# Patient Record
Sex: FEMALE | Race: BLACK OR AFRICAN AMERICAN | Employment: FULL TIME | URBAN - NONMETROPOLITAN AREA
[De-identification: names, ages, dates, MRNs, and addresses within clinical notes are randomized per-mention and may not be internally consistent; named-entity substitution may affect disease eponyms.]

---

## 2017-07-07 PROBLEM — C20 MALIGNANT NEOPLASM OF RECTUM (HCC): Status: ACTIVE | Noted: 2017-07-07

## 2017-07-17 ENCOUNTER — HOSPITAL ENCOUNTER (EMERGENCY)
Age: 52
Discharge: HOME | End: 2017-07-17
Payer: COMMERCIAL

## 2017-07-17 VITALS
TEMPERATURE: 99.2 F | RESPIRATION RATE: 16 BRPM | BODY MASS INDEX: 27.21 KG/M2 | WEIGHT: 135 LBS | OXYGEN SATURATION: 97 % | HEIGHT: 59 IN | HEART RATE: 92 BPM | SYSTOLIC BLOOD PRESSURE: 153 MMHG | DIASTOLIC BLOOD PRESSURE: 102 MMHG

## 2017-07-17 DIAGNOSIS — C20 MALIGNANT NEOPLASM OF RECTUM (HCC): Primary | ICD-10-CM

## 2017-07-17 PROCEDURE — 99283 EMERGENCY DEPT VISIT LOW MDM: CPT

## 2017-07-17 PROCEDURE — 6370000000 HC RX 637 (ALT 250 FOR IP): Performed by: NURSE PRACTITIONER

## 2017-07-17 RX ORDER — OXYCODONE HYDROCHLORIDE AND ACETAMINOPHEN 5; 325 MG/1; MG/1
2 TABLET ORAL ONCE
Status: COMPLETED | OUTPATIENT
Start: 2017-07-17 | End: 2017-07-17

## 2017-07-17 RX ORDER — LIDOCAINE 50 MG/G
OINTMENT TOPICAL
Qty: 50 G | Refills: 1 | Status: SHIPPED | OUTPATIENT
Start: 2017-07-17 | End: 2018-01-25 | Stop reason: SDUPTHER

## 2017-07-17 RX ADMIN — OXYCODONE HYDROCHLORIDE AND ACETAMINOPHEN 2 TABLET: 5; 325 TABLET ORAL at 23:00

## 2017-07-17 ASSESSMENT — ENCOUNTER SYMPTOMS
RHINORRHEA: 0
RECTAL PAIN: 1
EYE PAIN: 0
SHORTNESS OF BREATH: 0
DIARRHEA: 0
VOMITING: 0
EYE DISCHARGE: 0
BACK PAIN: 0
WHEEZING: 0
SORE THROAT: 0
ANAL BLEEDING: 1
NAUSEA: 0
COUGH: 0
ABDOMINAL PAIN: 0

## 2017-07-17 ASSESSMENT — PAIN SCALES - GENERAL
PAINLEVEL_OUTOF10: 8
PAINLEVEL_OUTOF10: 8

## 2017-07-17 ASSESSMENT — PAIN DESCRIPTION - LOCATION: LOCATION: RECTUM

## 2017-07-17 ASSESSMENT — PAIN DESCRIPTION - DESCRIPTORS: DESCRIPTORS: BURNING

## 2017-07-19 ENCOUNTER — HOSPITAL ENCOUNTER (OUTPATIENT)
Dept: PET IMAGING | Age: 52
Discharge: HOME | End: 2017-07-19
Payer: COMMERCIAL

## 2017-07-19 DIAGNOSIS — C79.89 COLON CANCER METASTASIZED TO PELVIS (HCC): ICD-10-CM

## 2017-07-19 DIAGNOSIS — C18.9 COLON CANCER METASTASIZED TO PELVIS (HCC): ICD-10-CM

## 2017-07-19 PROCEDURE — 3430000000 HC RX DIAGNOSTIC RADIOPHARMACEUTICAL: Performed by: RADIOLOGY

## 2017-07-19 PROCEDURE — 78815 PET IMAGE W/CT SKULL-THIGH: CPT

## 2017-07-19 PROCEDURE — A9552 F18 FDG: HCPCS | Performed by: RADIOLOGY

## 2017-07-19 RX ORDER — FLUDEOXYGLUCOSE F 18 200 MCI/ML
10 INJECTION, SOLUTION INTRAVENOUS
Status: COMPLETED | OUTPATIENT
Start: 2017-07-19 | End: 2017-07-19

## 2017-07-19 RX ADMIN — FLUDEOXYGLUCOSE F 18 10.9 MILLICURIE: 200 INJECTION, SOLUTION INTRAVENOUS at 08:13

## 2017-09-29 ENCOUNTER — APPOINTMENT (OUTPATIENT)
Dept: CT IMAGING | Age: 52
End: 2017-09-29
Payer: COMMERCIAL

## 2017-09-29 ENCOUNTER — HOSPITAL ENCOUNTER (EMERGENCY)
Age: 52
Discharge: HOME | End: 2017-09-29
Payer: COMMERCIAL

## 2017-09-29 VITALS
OXYGEN SATURATION: 98 % | HEART RATE: 99 BPM | DIASTOLIC BLOOD PRESSURE: 103 MMHG | TEMPERATURE: 98.2 F | RESPIRATION RATE: 18 BRPM | SYSTOLIC BLOOD PRESSURE: 170 MMHG

## 2017-09-29 DIAGNOSIS — K62.89 RECTAL PAIN: ICD-10-CM

## 2017-09-29 DIAGNOSIS — K59.03 DRUG-INDUCED CONSTIPATION: ICD-10-CM

## 2017-09-29 DIAGNOSIS — Z12.12 SCREENING FOR MALIGNANT NEOPLASM OF THE RECTUM: Primary | ICD-10-CM

## 2017-09-29 LAB
ALBUMIN SERPL-MCNC: 4 G/DL (ref 3.5–5.1)
ALP BLD-CCNC: 69 U/L (ref 38–126)
ALT SERPL-CCNC: 22 U/L (ref 11–66)
ANION GAP SERPL CALCULATED.3IONS-SCNC: 15 MEQ/L (ref 8–16)
ANISOCYTOSIS: ABNORMAL
AST SERPL-CCNC: 14 U/L (ref 5–40)
BACTERIA: ABNORMAL /HPF
BASOPHILS # BLD: 0 %
BASOPHILS ABSOLUTE: 0 THOU/MM3 (ref 0–0.1)
BILIRUB SERPL-MCNC: 0.4 MG/DL (ref 0.3–1.2)
BILIRUBIN URINE: NEGATIVE
BLOOD, URINE: ABNORMAL
BUN BLDV-MCNC: 13 MG/DL (ref 7–22)
CALCIUM SERPL-MCNC: 9.5 MG/DL (ref 8.5–10.5)
CASTS 2: ABNORMAL /LPF
CASTS UA: ABNORMAL /LPF
CHARACTER, URINE: CLEAR
CHLORIDE BLD-SCNC: 97 MEQ/L (ref 98–111)
CO2: 27 MEQ/L (ref 23–33)
COLOR: YELLOW
CREAT SERPL-MCNC: 0.5 MG/DL (ref 0.4–1.2)
CRYSTALS, UA: ABNORMAL
EOSINOPHIL # BLD: 0.7 %
EOSINOPHILS ABSOLUTE: 0.1 THOU/MM3 (ref 0–0.4)
EPITHELIAL CELLS, UA: ABNORMAL /HPF
GFR SERPL CREATININE-BSD FRML MDRD: > 90 ML/MIN/1.73M2
GLUCOSE BLD-MCNC: 98 MG/DL (ref 70–108)
GLUCOSE URINE: NEGATIVE MG/DL
HCT VFR BLD CALC: 31.2 % (ref 37–47)
HEMOGLOBIN: 10.2 GM/DL (ref 12–16)
KETONES, URINE: NEGATIVE
LACTIC ACID: 0.7 MMOL/L (ref 0.5–2.2)
LEUKOCYTE ESTERASE, URINE: NEGATIVE
LYMPHOCYTES # BLD: 6.7 %
LYMPHOCYTES ABSOLUTE: 0.5 THOU/MM3 (ref 1–4.8)
MCH RBC QN AUTO: 28.5 PG (ref 27–31)
MCHC RBC AUTO-ENTMCNC: 32.6 GM/DL (ref 33–37)
MCV RBC AUTO: 87.6 FL (ref 81–99)
MISCELLANEOUS 2: ABNORMAL
MONOCYTES # BLD: 10.3 %
MONOCYTES ABSOLUTE: 0.8 THOU/MM3 (ref 0.4–1.3)
NITRITE, URINE: NEGATIVE
NUCLEATED RED BLOOD CELLS: 0 /100 WBC
OSMOLALITY CALCULATION: 277.6 MOSMOL/KG (ref 275–300)
PDW BLD-RTO: 17.8 % (ref 11.5–14.5)
PH UA: 6
PLATELET # BLD: 350 THOU/MM3 (ref 130–400)
PMV BLD AUTO: 6.6 MCM (ref 7.4–10.4)
POTASSIUM SERPL-SCNC: 3.4 MEQ/L (ref 3.5–5.2)
PROTEIN UA: NEGATIVE
RBC # BLD: 3.57 MILL/MM3 (ref 4.2–5.4)
RBC # BLD: NORMAL 10*6/UL
RBC URINE: ABNORMAL /HPF
RENAL EPITHELIAL, UA: ABNORMAL
SEG NEUTROPHILS: 82.3 %
SEGMENTED NEUTROPHILS ABSOLUTE COUNT: 6.6 THOU/MM3 (ref 1.8–7.7)
SODIUM BLD-SCNC: 139 MEQ/L (ref 135–145)
SPECIFIC GRAVITY, URINE: 1.01 (ref 1–1.03)
TOTAL PROTEIN: 7 G/DL (ref 6.1–8)
UROBILINOGEN, URINE: 0.2 EU/DL
WBC # BLD: 8 THOU/MM3 (ref 4.8–10.8)
WBC UA: ABNORMAL /HPF
YEAST: ABNORMAL

## 2017-09-29 PROCEDURE — 2580000003 HC RX 258: Performed by: PHYSICIAN ASSISTANT

## 2017-09-29 PROCEDURE — 83605 ASSAY OF LACTIC ACID: CPT

## 2017-09-29 PROCEDURE — 36415 COLL VENOUS BLD VENIPUNCTURE: CPT

## 2017-09-29 PROCEDURE — 80053 COMPREHEN METABOLIC PANEL: CPT

## 2017-09-29 PROCEDURE — 96361 HYDRATE IV INFUSION ADD-ON: CPT

## 2017-09-29 PROCEDURE — 96374 THER/PROPH/DIAG INJ IV PUSH: CPT

## 2017-09-29 PROCEDURE — 6360000002 HC RX W HCPCS: Performed by: PHYSICIAN ASSISTANT

## 2017-09-29 PROCEDURE — 6360000004 HC RX CONTRAST MEDICATION: Performed by: PHYSICIAN ASSISTANT

## 2017-09-29 PROCEDURE — 81001 URINALYSIS AUTO W/SCOPE: CPT

## 2017-09-29 PROCEDURE — 74177 CT ABD & PELVIS W/CONTRAST: CPT

## 2017-09-29 PROCEDURE — 96375 TX/PRO/DX INJ NEW DRUG ADDON: CPT

## 2017-09-29 PROCEDURE — 99284 EMERGENCY DEPT VISIT MOD MDM: CPT

## 2017-09-29 PROCEDURE — 85025 COMPLETE CBC W/AUTO DIFF WBC: CPT

## 2017-09-29 RX ORDER — OXYCODONE HYDROCHLORIDE AND ACETAMINOPHEN 5; 325 MG/1; MG/1
1 TABLET ORAL EVERY 4 HOURS PRN
Status: ON HOLD | COMMUNITY
End: 2017-10-27

## 2017-09-29 RX ORDER — HEPARIN SODIUM (PORCINE) LOCK FLUSH IV SOLN 100 UNIT/ML 100 UNIT/ML
100 SOLUTION INTRAVENOUS ONCE
Status: COMPLETED | OUTPATIENT
Start: 2017-09-29 | End: 2017-09-29

## 2017-09-29 RX ORDER — HYDROCORTISONE ACETATE 25 MG/1
25 SUPPOSITORY RECTAL 2 TIMES DAILY
Status: ON HOLD | COMMUNITY
End: 2018-02-08

## 2017-09-29 RX ORDER — KETOROLAC TROMETHAMINE 30 MG/ML
30 INJECTION, SOLUTION INTRAMUSCULAR; INTRAVENOUS ONCE
Status: COMPLETED | OUTPATIENT
Start: 2017-09-29 | End: 2017-09-29

## 2017-09-29 RX ORDER — 0.9 % SODIUM CHLORIDE 0.9 %
1000 INTRAVENOUS SOLUTION INTRAVENOUS ONCE
Status: COMPLETED | OUTPATIENT
Start: 2017-09-29 | End: 2017-09-29

## 2017-09-29 RX ORDER — FENTANYL CITRATE 50 UG/ML
50 INJECTION, SOLUTION INTRAMUSCULAR; INTRAVENOUS ONCE
Status: COMPLETED | OUTPATIENT
Start: 2017-09-29 | End: 2017-09-29

## 2017-09-29 RX ORDER — ONDANSETRON 2 MG/ML
4 INJECTION INTRAMUSCULAR; INTRAVENOUS ONCE
Status: COMPLETED | OUTPATIENT
Start: 2017-09-29 | End: 2017-09-29

## 2017-09-29 RX ADMIN — IOPAMIDOL 80 ML: 755 INJECTION, SOLUTION INTRAVENOUS at 13:07

## 2017-09-29 RX ADMIN — FENTANYL CITRATE 50 MCG: 50 INJECTION INTRAMUSCULAR; INTRAVENOUS at 13:13

## 2017-09-29 RX ADMIN — ONDANSETRON 4 MG: 2 INJECTION INTRAMUSCULAR; INTRAVENOUS at 12:15

## 2017-09-29 RX ADMIN — SODIUM CHLORIDE 1000 ML: 9 INJECTION, SOLUTION INTRAVENOUS at 12:15

## 2017-09-29 RX ADMIN — SODIUM CHLORIDE, PRESERVATIVE FREE 100 UNITS: 5 INJECTION INTRAVENOUS at 15:11

## 2017-09-29 RX ADMIN — KETOROLAC TROMETHAMINE 30 MG: 30 INJECTION, SOLUTION INTRAMUSCULAR at 12:15

## 2017-09-29 ASSESSMENT — ENCOUNTER SYMPTOMS
BLOOD IN STOOL: 1
SHORTNESS OF BREATH: 0
RHINORRHEA: 0
EYE PAIN: 0
SORE THROAT: 0
WHEEZING: 0
NAUSEA: 0
COUGH: 0
RECTAL PAIN: 1
ABDOMINAL PAIN: 0
VOMITING: 0
BACK PAIN: 0
DIARRHEA: 0
EYE DISCHARGE: 0
CONSTIPATION: 1

## 2017-09-29 ASSESSMENT — PAIN DESCRIPTION - PAIN TYPE: TYPE: ACUTE PAIN

## 2017-09-29 ASSESSMENT — PAIN SCALES - GENERAL
PAINLEVEL_OUTOF10: 4
PAINLEVEL_OUTOF10: 5

## 2017-09-29 ASSESSMENT — PAIN DESCRIPTION - LOCATION: LOCATION: RECTUM

## 2017-09-29 NOTE — ED PROVIDER NOTES
R-1, Print      metFORMIN (GLUCOPHAGE) 500 MG tablet Take 500 mg by mouth 2 times daily (with meals)      Probiotic Product (PROBIOTIC ADVANCED PO) Take by mouth      Alpha-D-Galactosidase (BEANO PO) Take by mouth      prochlorperazine (COMPAZINE) 10 MG tablet Take 10 mg by mouth every 6 hours as needed. loperamide (IMODIUM) 2 MG capsule Take 2 mg by mouth as needed. No more than 4 caplets in 24 hours      levothyroxine (LEVOTHROID) 75 MCG tablet Take 75 mcg by mouth Daily. benazepril (LOTENSIN) 10 MG tablet Take 10 mg by mouth nightly. amlodipine (NORVASC) 10 MG tablet Take 10 mg by mouth nightly. Indications: High Blood Pressure             ALLERGIES     is allergic to codeine; dilaudid [hydromorphone hcl]; and morphine. FAMILY HISTORY     indicated that her mother is alive. She indicated that her father is alive. She indicated that her brother is alive. She indicated that her maternal grandmother is . She indicated that her maternal grandfather is . She indicated that her paternal grandmother is . She indicated that her paternal grandfather is . family history includes Cancer in her father; Diabetes in her mother; High Blood Pressure in her father and mother. SOCIAL HISTORY      reports that she has never smoked. She has never used smokeless tobacco. She reports that she drinks alcohol. She reports that she does not use illicit drugs. PHYSICAL EXAM     INITIAL VITALS:  temperature is 98.2 °F (36.8 °C). Her blood pressure is 170/103 (abnormal) and her pulse is 99. Her respiration is 18 and oxygen saturation is 98%. Physical Exam   Constitutional: She is oriented to person, place, and time. Vital signs are normal. She appears well-developed and well-nourished. Non-toxic appearance. No distress. HENT:   Head: Normocephalic and atraumatic. Right Ear: Hearing normal.   Left Ear: Hearing normal.   Nose: Nose normal. No rhinorrhea.    Mouth/Throat: Uvula is midline, oropharynx is clear and moist and mucous membranes are normal.   Eyes: Conjunctivae and EOM are normal. Pupils are equal, round, and reactive to light. No scleral icterus. Neck: No JVD present. No rigidity. No tracheal deviation present. Cardiovascular: Normal rate, regular rhythm and normal heart sounds. Pulmonary/Chest: Effort normal and breath sounds normal. No respiratory distress. She has no decreased breath sounds. She has no wheezes. Abdominal: Soft. Normal appearance and bowel sounds are normal. She exhibits no distension and no pulsatile midline mass. There is no tenderness. There is no rigidity, no rebound, no guarding, no CVA tenderness, no tenderness at McBurney's point and negative Canchola's sign. No hernia. Genitourinary: Rectal exam shows external hemorrhoid (small; not inflamed). Rectal exam shows no fissure, no mass, no tenderness and anal tone normal. Pelvic exam was performed with patient in the knee-chest position. Genitourinary Comments: Small traces of blood present on rectal exam With light brown stool Noted and moderate stool present in the rectal vault. Musculoskeletal: Normal range of motion. Lymphadenopathy:     She has no cervical adenopathy. Neurological: She is alert and oriented to person, place, and time. She has normal strength. Gait normal.   Skin: Skin is warm, dry and intact. No rash noted. She is not diaphoretic. No pallor. Psychiatric: She has a normal mood and affect. Her speech is normal and behavior is normal. Thought content normal.   Nursing note and vitals reviewed.       DIFFERENTIAL DIAGNOSIS:   Including but not limited to: constipation, rectal pain, pain with cancer, GI bleeding, anemia, and progression of colorectal cancer     DIAGNOSTIC RESULTS     EKG: All EKG's are interpreted by the Emergency Department Physician who either signs or Co-signs this chart in the absence of a cardiologist.  None    RADIOLOGY: non-plain film images(s) such as CT, Ultrasound and MRI are read by the radiologist.  Plain radiographic images are visualized and preliminarily interpreted by the emergency physician unless otherwise stated below. CT ABDOMEN PELVIS W IV CONTRAST Additional Contrast? Oral   Final Result   Improved appearance of the pelvis since the prior exam. Persistent moderate constipation. No acute findings. **This report has been created using voice recognition software. It may contain minor errors which are inherent in voice recognition technology. **      Final report electronically signed by Dr. Africa June on 9/29/2017 1:20 PM          LABS:   Labs Reviewed   CBC WITH AUTO DIFFERENTIAL - Abnormal; Notable for the following:        Result Value    RBC 3.57 (*)     Hemoglobin 10.2 (*)     Hematocrit 31.2 (*)     MCHC 32.6 (*)     RDW 17.8 (*)     MPV 6.6 (*)     Lymphocytes # 0.5 (*)     All other components within normal limits   COMPREHENSIVE METABOLIC PANEL - Abnormal; Notable for the following:     Potassium 3.4 (*)     Chloride 97 (*)     All other components within normal limits   URINE WITH REFLEXED MICRO - Abnormal; Notable for the following:     Blood, Urine SMALL (*)     All other components within normal limits   LACTIC ACID, PLASMA   ANION GAP   GLOMERULAR FILTRATION RATE, ESTIMATED   OSMOLALITY       EMERGENCY DEPARTMENT COURSE:   Vitals:    Vitals:    09/29/17 1030 09/29/17 1221   BP: (!) 165/104 (!) 170/103   Pulse: 86 99   Resp: 18 18   Temp:  98.2 °F (36.8 °C)   SpO2: 98% 98%     11:02 AM: The patient received Zofran and Toradol for her symptoms. The patient was seen and evaluated within the ED today for Worsening of chronic rectal pain. On exam, I appreciated Mild fecal impaction with small amount of rectal bleeding, but otherwise soft and nontender abdomen in a nontoxic-appearing patient. Old records were reviewed.   Within the department, I observed the patient's vital signs to be within acceptable range, Although hypertensive. Radiological studies within the department revealed Improved appearance of area in the pelvis with moderate constipation noted. Laboratory work was reassuring. Within the department, the patient was treated with Zofran, Fentanyl and Toradol. I observed the patient's condition to modestly improve during the duration of their stay. The patient had more improvement of pain after Toradol and then with the fentanyl. On re-exam the patient's abdomen is soft and non-tender, with no peritoneal signs. Vital signs have remained stable. The patient remains non-toxic appearing with no distress evident in the ER. I have considered  Intra-abdominal infection, anemia, acute GI bleed and this patient's presentation is not consistent with such entities and therefore, no further testing was warranted. The bleeding is mild and chronic and patient maintains a stable hemoglobin and hematocrit. She is comfortable with the addition of an anti-inflammatory to her regimen. She is aware this may increase rectal bleeding. She has already stopped the iron because she believes it is the cause for the constipation and increase in the rectal pain. I have talked to her about treatments for constipation such as adding Colace or MiraLAX. The patient was comfortable with the plan of discharge home and to follow up with Dr. Azul Parker and Dr. Edson More. Anticipatory guidance was given. The patient is instructed to return to the emergency department for any worsening of their symptoms. Patient was discharged from the emergency department in good condition with all questions answered. See disposition below. I have given the patient strict written and verbal instructions about care at home, follow-up, and signs and symptoms of worsening of condition and they did verbalize understanding. CRITICAL CARE:   None    CONSULTS:  None    PROCEDURES:  None    FINAL IMPRESSION      1. Screening for malignant neoplasm of the rectum    2.  Drug-induced constipation    3. Rectal pain          DISPOSITION/PLAN     1. Screening for malignant neoplasm of the rectum    2. Drug-induced constipation    3. Rectal pain        PATIENT REFERRED TO:  Radha Butt 84  [phone]      as scheduled on Monday    Will Smiley Galvan [address] [address]  [phone]      as scheduled next week      DISCHARGE MEDICATIONS:  Discharge Medication List as of 9/29/2017  2:02 PM          (Please note that portions of this note were completed with a voice recognition program.  Efforts were made to edit the dictations but occasionally words are mis-transcribed.)    Scribe:  Aliza Talbot 9/29/17 11:02 AM Scribing for and in the presence of DELMI Torres. Signed by: Brian Dunbar, 09/30/17 1:05 PM    Provider:  I personally performed the services described in the documentation, reviewed and edited the documentation which was dictated to the scribe in my presence, and it accurately records my words and actions.     DELMI Torres 09/30/17 1:05 PM    DELMI Torres [place]  09/30/17 [id number]

## 2017-09-29 NOTE — ED AVS SNAPSHOT
After Visit Summary  (Discharge Instructions)    Medication List for Home    Based on the information you provided to us as well as any changes during this visit, the following is your updated medication list.  Compare this with your prescription bottles at home. If you have any questions or concerns, contact your primary care physician's office. Daily Medication List (This medication list can be shared with any Healthcare provider who is helping you manage your medications)      ASK your doctor about these medications if you have questions     amLODIPine 10 MG tablet   Commonly known as:  NORVASC   Take 10 mg by mouth nightly. Indications: High Blood Pressure       ANUSOL-HC 25 MG suppository   Generic drug:  hydrocortisone   Place 25 mg rectally 2 times daily       BEANO PO   Take by mouth       benazepril 10 MG tablet   Commonly known as:  LOTENSIN   Take 10 mg by mouth nightly. LEVOTHROID 75 MCG tablet   Generic drug:  levothyroxine   Take 75 mcg by mouth Daily.       lidocaine 5 % ointment   Commonly known as:  XYLOCAINE   Apply topically as needed. loperamide 2 MG capsule   Commonly known as:  IMODIUM   Take 2 mg by mouth as needed. No more than 4 caplets in 24 hours       metFORMIN 500 MG tablet   Commonly known as:  GLUCOPHAGE   Take 500 mg by mouth 2 times daily (with meals)       oxyCODONE-acetaminophen 5-325 MG per tablet   Commonly known as:  PERCOCET   Take 1 tablet by mouth every 4 hours as needed for Pain . PROBIOTIC ADVANCED PO   Take by mouth       prochlorperazine 10 MG tablet   Commonly known as:  COMPAZINE   Take 10 mg by mouth every 6 hours as needed. Allergies as of 9/29/2017        Reactions    Codeine Itching    Dilaudid [Hydromorphone Hcl] Itching    Morphine Itching      Immunizations as of 9/29/2017     No immunizations on file. After Visit Summary    This summary was created for you. Call the [address] [place] [address] at [facility] ([phone])    Smoking harms nonsmokers. When nonsmokers are around people who smoke, they absorb nicotine, carbon monoxide, and other ingredients of tobacco smoke. DO NOT SMOKE AROUND CHILDREN     Children exposed to secondhand smoke are at an increased risk of:  Sudden Infant Death Syndrome (SIDS), acute respiratory infections, inflammation of the middle ear, and severe asthma. Over a longer time, it causes heart disease and lung cancer. There is no safe level of exposure to secondhand smoke. [facility] Signup     Our records indicate that you have an active [facility] account. You can view your After Visit Summary by going to [email or web address] and logging in with your [facility] username and password. If you don't have a [facility] username and password but a parent or guardian has access to your record, the parent or guardian should login with their own [employer] username and password and access your record to view the After Visit Summary. Additional Information  If you have questions, please contact the physician practice where you receive care. Remember, [facility] is NOT to be used for urgent needs. For medical emergencies, dial 911. For questions regarding your [facility] account call [phone]. If you have a clinical question, please call your doctor's office. View your information online  ? Review your current list of  medications, immunization, and allergies. ? Review your future test results online . ? Review your discharge instructions provided by your caregivers at discharge    Certain functionality such as prescription refills, scheduling appointments or sending messages to your provider are not activated if your provider does not use [employer] in his/her office    For questions regarding your [employer] account call [phone].  If you Current as of: March 20, 2017  Content Version: 11.3  © 0314-8857 [email or web address], [employer]. Care instructions adapted under license by [facility] ([facility]). If you have questions about a medical condition or this instruction, always ask your healthcare professional. [address] any warranty or liability for your use of this information.

## 2017-10-04 ENCOUNTER — HOSPITAL ENCOUNTER (EMERGENCY)
Age: 52
Discharge: HOME | End: 2017-10-04
Payer: COMMERCIAL

## 2017-10-04 VITALS
HEART RATE: 105 BPM | TEMPERATURE: 99.5 F | SYSTOLIC BLOOD PRESSURE: 147 MMHG | RESPIRATION RATE: 17 BRPM | DIASTOLIC BLOOD PRESSURE: 92 MMHG | OXYGEN SATURATION: 98 %

## 2017-10-04 DIAGNOSIS — G89.3 CHRONIC PAIN DUE TO NEOPLASM: ICD-10-CM

## 2017-10-04 DIAGNOSIS — C20 MALIGNANT NEOPLASM OF RECTUM (HCC): Primary | ICD-10-CM

## 2017-10-04 PROCEDURE — 99282 EMERGENCY DEPT VISIT SF MDM: CPT

## 2017-10-04 PROCEDURE — 6370000000 HC RX 637 (ALT 250 FOR IP): Performed by: NURSE PRACTITIONER

## 2017-10-04 RX ORDER — FENTANYL 25 UG/H
1 PATCH TRANSDERMAL
Qty: 5 PATCH | Refills: 0 | Status: SHIPPED | OUTPATIENT
Start: 2017-10-04 | End: 2017-10-19

## 2017-10-04 RX ORDER — FENTANYL 12 UG/H
1 PATCH TRANSDERMAL
Status: DISCONTINUED | OUTPATIENT
Start: 2017-10-04 | End: 2017-10-04

## 2017-10-04 RX ORDER — IBUPROFEN 600 MG/1
600 TABLET ORAL EVERY 6 HOURS PRN
Qty: 30 TABLET | Refills: 0 | Status: ON HOLD | OUTPATIENT
Start: 2017-10-04 | End: 2018-11-05

## 2017-10-04 RX ORDER — FENTANYL 50 UG/H
1 PATCH TRANSDERMAL
Status: DISCONTINUED | OUTPATIENT
Start: 2017-10-04 | End: 2017-10-04

## 2017-10-04 RX ORDER — FENTANYL 25 UG/H
1 PATCH TRANSDERMAL
Status: DISCONTINUED | OUTPATIENT
Start: 2017-10-04 | End: 2017-10-04

## 2017-10-04 RX ORDER — FENTANYL 25 UG/H
1 PATCH TRANSDERMAL
Status: DISCONTINUED | OUTPATIENT
Start: 2017-10-04 | End: 2017-10-04 | Stop reason: HOSPADM

## 2017-10-04 ASSESSMENT — ENCOUNTER SYMPTOMS
WHEEZING: 0
NAUSEA: 0
COUGH: 0
BACK PAIN: 0
BLOOD IN STOOL: 0
ABDOMINAL PAIN: 0
DIARRHEA: 0
RECTAL PAIN: 1
SORE THROAT: 0
VOMITING: 0
SHORTNESS OF BREATH: 0

## 2017-10-04 ASSESSMENT — PAIN SCALES - GENERAL
PAINLEVEL_OUTOF10: 10
PAINLEVEL_OUTOF10: 10

## 2017-10-04 ASSESSMENT — PAIN DESCRIPTION - PAIN TYPE: TYPE: ACUTE PAIN

## 2017-10-04 NOTE — LETTER
[address] [facility] [address] [address] EMERGENCY DEPT  [facility] [address]  Phone: [phone]             October 4, 2017    Patient: Karishma Narvaez   YOB: 1965   Date of Visit: 10/4/2017       To Whom It May Concern:    Corrine Can was seen and treated in our emergency department on 10/4/2017. She may return to work on 10/9/2017.       Sincerely,       Margareth Mckeon RN      Signature:__________________________________

## 2017-10-04 NOTE — ED AVS SNAPSHOT
After Visit Summary  (Discharge Instructions)    Medication List for Home    Based on the information you provided to us as well as any changes during this visit, the following is your updated medication list.  Compare this with your prescription bottles at home. If you have any questions or concerns, contact your primary care physician's office. Daily Medication List (This medication list can be shared with any Healthcare provider who is helping you manage your medications)      There are NEW medications for you. START taking them after you leave the hospital     fentaNYL 25 MCG/HR   Commonly known as:  [address] 1 patch onto the skin every 72 hours for 15 days . ibuprofen 600 MG tablet   Commonly known as:  ADVIL;MOTRIN   Take 1 tablet by mouth every 6 hours as needed for Pain         ASK your doctor about these medications if you have questions     amLODIPine 10 MG tablet   Commonly known as:  NORVASC   Take 10 mg by mouth nightly. Indications: High Blood Pressure       ANUSOL-HC 25 MG suppository   Generic drug:  hydrocortisone   Place 25 mg rectally 2 times daily       BEANO PO   Take by mouth       benazepril 10 MG tablet   Commonly known as:  LOTENSIN   Take 10 mg by mouth nightly. LEVOTHROID 75 MCG tablet   Generic drug:  levothyroxine   Take 75 mcg by mouth Daily.       lidocaine 5 % ointment   Commonly known as:  XYLOCAINE   Apply topically as needed. loperamide 2 MG capsule   Commonly known as:  IMODIUM   Take 2 mg by mouth as needed. No more than 4 caplets in 24 hours       metFORMIN 500 MG tablet   Commonly known as:  GLUCOPHAGE   Take 500 mg by mouth 2 times daily (with meals)       oxyCODONE-acetaminophen 5-325 MG per tablet   Commonly known as:  PERCOCET   Take 1 tablet by mouth every 4 hours as needed for Pain .        PROBIOTIC ADVANCED PO   Take by mouth       prochlorperazine 10 MG tablet   Commonly known as:  COMPAZINE Your diagnoses were MALIGNANT NEOPLASM OF RECTUM ([facility] [address] and CHRONIC PAIN DUE TO NEOPLASM. Visit Information     Date of Visit Department Dept Phone    10/4/2017 [facility] [phone]      You were seen by     You were seen by Francesca Saxena NP. Follow-up Appointments    Below is a list of your follow-up and future appointments. This may not be a complete list as you may have made appointments directly with providers that we are not aware of or your providers may have made some for you. Please call your providers to confirm appointments. It is important to keep your appointments. Please bring your current insurance card, photo ID, co-pay, and all medication bottles to your appointment. If self-pay, payment is expected at the time of service. Follow-up Information     Go to Vladimir Martinez DO. Specialty:  Hematology and Oncology    Why:  Go on Tuesday as scheduled    Contact information:    [address]  [phone]        Preventive Care        Date Due    HIV screening is recommended for all people regardless of risk factors  aged 15-65 years at least once (lifetime) who have never been HIV tested. 1/29/1981    Tetanus Combination Vaccine (1 - Tdap) 1/29/1985    Pap Smear 1/29/1987    Colonoscopy 1/29/2016    Yearly Flu Vaccine (1) 9/1/2017    Mammograms are recommended every 2 years for low/average risk patients aged 48 - 69, and every year for high risk patients per updated national guidelines. However these guidelines can be individualized by your provider. 10/4/2018    Cholesterol Screening 1/23/2021                 Care Plan Once You Return Home    This section includes instructions you will need to follow once you leave the hospital.  Your care team will discuss these with you, so you and those caring for you know how to best care for your health needs at home.   This section may also include educational information about certain health topics that may be of help to you. Important Information if you smoke or are exposed to smoking       SMOKING: QUIT SMOKING. THIS IS THE MOST IMPORTANT ACTION YOU CAN TAKE TO IMPROVE YOUR CURRENT AND FUTURE HEALTH. Call the [address] [place] [address] at [facility] ([phone])    Smoking harms nonsmokers. When nonsmokers are around people who smoke, they absorb nicotine, carbon monoxide, and other ingredients of tobacco smoke. DO NOT SMOKE AROUND CHILDREN     Children exposed to secondhand smoke are at an increased risk of:  Sudden Infant Death Syndrome (SIDS), acute respiratory infections, inflammation of the middle ear, and severe asthma. Over a longer time, it causes heart disease and lung cancer. There is no safe level of exposure to secondhand smoke. [employer] Signup     Our records indicate that you have an active [employer] account. You can view your After Visit Summary by going to [email or web address] and logging in with your [employer] username and password. If you don't have a [employer] username and password but a parent or guardian has access to your record, the parent or guardian should login with their own [employer] username and password and access your record to view the After Visit Summary. Additional Information  If you have questions, please contact the physician practice where you receive care. Remember, [employer] is NOT to be used for urgent needs. For medical emergencies, dial 911. For questions regarding your [employer] account call [phone]. If you have a clinical question, please call your doctor's office. View your information online  ? Review your current list of  medications, immunization, and allergies. ? Review your future test results online . ?  Review your discharge instructions provided by your caregivers at discharge    Certain functionality such as prescription refills, scheduling Watch closely for changes in your health, and be sure to contact your doctor if:  · Your pain is not controlled by medicine. · Your symptoms get worse or are not improving as expected. Where can you learn more? Go to [email or web address] and sign in to your [employer] account. Enter S279 in the XIFIN box to learn more about \"Rectal Cancer: Care Instructions. \"     If you do not have an account, please click on the \"Sign Up Now\" link. Current as of: July 26, 2016  Content Version: 11.3  © 0826-4251 [employer], Incorporated. Care instructions adapted under license by [facility] ([facility]). If you have questions about a medical condition or this instruction, always ask your healthcare professional. [address] any warranty or liability for your use of this information.

## 2017-10-04 NOTE — ED PROVIDER NOTES
[facility]  eMERGENCY dEPARTMENT eNCOUnter          [address] [facility]       Chief Complaint   Patient presents with    Rectal Pain     rectal cancer, chemo tx. Nurses Notes reviewed and I agree except as noted in the HPI. HISTORY OF PRESENT ILLNESS    Jimmy Smith is a 47 y.o. female who presents to the Emergency Department for the evaluation of rectal pain. Patient has a history of Hyperlipidemia, Hyperthyroidism, Hypertension, and Colon cancer. The patient states that she was diagnosed with colon cancer in 2012. She states that since then she has gone through surgeries, radiation, and chemo therapy. She states that every time she goes into remission, they find another spot with cancer. She states that Dr. Onel Mcnally MD is who she follows up with for her cancer. She states she is currently on chemotherapy treatment. She states that she is on Xylocaine and Percocet for her rectal pain, but is no longer working. She states after taking Percocet, her pain comes back 2 hours later and she is prescribed to take it every 4-6 hours. She also states Percocet makes her constipated so she is not really taking it. She states tylenol is not helping symptoms. She states she had a bowel movement of diarrhea this morning and denies any blood in it. She states she had an appointment with Dr.Sheikh CLARKE Monday who stated they thought her pain were muscle spasms and gave a new medicine. Patient states this is also not helping relieve pain. She describes her pain as being constant and aching and rates it a 10/10 in severity. She denies any abdominal pain. She states she was here 5 days ago. She states Advil has helped with her pain in the past. Patient has no other complaints at this time. The HPI was provided by the patient. REVIEW OF SYSTEMS     Review of Systems   Constitutional: Negative for chills, fever and unexpected weight change. HENT: Negative for congestion, ear pain and sore throat. Eyes: Negative for visual disturbance. Respiratory: Negative for cough, shortness of breath and wheezing. Cardiovascular: Negative for chest pain, palpitations and leg swelling. Gastrointestinal: Positive for rectal pain. Negative for abdominal pain, blood in stool, diarrhea, nausea and vomiting. Genitourinary: Negative for dysuria and hematuria. Musculoskeletal: Negative for arthralgias and back pain. Skin: Negative for rash. Allergic/Immunologic: Negative for environmental allergies. Neurological: Negative for dizziness, syncope, weakness, numbness and headaches. Hematological: Does not bruise/bleed easily. Psychiatric/Behavioral: Negative for dysphoric mood. The patient is not nervous/anxious. All other systems reviewed and are negative. PAST MEDICAL HISTORY    has a past medical history of Blood transfusion; Cancer ([facility] [address] Hyperlipidemia; Hypertension; and Hyperthyroidism. SURGICAL HISTORY      has a past surgical history that includes Hysterectomy (5/2011); hysteroscopy (2004); Tubal ligation (1990's); Tunneled venous port placement (6/28/2012); Colon surgery (6-5-12); other surgical history (7/30/2013); and Hemorrhoid surgery (8/2013). CURRENT MEDICATIONS       Previous Medications    ALPHA-D-GALACTOSIDASE (BEANO PO)    Take by mouth    AMLODIPINE (NORVASC) 10 MG TABLET    Take 10 mg by mouth nightly. Indications: High Blood Pressure    BENAZEPRIL (LOTENSIN) 10 MG TABLET    Take 10 mg by mouth nightly. HYDROCORTISONE (ANUSOL-HC) 25 MG SUPPOSITORY    Place 25 mg rectally 2 times daily    LEVOTHYROXINE (LEVOTHROID) 75 MCG TABLET    Take 75 mcg by mouth Daily. LIDOCAINE (XYLOCAINE) 5 % OINTMENT    Apply topically as needed. LOPERAMIDE (IMODIUM) 2 MG CAPSULE    Take 2 mg by mouth as needed.  No more than 4 caplets in 24 hours    METFORMIN (GLUCOPHAGE) 500 MG TABLET    Take 500 mg by mouth 2 times daily (with meals)    OXYCODONE-ACETAMINOPHEN (PERCOCET) 5-325 MG PER Exam reveals no gallop and no friction rub. No murmur heard. Pulses:       Radial pulses are 2+ on the right side   Pulmonary/Chest: Effort normal and breath sounds normal. No stridor. No respiratory distress. She has no wheezes. She has no rales. She exhibits no tenderness. Abdominal: Soft. Bowel sounds are normal. She exhibits no distension and no pulsatile midline mass. There is no tenderness. There is no rebound and no guarding. Musculoskeletal: Normal range of motion. She exhibits no edema or tenderness (No calf pain or tenderness. No evidence of DVT). Neurological: She is alert and oriented to person, place, and time. She has normal strength. She displays no tremor. She displays no seizure activity. Coordination normal. GCS eye subscore is 4. GCS verbal subscore is 5. GCS motor subscore is 6. Skin: Skin is warm and dry. No rash (on exposed surfaced) noted. She is not diaphoretic. No cyanosis or erythema. No pallor. Psychiatric: She has a normal mood and affect. Her speech is normal and behavior is normal.   Nursing note and vitals reviewed. DIFFERENTIAL DIAGNOSIS:   Ineffective pain control, Rectal cancer, Chronic pain    DIAGNOSTIC RESULTS     RADIOLOGY: non-plain film images(s) such as CT, Ultrasound and MRI are read by the radiologist.    No orders to display       LABS:   Labs Reviewed - No data to display    EMERGENCY DEPARTMENT COURSE:   Vitals:    Vitals:    10/04/17 1852   BP: (!) 147/92   Pulse: 105   Resp: 17   Temp: 99.5 °F (37.5 °C)   TempSrc: Oral   SpO2: 98%     7:19 PM: The patient was seen and evaluated. The patient's concern all surrounds pain. She has seen oncology and GI this week as well as had a CT scan in the ER. Patient was seen history and physical exam was performed. Patient remained stable here in the emergency department. This patient was evaluated by myself. Patient was resting on cart during examination. Patient did not appear in any distress.   Extensive discussion was had with the patient about pain control and disease process. I consulted with attending physician, Dr. Jose Fernandez, who recommended trialing a fentanyl patch. I discussed the case with Dr. Gerhardt Duel, the patient's oncologist, who agrees with this POC and will follow up with her on Tuesday. The patient will be given a patch here and instructed to continue her percocet and motrin for at least the next 24 hours while the fentanyl begins to work. She is fully educated on side effects of the medication and s/s of overdose. Patient is agreeable to plan of care and will follow up as directed. CRITICAL CARE:   None     CONSULTS:  Dr. Headley :  None    FINAL IMPRESSION      1. Malignant neoplasm of rectum ([address]    2. Chronic pain due to neoplasm          DISPOSITION/PLAN   Discharge    PATIENT REFERRED TO:  Mayra Andersen, [address] [place] [address]  [phone]    Go to  Go on Tuesday as scheduled      DISCHARGE MEDICATIONS:  New Prescriptions    FENTANYL (DURAGESIC) 25 MCG/HR    Place 1 patch onto the skin every 72 hours for 15 days . IBUPROFEN (ADVIL;MOTRIN) 600 MG TABLET    Take 1 tablet by mouth every 6 hours as needed for Pain       (Please note that portions of this note were completed with a voice recognition program.  Efforts were made to edit the dictations but occasionally words are mis-transcribed.)    Scribe:  Bill Pittman 10/4/17 7:19 PM Scribing for and in the presence of Adam Andrews CNP. Signed: Brian Mckee. 10/4/17 7:19 PM    Provider: Adam Andrews CNP  I personally performed the services described in the documentation, reviewed and edited the documentation which was dictated to the scribe in my presence, and it accurately records my words and actions.     Adam Andrews CNP 10/4/17 8:00 PM                     Dima Weiner NP  10/04/17 2052

## 2017-10-25 ENCOUNTER — HOSPITAL ENCOUNTER (OUTPATIENT)
Dept: INFUSION THERAPY | Age: 52
Discharge: HOME | End: 2017-10-25
Payer: COMMERCIAL

## 2017-10-25 VITALS
HEART RATE: 94 BPM | RESPIRATION RATE: 16 BRPM | DIASTOLIC BLOOD PRESSURE: 90 MMHG | OXYGEN SATURATION: 97 % | TEMPERATURE: 98.7 F | SYSTOLIC BLOOD PRESSURE: 150 MMHG

## 2017-10-25 DIAGNOSIS — C20 MALIGNANT NEOPLASM OF RECTUM (HCC): ICD-10-CM

## 2017-10-25 DIAGNOSIS — K63.89 COLONIC MASS: ICD-10-CM

## 2017-10-25 DIAGNOSIS — R19.00 ABDOMINAL WALL MASS OF RIGHT FLANK: ICD-10-CM

## 2017-10-25 LAB
ABO: NORMAL
ANTIBODY SCREEN: NORMAL
RH FACTOR: NORMAL

## 2017-10-25 PROCEDURE — 86900 BLOOD TYPING SEROLOGIC ABO: CPT

## 2017-10-25 PROCEDURE — 36591 DRAW BLOOD OFF VENOUS DEVICE: CPT

## 2017-10-25 PROCEDURE — 86850 RBC ANTIBODY SCREEN: CPT

## 2017-10-25 PROCEDURE — P9016 RBC LEUKOCYTES REDUCED: HCPCS

## 2017-10-25 PROCEDURE — 86901 BLOOD TYPING SEROLOGIC RH(D): CPT

## 2017-10-25 PROCEDURE — 2580000003 HC RX 258: Performed by: RADIOLOGY

## 2017-10-25 PROCEDURE — 6360000002 HC RX W HCPCS: Performed by: RADIOLOGY

## 2017-10-25 PROCEDURE — 36430 TRANSFUSION BLD/BLD COMPNT: CPT

## 2017-10-25 PROCEDURE — 2580000003 HC RX 258: Performed by: INTERNAL MEDICINE

## 2017-10-25 PROCEDURE — 86923 COMPATIBILITY TEST ELECTRIC: CPT

## 2017-10-25 RX ORDER — HEPARIN SODIUM (PORCINE) LOCK FLUSH IV SOLN 100 UNIT/ML 100 UNIT/ML
500 SOLUTION INTRAVENOUS PRN
Status: CANCELLED | OUTPATIENT
Start: 2017-10-25

## 2017-10-25 RX ORDER — HEPARIN SODIUM (PORCINE) LOCK FLUSH IV SOLN 100 UNIT/ML 100 UNIT/ML
500 SOLUTION INTRAVENOUS PRN
Status: DISCONTINUED | OUTPATIENT
Start: 2017-10-25 | End: 2017-10-26 | Stop reason: HOSPADM

## 2017-10-25 RX ORDER — 0.9 % SODIUM CHLORIDE 0.9 %
250 INTRAVENOUS SOLUTION INTRAVENOUS ONCE
Status: COMPLETED | OUTPATIENT
Start: 2017-10-25 | End: 2017-10-25

## 2017-10-25 RX ORDER — SODIUM CHLORIDE 0.9 % (FLUSH) 0.9 %
10 SYRINGE (ML) INJECTION PRN
Status: DISCONTINUED | OUTPATIENT
Start: 2017-10-25 | End: 2017-10-26 | Stop reason: HOSPADM

## 2017-10-25 RX ORDER — SODIUM CHLORIDE 0.9 % (FLUSH) 0.9 %
20 SYRINGE (ML) INJECTION PRN
Status: CANCELLED | OUTPATIENT
Start: 2017-10-25

## 2017-10-25 RX ORDER — SODIUM CHLORIDE 0.9 % (FLUSH) 0.9 %
10 SYRINGE (ML) INJECTION PRN
Status: CANCELLED | OUTPATIENT
Start: 2017-10-25

## 2017-10-25 RX ADMIN — Medication 10 ML: at 14:50

## 2017-10-25 RX ADMIN — Medication 10 ML: at 10:30

## 2017-10-25 RX ADMIN — Medication 500 UNITS: at 14:50

## 2017-10-25 RX ADMIN — Medication 10 ML: at 12:02

## 2017-10-25 RX ADMIN — SODIUM CHLORIDE 250 ML: 9 INJECTION, SOLUTION INTRAVENOUS at 12:02

## 2017-10-25 NOTE — PROGRESS NOTES
Patient tolerated transfusion of 1 unit PRBC's without any complications. Patient kept for 20 minutes observation post transfusion. Denies dizziness, lightheadedness, acute nausea or vomiting, headache, chest pain/pressure, rash/itching, or an increase in SOB. Last vital signs:   BP (!) 150/90   Pulse 94   Temp 98.7 °F (37.1 °C) (Oral)   Resp 16 Comment: lungs clear  LMP 05/01/2011   SpO2 97%     Patient instructed if they experience any of the above symptoms following today's transfusion, she is to notify the physician immediately or go to the emergency department. Discharge instructions given to patient. Verbalizes understanding. Ambulated off unit in stable condition accompanied by family.

## 2017-10-27 ENCOUNTER — APPOINTMENT (OUTPATIENT)
Dept: CT IMAGING | Age: 52
DRG: 394 | End: 2017-10-27
Payer: COMMERCIAL

## 2017-10-27 ENCOUNTER — HOSPITAL ENCOUNTER (INPATIENT)
Age: 52
LOS: 2 days | Discharge: ANOTHER HOSPITAL | DRG: 394 | End: 2017-10-29
Attending: INTERNAL MEDICINE | Admitting: INTERNAL MEDICINE
Payer: COMMERCIAL

## 2017-10-27 DIAGNOSIS — K92.2 LOWER GI BLEED: Primary | ICD-10-CM

## 2017-10-27 DIAGNOSIS — I95.89 HYPOTENSION DUE TO BLOOD LOSS: ICD-10-CM

## 2017-10-27 DIAGNOSIS — R00.0 TACHYCARDIA: ICD-10-CM

## 2017-10-27 DIAGNOSIS — R58 HYPOTENSION DUE TO BLOOD LOSS: ICD-10-CM

## 2017-10-27 DIAGNOSIS — E87.1 HYPONATREMIA: ICD-10-CM

## 2017-10-27 PROBLEM — E03.9 HYPOTHYROIDISM: Status: ACTIVE | Noted: 2017-10-27

## 2017-10-27 PROBLEM — K60.4 PERIRECTAL FISTULA: Status: ACTIVE | Noted: 2017-10-27

## 2017-10-27 PROBLEM — K62.89 CHRONIC RECTAL PAIN: Status: ACTIVE | Noted: 2017-10-27

## 2017-10-27 PROBLEM — K62.5 RECTAL BLEED: Status: ACTIVE | Noted: 2017-10-27

## 2017-10-27 PROBLEM — K62.5 BRBPR (BRIGHT RED BLOOD PER RECTUM): Status: ACTIVE | Noted: 2017-10-27

## 2017-10-27 PROBLEM — E11.9 DIABETES MELLITUS TYPE 2, CONTROLLED (HCC): Status: ACTIVE | Noted: 2017-10-27

## 2017-10-27 PROBLEM — D68.9 COAGULOPATHY (HCC): Status: ACTIVE | Noted: 2017-10-27

## 2017-10-27 PROBLEM — G89.29 CHRONIC RECTAL PAIN: Status: ACTIVE | Noted: 2017-10-27

## 2017-10-27 PROBLEM — I10 BENIGN HYPERTENSION: Status: ACTIVE | Noted: 2017-10-27

## 2017-10-27 LAB
ABO: NORMAL
ALBUMIN SERPL-MCNC: 3 G/DL (ref 3.5–5.1)
ALP BLD-CCNC: 89 U/L (ref 38–126)
ALT SERPL-CCNC: 24 U/L (ref 11–66)
ANION GAP SERPL CALCULATED.3IONS-SCNC: 15 MEQ/L (ref 8–16)
ANISOCYTOSIS: ABNORMAL
ANISOCYTOSIS: ABNORMAL
ANTIBODY SCREEN: NORMAL
APTT: 88.5 SECONDS (ref 22–38)
AST SERPL-CCNC: 12 U/L (ref 5–40)
BASOPHILS # BLD: 0 %
BASOPHILS # BLD: 0 %
BASOPHILS ABSOLUTE: 0 THOU/MM3 (ref 0–0.1)
BASOPHILS ABSOLUTE: 0 THOU/MM3 (ref 0–0.1)
BILIRUB SERPL-MCNC: 0.7 MG/DL (ref 0.3–1.2)
BILIRUBIN DIRECT: 0.3 MG/DL (ref 0–0.3)
BUN BLDV-MCNC: 17 MG/DL (ref 7–22)
CALCIUM SERPL-MCNC: 8.4 MG/DL (ref 8.5–10.5)
CHLORIDE BLD-SCNC: 82 MEQ/L (ref 98–111)
CO2: 24 MEQ/L (ref 23–33)
CREAT SERPL-MCNC: 0.7 MG/DL (ref 0.4–1.2)
DIFFERENTIAL TYPE: ABNORMAL
EOSINOPHIL # BLD: 0 %
EOSINOPHIL # BLD: 0.1 %
EOSINOPHILS ABSOLUTE: 0 THOU/MM3 (ref 0–0.4)
EOSINOPHILS ABSOLUTE: 0 THOU/MM3 (ref 0–0.4)
GFR SERPL CREATININE-BSD FRML MDRD: > 90 ML/MIN/1.73M2
GLUCOSE BLD-MCNC: 102 MG/DL (ref 70–108)
GLUCOSE BLD-MCNC: 112 MG/DL (ref 70–108)
GLUCOSE BLD-MCNC: 131 MG/DL (ref 70–108)
GLUCOSE BLD-MCNC: 168 MG/DL (ref 70–108)
HCT VFR BLD CALC: 18.6 % (ref 37–47)
HCT VFR BLD CALC: 33.1 % (ref 37–47)
HEMOCCULT STL QL: POSITIVE
HEMOGLOBIN: 11.5 GM/DL (ref 12–16)
HEMOGLOBIN: 6.2 GM/DL (ref 12–16)
INR BLD: 1.11 (ref 0.85–1.13)
LACTIC ACID: 2.7 MMOL/L (ref 0.5–2.2)
LYMPHOCYTES # BLD: 2.1 %
LYMPHOCYTES # BLD: 3.1 %
LYMPHOCYTES ABSOLUTE: 0.3 THOU/MM3 (ref 1–4.8)
LYMPHOCYTES ABSOLUTE: 0.4 THOU/MM3 (ref 1–4.8)
MCH RBC QN AUTO: 29.7 PG (ref 27–31)
MCH RBC QN AUTO: 31 PG (ref 27–31)
MCHC RBC AUTO-ENTMCNC: 33.2 GM/DL (ref 33–37)
MCHC RBC AUTO-ENTMCNC: 34.7 GM/DL (ref 33–37)
MCV RBC AUTO: 89.2 FL (ref 81–99)
MCV RBC AUTO: 89.4 FL (ref 81–99)
MONOCYTES # BLD: 4.9 %
MONOCYTES # BLD: 6.1 %
MONOCYTES ABSOLUTE: 0.7 THOU/MM3 (ref 0.4–1.3)
MONOCYTES ABSOLUTE: 0.7 THOU/MM3 (ref 0.4–1.3)
MRSA SCREEN RT-PCR: NEGATIVE
NUCLEATED RED BLOOD CELLS: 0 /100 WBC
NUCLEATED RED BLOOD CELLS: 0 /100 WBC
OSMOLALITY CALCULATION: 249.5 MOSMOL/KG (ref 275–300)
PATHOLOGIST REVIEW: ABNORMAL
PDW BLD-RTO: 16.7 % (ref 11.5–14.5)
PDW BLD-RTO: 19.6 % (ref 11.5–14.5)
PLATELET # BLD: 271 THOU/MM3 (ref 130–400)
PLATELET # BLD: 413 THOU/MM3 (ref 130–400)
PLATELET ESTIMATE: ADEQUATE
PMV BLD AUTO: 6.1 MCM (ref 7.4–10.4)
PMV BLD AUTO: 6.4 MCM (ref 7.4–10.4)
POTASSIUM SERPL-SCNC: 3.6 MEQ/L (ref 3.5–5.2)
RBC # BLD: 2.09 MILL/MM3 (ref 4.2–5.4)
RBC # BLD: 3.71 MILL/MM3 (ref 4.2–5.4)
RH FACTOR: NORMAL
SEG NEUTROPHILS: 90.8 %
SEG NEUTROPHILS: 92.9 %
SEGMENTED NEUTROPHILS ABSOLUTE COUNT: 11 THOU/MM3 (ref 1.8–7.7)
SEGMENTED NEUTROPHILS ABSOLUTE COUNT: 12.5 THOU/MM3 (ref 1.8–7.7)
SODIUM BLD-SCNC: 121 MEQ/L (ref 135–145)
TOTAL PROTEIN: 5.6 G/DL (ref 6.1–8)
WBC # BLD: 12.1 THOU/MM3 (ref 4.8–10.8)
WBC # BLD: 13.5 THOU/MM3 (ref 4.8–10.8)

## 2017-10-27 PROCEDURE — 36415 COLL VENOUS BLD VENIPUNCTURE: CPT

## 2017-10-27 PROCEDURE — 3609027000 HC COLONOSCOPY: Performed by: INTERNAL MEDICINE

## 2017-10-27 PROCEDURE — 99152 MOD SED SAME PHYS/QHP 5/>YRS: CPT | Performed by: INTERNAL MEDICINE

## 2017-10-27 PROCEDURE — 6370000000 HC RX 637 (ALT 250 FOR IP): Performed by: INTERNAL MEDICINE

## 2017-10-27 PROCEDURE — 2500000003 HC RX 250 WO HCPCS

## 2017-10-27 PROCEDURE — 2580000003 HC RX 258: Performed by: NURSE PRACTITIONER

## 2017-10-27 PROCEDURE — 2580000003 HC RX 258: Performed by: INTERNAL MEDICINE

## 2017-10-27 PROCEDURE — 99223 1ST HOSP IP/OBS HIGH 75: CPT | Performed by: INTERNAL MEDICINE

## 2017-10-27 PROCEDURE — 0DJD8ZZ INSPECTION OF LOWER INTESTINAL TRACT, VIA NATURAL OR ARTIFICIAL OPENING ENDOSCOPIC: ICD-10-PCS | Performed by: INTERNAL MEDICINE

## 2017-10-27 PROCEDURE — 85730 THROMBOPLASTIN TIME PARTIAL: CPT

## 2017-10-27 PROCEDURE — 80053 COMPREHEN METABOLIC PANEL: CPT

## 2017-10-27 PROCEDURE — 86901 BLOOD TYPING SEROLOGIC RH(D): CPT

## 2017-10-27 PROCEDURE — 82948 REAGENT STRIP/BLOOD GLUCOSE: CPT

## 2017-10-27 PROCEDURE — 99284 EMERGENCY DEPT VISIT MOD MDM: CPT

## 2017-10-27 PROCEDURE — 86850 RBC ANTIBODY SCREEN: CPT

## 2017-10-27 PROCEDURE — 99253 IP/OBS CNSLTJ NEW/EST LOW 45: CPT | Performed by: SURGERY

## 2017-10-27 PROCEDURE — 87641 MR-STAPH DNA AMP PROBE: CPT

## 2017-10-27 PROCEDURE — A6212 FOAM DRG <=16 SQ IN W/BORDER: HCPCS

## 2017-10-27 PROCEDURE — 85025 COMPLETE CBC W/AUTO DIFF WBC: CPT

## 2017-10-27 PROCEDURE — P9016 RBC LEUKOCYTES REDUCED: HCPCS

## 2017-10-27 PROCEDURE — 6360000002 HC RX W HCPCS

## 2017-10-27 PROCEDURE — 87081 CULTURE SCREEN ONLY: CPT

## 2017-10-27 PROCEDURE — 6360000002 HC RX W HCPCS: Performed by: NURSE PRACTITIONER

## 2017-10-27 PROCEDURE — 93005 ELECTROCARDIOGRAM TRACING: CPT

## 2017-10-27 PROCEDURE — 96374 THER/PROPH/DIAG INJ IV PUSH: CPT

## 2017-10-27 PROCEDURE — 82248 BILIRUBIN DIRECT: CPT

## 2017-10-27 PROCEDURE — 2500000003 HC RX 250 WO HCPCS: Performed by: INTERNAL MEDICINE

## 2017-10-27 PROCEDURE — 83605 ASSAY OF LACTIC ACID: CPT

## 2017-10-27 PROCEDURE — 86900 BLOOD TYPING SEROLOGIC ABO: CPT

## 2017-10-27 PROCEDURE — 82272 OCCULT BLD FECES 1-3 TESTS: CPT

## 2017-10-27 PROCEDURE — 85610 PROTHROMBIN TIME: CPT

## 2017-10-27 PROCEDURE — 87040 BLOOD CULTURE FOR BACTERIA: CPT

## 2017-10-27 PROCEDURE — 72194 CT PELVIS W/O & W/DYE: CPT

## 2017-10-27 PROCEDURE — 86923 COMPATIBILITY TEST ELECTRIC: CPT

## 2017-10-27 PROCEDURE — 36430 TRANSFUSION BLD/BLD COMPNT: CPT

## 2017-10-27 PROCEDURE — 6360000004 HC RX CONTRAST MEDICATION: Performed by: INTERNAL MEDICINE

## 2017-10-27 PROCEDURE — 2100000000 HC CCU R&B

## 2017-10-27 RX ORDER — DEXTROSE, SODIUM CHLORIDE, AND POTASSIUM CHLORIDE 5; .9; .15 G/100ML; G/100ML; G/100ML
INJECTION INTRAVENOUS CONTINUOUS
Status: DISCONTINUED | OUTPATIENT
Start: 2017-10-27 | End: 2017-10-30 | Stop reason: HOSPADM

## 2017-10-27 RX ORDER — SODIUM CHLORIDE 1000 MG
1 TABLET, SOLUBLE MISCELLANEOUS DAILY
Status: ON HOLD | COMMUNITY
End: 2018-11-08 | Stop reason: HOSPADM

## 2017-10-27 RX ORDER — FENTANYL CITRATE 50 UG/ML
50 INJECTION, SOLUTION INTRAMUSCULAR; INTRAVENOUS
Status: DISCONTINUED | OUTPATIENT
Start: 2017-10-27 | End: 2017-10-30 | Stop reason: HOSPADM

## 2017-10-27 RX ORDER — HYDROCHLOROTHIAZIDE 25 MG/1
25 TABLET ORAL DAILY
Status: ON HOLD | COMMUNITY
End: 2018-03-23 | Stop reason: HOSPADM

## 2017-10-27 RX ORDER — MIDAZOLAM HYDROCHLORIDE 1 MG/ML
INJECTION INTRAMUSCULAR; INTRAVENOUS
Status: COMPLETED
Start: 2017-10-27 | End: 2017-10-27

## 2017-10-27 RX ORDER — ALBUTEROL SULFATE 90 UG/1
1 AEROSOL, METERED RESPIRATORY (INHALATION) EVERY 6 HOURS PRN
Status: DISCONTINUED | OUTPATIENT
Start: 2017-10-27 | End: 2017-10-30 | Stop reason: HOSPADM

## 2017-10-27 RX ORDER — SODIUM CHLORIDE 0.9 % (FLUSH) 0.9 %
10 SYRINGE (ML) INJECTION PRN
Status: DISCONTINUED | OUTPATIENT
Start: 2017-10-27 | End: 2017-10-28

## 2017-10-27 RX ORDER — ACETAMINOPHEN 325 MG/1
650 TABLET ORAL EVERY 4 HOURS PRN
Status: DISCONTINUED | OUTPATIENT
Start: 2017-10-27 | End: 2017-10-30 | Stop reason: HOSPADM

## 2017-10-27 RX ORDER — VALSARTAN 320 MG/1
320 TABLET ORAL DAILY
Status: DISCONTINUED | OUTPATIENT
Start: 2017-10-27 | End: 2017-10-30 | Stop reason: HOSPADM

## 2017-10-27 RX ORDER — 0.9 % SODIUM CHLORIDE 0.9 %
250 INTRAVENOUS SOLUTION INTRAVENOUS ONCE
Status: COMPLETED | OUTPATIENT
Start: 2017-10-27 | End: 2017-10-27

## 2017-10-27 RX ORDER — ALBUTEROL SULFATE 90 UG/1
1 AEROSOL, METERED RESPIRATORY (INHALATION) PRN
Status: ON HOLD | COMMUNITY
End: 2018-02-08 | Stop reason: ALTCHOICE

## 2017-10-27 RX ORDER — FENTANYL 25 UG/H
1 PATCH TRANSDERMAL
Status: DISCONTINUED | OUTPATIENT
Start: 2017-10-30 | End: 2017-10-30 | Stop reason: HOSPADM

## 2017-10-27 RX ORDER — VALSARTAN 320 MG/1
320 TABLET ORAL DAILY
Status: ON HOLD | COMMUNITY
End: 2018-09-28 | Stop reason: HOSPADM

## 2017-10-27 RX ORDER — METOPROLOL TARTRATE 5 MG/5ML
INJECTION INTRAVENOUS
Status: COMPLETED
Start: 2017-10-27 | End: 2017-10-27

## 2017-10-27 RX ORDER — HYDROCORTISONE ACETATE 25 MG/1
25 SUPPOSITORY RECTAL 2 TIMES DAILY
Status: DISCONTINUED | OUTPATIENT
Start: 2017-10-27 | End: 2017-10-30 | Stop reason: HOSPADM

## 2017-10-27 RX ORDER — 0.9 % SODIUM CHLORIDE 0.9 %
1000 INTRAVENOUS SOLUTION INTRAVENOUS ONCE
Status: DISCONTINUED | OUTPATIENT
Start: 2017-10-27 | End: 2017-10-27

## 2017-10-27 RX ORDER — AMLODIPINE BESYLATE 5 MG/1
5 TABLET ORAL PRN
Status: DISCONTINUED | OUTPATIENT
Start: 2017-10-27 | End: 2017-10-30 | Stop reason: HOSPADM

## 2017-10-27 RX ORDER — FENTANYL 25 UG/H
1 PATCH TRANSDERMAL
COMMUNITY
End: 2017-11-22 | Stop reason: ALTCHOICE

## 2017-10-27 RX ORDER — 0.9 % SODIUM CHLORIDE 0.9 %
30 INTRAVENOUS SOLUTION INTRAVENOUS ONCE
Status: DISCONTINUED | OUTPATIENT
Start: 2017-10-27 | End: 2017-10-27

## 2017-10-27 RX ORDER — SODIUM CHLORIDE 9 MG/ML
INJECTION, SOLUTION INTRAVENOUS CONTINUOUS
Status: DISCONTINUED | OUTPATIENT
Start: 2017-10-27 | End: 2017-10-28

## 2017-10-27 RX ORDER — LEVOTHYROXINE SODIUM 0.07 MG/1
75 TABLET ORAL DAILY
Status: DISCONTINUED | OUTPATIENT
Start: 2017-10-28 | End: 2017-10-30 | Stop reason: HOSPADM

## 2017-10-27 RX ORDER — HYDROCHLOROTHIAZIDE 25 MG/1
25 TABLET ORAL DAILY
Status: DISCONTINUED | OUTPATIENT
Start: 2017-10-27 | End: 2017-10-30 | Stop reason: HOSPADM

## 2017-10-27 RX ORDER — METOPROLOL TARTRATE 5 MG/5ML
5 INJECTION INTRAVENOUS ONCE
Status: COMPLETED | OUTPATIENT
Start: 2017-10-27 | End: 2017-10-27

## 2017-10-27 RX ORDER — SODIUM CHLORIDE 0.9 % (FLUSH) 0.9 %
10 SYRINGE (ML) INJECTION EVERY 12 HOURS SCHEDULED
Status: DISCONTINUED | OUTPATIENT
Start: 2017-10-27 | End: 2017-10-28

## 2017-10-27 RX ORDER — SODIUM CHLORIDE 450 MG/100ML
INJECTION, SOLUTION INTRAVENOUS CONTINUOUS
Status: DISCONTINUED | OUTPATIENT
Start: 2017-10-27 | End: 2017-10-28

## 2017-10-27 RX ADMIN — VALSARTAN 320 MG: 320 TABLET ORAL at 22:07

## 2017-10-27 RX ADMIN — SODIUM CHLORIDE: 4.5 INJECTION, SOLUTION INTRAVENOUS at 13:51

## 2017-10-27 RX ADMIN — TAZOBACTAM SODIUM AND PIPERACILLIN SODIUM 3.38 G: 375; 3 INJECTION, SOLUTION INTRAVENOUS at 14:35

## 2017-10-27 RX ADMIN — MIDAZOLAM 4 MG: 1 INJECTION INTRAMUSCULAR; INTRAVENOUS at 13:07

## 2017-10-27 RX ADMIN — POTASSIUM CHLORIDE, DEXTROSE MONOHYDRATE AND SODIUM CHLORIDE: 150; 5; 900 INJECTION, SOLUTION INTRAVENOUS at 20:28

## 2017-10-27 RX ADMIN — FENTANYL CITRATE 50 MCG: 50 INJECTION INTRAMUSCULAR; INTRAVENOUS at 08:39

## 2017-10-27 RX ADMIN — IOPAMIDOL 80 ML: 755 INJECTION, SOLUTION INTRAVENOUS at 13:36

## 2017-10-27 RX ADMIN — TAZOBACTAM SODIUM AND PIPERACILLIN SODIUM 3.38 G: 375; 3 INJECTION, SOLUTION INTRAVENOUS at 22:56

## 2017-10-27 RX ADMIN — METOPROLOL TARTRATE 5 MG: 5 INJECTION INTRAVENOUS at 11:55

## 2017-10-27 RX ADMIN — SODIUM CHLORIDE 1000 ML: 9 INJECTION, SOLUTION INTRAVENOUS at 07:10

## 2017-10-27 RX ADMIN — SODIUM CHLORIDE 1782 ML: 9 INJECTION, SOLUTION INTRAVENOUS at 07:30

## 2017-10-27 RX ADMIN — FENTANYL CITRATE 50 MCG: 50 INJECTION INTRAMUSCULAR; INTRAVENOUS at 18:17

## 2017-10-27 RX ADMIN — SODIUM CHLORIDE 250 ML: 9 INJECTION, SOLUTION INTRAVENOUS at 10:00

## 2017-10-27 RX ADMIN — FENTANYL CITRATE 100 MCG: 50 INJECTION INTRAMUSCULAR; INTRAVENOUS at 13:08

## 2017-10-27 RX ADMIN — SODIUM CHLORIDE: 9 INJECTION, SOLUTION INTRAVENOUS at 08:10

## 2017-10-27 RX ADMIN — Medication 10 ML: at 22:08

## 2017-10-27 RX ADMIN — METOPROLOL TARTRATE 5 MG: 5 INJECTION, SOLUTION INTRAVENOUS at 11:55

## 2017-10-27 RX ADMIN — FENTANYL CITRATE 50 MCG: 50 INJECTION INTRAMUSCULAR; INTRAVENOUS at 07:25

## 2017-10-27 RX ADMIN — Medication 10 ML: at 13:09

## 2017-10-27 ASSESSMENT — PAIN SCALES - GENERAL
PAINLEVEL_OUTOF10: 10
PAINLEVEL_OUTOF10: 6
PAINLEVEL_OUTOF10: 4
PAINLEVEL_OUTOF10: 3
PAINLEVEL_OUTOF10: 3
PAINLEVEL_OUTOF10: 4
PAINLEVEL_OUTOF10: 2
PAINLEVEL_OUTOF10: 8
PAINLEVEL_OUTOF10: 7
PAINLEVEL_OUTOF10: 10
PAINLEVEL_OUTOF10: 5
PAINLEVEL_OUTOF10: 8

## 2017-10-27 ASSESSMENT — ENCOUNTER SYMPTOMS
CHEST TIGHTNESS: 0
COUGH: 0
SINUS PAIN: 0
NAUSEA: 1
COLOR CHANGE: 0
VOMITING: 0
EYE REDNESS: 0
EYE ITCHING: 0
CONSTIPATION: 0
WHEEZING: 0
DIARRHEA: 0
STRIDOR: 0
SORE THROAT: 0
SHORTNESS OF BREATH: 1
ABDOMINAL PAIN: 0
EYE PAIN: 0
APNEA: 0
BLOOD IN STOOL: 1
EYE DISCHARGE: 0
ABDOMINAL DISTENTION: 0
TROUBLE SWALLOWING: 0
RECTAL PAIN: 1
FACIAL SWELLING: 0
ANAL BLEEDING: 1
BACK PAIN: 0

## 2017-10-27 ASSESSMENT — PAIN DESCRIPTION - PAIN TYPE
TYPE: ACUTE PAIN

## 2017-10-27 ASSESSMENT — PAIN DESCRIPTION - LOCATION
LOCATION: RECTUM

## 2017-10-27 ASSESSMENT — PAIN DESCRIPTION - FREQUENCY
FREQUENCY: CONTINUOUS
FREQUENCY: CONTINUOUS

## 2017-10-27 ASSESSMENT — PAIN DESCRIPTION - DESCRIPTORS: DESCRIPTORS: SHARP

## 2017-10-27 NOTE — ED PROVIDER NOTES
[facility] Emergency Department    CHIEF COMPLAINT       Chief Complaint   Patient presents with    Rectal Bleeding     rectal pain       Nurses Notes reviewed and I agree except as noted in the HPI. HISTORY OF PRESENT ILLNESS    María Mohan is a 47 y.o. female who presents to the ED for evaluation of Rectal pain and bleeding. Patient reports that she began having bright red blood per rectum early this morning. Patient has dried blood along gluteal folds and douglas blood oozing from rectum. Patient has saturated an adult pad. She reports douglas blood and large clots in the toilet this morning. She states that she had a blood transfusion 2 days ago. She reports pain is chronic. States that it has been 2 days since her last Percocet, pain is being treated with fentanyl patch. Patient reports that she has also been treating pain with topical [place] cream. Patient noticed      Pain description:  Onset: Yesterday  Location: Rectum  Duration: 5 years  Character: aching and burning  Aggravating factors: bowel movements and ambulation  Radiation: none  Timing: constant  Severity: 9/10    Experienced previously: New onset bleeding, chronic pain    HPI was provided by the patient. REVIEW OF SYSTEMS     Review of Systems   Constitutional: Positive for activity change and fatigue. Negative for appetite change, chills, diaphoresis, fever and unexpected weight change. HENT: Negative for congestion, dental problem, ear pain, facial swelling, hearing loss, nosebleeds, sinus pain, sneezing, sore throat and trouble swallowing. Eyes: Negative for pain, discharge, redness and itching. Respiratory: Positive for shortness of breath. Negative for apnea, cough, chest tightness, wheezing and stridor. Cardiovascular: Negative for chest pain, palpitations and leg swelling. Gastrointestinal: Positive for anal bleeding, blood in stool, nausea and rectal pain.  Negative for abdominal distention, abdominal pain, constipation, tracheal deviation present. No thyromegaly present. Cardiovascular: Intact distal pulses. Tachycardia present. PMI is not displaced. Exam reveals no gallop, no S3, no S4 and no friction rub. No murmur heard. Pulmonary/Chest: No stridor. Tachypnea noted. No apnea. She is not intubated. No respiratory distress. She has no wheezes. She has no rales. She exhibits no tenderness. Abdominal: Bowel sounds are normal. She exhibits no shifting dullness, no distension, no pulsatile liver, no fluid wave, no abdominal bruit and no ascites. There is no hepatosplenomegaly. There is no tenderness. There is no rebound, no guarding and no CVA tenderness. No hernia. Genitourinary: Rectal exam shows guaiac positive stool. Musculoskeletal: She exhibits no edema, tenderness or deformity. Neurological: She is alert and oriented to person, place, and time. Skin: Skin is warm and dry. DIFFERENTIAL DIAGNOSIS:   Rectal cancer, melena, internal hemorrhoid, rectal fissure. DIAGNOSTIC RESULTS     EKG: All EKG's are interpreted by the Emergency Department Physician who either signs or Co-signs this chart in the absence of a cardiologist.    EKG read and interpreted by myself with comparison to EKG from July 2013 gives impression of sinus tachycardia with heart rate of 131; interval 120ms; QRS 64ms;QTc 445ms; axis 66-45-59. RADIOLOGY: non-plain film images(s) such as CT, Ultrasound and MRI are read by the radiologist.  Plain radiographic images are visualized and preliminarily interpreted by the emergency physician unless otherwise stated below. CT PELVIS W WO IV CONTRAST Additional Contrast? None   Final Result    IMPRESSION:  Perirectal and presacral gas and fluid with contiguous gas predominantly and minimal associated edema highly suspicious for a perirectal fistula and possible developing necrotic infection, no drainable abscess is seen.             **This report has been created using voice recognition software. It may contain minor errors which are inherent in voice recognition technology. **      Final report electronically signed by Dr. Colonel Markham on 10/27/2017 1:50 PM            LABS:   Labs Reviewed   CBC WITH AUTO DIFFERENTIAL - Abnormal; Notable for the following:        Result Value    WBC 13.5 (*)     RBC 2.09 (*)     Hemoglobin 6.2 (*)     Hematocrit 18.6 (*)     RDW 19.6 (*)     Platelets 229 (*)     MPV 6.1 (*)     Segs Absolute 12.5 (*)     Lymphocytes # 0.3 (*)     All other components within normal limits   BASIC METABOLIC PANEL - Abnormal; Notable for the following:     Sodium 121 (*)     Chloride 82 (*)     Glucose 168 (*)     Calcium 8.4 (*)     All other components within normal limits   HEPATIC FUNCTION PANEL - Abnormal; Notable for the following:     Alb 3.0 (*)     Total Protein 5.6 (*)     All other components within normal limits   APTT - Abnormal; Notable for the following:     aPTT 88.5 (*)     All other components within normal limits   LACTIC ACID, PLASMA - Abnormal; Notable for the following:     Lactic Acid 2.7 (*)     All other components within normal limits   OSMOLALITY - Abnormal; Notable for the following:     Osmolality Calc 249.5 (*)     All other components within normal limits   CBC WITH AUTO DIFFERENTIAL - Abnormal; Notable for the following:     WBC 12.1 (*)     RBC 3.71 (*)     Hemoglobin 11.5 (*)     Hematocrit 33.1 (*)     RDW 16.7 (*)     MPV 6.4 (*)     Segs Absolute 11.0 (*)     Lymphocytes # 0.4 (*)     All other components within normal limits   POCT GLUCOSE - Abnormal; Notable for the following:     POC Glucose 112 (*)     All other components within normal limits   MRSA SCREENING CULTURE ONLY   VRE CULTURE   CULTURE BLOOD #1   CULTURE BLOOD #2   PROTIME-INR   BLOOD OCCULT STOOL SCREEN #1   ANION GAP   GLOMERULAR FILTRATION RATE, ESTIMATED   MRSA BY PCR   POCT GLUCOSE   TYPE AND SCREEN   PREPARE RBC (CROSSMATCH)    Narrative:     [id number] transfused  [id number]     issued       EMERGENCY DEPARTMENT COURSE:   Vitals:    Vitals:    10/27/17 1040 10/27/17 1125 10/27/17 1222 10/27/17 1500   BP:   (!) 167/103 (!) 151/96   Pulse: 98   125   Resp: 20 22 18   Temp: 99 °F (37.2 °C)  99.6 °F (37.6 °C) 99.5 °F (37.5 °C)   TempSrc:   Oral Oral   SpO2:    99%   Weight:       Height:  4' 11\" (1.499 m)           MDM    Patient was seen and evaluated in the emergency department, patient appeared to be in acute distress associated with rectal pain. She was given Fentanyl for pain relief, this appeared to significantly decrease her pain. Labs were obtained, significant anemia was noted hemoglobin of 6.2 and hematocrit of 18.6. Hyponatremia was also noted. Slight lactic acid was noted, tachycardia was also noted, I believe these are all likely related to the patient's anemia. I have a low suspicion for infection at this time as the patient's clinical scenario points to GI bleeding likely from rectal cancer. Due to the patient's tachycardia and slight decreased blood pressure I feel she will require intensive care. I discussed the case with Dr. Maricarmen Foreman of the intensivist service he graciously accepted the patient for admission. I discussed the case with Lyn montenegro CNP of the gastroenterology service who states they will see the patient on the floor. I also discussed the case with Dr. Major the patient's oncologist to also will see the patient on the floor. I discussed my findings my plan of care with the patient she was amenable with admission and blood transfusion. While here in the emergency department she maintained a stable course is appropriate for admission.     Medications   fentaNYL (SUBLIMAZE) injection 50 mcg (50 mcg Intravenous Given 10/27/17 1817)   0.9 % sodium chloride bolus (250 mLs Intravenous New Bag 10/27/17 1000)   0.9 % sodium chloride infusion ( Intravenous New Bag 10/27/17 0810)   sodium chloride flush 0.9 % injection 10 mL (10 mLs [address] [facility] [address]

## 2017-10-27 NOTE — ED NOTES
Pt alert and oriented x4. Breathing easy and unlabored. Rprts 10/10 rectum pressure. Bedside commode placed at bedside. Pt able to ambulate and pivot w/o assistance. Rprts increased pain when sitting on commode. Multiple blood clots noted in commode. Pt repositioned back on cot. Call light within reach. Will continue to monitor for safety and comfort.       Adam Ruby, RN  10/27/17 3034

## 2017-10-27 NOTE — CONSULTS
[address] CONSULT      Patient: Jayy Harrison  : 1965  Acct#: [de-identified]  Consult seen for:   Jayy Harrison is a 47 y.o. , female with rectal bleeding. ASSESSMENT:     Rectal bleeding- Hematochezia  Perirectal pain; multiple ER visits for this per 2017 office note. Was on NSAIDs per office notes ([facility] had recommended she take 600 mg every 6 hours)  Anemia; H/H 6.2/18.6  Hx rectal cancer  and recurrent  and again on Flex sigmoidoscopy in May 2017 ; had radiation and chemotherapy in the past. Notes from Radiation oncology state that the patient had no further imaging for 2 years. (since )  Hyponatremia; Na+ 121   + FOBT  Hx bowel resection in   HTN  Hyperthyroidism  Anxiety  Has Mediport         PLAN: I reviewed multiple provider notes since patient was not helpful with information!  --Note from Office; Alejo Closs CNP, from 10-02-17. She saw her for rectal pain which patient described as twisting, pinching sensation. 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 She was to take baths, continue Anusol suppositories, lidocaine ointment and avoid narcotics/Percoce that she was on already. --She was to see Alejo Closs CNP again in the office on 10-23-17 but did not go; said that she went to see DR Sidney Hamilton \"instead\". Patient is already due to have repeat colonoscopy per office note  -- plan for flex sigmoidoscopy/Colonoscopy to be done today with DR call at 1215. She may still need full colonoscopy done at a later time  --Pt said that she saw DR Sidney Hamilton just yesterday, and is getting Avastin. However, in the ER note from 10-04-17, she told the ER staff that she was getting Chemotherapy and she was seeing DR Sidney Hamilton then also   --Follow labs; H/H  --Saw surgeon at [facility]; Dragan Harp in the past   --Flex sigmoidoscopy May 2017: pathology was suspicious for invasive colonic adenocarcinoma.  She was referred to  [facility]  --Office visit 10/27/17   0704   LACTA  2.7*     Calcium:  Recent Labs      10/27/17   0704   CALCIUM  8.4*     Ionized Calcium:No results for input(s): IONCA in the last 72 hours. Magnesium:No results for input(s): MG in the last 72 hours. Phosphorus:No results for input(s): PHOS in the last 72 hours. Troponin: No results for input(s): CKTOTAL, CKMB, TROPONINI in the last 72 hours. PT/INR:     Recent Labs      10/27/17   0704   INR  1.11         REVIEW OF SYSTEMS:    GENERAL:  No fever, chills or weight loss. EYES:  No  blurred vision, double vision,  pain on exposure to light. CARDIOVASCULAR:  No chest pain or palpitations. RESPIRATORY:  No dyspnea or cough. GI: rectal bleeding; rectal pain   MUSCULOSKELETAL:  No new painful or swollen joints or myalgias. :   No dysuria or hematuria. SKIN:  No rashes or jaundice. NEUROLOGIC:   No headaches or  seizures,  numbness or tingling of arms, or legs. PSYCH:  No anxiety or depression. ENDOCRINE:   No polyuria or polydipsia. BLOOD: ++  anemia, no bleeding disorder, ++ blood or blood product transfusion. PHYSICAL EXAMINATION:      Vitals:    10/27/17 0730   BP: 99/76   Pulse: 126   Resp: 12   Temp:    SpO2: 98%     GEN: Well nourished, well developed. LUNGS:  Clear to auscultation bilaterally. Chest rises equally on inspiration. CARDIOVASCULAR:  Regular rate and rhythm without murmurs, rubs or gallops. ABDOMEN:  Soft, nontender and nondistended with normal bowel sounds. EYES: SANTIAGO. Extraoccular motions intact bilaterally. ENT:  Ears symmetric, Neck supple, trachea midline, moist mucous membranes     EXTREMITIES:  No cyanosis, clubbing, or edema. DERM:  No rash or jaundice. NEURO:  Alert and oriented x4. Patient moves all extremities and has gross sensation in all extremities.    PSYCHIATRIC:short with answers, said she has no idea what the plan of care is with DR Major or any other provider, rude  Male visitors were more friendly but still not a lot of info supplied. HOME MEDICATIONS      Prior to Admission medications    Medication Sig Start Date End Date Taking? Authorizing Provider   ibuprofen (ADVIL;MOTRIN) 600 MG tablet Take 1 tablet by mouth every 6 hours as needed for Pain 10/4/17  Yes Rema Clark NP   oxyCODONE-acetaminophen (PERCOCET) 5-325 MG per tablet Take 1 tablet by mouth every 4 hours as needed for Pain . Yes Historical Provider, MD   hydrocortisone (ANUSOL-HC) 25 MG suppository Place 25 mg rectally 2 times daily   Yes Historical Provider, MD   lidocaine (XYLOCAINE) 5 % ointment Apply topically as needed. 7/17/17  Yes Rema Clark NP   metFORMIN (GLUCOPHAGE) 500 MG tablet Take 500 mg by mouth 2 times daily (with meals)   Yes Historical Provider, MD   Probiotic Product (PROBIOTIC ADVANCED PO) Take by mouth   Yes Historical Provider, MD   Alpha-D-Galactosidase (BEANO PO) Take by mouth   Yes Historical Provider, MD   prochlorperazine (COMPAZINE) 10 MG tablet Take 10 mg by mouth every 6 hours as needed. Yes Historical Provider, MD   loperamide (IMODIUM) 2 MG capsule Take 2 mg by mouth as needed. No more than 4 caplets in 24 hours   Yes Historical Provider, MD   levothyroxine (LEVOTHROID) 75 MCG tablet Take 75 mcg by mouth Daily. Yes Historical Provider, MD   benazepril (LOTENSIN) 10 MG tablet Take 10 mg by mouth nightly. Yes Historical Provider, MD   amlodipine (NORVASC) 10 MG tablet Take 10 mg by mouth nightly. Indications: High Blood Pressure   Yes Historical Provider, MD       MEDICATIONS    Scheduled Meds:   sodium chloride  250 mL Intravenous Once     Continuous Infusions:   sodium chloride       PRN Meds:.fentanNYL    ALLERGIES   is allergic to codeine; dilaudid [hydromorphone hcl]; and morphine.     SOCIAL HISTORY    Social History  Social History     Social History    Marital status:      Spouse name: N/A    Number of children: N/A    Years of education: N/A     Occupational

## 2017-10-27 NOTE — PROGRESS NOTES
MRI ; care everywhere notes      Result Impression   IMPRESSION:  1.  Treatment changes from prior low anterior resection and chemoradiation. Wall thickening of the rectosigmoid colon demonstrates mild enhancement and  may correspond with the recurrent disease seen on recent sigmoidoscopy. 2.  Presacral and perirectal fat soft tissue thickening may reflect treatment  change. Peripheral enhancement is seen in some of the presacral soft tissues  and is indeterminate. 3.  Right pelvic sidewall lymph node is not enlarged by size criteria and  appears similar to prior PET/CT. 4.  No loculated fluid collections. Electronically Signed By: Radames Kang MD on 8/3/2017 11:27 AM    I personally viewed and interpreted these images and I have reviewed and  approved this report. Electronically Signed By: Jakob Castle MD on 8/3/2017 1:50 PM   Result Narrative   EXAM: MRI PELVIS WITH AND WITHOUT CONTRAST, 08/02/2017 17:49 PM    CLINICAL INDICATIONS: Rectal cancer, staging, locoregional; Recurrent rectal  cancer, please assess sacral involvement;       C20: Malignant neoplasm of rectum      Sex:  Female, Age:  52 years    COMPARISON: PET/CT dated August 12, 2015. TECHNIQUE: 1.5 linda  T2-weighted images were obtained in the axial, coronal and sagittal planes. Axial T1-weighted images with and without fat suppression were also acquired. Postcontrast T1 W fat saturated images were obtained in the axial and sagittal  planes. FINDINGS:     Genital:  Patient is status post hysterectomy minimal, nonspecific enhancement along the  vaginal cuff. Bladder:  Urinary bladder is distended and appears unremarkable. Adenopathy:  No enlarged pelvic lymph nodes by size criteria. A prominent pelvic lymph node  along the right external iliac chain measures 0.8 x 1.6 cm on axial  postgadolinium image 125; this appears not significantly changed from PET/CT  in 2015 where it showed no significant hypermetabolic activity.  No new  lymphadenopathy is noted within the visualized pelvis. Gastrointestinal:  Postoperative change is seen from low anterior resection and chemoradiation,  with susceptibility artifact noted on precontrast T1 sequences at the  anastomosis. There is wall thickening with mild postcontrast enhancement near  the site of prior anastomosis (series 13 image 104). There is soft tissue  thickening in the presacral and perirectal fat planes, which may reflect  treatment change from prior resection and chemoradiation. There is peripheral  enhancement of some of this thickened soft tissue in the presacral space,  eccentric to the left; this is indeterminate (series 13 image 98). No fluid  collections are seen. No free fluid or loculated fluid collections are noted within the visualized  pelvis.     Bony:  Visualized osseous structures are intact with a normal marrow signal.   Status Results Details

## 2017-10-27 NOTE — H&P
History & Physical        Patient:  Dannie Lagunas  YOB: 1965    MRN: [id number]     Acct: [de-identified]    PCP: Alysha Gaona MD    Date of Admission: 10/27/2017    Date of Service: Pt seen/examined on 10/27/17 and Admitted to Inpatient with expected LOS greater than two midnights due to medical therapy. Chief Complaint:  Acute onset of bright red blood per rectum. Chronic rectal pain. History Of Present Illness:     47 y.o. female who has a known history of colon cancer and is status post a left hemicolectomy back in 2012, possibly with adjunctive radio and chemotherapy. In 2013 patient underwent resection of a right abdominal wall mass, and shortly thereafter hemorrhoid surgery. Review of her chart seems to indicate that patient had further radiation therapy for malignant neoplasm of the rectosigmoid junction and the anus back in 2015. For several months now patient has had a persistent problem with worsening rectal pain. Recent studies have considered that a presacral mass was highly suspicious for malignancy. She has been receiving topical therapy and also a fentanyl skin patch for the rectal pain. Patient has a history of diabetes, hypertension, and hypothyroidism and reports that she diligently follows her medical regimen. Beginning last p.m. patient began to experience the passage of bright red blood per rectum, describing at times dark red blood and clots. She had no abdominal pain or cramping, except for the chronic rectal pain, but noted her heart was \"racing\". Patient was brought to the emergency department where an initial CBC revealed a white cell count of 13, a hematocrit of 18. Platelet count was 004,266. Patient's chemistries showed a sodium of 121. INR was 1.11, but a PTT was 85. Patient was admitted and had the emergent transfusion of 2 units of packed red cells. The follow-up hematocrit was 33 when checked this afternoon.   Patient also had CT scan of Regular rate and rhythm with normal S1/S2 without murmurs, rubs or gallops. Abdomen: Soft,  and rounded,non-tender, non-distended with normal bowel sounds. multiple well-healed scars from her prior surgeries. Musculoskeletal:  No clubbing, cyanosis or edema bilaterally. Full range of motion without deformity. Skin: Skin color, texture, turgor normal.  No rashes or lesions. Neurologic:  Neurovascularly intact without any focal sensory/motor deficits. Cranial nerves: II-XII intact, grossly non-focal.  Psychiatric:  Alert and oriented, thought content appropriate, normal insight  Capillary Refill: Brisk,< 3 seconds   Peripheral Pulses: +2 palpable, equal bilaterally       Labs:     Recent Labs      10/27/17   0704  10/27/17   1500   WBC  13.5*  12.1*   HGB  6.2*  11.5*   HCT  18.6*  33.1*   PLT  413*  271     Recent Labs      10/27/17   0704   NA  121*   K  3.6   CL  82*   CO2  24   BUN  17   CREATININE  0.7   CALCIUM  8.4*     Recent Labs      10/27/17   0704   AST  12   ALT  24   BILIDIR  0.3   BILITOT  0.7   ALKPHOS  89     Recent Labs      10/27/17   0704   INR  1.11     No results for input(s): Bernis Coy in the last 72 hours. Urinalysis:      Lab Results   Component Value Date    NITRU NEGATIVE 09/29/2017    WBCUA 0-2 09/29/2017    BACTERIA NONE 09/29/2017    RBCUA 3-5 09/29/2017    BLOODU SMALL 09/29/2017    SPECGRAV 1.020 08/06/2016    GLUCOSEU NEGATIVE 09/29/2017       Radiology:       CT PELVIS W WO IV CONTRAST Additional Contrast? None   Final Result    IMPRESSION:  Perirectal and presacral gas and fluid with contiguous gas predominantly and minimal associated edema highly suspicious for a perirectal fistula and possible developing necrotic infection, no drainable abscess is seen. **This report has been created using voice recognition software. It may contain minor errors which are inherent in voice recognition technology. **      Final report electronically signed by Dr. Ronen Ricci Bijal Rogerio on 10/27/2017 1:50 PM               DVT prophylaxis: [] Lovenox                                 [x] SCDs                                 [] SQ Heparin                                 [] Encourage ambulation           [] Already on Anticoagulation    Code Status: Full Code        Disposition:    [] Home       [] TCU       [] Rehab       [] Psych       [] SNF       [] [facility]       [] Other-    ASSESSMENT:    C/Sophia [facility] [address] Problems    Diagnosis Date Noted    BRBPR (bright red blood per rectum) [K62.5] 10/27/2017     Priority: High    Chronic rectal pain [K62.89, G89.29] 10/27/2017     Priority: High    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 Infectious diseases have been consulted and and will likely make recommendations for antibiotic therapy. Surgery has been subsequently involved. Patient had an elevated APTT on admission labs, suggesting a coagulopathy. This will be rechecked in the morning as well. I have asked that nursing keep a unit of blood available for emergent transfusion should it be needed. Thank you Alexa Mathis MD for the opportunity to be involved in this patient's care.     Electronically signed by Amelia Rosales MD on 10/27/2017 at 8:39 PM

## 2017-10-28 LAB
ANION GAP SERPL CALCULATED.3IONS-SCNC: 12 MEQ/L (ref 8–16)
APTT: 22.8 SECONDS (ref 22–38)
BUN BLDV-MCNC: 11 MG/DL (ref 7–22)
CALCIUM IONIZED: 1.1 MMOL/L (ref 1.12–1.32)
CALCIUM SERPL-MCNC: 7.8 MG/DL (ref 8.5–10.5)
CHLORIDE BLD-SCNC: 90 MEQ/L (ref 98–111)
CO2: 23 MEQ/L (ref 23–33)
CREAT SERPL-MCNC: 0.7 MG/DL (ref 0.4–1.2)
EKG ATRIAL RATE: 131 BPM
EKG P AXIS: 66 DEGREES
EKG P-R INTERVAL: 120 MS
EKG Q-T INTERVAL: 302 MS
EKG QRS DURATION: 64 MS
EKG QTC CALCULATION (BAZETT): 445 MS
EKG R AXIS: 45 DEGREES
EKG T AXIS: 59 DEGREES
EKG VENTRICULAR RATE: 131 BPM
GFR SERPL CREATININE-BSD FRML MDRD: > 90 ML/MIN/1.73M2
GLUCOSE BLD-MCNC: 137 MG/DL (ref 70–108)
GLUCOSE BLD-MCNC: 167 MG/DL (ref 70–108)
GLUCOSE BLD-MCNC: 168 MG/DL (ref 70–108)
GLUCOSE BLD-MCNC: 172 MG/DL (ref 70–108)
GLUCOSE BLD-MCNC: 172 MG/DL (ref 70–108)
HCT VFR BLD CALC: 29.5 % (ref 37–47)
HEMOGLOBIN: 10.4 GM/DL (ref 12–16)
INR BLD: 1.27 (ref 0.85–1.13)
MCH RBC QN AUTO: 31.4 PG (ref 27–31)
MCHC RBC AUTO-ENTMCNC: 35.2 GM/DL (ref 33–37)
MCV RBC AUTO: 89.4 FL (ref 81–99)
PDW BLD-RTO: 17.1 % (ref 11.5–14.5)
PLATELET # BLD: 254 THOU/MM3 (ref 130–400)
PMV BLD AUTO: 6.2 MCM (ref 7.4–10.4)
POTASSIUM SERPL-SCNC: 4 MEQ/L (ref 3.5–5.2)
RBC # BLD: 3.3 MILL/MM3 (ref 4.2–5.4)
SODIUM BLD-SCNC: 125 MEQ/L (ref 135–145)
WBC # BLD: 9.3 THOU/MM3 (ref 4.8–10.8)

## 2017-10-28 PROCEDURE — 6360000002 HC RX W HCPCS: Performed by: NURSE PRACTITIONER

## 2017-10-28 PROCEDURE — 82330 ASSAY OF CALCIUM: CPT

## 2017-10-28 PROCEDURE — 6370000000 HC RX 637 (ALT 250 FOR IP): Performed by: INTERNAL MEDICINE

## 2017-10-28 PROCEDURE — 2500000003 HC RX 250 WO HCPCS: Performed by: INTERNAL MEDICINE

## 2017-10-28 PROCEDURE — 87184 SC STD DISK METHOD PER PLATE: CPT

## 2017-10-28 PROCEDURE — 2100000000 HC CCU R&B

## 2017-10-28 PROCEDURE — 2580000003 HC RX 258: Performed by: INTERNAL MEDICINE

## 2017-10-28 PROCEDURE — 36415 COLL VENOUS BLD VENIPUNCTURE: CPT

## 2017-10-28 PROCEDURE — 80048 BASIC METABOLIC PNL TOTAL CA: CPT

## 2017-10-28 PROCEDURE — 87075 CULTR BACTERIA EXCEPT BLOOD: CPT

## 2017-10-28 PROCEDURE — 99232 SBSQ HOSP IP/OBS MODERATE 35: CPT | Performed by: SURGERY

## 2017-10-28 PROCEDURE — 87070 CULTURE OTHR SPECIMN AEROBIC: CPT

## 2017-10-28 PROCEDURE — 85027 COMPLETE CBC AUTOMATED: CPT

## 2017-10-28 PROCEDURE — 99233 SBSQ HOSP IP/OBS HIGH 50: CPT | Performed by: INTERNAL MEDICINE

## 2017-10-28 PROCEDURE — 82948 REAGENT STRIP/BLOOD GLUCOSE: CPT

## 2017-10-28 PROCEDURE — 85610 PROTHROMBIN TIME: CPT

## 2017-10-28 PROCEDURE — 87186 SC STD MICRODIL/AGAR DIL: CPT

## 2017-10-28 PROCEDURE — 85730 THROMBOPLASTIN TIME PARTIAL: CPT

## 2017-10-28 PROCEDURE — 87205 SMEAR GRAM STAIN: CPT

## 2017-10-28 PROCEDURE — 87147 CULTURE TYPE IMMUNOLOGIC: CPT

## 2017-10-28 RX ORDER — SODIUM CHLORIDE 1000 MG
1 TABLET, SOLUBLE MISCELLANEOUS
Status: DISCONTINUED | OUTPATIENT
Start: 2017-10-28 | End: 2017-10-30 | Stop reason: HOSPADM

## 2017-10-28 RX ADMIN — POTASSIUM CHLORIDE, DEXTROSE MONOHYDRATE AND SODIUM CHLORIDE: 150; 5; 900 INJECTION, SOLUTION INTRAVENOUS at 06:02

## 2017-10-28 RX ADMIN — SODIUM CHLORIDE TAB 1 GM 1 G: 1 TAB at 17:59

## 2017-10-28 RX ADMIN — LEVOTHYROXINE SODIUM 75 MCG: 75 TABLET ORAL at 07:00

## 2017-10-28 RX ADMIN — ACETAMINOPHEN 650 MG: 325 TABLET ORAL at 15:53

## 2017-10-28 RX ADMIN — POTASSIUM CHLORIDE, DEXTROSE MONOHYDRATE AND SODIUM CHLORIDE: 150; 5; 900 INJECTION, SOLUTION INTRAVENOUS at 16:26

## 2017-10-28 RX ADMIN — TAZOBACTAM SODIUM AND PIPERACILLIN SODIUM 3.38 G: 375; 3 INJECTION, SOLUTION INTRAVENOUS at 22:56

## 2017-10-28 RX ADMIN — TAZOBACTAM SODIUM AND PIPERACILLIN SODIUM 3.38 G: 375; 3 INJECTION, SOLUTION INTRAVENOUS at 15:49

## 2017-10-28 RX ADMIN — FENTANYL CITRATE 50 MCG: 50 INJECTION INTRAMUSCULAR; INTRAVENOUS at 21:12

## 2017-10-28 RX ADMIN — HYDROCHLOROTHIAZIDE 25 MG: 25 TABLET ORAL at 00:48

## 2017-10-28 RX ADMIN — FENTANYL CITRATE 50 MCG: 50 INJECTION INTRAMUSCULAR; INTRAVENOUS at 04:51

## 2017-10-28 RX ADMIN — FENTANYL CITRATE 50 MCG: 50 INJECTION INTRAMUSCULAR; INTRAVENOUS at 14:07

## 2017-10-28 RX ADMIN — HYDROCHLOROTHIAZIDE 25 MG: 25 TABLET ORAL at 09:58

## 2017-10-28 RX ADMIN — TAZOBACTAM SODIUM AND PIPERACILLIN SODIUM 3.38 G: 375; 3 INJECTION, SOLUTION INTRAVENOUS at 07:00

## 2017-10-28 RX ADMIN — FENTANYL CITRATE 50 MCG: 50 INJECTION INTRAMUSCULAR; INTRAVENOUS at 00:49

## 2017-10-28 RX ADMIN — SODIUM CHLORIDE TAB 1 GM 1 G: 1 TAB at 14:07

## 2017-10-28 RX ADMIN — VALSARTAN 320 MG: 320 TABLET ORAL at 09:58

## 2017-10-28 ASSESSMENT — PAIN DESCRIPTION - PAIN TYPE
TYPE: ACUTE PAIN

## 2017-10-28 ASSESSMENT — PAIN SCALES - GENERAL
PAINLEVEL_OUTOF10: 3
PAINLEVEL_OUTOF10: 2
PAINLEVEL_OUTOF10: 3
PAINLEVEL_OUTOF10: 5
PAINLEVEL_OUTOF10: 4
PAINLEVEL_OUTOF10: 5
PAINLEVEL_OUTOF10: 6
PAINLEVEL_OUTOF10: 4
PAINLEVEL_OUTOF10: 5
PAINLEVEL_OUTOF10: 4
PAINLEVEL_OUTOF10: 3
PAINLEVEL_OUTOF10: 2
PAINLEVEL_OUTOF10: 5
PAINLEVEL_OUTOF10: 4
PAINLEVEL_OUTOF10: 5
PAINLEVEL_OUTOF10: 4
PAINLEVEL_OUTOF10: 3
PAINLEVEL_OUTOF10: 2

## 2017-10-28 ASSESSMENT — PAIN DESCRIPTION - DESCRIPTORS
DESCRIPTORS: SHARP
DESCRIPTORS: SHARP

## 2017-10-28 ASSESSMENT — PAIN DESCRIPTION - LOCATION
LOCATION: RECTUM
LOCATION: BACK;RECTUM
LOCATION: BUTTOCKS
LOCATION: BUTTOCKS

## 2017-10-28 ASSESSMENT — PAIN DESCRIPTION - FREQUENCY
FREQUENCY: CONTINUOUS
FREQUENCY: CONTINUOUS

## 2017-10-28 ASSESSMENT — PAIN DESCRIPTION - ONSET: ONSET: ON-GOING

## 2017-10-28 ASSESSMENT — PAIN DESCRIPTION - PROGRESSION: CLINICAL_PROGRESSION: GRADUALLY WORSENING

## 2017-10-28 NOTE — PLAN OF CARE
Problem: Falls - Risk of  Goal: Absence of falls  Outcome: Ongoing  Pt free from any falls. Problem: Risk for Impaired Skin Integrity  Goal: Tissue integrity - skin and mucous membranes  Structural intactness and normal physiological function of skin and  mucous membranes. Outcome: Ongoing  Pt able to turn. Left butt cheek firm and tender. Problem: Discharge Planning:  Goal: Participates in care planning  Participates in care planning   Outcome: Ongoing  Pt and  participates in care of plan. Goal: Discharged to appropriate level of care  Discharged to appropriate level of care   Outcome: Ongoing  No plans of d/c as of now. Problem: Bowel Function - Altered:  Goal: Bowel elimination is within specified parameters  Bowel elimination is within specified parameters   Outcome: Ongoing  Pt has rectal bleeding. Sounds active. Problem: Pain:  Goal: Pain level will decrease  Pain level will decrease    Outcome: Ongoing  Acute pain from left butt cheek. Problem: Pain:  Goal: Pain level will decrease  Pain level will decrease    Outcome: Ongoing  Acute pain from left butt cheek. Comments: Care plan reviewed with patient and . Patient and  verbalize understanding of the plan of care and contribute to goal setting.

## 2017-10-28 NOTE — PROGRESS NOTES
[address] [place] [id number]   Dr. Nick Hudson  Daily Progress Note  Pt Name: Emmanuel Enriquez  Medical Record Number: [id number]  Date of Birth 1965   Today's Date: 10/28/2017    PAD#2    CHIEF COMPLAINT rectal bleeding recurrent carcinoma perirectal fistula    SUBJECTIVE  Patient feels well. OBJECTIVE  CURRENT VITALS BP (!) 144/97   Pulse 113   Temp 99.8 °F (37.7 °C) (Oral)   Resp 16   Ht 4' 11\" (1.499 m)   Wt 135 lb 5.8 oz (61.4 kg)   LMP 05/01/2011   SpO2 94%   BMI 27.34 kg/m²   LUNGS: Lungs clear   ABDOMEN: Soft bowel sounds positive  WOUNDS: Head drainage via rectum of pus and feels better. io  24 HR INTAKE/OUTPUT :   Intake/Output Summary (Last 24 hours) at 10/28/17 1151  Last data filed at 10/28/17 0520   Gross per 24 hour   Intake          2508.95 ml   Output              975 ml   Net          1533.95 ml   I/O last 3 completed shifts: In: 2860.3 [P.O.:300; I.V.:1899; Blood:661.3]  Out: 1375 [Urine:975; Stool:400]  DRAIN/TUBE OUTPUT :      LABS  CBC :   Lab Results   Component Value Date    WBC 9.3 10/28/2017    HGB 10.4 10/28/2017    HCT 29.5 10/28/2017     10/28/2017     BMP: Lab Results   Component Value Date     10/28/2017    K 4.0 10/28/2017    CL 90 10/28/2017    CO2 23 10/28/2017    BUN 11 10/28/2017    CREATININE 0.7 10/28/2017    MG 1.8 06/08/2012    PHOS 1.9 06/08/2012       ASSESSMENT  1. Initially planned on taking him to surgery for diverting colostomy possible I&D of right buttocks however discussed the case with Dr. Major and given the chemotherapy regimen patient needs to be off of it for a period of time prior to any surgical endeavor due to bleeding discussed with patient that this needs to be put on hold currently hemoglobin is stable but certainly if patient began bleeding rectally there would be almost nothing could be done to stop it. Patient does states she feels better with drainage of the abscess through the rectum      PLAN  1.   Colon surgery      Delilah Plants YAAKOV Chinchilla  Electronically signed 10/28/2017 at 11:51 AM

## 2017-10-28 NOTE — PROGRESS NOTES
or edema bilaterally. Full range of motion without deformity. Skin: Skin color, texture, turgor normal.  No rashes or lesions. Neurologic:  Neurovascularly intact without any focal sensory/motor deficits. Cranial nerves: II-XII intact, grossly non-focal.  Psychiatric: Alert and oriented, thought content appropriate, normal insight  Capillary Refill: Brisk,< 3 seconds   Peripheral Pulses: +2 palpable, equal bilaterally       Labs:   Recent Labs      10/27/17   0704  10/27/17   1500  10/28/17   0532   WBC  13.5*  12.1*  9.3   HGB  6.2*  11.5*  10.4*   HCT  18.6*  33.1*  29.5*   PLT  413*  271  254     Recent Labs      10/27/17   0704  10/28/17   0532   NA  121*  125*   K  3.6  4.0   CL  82*  90*   CO2  24  23   BUN  17  11   CREATININE  0.7  0.7   CALCIUM  8.4*  7.8*   siadh due to chemo drug, rxd by onc  Recent Labs      10/27/17   0704   AST  12   ALT  24   BILIDIR  0.3   BILITOT  0.7   ALKPHOS  89     Recent Labs      10/27/17   0704  10/28/17   0532   INR  1.11  1.27*     No results for input(s): Erna Rumps in the last 72 hours. Urinalysis:    Lab Results   Component Value Date    NITRU NEGATIVE 09/29/2017    WBCUA 0-2 09/29/2017    BACTERIA NONE 09/29/2017    RBCUA 3-5 09/29/2017    BLOODU SMALL 09/29/2017    SPECGRAV 1.020 08/06/2016    GLUCOSEU NEGATIVE 09/29/2017       Radiology:  CT PELVIS W WO IV CONTRAST Additional Contrast? None   Final Result    IMPRESSION:  Perirectal and presacral gas and fluid with contiguous gas predominantly and minimal associated edema highly suspicious for a perirectal fistula and possible developing necrotic infection, no drainable abscess is seen. **This report has been created using voice recognition software. It may contain minor errors which are inherent in voice recognition technology. **      Final report electronically signed by Dr. Wilson on 10/27/2017 1:50 PM          Diet: DIET CLEAR LIQUID;    DVT prophylaxis: [] Lovenox [x] SCDs                                 [] SQ Heparin                                 [] Encourage ambulation           [] Already on Anticoagulation     Disposition:    [x] Home       [] TCU       [] Rehab       [] Psych       [] SNF       [] [facility]       [] Other-    Code Status: Full Code    PT/OT Eval Status: not needed now    Assessment/Plan:    Anticipated Discharge in : ?    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 Discussed at length with Dr Miranda Ped   Transfer to [facility]?   As per surgery  Poor prognosis  I rec [facility]      Electronically signed by Ashley Marr MD on 10/28/2017 at 8:42 AM

## 2017-10-28 NOTE — CONSULTS
[address] [place] [address]                                 CONSULTATION    PATIENT NAME: Shivani Reynolds                      :             1965  MED REC NO:   [id number]                            ROOM:            0010  ACCOUNT NO:   [de-identified]                            ADMISSION DATE:  10/27/2017  PROVIDER:    Gladys Major D.O.    CONSULT DATE:  10/27/2017    REASON FOR CONSULTATION:  Metastatic rectal cancer with massive GI  bleeding. HISTORY OF PRESENT ILLNESS:  The patient is a 81[facility]  [place] [facility] with rectal cancer. She has a very long history of  this disease since . She initially was diagnosed then with an  adenocarcinoma of the rectum stage III, [id number] disease. My progress  notes from 10/10/2017 are on the chart. She is status post resection  surgically and adjuvant radiation chemotherapy eight cycles of FOLFOX. Back in , she had few pelvic recurrence in 2014 and 10/2015  with disease in the presacral soft tissue is managed with additional  resection chemotherapy and radiation. She had temporary colostomy in  . She had reversals done in . She had completed six cycles  of FOLFIRI in 2015. Her colonoscopy was negative technically  for cancer until 2017. She had colonoscopy 2017 that showed  a small lesion in the rectum and a biopsy showed intramucosal  adenocarcinoma suspicious for invasive disease. I ordered a PET scan  then on 2017 that showed avid presacral soft tissue mass highly  suspicious for malignancy with an FEV of 14.4, previously was seen  actually by Dr. Sukhjindre Armando.  She transferred care to me then on 2017  it is when I started seeing her for treatment. She is also seen by  Dr. Sia Kenyon on 2017 for surgical opinion and she was not  deemed a surgical candidate for her recurrent disease.   She was then  sent back to me for Avastin therapy or should make it safer for surgery. She  does have active bleeding still at this time with clots noted mostly  in the anal area. PAST MEDICAL HISTORY:  The patient's other medical history includes  hypertension, hyperlipidemia, hypothyroidism, diabetes, rectal cancer  and now SIADH. Her current therapy with CAPOX, Avastin and unfortunately she cannot  continue this. ALLERGIES:  DILAUDID and MORPHINE. MEDICATIONS:  Her medications to be seen in the [facility] PAST SURGICAL HISTORY:  Rectal surgery 2012 and what I mentioned up in  the history of present illness. SOCIAL HISTORY:  She is a nonsmoker, never smoker actually. No  alcohol. She has had colonoscopies obviously, never had an EGD. REVIEW OF SYSTEMS:  As I mentioned in the history of present illness. The patient had rectal bleeding. She does have pain in the perianal  area and this is chronic as a result of her recurrent cancer and is on  heavy doses of opiates to control her pain. The patient has a low  grade temp. She did not have nausea, but had cramping in the lower  abdominal area. She otherwise did not have any other complaints at  this present time. PHYSICAL EXAMINATION:  VITAL SIGNS:  As follows, temp is 99.8, pulse 107, blood pressure is  122/94, 27 respiration, sat is 97% on room air. HEENT:  Head is atraumatic and normocephalic. No pupillary defect,  scleral icterus or jaundice. Oral mucosa is pink without thrush. NECK:  Supple. No JVD. HEART:  Regular. LUNGS:  Clear. ABDOMEN:  Soft and nontender. The patient does have some protuberance  of the abdomen. There is no guarding or rebound. The pain mostly in  the lower part of the abdomen. I did a perianal exam, she has some  clots still noted in the anus. She had also problems with some  nodular areas underneath the skin, but not protruding out of the skin  of the right buttock area proximally. The patient has no edema.   EXTREMITIES:  No focal

## 2017-10-28 NOTE — PROGRESS NOTES
cephalic/atraumatic. NECK:  Neck supple. Trachea midline      UPPER EXTREMITIES:  No cyanosis, clubbing, or edema. DERM:  No rash or jaundice. LOWER EXTREMITIES:  No cyanosis, clubbing, or edema. NEURO:  Alert and oriented x4. Patient moves all extremities and has gross sensation in all extremities. Medications:  Scheduled Meds:   sodium chloride flush  10 mL Intravenous 2 times per day    piperacillin-tazobactam  3.375 g Intravenous Q8H    [START ON 10/30/2017] fentaNYL  1 patch Transdermal Q72H    hydrochlorothiazide  25 mg Oral Daily    hydrocortisone  25 mg Rectal BID    levothyroxine  75 mcg Oral Daily    valsartan  320 mg Oral Daily     Continuous Infusions:   sodium chloride Stopped (10/27/17 1821)    sodium chloride Stopped (10/27/17 2029)    dextrose 5% and 0.9% NaCl with KCl 20 mEq 100 mL/hr at 10/28/17 0602     PRN Meds:fentanNYL, sodium chloride flush, acetaminophen, albuterol sulfate HFA, amLODIPine        ASSESSMENT:     Rectal bleeding- Hematochezia  Perirectal fistula and possible abscess   Perirectal pain; multiple ER visits for this per October 2017 office note. Was on NSAIDs per office notes ([facility] had recommended she take 600 mg every 6 hours)  Anemia; H/H 10.4/29.5 after blood  Transfusion. Hx rectal cancer 2012, Dr. Clemetine Gottron did original colostomy,  and recurrent 2014 and again on Flex sigmoidoscopy in May 2017 ; had radiation and chemotherapy in the past. ; patient had no further imaging for 2 years. (since 2015)  Hx bowel resection in 2015  HTN  Hyperthyroidism  Anxiety          PLAN:   Colonoscopy done yesterday with DR Cloretta Scheuermann. He then talked with surgeon. Surgeon note reviewed (Dr Jeffy Mattson). He discussed diverting colostomy and possible I&D of buttocks. Pt not wanting to go to [facility] so surgery tomorrow. Pt follows with DR Jenifer Blackmon - is getting Avastin. Which will now be held.    ID consult placed yesterday, patient started on Zosyn for possibility of abscess. Unasyn not available. Labs noted  Saw surgeon at [facility]; DR Kierra Sood in the past   Flex sigmoidoscopy May 2017: pathology was suspicious for invasive colonic adenocarcinoma. [facility] Dr Kierra Sood 7-25-17 reviewed (Care everywhere tab)  She will need to f/u with Parris Davalos CNP. NED (she was scheduled on 10-23-17 and again on 10-25-17 but did not come for those)     GI signing off. Hospitalist/Attending provider notes, consulting physician notes, laboratory results and procedure notes reviewed prior to seeing the patient.    Note done in collaboration with DR Candida Ryan MD  Electronically signed by Erickson Hernandez CNP on 10/28/17 at 7:35 AM

## 2017-10-28 NOTE — FLOWSHEET NOTE
10/28/17 0817   Provider Notification   Reason for Communication Evaluate; Review case   Provider Name Dr. Gerhardt Duel   Provider Notification Physician   Method of Communication Secure Message   Response Waiting for response   Notification Time [phone]   Luis Alfredo Bettencourt 3A10: Consult from [address], sees you in office for rectal cancer. Patient arrived yesterday from ED with rectal bleeding, had a colonoscopy with Dr. Chelsy Reynolds, Dr. Gina Tariq consulted- planning a possible diverting colostomy and possible I&D of R raz Sunday. Thank you!

## 2017-10-28 NOTE — CONSULTS
improved to  9.3. I did not have any cultures on the abscess draining, but blood  cultures have been negative. Rest of the radiological studies done  including CAT scan of the abdomen and pelvis was done showing  perirectal gas and presacral gas with possible perirectal fistula and  necrotic infection. The patient's chest x-ray on 06/28 is noted to show no evidence of any  pneumothorax. No evidence of any pneumonia. The patient does have left subclavian  PowerPort present. DIAGNOSTIC IMPRESSION:  1. Perirectal abscess with fistula. 2.  Metastatic rectal cancer. 3.  Massive GI bleed. 4.  Status post past surgery. 5.  Hypertension. PLAN:  Continue Zosyn. Follow surgical plans on colostomy. The  patient is clinically stable on Zosyn. Follow the cultures and  overall, further intervention will depend upon as the clinical course  evolves. We will follow up this and discuss with GI and Surgical and  Oncology Services. Jessa Macdonald M.D.    D: 10/28/2017 10:46:48       T: 10/28/2017 11:43:17     [email or web address]  Job#: [id number]     Doc#: [id number]    CC:  Golden Thompson M.D. Mina Watkins. Fredna Soulier. Ta Olmedo.  Gal Jose MD

## 2017-10-29 VITALS
WEIGHT: 135.36 LBS | DIASTOLIC BLOOD PRESSURE: 83 MMHG | HEART RATE: 100 BPM | TEMPERATURE: 98.5 F | HEIGHT: 59 IN | SYSTOLIC BLOOD PRESSURE: 141 MMHG | RESPIRATION RATE: 23 BRPM | OXYGEN SATURATION: 100 % | BODY MASS INDEX: 27.29 KG/M2

## 2017-10-29 LAB
ANION GAP SERPL CALCULATED.3IONS-SCNC: 13 MEQ/L (ref 8–16)
BUN BLDV-MCNC: 15 MG/DL (ref 7–22)
CALCIUM IONIZED: 1.11 MMOL/L (ref 1.12–1.32)
CALCIUM SERPL-MCNC: 7.8 MG/DL (ref 8.5–10.5)
CHLORIDE BLD-SCNC: 95 MEQ/L (ref 98–111)
CO2: 20 MEQ/L (ref 23–33)
CREAT SERPL-MCNC: 1.3 MG/DL (ref 0.4–1.2)
GFR SERPL CREATININE-BSD FRML MDRD: 52 ML/MIN/1.73M2
GLUCOSE BLD-MCNC: 133 MG/DL (ref 70–108)
GLUCOSE BLD-MCNC: 136 MG/DL (ref 70–108)
GLUCOSE BLD-MCNC: 139 MG/DL (ref 70–108)
GLUCOSE BLD-MCNC: 144 MG/DL (ref 70–108)
HCT VFR BLD CALC: 27.2 % (ref 37–47)
HEMOGLOBIN: 9.5 GM/DL (ref 12–16)
MCH RBC QN AUTO: 31 PG (ref 27–31)
MCHC RBC AUTO-ENTMCNC: 34.8 GM/DL (ref 33–37)
MCV RBC AUTO: 89.2 FL (ref 81–99)
MRSA SCREEN: NORMAL
PDW BLD-RTO: 17.6 % (ref 11.5–14.5)
PLATELET # BLD: 232 THOU/MM3 (ref 130–400)
PMV BLD AUTO: 5.9 MCM (ref 7.4–10.4)
POTASSIUM SERPL-SCNC: 4.1 MEQ/L (ref 3.5–5.2)
RBC # BLD: 3.05 MILL/MM3 (ref 4.2–5.4)
SODIUM BLD-SCNC: 128 MEQ/L (ref 135–145)
VRE CULTURE: NORMAL
WBC # BLD: 8.1 THOU/MM3 (ref 4.8–10.8)

## 2017-10-29 PROCEDURE — 82330 ASSAY OF CALCIUM: CPT

## 2017-10-29 PROCEDURE — 80048 BASIC METABOLIC PNL TOTAL CA: CPT

## 2017-10-29 PROCEDURE — 6360000002 HC RX W HCPCS: Performed by: NURSE PRACTITIONER

## 2017-10-29 PROCEDURE — 99232 SBSQ HOSP IP/OBS MODERATE 35: CPT | Performed by: INTERNAL MEDICINE

## 2017-10-29 PROCEDURE — 2580000003 HC RX 258: Performed by: INTERNAL MEDICINE

## 2017-10-29 PROCEDURE — 82948 REAGENT STRIP/BLOOD GLUCOSE: CPT

## 2017-10-29 PROCEDURE — 6370000000 HC RX 637 (ALT 250 FOR IP): Performed by: INTERNAL MEDICINE

## 2017-10-29 PROCEDURE — 36415 COLL VENOUS BLD VENIPUNCTURE: CPT

## 2017-10-29 PROCEDURE — 2500000003 HC RX 250 WO HCPCS: Performed by: INTERNAL MEDICINE

## 2017-10-29 PROCEDURE — 2100000000 HC CCU R&B

## 2017-10-29 PROCEDURE — 85027 COMPLETE CBC AUTOMATED: CPT

## 2017-10-29 RX ORDER — CYCLOBENZAPRINE HCL 10 MG
5 TABLET ORAL 3 TIMES DAILY PRN
Status: DISCONTINUED | OUTPATIENT
Start: 2017-10-29 | End: 2017-10-30 | Stop reason: HOSPADM

## 2017-10-29 RX ADMIN — FENTANYL CITRATE 50 MCG: 50 INJECTION INTRAMUSCULAR; INTRAVENOUS at 21:43

## 2017-10-29 RX ADMIN — POTASSIUM CHLORIDE, DEXTROSE MONOHYDRATE AND SODIUM CHLORIDE: 150; 5; 900 INJECTION, SOLUTION INTRAVENOUS at 23:04

## 2017-10-29 RX ADMIN — POTASSIUM CHLORIDE, DEXTROSE MONOHYDRATE AND SODIUM CHLORIDE: 150; 5; 900 INJECTION, SOLUTION INTRAVENOUS at 02:04

## 2017-10-29 RX ADMIN — FENTANYL CITRATE 50 MCG: 50 INJECTION INTRAMUSCULAR; INTRAVENOUS at 23:34

## 2017-10-29 RX ADMIN — TAZOBACTAM SODIUM AND PIPERACILLIN SODIUM 3.38 G: 375; 3 INJECTION, SOLUTION INTRAVENOUS at 14:31

## 2017-10-29 RX ADMIN — VALSARTAN 320 MG: 320 TABLET ORAL at 09:05

## 2017-10-29 RX ADMIN — SODIUM CHLORIDE TAB 1 GM 1 G: 1 TAB at 09:05

## 2017-10-29 RX ADMIN — TAZOBACTAM SODIUM AND PIPERACILLIN SODIUM 3.38 G: 375; 3 INJECTION, SOLUTION INTRAVENOUS at 06:56

## 2017-10-29 RX ADMIN — SODIUM CHLORIDE TAB 1 GM 1 G: 1 TAB at 14:31

## 2017-10-29 RX ADMIN — FENTANYL CITRATE 50 MCG: 50 INJECTION INTRAMUSCULAR; INTRAVENOUS at 04:04

## 2017-10-29 RX ADMIN — FENTANYL CITRATE 50 MCG: 50 INJECTION INTRAMUSCULAR; INTRAVENOUS at 20:34

## 2017-10-29 RX ADMIN — CYCLOBENZAPRINE 5 MG: 10 TABLET, FILM COATED ORAL at 22:14

## 2017-10-29 RX ADMIN — LEVOTHYROXINE SODIUM 75 MCG: 75 TABLET ORAL at 06:56

## 2017-10-29 RX ADMIN — TAZOBACTAM SODIUM AND PIPERACILLIN SODIUM 3.38 G: 375; 3 INJECTION, SOLUTION INTRAVENOUS at 23:04

## 2017-10-29 RX ADMIN — HYDROCHLOROTHIAZIDE 25 MG: 25 TABLET ORAL at 09:05

## 2017-10-29 RX ADMIN — FENTANYL CITRATE 50 MCG: 50 INJECTION INTRAMUSCULAR; INTRAVENOUS at 17:51

## 2017-10-29 ASSESSMENT — PAIN SCALES - GENERAL
PAINLEVEL_OUTOF10: 6
PAINLEVEL_OUTOF10: 3
PAINLEVEL_OUTOF10: 4
PAINLEVEL_OUTOF10: 4
PAINLEVEL_OUTOF10: 3
PAINLEVEL_OUTOF10: 5
PAINLEVEL_OUTOF10: 2
PAINLEVEL_OUTOF10: 6
PAINLEVEL_OUTOF10: 6
PAINLEVEL_OUTOF10: 5
PAINLEVEL_OUTOF10: 6
PAINLEVEL_OUTOF10: 4
PAINLEVEL_OUTOF10: 3
PAINLEVEL_OUTOF10: 4

## 2017-10-29 ASSESSMENT — PAIN DESCRIPTION - PAIN TYPE: TYPE: ACUTE PAIN

## 2017-10-29 ASSESSMENT — PAIN DESCRIPTION - LOCATION: LOCATION: BUTTOCKS

## 2017-10-29 NOTE — PROGRESS NOTES
Dr. Waleska Brewer initiating transfer to [facility] with Dr. Eddie Morel via one call transfer. [phone]: bed assigned, 1142 SICU at the [facility], consents signed for transfer papers, obtaining paper chart, radiology reports/images send to Dr. Eddie Morel by the transfer center prior to patient being accepted. 1344: [facility] called stating they would call with a transport time when schedule was figured out. 1540: writer called [facility] inquiring a transport time, estimated  time is 1945. Family and patient aware. 1814: Report called to Carroll Roe at [facility]. Family updated on visiting hours and no call policy for updates, informed they will receive a daily update around 10pm once patient is there.

## 2017-10-29 NOTE — PROGRESS NOTES
Hospitalist Progress Note    Patient:  Kailey BookerPear Deck      Unit/Bed:3A-10/010-A    YOB: 1965    MRN: [id number]       Acct: [de-identified]     PCP: Umm Moore MD    Date of Admission: 10/27/2017    Chief Complaint: bleeding   BRBPR     Site of previous rectal cancer    Hospital Course: expectant       Talked w Dr Taylor Elam    Would rec tr to [facility]     Subjective:  No new pains       Feels weak     Hb=9.2      Medications:  Reviewed    Infusion Medications    dextrose 5% and 0.9% NaCl with KCl 20 mEq 100 mL/hr at 10/29/17 0204     Scheduled Medications    sodium chloride  1 g Oral TID WC    calcium replacement protocol   Other RX Placeholder    piperacillin-tazobactam  3.375 g Intravenous Q8H    [START ON 10/30/2017] fentaNYL  1 patch Transdermal Q72H    hydrochlorothiazide  25 mg Oral Daily    hydrocortisone  25 mg Rectal BID    levothyroxine  75 mcg Oral Daily    valsartan  320 mg Oral Daily     PRN Meds: fentanNYL, acetaminophen, albuterol sulfate HFA, amLODIPine      Intake/Output Summary (Last 24 hours) at 10/29/17 0807  Last data filed at 10/29/17 0644   Gross per 24 hour   Intake          4403.42 ml   Output                0 ml   Net          4403.42 ml       Diet:  DIET CLEAR LIQUID;    Exam:  /83   Pulse 105   Temp 98.8 °F (37.1 °C) (Oral)   Resp 27   Ht 4' 11\" (1.499 m)   Wt 135 lb 5.8 oz (61.4 kg)   LMP 05/01/2011   SpO2 100%   BMI 27.34 kg/m²     General appearance: No apparent distress, appears stated age and cooperative. pale  HEENT: Pupils equal, round, and reactive to light. Conjunctivae/corneas clear. Neck: Supple, with full range of motion. No jugular venous distention. Trachea midline. Respiratory:  Normal respiratory effort. Clear to auscultation, bilaterally without Rales/Wheezes/Rhonchi. Cardiovascular: Regular rate and rhythm with normal S1/S2 without murmurs, rubs or gallops.   Abdomen: Soft, non-tender, non-distended with normal bowel CLEAR LIQUID;    DVT prophylaxis: [] Lovenox                                 [x] SCDs                                 [] SQ Heparin                                 [] Encourage ambulation           [] Already on Anticoagulation     Disposition:    [x] Home       [] TCU       [] Rehab       [] Psych       [] SNF       [] [place]       [] Other-    Code Status: Full Code    PT/OT Eval Status: none needed    Assessment/Plan:    Anticipated Discharge in : 1 day    C/Sophia Schuster 1106 Problems    Diagnosis Date Noted    Malignant neoplasm of rectum ([facility] 07/07/2017     Priority: High     Class: Acute    BRBPR (bright red blood per rectum) [K62.5] 10/27/2017    Chronic rectal pain [K62.89, G89.29] 10/27/2017    Benign hypertension [I10] 10/27/2017    Diabetes mellitus type 2, controlled ([facility] [E11.9] 10/27/2017    Hypothyroidism [E03.9] 10/27/2017    Perirectal fistula [K60.4] 10/27/2017    Co[facility] [D68.9] 10/27/2017    Lower GI bleed [K92.2]    Anemia due to rectal blood loss from cancer    1.  As per surg and onc        Electronically signed by Savi Greene MD on 10/29/2017 at 8:07 AM

## 2017-10-29 NOTE — PROGRESS NOTES
Infectious Diseases Progress Note  Pt Name: Dannie Lagunas  MRN: [id number]  YOB: 1965  Admit Date: 10/27/2017  6:23 AM  Date of evaluation: 10/29/2017  Primary Care Physician: Alysha Gaona MD   3A-10/010-A     60-year-old female with  history of rectal cancer and history of radiation treatments and  surgeries in the past.  Problems started around 2012 and had  adenocarcinoma of the rectum. The patient did have surgical resection  and radiation treatment in 2012 and the patient had pelvic recurrence  in 12/2014 and also 10/2015. The patient has had disease with  presacral soft tissue that was treated with resection, chemotherapy,  and radiation and temporary colostomy that was done in 2014 and  reversal done in 2015. She has had evaluations at [facility] also in  07/2017. No further radiation treatments planned at this point and  the patient presented to the emergency room with GI bleeding on  10/27/2017 and had GI evaluation. The patient is noted to have right  perirectal/gluteal tender regions and essentially starting to bleed  from the perirectal area. She does have history of problems with  SIADH and she was treated for that. The patient presented with  massive GI bleeding with hemoglobin of 6.2, and she is being planned  for possible diverting colostomy and debridement of the buttock area  by Dr. Wilfredo Cancino. No fever, chills, or rigors. No abdominal pain. No dysuria. No cough or shortness of breath. Subjective: Interval History:  Patient being planned for transfer to [facility] . Still has rectal area bleeding     Pt/Chart review last 24 hours:  Fever No, Diarrhea rectal bleeding, Dyspnea No, Nausea No, Chills No    Objective:      Vitals: /78   Pulse 113   Temp 99.1 °F (37.3 °C) (Oral)   Resp (!) 33   Ht 4' 11\" (1.499 m)   Wt 135 lb 5.8 oz (61.4 kg)   LMP 05/01/2011   SpO2 100%   BMI 27.34 kg/m²    HEENT: Head: Normocephalic, no lesions, without obvious abnormality.   Heart: S1, S2 normal  Lungs: clear to auscultation bilaterally  Abdomen: soft, non-tender; bowel sounds normal; no masses,  no organomegaly  Extremities: extremities normal, atraumatic, no cyanosis or edema  Neurologic: Mental status: Alert, oriented, thought content appropriate   Wound/Ulcers: perirectal ulcer      Data:   Current ATBs: zosyn 10/27    Scheduled Meds:   sodium chloride  1 g Oral TID WC    calcium replacement protocol   Other RX Placeholder    piperacillin-tazobactam  3.375 g Intravenous Q8H    [START ON 10/30/2017] fentaNYL  1 patch Transdermal Q72H    hydrochlorothiazide  25 mg Oral Daily    hydrocortisone  25 mg Rectal BID    levothyroxine  75 mcg Oral Daily    valsartan  320 mg Oral Daily     Continuous Infusions:   dextrose 5% and 0.9% NaCl with KCl 20 mEq 100 mL/hr at 10/29/17 0204       I/O last 3 completed shifts: In: 4403.4 [P.O.:1870; I.V.:2533.4]  Out: -   I/O this shift:  In: 1900 [P.O.:1040; I.V.:860]  Out: -     Intake/Output Summary (Last 24 hours) at 10/29/17 1440  Last data filed at 10/29/17 1340   Gross per 24 hour   Intake          4731.42 ml   Output                0 ml   Net          4731.42 ml     CBC:   Recent Labs      10/27/17   1500  10/28/17   0532  10/29/17   0400   WBC  12.1*  9.3  8.1   HGB  11.5*  10.4*  9.5*   HCT  33.1*  29.5*  27.2*   PLT  271  254  232     BMP:  Recent Labs      10/27/17   0704  10/28/17   0532  10/29/17   0400   NA  121*  125*  128*   K  3.6  4.0  4.1   CL  82*  90*  95*   CO2  24  23  20*   BUN  17  11  15   CREATININE  0.7  0.7  1.3*   GLUCOSE  168*  172*  139*     Hepatic: Recent Labs      10/27/17   0704   AST  12   ALT  24   BILITOT  0.7   ALKPHOS  89     Urine:       MICRO:   Lab Results   Component Value Date    BC No growth-preliminary 10/27/2017       IMAGING per radiologist interpretation:      Assessment    1. Perirectal abscess with fistula. 2.  Metastatic rectal cancer. 3.  Massive GI bleed. 4.  Status post past surgery.   5. Hypertension. Patient Active Problem List:     Colonic mass     Abdominal wall mass of right flank     Malignant neoplasm of rectum (HCC)     Rectal bleed     BRBPR (bright red blood per rectum)     Chronic rectal pain     Benign hypertension     Diabetes mellitus type 2, controlled ([address]     Hypothyroidism     Perirectal fistula     Coagulopathy (HCC)     Lower GI bleed      Plan   Continue current antibiotics with zosyn  Follow cultures with non fermenting gram negative  [facility]   If surgery done and wound present may need jovanni post [facility] discharge   Continue wound care with  Gauze   Please see today's orders for updated patient plan of care.         Electronically signed by Noman Corey MD on 10/29/2017 at 2:40 PM

## 2017-10-29 NOTE — PLAN OF CARE
Problem: Falls - Risk of  Goal: Absence of falls  Outcome: Ongoing  From from any fall risk. Bed at lowest level and call light within reach. Problem: Risk for Impaired Skin Integrity  Goal: Tissue integrity - skin and mucous membranes  Structural intactness and normal physiological function of skin and  mucous membranes. Outcome: Ongoing  Pt able to turn self. Right butt cheek, firm, tenderness possible abscess. Problem: Discharge Planning:  Goal: Participates in care planning  Participates in care planning   Outcome: Ongoing  Pt and  participates in plan of care. Problem: Bowel Function - Altered:  Goal: Bowel elimination is within specified parameters  Bowel elimination is within specified parameters   Outcome: Completed Date Met: 10/29/17  Pt has rectal bleeding. Problem: Pain:  Goal: Pain level will decrease  Pain level will decrease    Outcome: Ongoing  States pain goal is 3/10. Acute pain in the right butt cheek and leg cramps. Pt has fentanyl patch and prn pain medication    Problem: Pain:  Goal: Pain level will decrease  Pain level will decrease    Outcome: Ongoing  States pain goal is 3/10. Acute pain in the right butt cheek and leg cramps. Pt has fentanyl patch and prn pain medication    Comments: Care plan reviewed with patient . Patient verbalize understanding of the plan of care and contribute to goal setting.

## 2017-10-29 NOTE — PROGRESS NOTES
[facility]  Hematology/Oncology - Dr. Zora Ash  Daily Progress Note  Pt Name: Shaun Renteria  Medical Record Number: [id number]  Date of Birth 1965   Today's Date: 10/29/2017  ASSESSMENT   1. Hospital day # 2   2. Continued rectal bleeding from rectal tumor. Possible abscess vs necrotic tumor of the right buttock. 3. 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 She may or may not need surgery. Spoke with Dr Beulah Teran at [facility] this AM and he feels that vascular intervention may stop bleeding and he will look at the PAX images today and get back with me. 5. Hb is still dropping daily as she is still bleeding and clots are seen per rectum. PLAN   1. Spoke with Dr Lucia Sadler this AM, also spoke to Dr Beulah Teran at [facility] and will transfer patient there, either to GI oncology service or Surgical depending on the PAX images that Dr Beulah Trean wants to look at himself. 2. Continue to monitor sodium and Hb.    3. 35 min visit today plus phone calls with one-call transfer  Jerry Bautista is hemodynamically stable. NAD and denies pain currently. No fevers, but still with clots per rectum. No CP/SOB. CURRENT MEDICATIONS   Scheduled Meds:   sodium chloride  1 g Oral TID WC    calcium replacement protocol   Other RX Placeholder    piperacillin-tazobactam  3.375 g Intravenous Q8H    [START ON 10/30/2017] fentaNYL  1 patch Transdermal Q72H    hydrochlorothiazide  25 mg Oral Daily    hydrocortisone  25 mg Rectal BID    levothyroxine  75 mcg Oral Daily    valsartan  320 mg Oral Daily     Continuous Infusions:   dextrose 5% and 0.9% NaCl with KCl 20 mEq 100 mL/hr at 10/29/17 0204     PRN Meds:.fentanNYL, acetaminophen, albuterol sulfate HFA, amLODIPine  OBJECTIVE   VITALS:  height is 4' 11\" (1.499 m) and weight is 135 lb 5.8 oz (61.4 kg). Her oral temperature is 98.8 °F (37.1 °C).  Her blood pressure is 124/83 and 10/29/2017    GLUCOSE 84 06/08/2012    PROT 5.6 10/27/2017    LABALBU 3.0 10/27/2017    LABALBU 4.3 04/29/2012    CALCIUM 7.8 10/29/2017    BILITOT 0.7 10/27/2017    ALKPHOS 89 10/27/2017    AST 12 10/27/2017    ALT 24 10/27/2017     BMP:    Lab Results   Component Value Date     10/29/2017    K 4.1 10/29/2017    CL 95 10/29/2017    CO2 20 10/29/2017    BUN 15 10/29/2017    LABALBU 3.0 10/27/2017    LABALBU 4.3 04/29/2012    CREATININE 1.3 10/29/2017    CALCIUM 7.8 10/29/2017    LABGLOM 52 10/29/2017    GLUCOSE 139 10/29/2017    GLUCOSE 84 06/08/2012     RADIOLOGY    CT 10/27/17: IMPRESSION:  Perirectal and presacral gas and fluid with contiguous gas predominantly and minimal associated edema highly suspicious for a perirectal fistula and possible developing necrotic infection, no drainable abscess is seen.     Yadira Chacko  Electronically signed 10/29/2017 at 9:09 AM

## 2017-10-29 NOTE — PLAN OF CARE
Problem: Falls - Risk of  Goal: Absence of falls  Outcome: Ongoing  Patient is a fall risk, continue fall precautions and assisting patient from bed. Problem: Risk for Impaired Skin Integrity  Goal: Tissue integrity - skin and mucous membranes  Structural intactness and normal physiological function of skin and  mucous membranes. Outcome: Ongoing  Patient informs staff of any incontinence or need to be cleaned, continue to shift weight often. Problem: Discharge Planning:  Goal: Participates in care planning  Participates in care planning   Outcome: Ongoing  Patient is aware of transfer to [address] [place] [address] this evening. Problem: Pain:  Goal: Pain level will decrease  Pain level will decrease    Outcome: Ongoing  Patients pain level has been controlled with prn medications. Problem: Pain:  Goal: Control of acute pain  Control of acute pain   Outcome: Ongoing  Using ice and repositioning to aid in pain control. Goal: Pain level will decrease  Pain level will decrease    Outcome: Ongoing  Patients pain level has been controlled with prn medications. Comments: Care plan reviewed with patient. Patient verbalizes understanding of the care plan and contributed to goal setting.

## 2017-10-30 NOTE — CARE COORDINATION
10/30/17, 12:09 PM    Discharge plan discussed by  and . Discharge plan reviewed with patient/ family. Patient/ family verbalize understanding of discharge plan and are in agreement with plan. Understanding was demonstrated using the teach back method. Pt transferred out to [facility] over the weekend.

## 2017-10-31 LAB
AEROBIC CULTURE: ABNORMAL
AEROBIC CULTURE: ABNORMAL
ANAEROBIC CULTURE: ABNORMAL
GRAM STAIN RESULT: ABNORMAL
ORGANISM: ABNORMAL

## 2017-11-01 ENCOUNTER — TELEPHONE (OUTPATIENT)
Dept: PHYSICAL MEDICINE AND REHAB | Age: 52
End: 2017-11-01

## 2017-11-02 LAB
BLOOD CULTURE, ROUTINE: NORMAL
BLOOD CULTURE, ROUTINE: NORMAL

## 2017-11-02 NOTE — DISCHARGE SUMMARY
[address] [place] [address]                              DISCHARGE SUMMARY    PATIENT NAME: Annabelle Magdaleno                      :             1965  MED REC NO:   [id number]                            ROOM:            0010  ACCOUNT NO:   [de-identified]                            ADMISSION DATE:  10/27/2017  PROVIDER:     Cece Marcano. Mark Freeman M.D.                 Juan Mcleanar:  10/29/2017    ADMISSION DIAGNOSIS:  Bright red blood per rectum. She had previous rectal cancer and had several surgeries,  chemotherapy, etc.  Locally, it was felt by Surgery,  _____ that  the patient really could not have safely any procedures here and he  wanted the patient transferred to [facility]. He spoke with his contact  physician there. The patient had been admitted to the hospitalist  service. Medical diagnoses of type 2 diabetes, controlled;  hypothyroidism; coagulopathy; hypertension. The patient had  management in the hospital by Surgery and Oncology. She had lower GI  bleeding that really did not stop. The patient was watched carefully  in an intensive care type setting. Concerns as to whether this was an  abscess or a necrotic tumor in the right buttock. The patient had  received oxaliplatin, which was discontinued because it caused SIADH. She was still having clots per rectum. She was transferred. Her last  hemoglobin was 9.5. Sodium went up to 128. Creatinine was 1.3, which  is high. Infectious disease issues as per Dr. Palmer Estrada. Transferred  for further treatment to tertiary center. Rectal cancer, recurrent;  bleeding per rectum, continuing; diabetes; hypertension. Further  medications and procedures as per next institution. TIME SPENT:  28 minutes.         Carlyn Smith M.D.    D: 2017 11:37:41       T: 2017 10:52:42     [id number]  Job#: [id number]     Doc#: [id number]

## 2017-11-10 ENCOUNTER — OFFICE VISIT (OUTPATIENT)
Dept: PHYSICAL MEDICINE AND REHAB | Age: 52
End: 2017-11-10
Payer: COMMERCIAL

## 2017-11-10 VITALS
SYSTOLIC BLOOD PRESSURE: 146 MMHG | HEIGHT: 59 IN | BODY MASS INDEX: 26.45 KG/M2 | DIASTOLIC BLOOD PRESSURE: 88 MMHG | HEART RATE: 75 BPM | WEIGHT: 131.2 LBS

## 2017-11-10 DIAGNOSIS — K62.89 CHRONIC RECTAL PAIN: ICD-10-CM

## 2017-11-10 DIAGNOSIS — C20 MALIGNANT NEOPLASM OF RECTUM (HCC): Primary | ICD-10-CM

## 2017-11-10 DIAGNOSIS — G89.29 CHRONIC RECTAL PAIN: ICD-10-CM

## 2017-11-10 PROCEDURE — G8484 FLU IMMUNIZE NO ADMIN: HCPCS | Performed by: PAIN MEDICINE

## 2017-11-10 PROCEDURE — G8419 CALC BMI OUT NRM PARAM NOF/U: HCPCS | Performed by: PAIN MEDICINE

## 2017-11-10 PROCEDURE — 3014F SCREEN MAMMO DOC REV: CPT | Performed by: PAIN MEDICINE

## 2017-11-10 PROCEDURE — 3017F COLORECTAL CA SCREEN DOC REV: CPT | Performed by: PAIN MEDICINE

## 2017-11-10 PROCEDURE — G8427 DOCREV CUR MEDS BY ELIG CLIN: HCPCS | Performed by: PAIN MEDICINE

## 2017-11-10 PROCEDURE — 99244 OFF/OP CNSLTJ NEW/EST MOD 40: CPT | Performed by: PAIN MEDICINE

## 2017-11-10 RX ORDER — AMOXICILLIN AND CLAVULANATE POTASSIUM 875; 125 MG/1; MG/1
TABLET, FILM COATED ORAL
COMMUNITY
Start: 2017-11-03 | End: 2017-11-13

## 2017-11-10 RX ORDER — OXYCODONE HYDROCHLORIDE AND ACETAMINOPHEN 5; 325 MG/1; MG/1
2 TABLET ORAL EVERY 4 HOURS PRN
Qty: 180 TABLET | Refills: 0 | Status: SHIPPED | OUTPATIENT
Start: 2017-11-12 | End: 2017-11-27

## 2017-11-10 RX ORDER — FENTANYL 12 UG/H
1 PATCH TRANSDERMAL
Qty: 5 PATCH | Refills: 0 | Status: SHIPPED | OUTPATIENT
Start: 2017-11-10 | End: 2017-11-22 | Stop reason: ALTCHOICE

## 2017-11-10 RX ORDER — OXYCODONE HYDROCHLORIDE 5 MG/1
5 TABLET ORAL
COMMUNITY
Start: 2017-11-03 | End: 2018-01-25 | Stop reason: ALTCHOICE

## 2017-11-10 ASSESSMENT — ENCOUNTER SYMPTOMS
VOMITING: 0
DIARRHEA: 0
RECTAL PAIN: 1
COUGH: 0
CONSTIPATION: 1
SHORTNESS OF BREATH: 0
RHINORRHEA: 0
COLOR CHANGE: 0
BACK PAIN: 0
PHOTOPHOBIA: 0
SORE THROAT: 0
NAUSEA: 0
SINUS PRESSURE: 0
CHEST TIGHTNESS: 0
EYE PAIN: 0
ABDOMINAL PAIN: 0
WHEEZING: 0

## 2017-11-10 NOTE — PROGRESS NOTES
[facility]  [facility]  [address]  Dept: [phone]  Dept Fax: [phone]: [phone]    Visit Date: 11/10/2017    Wan Powers is a 47 y.o. female who is referred for pain management evaluation and treatment per Dr. Mima Lopez. [facility]AGE [facility] Questions   1. In the last three months, have you felt you should cut down or stop drinking or using drugs? Yes []        No [x]     2. In the last three months, has anyone annoyed you or gotten on your nerves by telling you to cut down or stop drinking or using drugs? Yes []        No [x]     3. In the last three months, have you felt guilty or bad about how much you drink or use drugs? Yes []        No [x]     4. In the last three months, have you been waking up wanting to have an alcoholic drink or use drugs? Yes []        No [x]        Opioid Risk Tool:  Clinician Form       1. Family History of Substance Abuse: Female Male    Alcohol   []1   [x]3    Illegal drugs   []2   []3    Prescription drugs     []4   []4   2. Personal History of Substance Abuse:          Alcohol   []3   []3    Illegal drugs   []4   []4    Prescription drugs     []5   []5   3. Age (lluvia box if between 12 and 39):     []1   []1   4. History of Preadolescent Sexual Abuse:     []3   []0   5. Psychological Disease:      Attention deficit disorder, obsessive-compulsive disorder, bipolar, schizophrenia   []2   []2      Depression     [x]1   []1    Scoring Totals 1 3     Total Score  Low Risk  Moderate Risk  High Risk   Risk Category   0 - 3   4 - 7   8 or Above      Patient states symptoms interfere with:  A.  General Activity:  yes   B. Mood: yes    C. Walking Ability:   yes   D. Normal Work (Includes both work outside the home and housework):   yes    E.  Relations with Other People:  no   F. Sleep:   yes   G.  Enjoyment of Life:  yes       HPI:     Chief Complaint:      Rectal pain  Family present      HPI  Patient is a 48 y/o female with history of  Adenocarcinoma of the coln which had  robotic assisted low anterior colon resection per Dr Ananda Quintanilla on 6/5/2012. Patient was treated with chemotherapy and  Radiation therapy. Patient had recurrent cancer in 2014 and again May 2017. Patient presented to [facility]'s Emergency room on 10/27/2017 with severe pain and rectal bleeding. Patient's hemoglobin and hematocrit was 6.2/18.6, which was admitted to ICU. Patient was treated and stabilized. Patient was discharged on 10/29/2017 and was discussed transfer patient to [facility] for further treatment. Patient states has had treatment at [facility], discharged  November 4 th or 5th. Patient states while in the hospital pain was unbearable. States Dr Dee Stein has tried Fentanyl Patches 25 mcg/hr every 72 hrs with not much benefit but when increase to Fentanyl patch 50 mch/hr every 72 hrs it was to strong and was very sleepy. Patient states has taking Oxycodone for breakthrough pain. States pain is sharp,stabbing and pressure in nature. States pain is aggravated by position and movement. States pain better with pain medications and being on side. Patient pain scale  At worse is a 9-10/10 and at best is a 7/10. Patient denies any rectal bleeding at present time. States pain medications causing constipation. CT Pelvis 10/27/2017:  FINDINGS: Precontrast demonstrates multiple surgical clips anterior to the sacrum. Stool and gas in the rectum as well as in the presacral space where there is gas bubbles and soft tissue present. This tracks to the right side of the rectum and into the    right perirectal soft tissues and is contiguous with gas and edematous change involving the right gluteal fold soft tissues. The muscle tissue does not appear to be involved.       Postcontrast images demonstrate the same findings   . Bladder is unremarkable. The distal most aorta is unremarkable. Liquid stool in the right colon.  No abnormal fluid collections within the abdomen pelvis otherwise.           Impression    IMPRESSION:  Perirectal and presacral gas and fluid with contiguous gas predominantly and minimal associated edema highly suspicious for a perirectal fistula and possible developing necrotic infection, no drainable abscess is seen.         CT Abdomen Pelvis 9/29/2017:  FINDINGS:        Lung bases   No infiltrates or effusions. Undersurface of the heart is unremarkable.       Abdomen pelvis   The liver, spleen, and pancreas are unremarkable. The adrenals and kidneys are unremarkable. Minimal michael of calcium in the distal aorta. Aorta is normal caliber. No adenopathy is identified. Stool is present throughout the entire colon compatible with constipation. Postsurgical changes seen in the right colon and at the rectosigmoid junction surgical clips in the presacral space. Overall appearance is improved since the prior exam. There is    decrease in almost complete resolution of previously seen previously sacral fluid and soft tissue thickening. Still some minimal residual. No convincing evidence for bowel obstruction. No other abnormal fluid collections. Urinary bladder is unremarkable.    No suspicious bone lesions.           Impression   Improved appearance of the pelvis since the prior exam. Persistent moderate constipation. No acute findings.         The patient is allergic to codeine and dilaudid [hydromorphone hcl]. Past Medical History  Lesia Rodriguez  has a past medical history of Blood transfusion; Cancer ([facility] [address] Diabetes mellitus type 2 in nonobese [facility]); Hyperlipidemia; Hypertension; and Hyperthyroidism. Past Surgical History  The patient  has a past surgical history that includes Hysterectomy (5/2011); hysteroscopy (2004); Tubal ligation (1990's); Tunneled venous port placement (6/28/2012); Colon surgery (6-5-12); other surgical history (7/30/2013);  Hemorrhoid surgery (8/2013); and colon ca scrn not hi rsk ind (Left, 10/27/2017). Family History  This patient's family history includes Cancer in her father; Diabetes in her mother; High Blood Pressure in her father and mother. Social History  Krys Lo  reports that she has never smoked. She has never used smokeless tobacco. She reports that she drinks alcohol. She reports that she does not use drugs. Medications    Current Outpatient Prescriptions:     oxyCODONE (ROXICODONE) 5 MG immediate release tablet, Take 5 mg by mouth ., Disp: , Rfl:     fentaNYL (DURAGESIC) 12 MCG/HR, Place 1 patch onto the skin every 72 hours for 15 days  Rectal cancer   Total will be 37 mcg /hr q 72 hrs. ., Disp: 5 patch, Rfl: 0    oxyCODONE-acetaminophen (PERCOCET) 5-325 MG per tablet, Take 2 tablets by mouth every 4 hours as needed for Pain  Rectal Cancer., Disp: 180 tablet, Rfl: 0    valsartan (DIOVAN) 320 MG tablet, Take 320 mg by mouth daily, Disp: , Rfl:     hydrochlorothiazide (HYDRODIURIL) 25 MG tablet, Take 25 mg by mouth daily, Disp: , Rfl:     fentaNYL (DURAGESIC) 25 MCG/HR, Place 1 patch onto the skin every 72 hours . , Disp: , Rfl:     sodium chloride 1 g tablet, Take 1 g by mouth daily, Disp: , Rfl:     ibuprofen (ADVIL;MOTRIN) 600 MG tablet, Take 1 tablet by mouth every 6 hours as needed for Pain, Disp: 30 tablet, Rfl: 0    Alpha-D-Galactosidase (BEANO PO), Take by mouth, Disp: , Rfl:     levothyroxine (LEVOTHROID) 75 MCG tablet, Take 75 mcg by mouth Daily. , Disp: , Rfl:     amlodipine (NORVASC) 10 MG tablet, Take 5 mg by mouth as needed (nightly) , Disp: , Rfl:     albuterol sulfate  (90 Base) MCG/ACT inhaler, Inhale 1 puff into the lungs as needed, Disp: , Rfl:     hydrocortisone (ANUSOL-HC) 25 MG suppository, Place 25 mg rectally 2 times daily, Disp: , Rfl:     lidocaine (XYLOCAINE) 5 % ointment, Apply topically as needed. , Disp: 50 g, Rfl: 1    metFORMIN (GLUCOPHAGE) 500 MG tablet, Take 500 mg by mouth 2 times daily (with meals), Disp: , Rfl:    prochlorperazine (COMPAZINE) 10 MG tablet, Take 10 mg by mouth every 6 hours as needed. , Disp: , Rfl:     Subjective:      Review of Systems   Constitutional: Negative for activity change, appetite change, chills, diaphoresis, fatigue, fever and unexpected weight change. HENT: Negative for congestion, ear pain, hearing loss, mouth sores, nosebleeds, rhinorrhea, sinus pressure and sore throat. Eyes: Negative for photophobia, pain and visual disturbance. Respiratory: Negative for cough, chest tightness, shortness of breath and wheezing. Cardiovascular: Negative for chest pain and palpitations. Gastrointestinal: Positive for constipation and rectal pain. Negative for abdominal pain, diarrhea, nausea and vomiting. Endocrine: Negative for cold intolerance, heat intolerance, polydipsia, polyphagia and polyuria. Genitourinary: Negative for decreased urine volume, difficulty urinating, frequency and hematuria. Musculoskeletal: Negative for arthralgias, back pain, gait problem, joint swelling, myalgias, neck pain and neck stiffness. Skin: Negative for color change and rash. Allergic/Immunologic: Negative for food allergies and immunocompromised state. Neurological: Negative for dizziness, tremors, seizures, syncope, facial asymmetry, speech difficulty, weakness, light-headedness, numbness and headaches. Hematological: Does not bruise/bleed easily. Psychiatric/Behavioral: Positive for sleep disturbance. Negative for agitation, behavioral problems, confusion, decreased concentration, dysphoric mood, hallucinations, self-injury and suicidal ideas. The patient is nervous/anxious. The patient is not hyperactive. Objective:     Vitals:    11/10/17 0851   BP: (!) 146/88   Site: Right Arm   Position: Supine   Pulse: 75   Weight: 131 lb 3.2 oz (59.5 kg)   Height: 4' 11.02\" (1.499 m)       Physical Exam   Constitutional: She is oriented to person, place, and time.  She appears well-developed and well-nourished. No distress. HENT:   Head: Normocephalic and atraumatic. Right Ear: External ear normal.   Left Ear: External ear normal.   Nose: Nose normal.   Mouth/Throat: Oropharynx is clear and moist. No oropharyngeal exudate. Eyes: Conjunctivae and EOM are normal. Pupils are equal, round, and reactive to light. Right eye exhibits no discharge. Left eye exhibits no discharge. No scleral icterus. Neck: Normal range of motion and full passive range of motion without pain. Neck supple. No muscular tenderness present. No neck rigidity. No edema, no erythema and normal range of motion present. No thyromegaly present. Cardiovascular: Normal rate, regular rhythm, normal heart sounds and intact distal pulses. Exam reveals no gallop and no friction rub. No murmur heard. Pulmonary/Chest: Effort normal and breath sounds normal. No respiratory distress. She has no wheezes. She has no rales. She exhibits no tenderness. Abdominal: Soft. Bowel sounds are normal. She exhibits no distension. There is generalized tenderness. There is no rebound and no guarding. Musculoskeletal:        Right ankle: She exhibits normal range of motion and no swelling. Left ankle: She exhibits swelling. Lumbar back: She exhibits decreased range of motion, tenderness and pain. Back:         Right lower leg: She exhibits swelling. Right foot: There is swelling. Neurological: She is alert and oriented to person, place, and time. She has normal strength and normal reflexes. She is not disoriented. She displays no atrophy. No cranial nerve deficit or sensory deficit. She exhibits normal muscle tone. She displays a negative Romberg sign. Gait abnormal. Coordination normal. She displays no Babinski's sign on the right side. She displays no Babinski's sign on the left side. Skin: Skin is warm. No rash noted. She is not diaphoretic. No erythema. No pallor. Psychiatric: Her mood appears not anxious. Her affect is not angry, not blunt, not labile and not inappropriate. Her speech is not rapid and/or pressured, not delayed, not tangential and not slurred. She is not agitated, not aggressive, not hyperactive, not slowed, not withdrawn, not actively hallucinating and not combative. Thought content is not paranoid and not delusional. Cognition and memory are not impaired. She does not express impulsivity or inappropriate judgment. She does not exhibit a depressed mood. She expresses no homicidal and no suicidal ideation. She expresses no suicidal plans and no homicidal plans. She is communicative. She exhibits normal recent memory and normal remote memory. She is attentive. Nursing note and vitals reviewed. Assessment:     1. Malignant neoplasm of rectum ([facility] [address]    2. Chronic rectal pain            Plan:      · Patient read and signed orientation and opioid agreement. · OARRS reviewed. · Discussed long term side effects of medications. · Discussed tolerance, dependency and addiction. .UDS preformed today. · Patient told can not receive any pain medications from any other source. · Discussed with pt.may not be pain free. · No evidence of abuse, diversion or aberrant behavior. · Medications and/or procedures improve function and quality of life. · Reviewed CT Pelvis and abdomen. · Reviewed Dr Major notes. · Reviewed multiple ER visits notes. · Reviewed last Inpatient hospital stay. · Do not have [facility] notes. · Long discussion with patient regarding sustain and immediate release opioids. · Told patient and family members would continue with Fentanyl 25 mcg/hr and add Fentanyl 12 mcg/hr for total of 37 mcg/hr every 72 hrs. Since 50 mcg/hr was to strong. · Would use for breakthrough Percocet 5/325 mg 2 every 4 hrs PRN. Patient states was adding Tylenol to prior Oxycodone. · Told patient would like to try this for 2 weeks and adjust to effect.   · Discussed interventional pain block but patient has fresh wound at present time. Meds. Prescribed:   Orders Placed This Encounter   Medications    fentaNYL (DURAGESIC) 12 MCG/HR     Sig: Place 1 patch onto the skin every 72 hours for 15 days  Rectal cancer   Total will be 37 mcg /hr q 72 hrs. .     Dispense:  5 patch     Refill:  0    oxyCODONE-acetaminophen (PERCOCET) 5-325 MG per tablet     Sig: Take 2 tablets by mouth every 4 hours as needed for Pain  Rectal Cancer. Dispense:  180 tablet     Refill:  0       Return in about 2 weeks (around 11/24/2017) for F/U pain meds. re- evaluation. .     Time spent with patient was 60 minutes more than 50% was spent  Counseling/coordinated the patient's care.     Electronically signed by Edgardo Rodriguez MD on 11/18/2017 at 6:07 PM

## 2017-11-22 ENCOUNTER — OFFICE VISIT (OUTPATIENT)
Dept: PHYSICAL MEDICINE AND REHAB | Age: 52
End: 2017-11-22
Payer: COMMERCIAL

## 2017-11-22 ENCOUNTER — TELEPHONE (OUTPATIENT)
Dept: PHYSICAL MEDICINE AND REHAB | Age: 52
End: 2017-11-22

## 2017-11-22 VITALS
SYSTOLIC BLOOD PRESSURE: 141 MMHG | BODY MASS INDEX: 30.32 KG/M2 | DIASTOLIC BLOOD PRESSURE: 92 MMHG | HEIGHT: 55 IN | HEART RATE: 111 BPM | WEIGHT: 131 LBS

## 2017-11-22 DIAGNOSIS — K62.89 CHRONIC RECTAL PAIN: ICD-10-CM

## 2017-11-22 DIAGNOSIS — C20 MALIGNANT NEOPLASM OF RECTUM (HCC): Primary | ICD-10-CM

## 2017-11-22 DIAGNOSIS — G89.29 CHRONIC RECTAL PAIN: ICD-10-CM

## 2017-11-22 PROCEDURE — G8417 CALC BMI ABV UP PARAM F/U: HCPCS | Performed by: NURSE PRACTITIONER

## 2017-11-22 PROCEDURE — G8484 FLU IMMUNIZE NO ADMIN: HCPCS | Performed by: NURSE PRACTITIONER

## 2017-11-22 PROCEDURE — 1111F DSCHRG MED/CURRENT MED MERGE: CPT | Performed by: NURSE PRACTITIONER

## 2017-11-22 PROCEDURE — 3014F SCREEN MAMMO DOC REV: CPT | Performed by: NURSE PRACTITIONER

## 2017-11-22 PROCEDURE — 99213 OFFICE O/P EST LOW 20 MIN: CPT | Performed by: NURSE PRACTITIONER

## 2017-11-22 PROCEDURE — G8427 DOCREV CUR MEDS BY ELIG CLIN: HCPCS | Performed by: NURSE PRACTITIONER

## 2017-11-22 PROCEDURE — 1036F TOBACCO NON-USER: CPT | Performed by: NURSE PRACTITIONER

## 2017-11-22 PROCEDURE — 3017F COLORECTAL CA SCREEN DOC REV: CPT | Performed by: NURSE PRACTITIONER

## 2017-11-22 RX ORDER — FENTANYL 37.5 UG/H
1 PATCH, EXTENDED RELEASE TRANSDERMAL
Qty: 10 PATCH | Refills: 0 | Status: SHIPPED | OUTPATIENT
Start: 2017-11-22 | End: 2017-12-19 | Stop reason: SDUPTHER

## 2017-11-22 RX ORDER — FENTANYL 37.5 UG/H
1 PATCH, EXTENDED RELEASE TRANSDERMAL
Qty: 10 PATCH | Refills: 0 | Status: SHIPPED | OUTPATIENT
Start: 2017-11-22 | End: 2017-11-22 | Stop reason: SDUPTHER

## 2017-11-22 RX ORDER — TIZANIDINE 2 MG/1
2 TABLET ORAL EVERY 8 HOURS PRN
Qty: 90 TABLET | Refills: 0 | Status: SHIPPED | OUTPATIENT
Start: 2017-11-22 | End: 2017-12-19 | Stop reason: ALTCHOICE

## 2017-11-22 ASSESSMENT — ENCOUNTER SYMPTOMS: RECTAL PAIN: 1

## 2017-11-22 NOTE — PROGRESS NOTES
[facility]  [facility]  [address]  Dept: [phone]  Dept Fax: [phone]: [phone]    Visit Date: 11/22/2017    Functionality Assessment/Goals Worksheet     On a scale of 0 (Does not Interfere) to 10 (Completely Interferes)     1. Which number describes how during the past week pain has interfered with       the following:  A. General Activity:  6  B. Mood: 6  C. Walking Ability:  5  D. Normal Work (Includes both work outside the home and housework):  5  E. Relations with Other People:   8  F. Sleep:   4  G. Enjoyment of Life:   6    2. Patient Prefers to Take their Pain Medications:     []  On a regular basis   [x]  Only when necessary    []  Does not take pain medications    3. What are the Patient's Goals/Expectations for Visiting Pain Management? [x]  Learn about my pain    []  Receive Medication   []  Physical Therapy     []  Treat Depression   []  Receive Injections    []  Treat Sleep   [x]  Deal with Anxiety and Stress   []  Treat Opoid Dependence/Addiction   []  Other:      HPI:   Robin Craig is a 47 y.o. female is here today for    Chief Complaint: Rectal Pain    Mother and fiance present     HPI   2 week FU. Continues to have rectal pain- stabbing aching pain. Continues to have elevated pain. Tolerating 37 mcg Fentanyl patch- helping better. Taking Oxy IR 10 mg every 4 hours- states not lasting more than 2 hours. Feels that pain is not well controlled. Patient states starting chemo in next two weeks. Medications reviewed. Patient denies  side effects with medications. Patient states she is  taking medications as prescribed. She denies receiving pain medications from other sources. She denies any ER visits since last visit. Pain scale with out pain medications is 8/10.   Pain scale with pain medications is  3/10 only for 2 hours   Last dose of Fentanyl patch on Left upper back- no rashes, Percocet was of the pelvis since the prior exam. Persistent moderate constipation. No acute findings.           The patient is allergic to codeine and dilaudid [hydromorphone hcl]. Past Medical History  Phong Lao  has a past medical history of Blood transfusion; Cancer ([facility] [address] Diabetes mellitus type 2 in nonobese [facility]); Hyperlipidemia; Hypertension; and Hyperthyroidism. Past Surgical History  The patient  has a past surgical history that includes Hysterectomy (5/2011); hysteroscopy (2004); Tubal ligation (1990's); Tunneled venous port placement (6/28/2012); Colon surgery (6-5-12); other surgical history (7/30/2013); Hemorrhoid surgery (8/2013); and colon ca scrn not hi rsk ind (Left, 10/27/2017). Family History  This patient's family history includes Cancer in her father; Diabetes in her mother; High Blood Pressure in her father and mother. Social History  Phong Lao  reports that she has never smoked. She has never used smokeless tobacco. She reports that she drinks alcohol. She reports that she does not use drugs. Medications    Current Outpatient Prescriptions:     FentaNYL 37.5 MCG/HR PT72, Place 1 patch onto the skin every 72 hours . , Disp: 10 patch, Rfl: 0    tiZANidine (ZANAFLEX) 2 MG tablet, Take 1 tablet by mouth every 8 hours as needed (spasms), Disp: 90 tablet, Rfl: 0    fentaNYL (SUBSYS) 400 MCG LIQD, Place 400 mcg under the tongue every 6 hours as needed for Pain ., Disp: 56 each, Rfl: 0    oxyCODONE (ROXICODONE) 5 MG immediate release tablet, Take 5 mg by mouth ., Disp: , Rfl:     oxyCODONE-acetaminophen (PERCOCET) 5-325 MG per tablet, Take 2 tablets by mouth every 4 hours as needed for Pain  Rectal Cancer., Disp: 180 tablet, Rfl: 0    valsartan (DIOVAN) 320 MG tablet, Take 320 mg by mouth daily, Disp: , Rfl:     hydrochlorothiazide (HYDRODIURIL) 25 MG tablet, Take 25 mg by mouth daily, Disp: , Rfl:     albuterol sulfate  (90 Base) MCG/ACT inhaler, Inhale 1 puff into the lungs as needed, Disp: , Rfl:     sodium chloride 1 g tablet, Take 1 g by mouth daily, Disp: , Rfl:     ibuprofen (ADVIL;MOTRIN) 600 MG tablet, Take 1 tablet by mouth every 6 hours as needed for Pain, Disp: 30 tablet, Rfl: 0    hydrocortisone (ANUSOL-HC) 25 MG suppository, Place 25 mg rectally 2 times daily, Disp: , Rfl:     lidocaine (XYLOCAINE) 5 % ointment, Apply topically as needed. , Disp: 50 g, Rfl: 1    metFORMIN (GLUCOPHAGE) 500 MG tablet, Take 500 mg by mouth 2 times daily (with meals), Disp: , Rfl:     Alpha-D-Galactosidase (BEANO PO), Take by mouth, Disp: , Rfl:     prochlorperazine (COMPAZINE) 10 MG tablet, Take 10 mg by mouth every 6 hours as needed. , Disp: , Rfl:     levothyroxine (LEVOTHROID) 75 MCG tablet, Take 75 mcg by mouth Daily. , Disp: , Rfl:     amlodipine (NORVASC) 10 MG tablet, Take 5 mg by mouth as needed (nightly) , Disp: , Rfl:     Subjective:      Review of Systems   Gastrointestinal: Positive for rectal pain. Musculoskeletal: Positive for arthralgias and myalgias. Objective:     Vitals:    11/22/17 1151   BP: (!) 141/92   Site: Left Arm   Position: Standing   Cuff Size: Small Adult   Pulse: 111   Weight: 131 lb (59.4 kg)   Height: 4' 7.12\" (1.4 m)       Physical Exam   Constitutional: She is oriented to person, place, and time. She appears well-developed and well-nourished. No distress. HENT:   Head: Normocephalic and atraumatic. Right Ear: External ear normal.   Left Ear: External ear normal.   Nose: Nose normal.   Mouth/Throat: Oropharynx is clear and moist. No oropharyngeal exudate. Eyes: Conjunctivae and EOM are normal. Pupils are equal, round, and reactive to light. Right eye exhibits no discharge. Left eye exhibits no discharge. No scleral icterus. Neck: Normal range of motion and full passive range of motion without pain. Neck supple. No muscular tenderness present. No neck rigidity. No edema, no erythema and normal range of motion present. No thyromegaly present.    Cardiovascular: Normal rate, regular rhythm, normal heart sounds and intact distal pulses. Exam reveals no gallop and no friction rub. No murmur heard. Pulmonary/Chest: Effort normal and breath sounds normal. No respiratory distress. She has no wheezes. She has no rales. She exhibits no tenderness. Abdominal: Soft. Bowel sounds are normal. She exhibits no distension. There is generalized tenderness. There is no rebound and no guarding. Musculoskeletal:        Right ankle: She exhibits normal range of motion and no swelling. Left ankle: She exhibits swelling. Lumbar back: She exhibits decreased range of motion, tenderness and pain. Back:         Right lower leg: She exhibits swelling. Right foot: There is swelling. Neurological: She is alert and oriented to person, place, and time. She has normal strength and normal reflexes. She is not disoriented. She displays no atrophy. No cranial nerve deficit or sensory deficit. She exhibits normal muscle tone. She displays a negative Romberg sign. Gait abnormal. Coordination normal.   Skin: Skin is warm. No rash noted. She is not diaphoretic. No erythema. No pallor. Psychiatric: Her mood appears not anxious. Her affect is not angry, not blunt, not labile and not inappropriate. Her speech is not rapid and/or pressured, not delayed, not tangential and not slurred. She is not agitated, not aggressive, not hyperactive, not slowed, not withdrawn, not actively hallucinating and not combative. Thought content is not paranoid and not delusional. Cognition and memory are not impaired. She does not express impulsivity or inappropriate judgment. She does not exhibit a depressed mood. She expresses no homicidal and no suicidal ideation. She expresses no suicidal plans and no homicidal plans. She is communicative. She exhibits normal recent memory and normal remote memory. She is attentive. Nursing note and vitals reviewed. Assessment:     1.  Malignant neoplasm of rectum ([facility] [address]    2. Chronic rectal pain            Plan:      · OARRS reviewed. · Discussed long term side effects of medications. · Discussed tolerance, dependency and addiction. · Previous UDS reviewed. · Patient told can not receive any pain medications from any other source. · Discussed with pt.may not be pain free. · No evidence of abuse, diversion or aberrant behavior. · Medications and/or procedures improve function and quality of life. · Reviewed CT Pelvis and abdomen. · Reviewed Dr Soledad Chery notes. · Long discussion with patient regarding sustain and immediate release opiates   · Continue 37 mcg patch for maintenance pain relief- tolerating- ordered 30 days supply   · Was not able to tolerate Fentanyl 50 mcg patch  · Continues to have increased breakthrough pain, Oxy IR is helping but only lasting 2 hours. · Will Start Subsys 400 mcg every 6 hours as needed for better breakthrough pain, will continue oxy IR until subsys arrives and then will stop  · Start Zanaflex 2 mg TID prn leg spasms     Meds. Prescribed:   Orders Placed This Encounter   Medications    FentaNYL 37.5 MCG/HR PT72     Sig: Place 1 patch onto the skin every 72 hours . Dispense:  10 patch     Refill:  0    tiZANidine (ZANAFLEX) 2 MG tablet     Sig: Take 1 tablet by mouth every 8 hours as needed (spasms)     Dispense:  90 tablet     Refill:  0    fentaNYL (SUBSYS) 400 MCG LIQD     Sig: Place 400 mcg under the tongue every 6 hours as needed for Pain . Dispense:  56 each     Refill:  0       Return in about 2 weeks (around 12/6/2017), or if symptoms worsen or fail to improve, for follow up  for medications.          Electronically signed by Silver Wolff CNP on 11/22/2017 at 12:26 PM

## 2017-11-22 NOTE — TELEPHONE ENCOUNTER
Patients pharmacy will not have Fentanyl patches in until Tuesday. Patient would like her Rx sent to [facility] on [address]. Please advise.

## 2017-12-19 ENCOUNTER — OFFICE VISIT (OUTPATIENT)
Dept: PHYSICAL MEDICINE AND REHAB | Age: 52
End: 2017-12-19
Payer: COMMERCIAL

## 2017-12-19 VITALS
WEIGHT: 122 LBS | BODY MASS INDEX: 24.6 KG/M2 | DIASTOLIC BLOOD PRESSURE: 85 MMHG | HEIGHT: 59 IN | HEART RATE: 93 BPM | SYSTOLIC BLOOD PRESSURE: 131 MMHG

## 2017-12-19 DIAGNOSIS — G89.29 CHRONIC RECTAL PAIN: ICD-10-CM

## 2017-12-19 DIAGNOSIS — K62.89 CHRONIC RECTAL PAIN: ICD-10-CM

## 2017-12-19 DIAGNOSIS — C20 MALIGNANT NEOPLASM OF RECTUM (HCC): Primary | ICD-10-CM

## 2017-12-19 PROCEDURE — G8420 CALC BMI NORM PARAMETERS: HCPCS | Performed by: NURSE PRACTITIONER

## 2017-12-19 PROCEDURE — 3017F COLORECTAL CA SCREEN DOC REV: CPT | Performed by: NURSE PRACTITIONER

## 2017-12-19 PROCEDURE — G8427 DOCREV CUR MEDS BY ELIG CLIN: HCPCS | Performed by: NURSE PRACTITIONER

## 2017-12-19 PROCEDURE — 99213 OFFICE O/P EST LOW 20 MIN: CPT | Performed by: NURSE PRACTITIONER

## 2017-12-19 PROCEDURE — G8484 FLU IMMUNIZE NO ADMIN: HCPCS | Performed by: NURSE PRACTITIONER

## 2017-12-19 PROCEDURE — 3014F SCREEN MAMMO DOC REV: CPT | Performed by: NURSE PRACTITIONER

## 2017-12-19 PROCEDURE — 1036F TOBACCO NON-USER: CPT | Performed by: NURSE PRACTITIONER

## 2017-12-19 RX ORDER — TIZANIDINE 4 MG/1
4 TABLET ORAL EVERY 8 HOURS PRN
Qty: 90 TABLET | Refills: 0 | Status: SHIPPED | OUTPATIENT
Start: 2017-12-19 | End: 2018-03-21 | Stop reason: ALTCHOICE

## 2017-12-19 RX ORDER — FENTANYL 37.5 UG/H
1 PATCH, EXTENDED RELEASE TRANSDERMAL
Qty: 10 PATCH | Refills: 0 | Status: SHIPPED | OUTPATIENT
Start: 2017-12-28 | End: 2018-01-25 | Stop reason: DRUGHIGH

## 2017-12-19 ASSESSMENT — ENCOUNTER SYMPTOMS: RECTAL PAIN: 1

## 2017-12-19 NOTE — PROGRESS NOTES
[facility]  [address]  Dept: [phone]  Dept Fax: [phone]: [phone]    Visit Date: 12/19/2017    Functionality Assessment/Goals Worksheet     On a scale of 0 (Does not Interfere) to 10 (Completely Interferes)     1. Which number describes how during the past week pain has interfered with       the following:  A. General Activity:  7  B. Mood: 8  C. Walking Ability:  10  D. Normal Work (Includes both work outside the home and housework):  5  E. Relations with Other People:   10  F. Sleep:   5  G. Enjoyment of Life:   9    2. Patient Prefers to Take their Pain Medications:     []  On a regular basis   [x]  Only when necessary    []  Does not take pain medications    3. What are the Patient's Goals/Expectations for Visiting Pain Management? []  Learn about my pain    []  Receive Medication   []  Physical Therapy     []  Treat Depression   []  Receive Injections    []  Treat Sleep   []  Deal with Anxiety and Stress   []  Treat Opoid Dependence/Addiction   [x]  Other:      HPI:   Rafael Davey is a 47 y.o. female is here today for    Chief Complaint:  Rectal pain, leg cramps     Mother present     HPI   1 month FU. Continues to have rectal pain. Overall improvement with medication changes to Subsys. Very tolerable during the day able to move around. Pain is worse during the night- having legs cramps. Taking Subsys 3-4 times per day      Medications reviewed. Patient denies side effects with medications. Patient states she is  taking medications as prescribed. She denies receiving pain medications from other sources. She denies any ER visits since last visit. Pain scale with out pain medications is 7/10. Pain scale with pain medications is  3/10.   Last dose of Fentanyl patch on right upper back no rash, Subsys was 12/19/2017  Drug screen reviewed from 11/10/2017- tez     The patient is allergic to codeine and dilaudid [hydromorphone hcl]. Past Medical History  Cas Rainey  has a past medical history of Blood transfusion; Cancer ([facility] [address] Diabetes mellitus type 2 in nonobese [facility]); Hyperlipidemia; Hypertension; and Hyperthyroidism. Past Surgical History  The patient  has a past surgical history that includes Hysterectomy (5/2011); hysteroscopy (2004); Tubal ligation (1990's); Tunneled venous port placement (6/28/2012); Colon surgery (6-5-12); other surgical history (7/30/2013); Hemorrhoid surgery (8/2013); and colon ca scrn not hi rsk ind (Left, 10/27/2017). Family History  This patient's family history includes Cancer in her father; Diabetes in her mother; High Blood Pressure in her father and mother. Social History  Cas Rainey  reports that she has never smoked. She has never used smokeless tobacco. She reports that she drinks alcohol. She reports that she does not use drugs. Medications    Current Outpatient Prescriptions:     [START ON 12/28/2017] FentaNYL 37.5 MCG/HR PT72, Place 1 patch onto the skin every 72 hours . , Disp: 10 patch, Rfl: 0    tiZANidine (ZANAFLEX) 4 MG tablet, Take 1 tablet by mouth every 8 hours as needed (spasms), Disp: 90 tablet, Rfl: 0    fentaNYL (SUBSYS) 400 MCG LIQD, Place 400 mcg under the tongue every 6 hours as needed for Pain ., Disp: 56 each, Rfl: 0    oxyCODONE (ROXICODONE) 5 MG immediate release tablet, Take 5 mg by mouth ., Disp: , Rfl:     valsartan (DIOVAN) 320 MG tablet, Take 320 mg by mouth daily, Disp: , Rfl:     hydrochlorothiazide (HYDRODIURIL) 25 MG tablet, Take 25 mg by mouth daily, Disp: , Rfl:     albuterol sulfate  (90 Base) MCG/ACT inhaler, Inhale 1 puff into the lungs as needed, Disp: , Rfl:     sodium chloride 1 g tablet, Take 1 g by mouth daily, Disp: , Rfl:     ibuprofen (ADVIL;MOTRIN) 600 MG tablet, Take 1 tablet by mouth every 6 hours as needed for Pain, Disp: 30 tablet, Rfl: 0    hydrocortisone (ANUSOL-HC) 25 MG suppository, Place 25 mg rectally 2 normal. She exhibits no distension. There is generalized tenderness. There is no rebound and no guarding. Musculoskeletal:        Right ankle: She exhibits normal range of motion and no swelling. Left ankle: She exhibits swelling. Lumbar back: She exhibits decreased range of motion, tenderness and pain. Back:         Right lower leg: She exhibits swelling. Right foot: There is swelling. Neurological: She is alert and oriented to person, place, and time. She has normal strength and normal reflexes. She is not disoriented. She displays no atrophy. No cranial nerve deficit or sensory deficit. She exhibits normal muscle tone. She displays a negative Romberg sign. Gait abnormal. Coordination normal.   Skin: Skin is warm. No rash noted. She is not diaphoretic. No erythema. No pallor. Psychiatric: Her mood appears not anxious. Her affect is not angry, not blunt, not labile and not inappropriate. Her speech is not rapid and/or pressured, not delayed, not tangential and not slurred. She is not agitated, not aggressive, not hyperactive, not slowed, not withdrawn, not actively hallucinating and not combative. Thought content is not paranoid and not delusional. Cognition and memory are not impaired. She does not express impulsivity or inappropriate judgment. She does not exhibit a depressed mood. She expresses no homicidal and no suicidal ideation. She expresses no suicidal plans and no homicidal plans. She is communicative. She exhibits normal recent memory and normal remote memory. She is attentive. Nursing note and vitals reviewed. Assessment:     1. Malignant neoplasm of rectum ([address]    2. Chronic rectal pain            Plan:      · OARRS reviewed. · Discussed long term side effects of medications. · Discussed tolerance, dependency and addiction. · Previous UDS reviewed. · Patient told can not receive any pain medications from any other source.   · Discussed with pt.may not be pain free. · No evidence of abuse, diversion or aberrant behavior. · Medications and/or procedures improve function and quality of life. · Overall doing better with adjustments in pain medications. · Medications are effective, patient is compliant. · Continue Fentanyl 37 mcg patch every 72 hours for maintanance pain relief- ordered refill   · Continue Subsys 400 mcg every 6 hours as needed for breakthrough pain recently filled 12/11/2017  · Increased Zanaflex to 4 mg TID prn for spasms     Meds. Prescribed:   Orders Placed This Encounter   Medications    FentaNYL 37.5 MCG/HR PT72     Sig: Place 1 patch onto the skin every 72 hours . Dispense:  10 patch     Refill:  0    tiZANidine (ZANAFLEX) 4 MG tablet     Sig: Take 1 tablet by mouth every 8 hours as needed (spasms)     Dispense:  90 tablet     Refill:  0       Return in about 1 month (around 1/19/2018), or if symptoms worsen or fail to improve, for follow up  for medications.          Electronically signed by Abril Blackburn CNP on 12/19/2017 at 4:46 PM

## 2018-01-04 NOTE — TELEPHONE ENCOUNTER
Patient is complaining of increased nausea, wanting to know if we will prescribe her anything for this ?  Please advise

## 2018-01-08 ENCOUNTER — HOSPITAL ENCOUNTER (INPATIENT)
Age: 53
LOS: 2 days | Discharge: OTHER INSTITUTION | DRG: 374 | End: 2018-01-10
Attending: INTERNAL MEDICINE | Admitting: INTERNAL MEDICINE
Payer: COMMERCIAL

## 2018-01-08 DIAGNOSIS — C20 RECTAL CANCER (HCC): ICD-10-CM

## 2018-01-08 DIAGNOSIS — R11.2 NON-INTRACTABLE VOMITING WITH NAUSEA, UNSPECIFIED VOMITING TYPE: Primary | ICD-10-CM

## 2018-01-08 DIAGNOSIS — E86.0 DEHYDRATION: ICD-10-CM

## 2018-01-08 LAB
ANION GAP SERPL CALCULATED.3IONS-SCNC: 14 MEQ/L (ref 8–16)
ANISOCYTOSIS: ABNORMAL
BASOPHILS # BLD: 0.3 %
BASOPHILS ABSOLUTE: 0 THOU/MM3 (ref 0–0.1)
BUN BLDV-MCNC: 23 MG/DL (ref 7–22)
CALCIUM SERPL-MCNC: 9.6 MG/DL (ref 8.5–10.5)
CHLORIDE BLD-SCNC: 96 MEQ/L (ref 98–111)
CO2: 30 MEQ/L (ref 23–33)
CREAT SERPL-MCNC: 0.8 MG/DL (ref 0.4–1.2)
EKG ATRIAL RATE: 103 BPM
EKG P AXIS: 75 DEGREES
EKG P-R INTERVAL: 132 MS
EKG Q-T INTERVAL: 336 MS
EKG QRS DURATION: 64 MS
EKG QTC CALCULATION (BAZETT): 440 MS
EKG R AXIS: 45 DEGREES
EKG T AXIS: 40 DEGREES
EKG VENTRICULAR RATE: 103 BPM
EOSINOPHIL # BLD: 3.9 %
EOSINOPHILS ABSOLUTE: 0.2 THOU/MM3 (ref 0–0.4)
FLU A ANTIGEN: NEGATIVE
FLU B ANTIGEN: NEGATIVE
GFR SERPL CREATININE-BSD FRML MDRD: > 90 ML/MIN/1.73M2
GLUCOSE BLD-MCNC: 89 MG/DL (ref 70–108)
HCT VFR BLD CALC: 28.9 % (ref 37–47)
HEMOGLOBIN: 9.3 GM/DL (ref 12–16)
LYMPHOCYTES # BLD: 9.4 %
LYMPHOCYTES ABSOLUTE: 0.6 THOU/MM3 (ref 1–4.8)
MCH RBC QN AUTO: 27.8 PG (ref 27–31)
MCHC RBC AUTO-ENTMCNC: 32 GM/DL (ref 33–37)
MCV RBC AUTO: 86.9 FL (ref 81–99)
MONOCYTES # BLD: 7.1 %
MONOCYTES ABSOLUTE: 0.4 THOU/MM3 (ref 0.4–1.3)
NUCLEATED RED BLOOD CELLS: 0 /100 WBC
OSMOLALITY CALCULATION: 282.6 MOSMOL/KG (ref 275–300)
PDW BLD-RTO: 16.2 % (ref 11.5–14.5)
PLATELET # BLD: 464 THOU/MM3 (ref 130–400)
PMV BLD AUTO: 6.2 MCM (ref 7.4–10.4)
POTASSIUM SERPL-SCNC: 3.9 MEQ/L (ref 3.5–5.2)
RBC # BLD: 3.32 MILL/MM3 (ref 4.2–5.4)
SEG NEUTROPHILS: 79.3 %
SEGMENTED NEUTROPHILS ABSOLUTE COUNT: 4.7 THOU/MM3 (ref 1.8–7.7)
SODIUM BLD-SCNC: 140 MEQ/L (ref 135–145)
WBC # BLD: 5.9 THOU/MM3 (ref 4.8–10.8)

## 2018-01-08 PROCEDURE — 85025 COMPLETE CBC W/AUTO DIFF WBC: CPT

## 2018-01-08 PROCEDURE — 6360000002 HC RX W HCPCS: Performed by: NURSE PRACTITIONER

## 2018-01-08 PROCEDURE — 96374 THER/PROPH/DIAG INJ IV PUSH: CPT

## 2018-01-08 PROCEDURE — 96361 HYDRATE IV INFUSION ADD-ON: CPT

## 2018-01-08 PROCEDURE — A4614 HAND-HELD PEFR METER: HCPCS

## 2018-01-08 PROCEDURE — 36415 COLL VENOUS BLD VENIPUNCTURE: CPT

## 2018-01-08 PROCEDURE — 6370000000 HC RX 637 (ALT 250 FOR IP): Performed by: NURSE PRACTITIONER

## 2018-01-08 PROCEDURE — 87804 INFLUENZA ASSAY W/OPTIC: CPT

## 2018-01-08 PROCEDURE — 1200000000 HC SEMI PRIVATE

## 2018-01-08 PROCEDURE — 93005 ELECTROCARDIOGRAM TRACING: CPT

## 2018-01-08 PROCEDURE — 2580000003 HC RX 258: Performed by: NURSE PRACTITIONER

## 2018-01-08 PROCEDURE — 99285 EMERGENCY DEPT VISIT HI MDM: CPT

## 2018-01-08 PROCEDURE — 80048 BASIC METABOLIC PNL TOTAL CA: CPT

## 2018-01-08 PROCEDURE — 96375 TX/PRO/DX INJ NEW DRUG ADDON: CPT

## 2018-01-08 PROCEDURE — 96376 TX/PRO/DX INJ SAME DRUG ADON: CPT

## 2018-01-08 RX ORDER — TIZANIDINE 4 MG/1
4 TABLET ORAL EVERY 8 HOURS PRN
Status: DISCONTINUED | OUTPATIENT
Start: 2018-01-08 | End: 2018-01-09

## 2018-01-08 RX ORDER — SODIUM CHLORIDE 0.9 % (FLUSH) 0.9 %
10 SYRINGE (ML) INJECTION PRN
Status: DISCONTINUED | OUTPATIENT
Start: 2018-01-08 | End: 2018-01-10 | Stop reason: HOSPADM

## 2018-01-08 RX ORDER — VALSARTAN 320 MG/1
320 TABLET ORAL DAILY
Status: DISCONTINUED | OUTPATIENT
Start: 2018-01-09 | End: 2018-01-10 | Stop reason: HOSPADM

## 2018-01-08 RX ORDER — AMLODIPINE BESYLATE 5 MG/1
5 TABLET ORAL PRN
Status: DISCONTINUED | OUTPATIENT
Start: 2018-01-08 | End: 2018-01-10 | Stop reason: HOSPADM

## 2018-01-08 RX ORDER — DIPHENHYDRAMINE HYDROCHLORIDE 50 MG/ML
25 INJECTION INTRAMUSCULAR; INTRAVENOUS ONCE
Status: COMPLETED | OUTPATIENT
Start: 2018-01-08 | End: 2018-01-08

## 2018-01-08 RX ORDER — ACETAMINOPHEN 325 MG/1
650 TABLET ORAL EVERY 4 HOURS PRN
Status: DISCONTINUED | OUTPATIENT
Start: 2018-01-08 | End: 2018-01-10 | Stop reason: HOSPADM

## 2018-01-08 RX ORDER — ALBUTEROL SULFATE 90 UG/1
1 AEROSOL, METERED RESPIRATORY (INHALATION) EVERY 4 HOURS PRN
Status: DISCONTINUED | OUTPATIENT
Start: 2018-01-08 | End: 2018-01-10 | Stop reason: HOSPADM

## 2018-01-08 RX ORDER — 0.9 % SODIUM CHLORIDE 0.9 %
1000 INTRAVENOUS SOLUTION INTRAVENOUS ONCE
Status: COMPLETED | OUTPATIENT
Start: 2018-01-08 | End: 2018-01-08

## 2018-01-08 RX ORDER — SODIUM CHLORIDE 0.9 % (FLUSH) 0.9 %
10 SYRINGE (ML) INJECTION EVERY 12 HOURS SCHEDULED
Status: DISCONTINUED | OUTPATIENT
Start: 2018-01-09 | End: 2018-01-10 | Stop reason: HOSPADM

## 2018-01-08 RX ORDER — SODIUM CHLORIDE 1000 MG
1 TABLET, SOLUBLE MISCELLANEOUS DAILY
Status: DISCONTINUED | OUTPATIENT
Start: 2018-01-09 | End: 2018-01-10 | Stop reason: HOSPADM

## 2018-01-08 RX ORDER — ONDANSETRON 2 MG/ML
4 INJECTION INTRAMUSCULAR; INTRAVENOUS ONCE
Status: COMPLETED | OUTPATIENT
Start: 2018-01-08 | End: 2018-01-08

## 2018-01-08 RX ORDER — DIPHENHYDRAMINE HYDROCHLORIDE 50 MG/ML
25 INJECTION INTRAMUSCULAR; INTRAVENOUS EVERY 6 HOURS PRN
Status: DISCONTINUED | OUTPATIENT
Start: 2018-01-08 | End: 2018-01-10 | Stop reason: HOSPADM

## 2018-01-08 RX ORDER — SODIUM CHLORIDE 9 MG/ML
INJECTION, SOLUTION INTRAVENOUS CONTINUOUS
Status: DISCONTINUED | OUTPATIENT
Start: 2018-01-09 | End: 2018-01-10 | Stop reason: HOSPADM

## 2018-01-08 RX ORDER — LIDOCAINE 40 MG/G
CREAM TOPICAL ONCE
Status: COMPLETED | OUTPATIENT
Start: 2018-01-08 | End: 2018-01-08

## 2018-01-08 RX ORDER — PROCHLORPERAZINE MALEATE 10 MG
10 TABLET ORAL EVERY 6 HOURS PRN
Status: DISCONTINUED | OUTPATIENT
Start: 2018-01-08 | End: 2018-01-10 | Stop reason: HOSPADM

## 2018-01-08 RX ORDER — LEVOTHYROXINE SODIUM 0.07 MG/1
75 TABLET ORAL DAILY
Status: DISCONTINUED | OUTPATIENT
Start: 2018-01-09 | End: 2018-01-10 | Stop reason: HOSPADM

## 2018-01-08 RX ORDER — ONDANSETRON 2 MG/ML
4 INJECTION INTRAMUSCULAR; INTRAVENOUS EVERY 6 HOURS PRN
Status: DISCONTINUED | OUTPATIENT
Start: 2018-01-08 | End: 2018-01-10 | Stop reason: HOSPADM

## 2018-01-08 RX ADMIN — LIDOCAINE 4%: 4 CREAM TOPICAL at 21:10

## 2018-01-08 RX ADMIN — ONDANSETRON 4 MG: 2 INJECTION INTRAMUSCULAR; INTRAVENOUS at 19:48

## 2018-01-08 RX ADMIN — SODIUM CHLORIDE 1000 ML: 9 INJECTION, SOLUTION INTRAVENOUS at 19:50

## 2018-01-08 RX ADMIN — HYDROMORPHONE HYDROCHLORIDE 1 MG: 1 INJECTION, SOLUTION INTRAMUSCULAR; INTRAVENOUS; SUBCUTANEOUS at 19:50

## 2018-01-08 RX ADMIN — HYDROMORPHONE HYDROCHLORIDE 1 MG: 1 INJECTION, SOLUTION INTRAMUSCULAR; INTRAVENOUS; SUBCUTANEOUS at 21:09

## 2018-01-08 RX ADMIN — DIPHENHYDRAMINE HYDROCHLORIDE 25 MG: 50 INJECTION, SOLUTION INTRAMUSCULAR; INTRAVENOUS at 19:48

## 2018-01-08 ASSESSMENT — ENCOUNTER SYMPTOMS
SHORTNESS OF BREATH: 0
WHEEZING: 0
EYE DISCHARGE: 0
BACK PAIN: 0
RECTAL PAIN: 1
COUGH: 0
BLOOD IN STOOL: 0
EYE PAIN: 0
ANAL BLEEDING: 0
CONSTIPATION: 0
NAUSEA: 1
SORE THROAT: 0
ABDOMINAL PAIN: 0
VOMITING: 1
DIARRHEA: 0
RHINORRHEA: 0

## 2018-01-08 ASSESSMENT — PAIN DESCRIPTION - LOCATION
LOCATION: RECTUM

## 2018-01-08 ASSESSMENT — PAIN SCALES - GENERAL
PAINLEVEL_OUTOF10: 6
PAINLEVEL_OUTOF10: 6
PAINLEVEL_OUTOF10: 10

## 2018-01-08 ASSESSMENT — PAIN DESCRIPTION - PAIN TYPE
TYPE: ACUTE PAIN

## 2018-01-09 ENCOUNTER — APPOINTMENT (OUTPATIENT)
Dept: GENERAL RADIOLOGY | Age: 53
DRG: 374 | End: 2018-01-09
Payer: COMMERCIAL

## 2018-01-09 ENCOUNTER — TELEPHONE (OUTPATIENT)
Dept: PHYSICAL MEDICINE AND REHAB | Age: 53
End: 2018-01-09

## 2018-01-09 ENCOUNTER — APPOINTMENT (OUTPATIENT)
Dept: CT IMAGING | Age: 53
DRG: 374 | End: 2018-01-09
Payer: COMMERCIAL

## 2018-01-09 LAB
ANISOCYTOSIS: ABNORMAL
BASOPHILS # BLD: 0.4 %
BASOPHILS ABSOLUTE: 0 THOU/MM3 (ref 0–0.1)
EOSINOPHIL # BLD: 5.8 %
EOSINOPHILS ABSOLUTE: 0.3 THOU/MM3 (ref 0–0.4)
GLUCOSE BLD-MCNC: 105 MG/DL (ref 70–108)
GLUCOSE BLD-MCNC: 134 MG/DL (ref 70–108)
HCT VFR BLD CALC: 27.7 % (ref 37–47)
HEMOGLOBIN: 9 GM/DL (ref 12–16)
LYMPHOCYTES # BLD: 10.4 %
LYMPHOCYTES ABSOLUTE: 0.5 THOU/MM3 (ref 1–4.8)
MCH RBC QN AUTO: 28.3 PG (ref 27–31)
MCHC RBC AUTO-ENTMCNC: 32.4 GM/DL (ref 33–37)
MCV RBC AUTO: 87.3 FL (ref 81–99)
MONOCYTES # BLD: 9.4 %
MONOCYTES ABSOLUTE: 0.5 THOU/MM3 (ref 0.4–1.3)
NUCLEATED RED BLOOD CELLS: 0 /100 WBC
PDW BLD-RTO: 16.4 % (ref 11.5–14.5)
PLATELET # BLD: 384 THOU/MM3 (ref 130–400)
PMV BLD AUTO: 6.6 MCM (ref 7.4–10.4)
RBC # BLD: 3.17 MILL/MM3 (ref 4.2–5.4)
SEG NEUTROPHILS: 74 %
SEGMENTED NEUTROPHILS ABSOLUTE COUNT: 3.6 THOU/MM3 (ref 1.8–7.7)
WBC # BLD: 4.8 THOU/MM3 (ref 4.8–10.8)

## 2018-01-09 PROCEDURE — 71045 X-RAY EXAM CHEST 1 VIEW: CPT

## 2018-01-09 PROCEDURE — 2580000003 HC RX 258: Performed by: INTERNAL MEDICINE

## 2018-01-09 PROCEDURE — 74177 CT ABD & PELVIS W/CONTRAST: CPT

## 2018-01-09 PROCEDURE — 6360000004 HC RX CONTRAST MEDICATION: Performed by: INTERNAL MEDICINE

## 2018-01-09 PROCEDURE — 6360000002 HC RX W HCPCS: Performed by: INTERNAL MEDICINE

## 2018-01-09 PROCEDURE — 36415 COLL VENOUS BLD VENIPUNCTURE: CPT

## 2018-01-09 PROCEDURE — 6370000000 HC RX 637 (ALT 250 FOR IP): Performed by: INTERNAL MEDICINE

## 2018-01-09 PROCEDURE — 85025 COMPLETE CBC W/AUTO DIFF WBC: CPT

## 2018-01-09 PROCEDURE — 82948 REAGENT STRIP/BLOOD GLUCOSE: CPT

## 2018-01-09 PROCEDURE — A6252 ABSORPT DRG >16 <=48 W/O BDR: HCPCS

## 2018-01-09 PROCEDURE — A6223 GAUZE >16<=48 NO W/SAL W/O B: HCPCS

## 2018-01-09 PROCEDURE — 1200000000 HC SEMI PRIVATE

## 2018-01-09 RX ORDER — CIPROFLOXACIN 2 MG/ML
400 INJECTION, SOLUTION INTRAVENOUS EVERY 12 HOURS
Status: DISCONTINUED | OUTPATIENT
Start: 2018-01-09 | End: 2018-01-10 | Stop reason: HOSPADM

## 2018-01-09 RX ORDER — ONDANSETRON 4 MG/1
4 TABLET, FILM COATED ORAL EVERY 8 HOURS PRN
Qty: 90 TABLET | Refills: 0 | Status: ON HOLD | OUTPATIENT
Start: 2018-01-09 | End: 2018-02-09 | Stop reason: HOSPADM

## 2018-01-09 RX ORDER — FERROUS SULFATE 325(65) MG
325 TABLET ORAL 2 TIMES DAILY WITH MEALS
Status: DISCONTINUED | OUTPATIENT
Start: 2018-01-09 | End: 2018-01-10 | Stop reason: HOSPADM

## 2018-01-09 RX ORDER — FENTANYL 50 UG/H
1 PATCH TRANSDERMAL
Status: DISCONTINUED | OUTPATIENT
Start: 2018-01-09 | End: 2018-01-10 | Stop reason: HOSPADM

## 2018-01-09 RX ORDER — METRONIDAZOLE 500 MG/1
500 TABLET ORAL EVERY 8 HOURS SCHEDULED
Status: DISCONTINUED | OUTPATIENT
Start: 2018-01-09 | End: 2018-01-10 | Stop reason: HOSPADM

## 2018-01-09 RX ADMIN — HYDROMORPHONE HYDROCHLORIDE 1 MG: 1 INJECTION, SOLUTION INTRAMUSCULAR; INTRAVENOUS; SUBCUTANEOUS at 23:51

## 2018-01-09 RX ADMIN — PROCHLORPERAZINE MALEATE 10 MG: 10 TABLET, FILM COATED ORAL at 15:24

## 2018-01-09 RX ADMIN — SODIUM CHLORIDE TAB 1 GM 1 G: 1 TAB at 15:23

## 2018-01-09 RX ADMIN — LEVOTHYROXINE SODIUM 75 MCG: 75 TABLET ORAL at 06:47

## 2018-01-09 RX ADMIN — HYDROMORPHONE HYDROCHLORIDE 1 MG: 1 INJECTION, SOLUTION INTRAMUSCULAR; INTRAVENOUS; SUBCUTANEOUS at 16:23

## 2018-01-09 RX ADMIN — HYDROMORPHONE HYDROCHLORIDE 1 MG: 1 INJECTION, SOLUTION INTRAMUSCULAR; INTRAVENOUS; SUBCUTANEOUS at 03:16

## 2018-01-09 RX ADMIN — METRONIDAZOLE 500 MG: 500 TABLET, FILM COATED ORAL at 09:06

## 2018-01-09 RX ADMIN — HYDROMORPHONE HYDROCHLORIDE 1 MG: 1 INJECTION, SOLUTION INTRAMUSCULAR; INTRAVENOUS; SUBCUTANEOUS at 06:43

## 2018-01-09 RX ADMIN — FERROUS SULFATE TAB 325 MG (65 MG ELEMENTAL FE) 325 MG: 325 (65 FE) TAB at 15:23

## 2018-01-09 RX ADMIN — CIPROFLOXACIN 400 MG: 2 INJECTION, SOLUTION INTRAVENOUS at 20:26

## 2018-01-09 RX ADMIN — ACETAMINOPHEN 650 MG: 325 TABLET ORAL at 09:05

## 2018-01-09 RX ADMIN — TIZANIDINE 4 MG: 4 TABLET ORAL at 06:47

## 2018-01-09 RX ADMIN — METRONIDAZOLE 500 MG: 500 TABLET, FILM COATED ORAL at 23:54

## 2018-01-09 RX ADMIN — VALSARTAN 320 MG: 320 TABLET ORAL at 15:23

## 2018-01-09 RX ADMIN — HYDROMORPHONE HYDROCHLORIDE 1 MG: 1 INJECTION, SOLUTION INTRAMUSCULAR; INTRAVENOUS; SUBCUTANEOUS at 00:06

## 2018-01-09 RX ADMIN — ONDANSETRON 4 MG: 2 INJECTION INTRAMUSCULAR; INTRAVENOUS at 11:57

## 2018-01-09 RX ADMIN — CIPROFLOXACIN 400 MG: 2 INJECTION, SOLUTION INTRAVENOUS at 09:06

## 2018-01-09 RX ADMIN — SODIUM CHLORIDE: 9 INJECTION, SOLUTION INTRAVENOUS at 00:05

## 2018-01-09 RX ADMIN — METRONIDAZOLE 500 MG: 500 TABLET, FILM COATED ORAL at 15:24

## 2018-01-09 RX ADMIN — HYDROMORPHONE HYDROCHLORIDE 1 MG: 1 INJECTION, SOLUTION INTRAMUSCULAR; INTRAVENOUS; SUBCUTANEOUS at 10:12

## 2018-01-09 RX ADMIN — IOPAMIDOL 80 ML: 755 INJECTION, SOLUTION INTRAVENOUS at 11:21

## 2018-01-09 RX ADMIN — HYDROMORPHONE HYDROCHLORIDE 1 MG: 1 INJECTION, SOLUTION INTRAMUSCULAR; INTRAVENOUS; SUBCUTANEOUS at 20:26

## 2018-01-09 ASSESSMENT — PAIN DESCRIPTION - LOCATION
LOCATION: RECTUM

## 2018-01-09 ASSESSMENT — PAIN SCALES - GENERAL
PAINLEVEL_OUTOF10: 8
PAINLEVEL_OUTOF10: 7
PAINLEVEL_OUTOF10: 5
PAINLEVEL_OUTOF10: 6
PAINLEVEL_OUTOF10: 5
PAINLEVEL_OUTOF10: 4
PAINLEVEL_OUTOF10: 7
PAINLEVEL_OUTOF10: 7
PAINLEVEL_OUTOF10: 5
PAINLEVEL_OUTOF10: 8
PAINLEVEL_OUTOF10: 0
PAINLEVEL_OUTOF10: 8
PAINLEVEL_OUTOF10: 7

## 2018-01-09 ASSESSMENT — PAIN DESCRIPTION - DESCRIPTORS: DESCRIPTORS: DISCOMFORT;ACHING;JABBING

## 2018-01-09 ASSESSMENT — PAIN DESCRIPTION - PAIN TYPE
TYPE: ACUTE PAIN
TYPE: SURGICAL PAIN

## 2018-01-09 ASSESSMENT — PAIN DESCRIPTION - PROGRESSION: CLINICAL_PROGRESSION: NOT CHANGED

## 2018-01-09 ASSESSMENT — PAIN DESCRIPTION - FREQUENCY: FREQUENCY: CONTINUOUS

## 2018-01-09 NOTE — PROGRESS NOTES
Consulted for Colostomy. Patient ambulating in room. Patient stated she has had her colostomy since November. Patient stated her son will be bringing in her own supplies. Patient stated her pouching system typically lasts for 3 to 4 days. Per RN patient also has a wound near the rectum. Patient turned onto right side. Noted to the inner buttock near the rectum an opening, that is draining a moderate amount of oleary drainage. Dr. Jorja Angelucci in room at this time. Will plan to measure and pack fistula once the patients CT scan is completed. Left extra supplies in room. Okay with Dr. Jorja Angelucci to apply Zinc protective barrier to salvador skin, pack fistula with mesalt, cover with dry gauze and ABD pad.

## 2018-01-09 NOTE — ED NOTES
Pt resting on cot comfortably, eyes closed. Family at bedside. Will monitor.      Dinah Minaya RN  01/08/18 7884

## 2018-01-09 NOTE — ED NOTES
In to reassess pt. Pt reports pain is down to a 6/10 following medication administration. Pt denies itching following dilaudid. Pt tolerating well. Family at bedside. Call light in reach. Will continue to monitor.      Kameron Bocanegra RN  01/08/18 2011

## 2018-01-09 NOTE — CONSULTS
[address] [place] [address]                                   CONSULTATION    PATIENT NAME: Shanice Luna                     :        1965  MED REC NO:   [id number]                           ROOM:       0028  ACCOUNT NO:   [de-identified]                           ADMIT DATE: 2018  PROVIDER:     Monie Bass. TIO Simmonsings:  2018    PATIENT OF:  Cailin Sims M.D. who is out of town. REASON FOR CONSULTATION:  I was asked to see this patient for draining  perineal wound. HISTORY OF PRESENT ILLNESS:  The patient is a 60-year-old female patient  who has metastatic rectal cancer, who was admitted to the hospital with  nausea and vomiting. She had a long history of cancer dating back to . She was initially diagnosed with adenocarcinoma of the rectum stage III. She had surgical resection, received adjuvant radiation chemotherapy. Subsequently, she had pelvic recurrence in  with the disease in the  presacral soft tissue, required another resection chemotherapy and had  temporary colostomy. It was reversed in  and completed chemotherapy. Subsequently, she developed dilation on her rectum and the biopsy showed  intramural adenocarcinoma. Subsequently, she had treatment and was seen on  October after she had a massive GI bleed requiring further treatment. Subsequently, she was transferred to [place] where she had debridement of  draining abscess, necrotic tissue and had revision of her diverting  colostomy. She has been doing relatively well and yesterday she started to  have some nausea, vomiting and abdominal pain. She denied any fever or  chills. She noted that there is more drainage from her perineal wound. She had a fistulous tract over her left perineal area.     PAST MEDICAL HISTORY:  Significant for colorectal cancer, diabetes,  hyperlipidemia, hypertension, hypothyroidism, 09:24:48     [email or web address]  Job#: [id number]     Doc#: [id number]    CC:

## 2018-01-09 NOTE — CARE COORDINATION
DISCHARGE BARRIERS  1/9/18, 2:26 PM    Reason for Referral: patient requests [facility] at discharge-skilled nursing for wound care  Mental Status: alert and oriented  Decision Making: patient is able to make her own decisions  Family/Social/Home Environment: Patient is a 47year old,  female. Admitted with nausea and vomiting-history of recurrent rectal cancer under the care of Dr. Evelin Roa. She resides in a 2 floor apartment with her 21 year ols son who is employed outside the home. Bedroom and bathroom are located on the second floor-patient does not have any difficulty using the stairs and remains independent with her personal care. Patient's mother is here from [place]. She and patient's son manage household needs. Mother states that she is here for however long patient needs her. Patient is on leave from [employer]. Current Services: none PTA  Current Equipment: N/A  Payment Source: [facility] [employer]  Concerns or Barriers to Discharge: none indicated  Collabrative List of ECF/HH were provided: N/A-patient has identified [facility]. Teach Back Method used with patient regarding care plan   Patient verbalize understanding of the plan of care and contribute to goal setting. Anticipated Needs/Discharge Plan: patient will return home with assistance from family and significant other. Patient will need home health care for wound care needs when discharged.      Electronically signed by ALEX Rendon on 1/9/2018 at 2:26 PM

## 2018-01-09 NOTE — ED NOTES
In to reassess pain. Pt reports lidocaine cream helped \"take the edge off. \" Although, pt is still rating pain as a 6/10 in her rectum. Pt requesting more for pain. Will notify provider.      Henrik Flores RN  01/08/18 1782

## 2018-01-09 NOTE — PROGRESS NOTES
Discussed with Dr Juan Antonio Murdock and Dr Major. Discussed with patient. Will transfer patient to [facility].

## 2018-01-09 NOTE — PROGRESS NOTES
Clinical Pharmacy Note  Metformin-IV Contrast Interaction Monitoring    Flores Mcdonald is a 47 y.o. female. Patient has received IV contrast and is on metformin. Current Metformin dose: 500mg BID    Height:   Ht Readings from Last 1 Encounters:   01/09/18 4' 11\" (1.499 m)     Weight:  Wt Readings from Last 1 Encounters:   01/09/18 117 lb 11.2 oz (53.4 kg)       Recent Labs      01/08/18 2017   CREATININE  0.8       CrCl cannot be calculated (Unknown ideal weight. ). Assessment:  Contrast administered: Yes  Metformin discontinued: Yes    Date/time of contrast administration: 1/09/18 at 1121      Plan:  1. Metformin will be held for at least 48 hours and restarted if serum creatinine is within FDA approved guidelines. 2.  Pharmacy will check serum creatinine with morning labs on 1/12/18 AM    Thank you.     Alber Moon, PharmD 1/9/2018 12:07 PM

## 2018-01-09 NOTE — ED NOTES
In to reassess pt and medicate per order. No significant changes from previous assessment. Will continue to monitor. Family remains at bedside.      Nia Schultz RN  01/08/18 7539

## 2018-01-09 NOTE — ED NOTES
Marquise Lincoln NP at bedside for pt assessment and to do rectal exam.      Karyn Perez RN  01/08/18 2522

## 2018-01-09 NOTE — TELEPHONE ENCOUNTER
Patient is admitted to hospital due to increased pain, since last chemo that was on 1/03/18- Dr Liban Dodson increased her patch medication to 50mcg. Receiving dilaudid at hospital at this time- which pt states is helping her.

## 2018-01-09 NOTE — PLAN OF CARE
Problem: SKIN INTEGRITY  Goal: Skin integrity is maintained or improved  Outcome: Ongoing  Into apply mesalt packing to fistula near rectum. Patient ambulating in room. Patient asked to lay down in bed for visualization of area. See photos below. Fistula noted to be red. Tan drainage noted from fistula. Area measured 2 cm x 1.2 cm x 1.2 cm. Area appeared to be deeper, but patient was experiencing to much pain. Packed lightly mesalt into fistula, salvador fistula applies zinc barrier cream,  buttocks with gauze, and placed an ABD pad in depends. Staff was directed at next change to allow patients son and significant other to watch. At this time they were not in the room. Assisted patient with a new gown and socks. Updated primary Rn. Will continue to follow. Care plan reviewed with patient and RN. Patient and RN verbalize understanding of the plan of care and contribute to goal setting.           Comments:

## 2018-01-09 NOTE — FLOWSHEET NOTE
Prayer and encouragement      01/09/18 1714   Encounter Summary   Services provided to: Patient and family together   Referral/Consult From: [facility] Parent   Place of [address] Visiting Yes  (1/9)   Complexity of Encounter Moderate   Length of Encounter 30 minutes   Spiritual/Adventist   Type Spiritual support   Assessment Approachable;Calm; Hopeful   Intervention Prayer;Nurtured hope; Active listening;Empowerment;Sustaining presence/ Ministry of presence   Outcome Connection/belonging;Expressed gratitude;Expressed regrets;Encouraged; Hopeful;Receptive

## 2018-01-09 NOTE — FLOWSHEET NOTE
During my contact with the patient, family members were present. The pt was nervous about her current condition. 01/09/18 1814   Encounter Summary   Services provided to: Patient and family together   Referral/Consult From: Patient; Family; Other ;Rounding   Support System Parent; Family members; Mormonism/hollis community   Place of Congregation ([address])   Contact Mormonism Completed   Continue Visiting Yes  (1/9/18 Pt was nervous about her current condition)   Complexity of Encounter Moderate   Length of Encounter 30 minutes   Spiritual/Religion   Type Spiritual support   Assessment Approachable; Anxious   Intervention Active listening;Explored feelings, thoughts, concerns;Nurtured hope;Prayer;Sustaining presence/ Ministry of presence   Outcome Comfort;Expressed gratitude;Engaged in conversation    I offered emotional support, nurtured hope, provided words of encouragement and a prayer of healing/comfort to the pt./family. I also placed a spiritual care card in the room. Spiritual plan: It would be helpful if Spiritual Care would continue to follow up on this case.

## 2018-01-10 VITALS
OXYGEN SATURATION: 97 % | SYSTOLIC BLOOD PRESSURE: 158 MMHG | BODY MASS INDEX: 23.73 KG/M2 | DIASTOLIC BLOOD PRESSURE: 90 MMHG | TEMPERATURE: 98.5 F | WEIGHT: 117.7 LBS | HEART RATE: 100 BPM | RESPIRATION RATE: 16 BRPM | HEIGHT: 59 IN

## 2018-01-10 LAB
ALBUMIN SERPL-MCNC: 3.1 G/DL (ref 3.5–5.1)
ALP BLD-CCNC: 60 U/L (ref 38–126)
ALT SERPL-CCNC: 7 U/L (ref 11–66)
ANION GAP SERPL CALCULATED.3IONS-SCNC: 11 MEQ/L (ref 8–16)
AST SERPL-CCNC: 6 U/L (ref 5–40)
BILIRUB SERPL-MCNC: 0.3 MG/DL (ref 0.3–1.2)
BUN BLDV-MCNC: 9 MG/DL (ref 7–22)
CALCIUM SERPL-MCNC: 8.8 MG/DL (ref 8.5–10.5)
CHLORIDE BLD-SCNC: 100 MEQ/L (ref 98–111)
CO2: 25 MEQ/L (ref 23–33)
CREAT SERPL-MCNC: 0.6 MG/DL (ref 0.4–1.2)
GFR SERPL CREATININE-BSD FRML MDRD: > 90 ML/MIN/1.73M2
GLUCOSE BLD-MCNC: 100 MG/DL (ref 70–108)
GLUCOSE BLD-MCNC: 111 MG/DL (ref 70–108)
GLUCOSE BLD-MCNC: 83 MG/DL (ref 70–108)
HCT VFR BLD CALC: 25.2 % (ref 37–47)
HEMOGLOBIN: 8.1 GM/DL (ref 12–16)
MCH RBC QN AUTO: 28.1 PG (ref 27–31)
MCHC RBC AUTO-ENTMCNC: 32.1 GM/DL (ref 33–37)
MCV RBC AUTO: 87.5 FL (ref 81–99)
PDW BLD-RTO: 16.3 % (ref 11.5–14.5)
PLATELET # BLD: 329 THOU/MM3 (ref 130–400)
PMV BLD AUTO: 6.7 MCM (ref 7.4–10.4)
POTASSIUM SERPL-SCNC: 3.8 MEQ/L (ref 3.5–5.2)
RBC # BLD: 2.87 MILL/MM3 (ref 4.2–5.4)
SODIUM BLD-SCNC: 136 MEQ/L (ref 135–145)
TOTAL PROTEIN: 5.8 G/DL (ref 6.1–8)
TSH SERPL DL<=0.05 MIU/L-ACNC: 1.58 UIU/ML (ref 0.4–4.2)
WBC # BLD: 4 THOU/MM3 (ref 4.8–10.8)

## 2018-01-10 PROCEDURE — 85027 COMPLETE CBC AUTOMATED: CPT

## 2018-01-10 PROCEDURE — 36415 COLL VENOUS BLD VENIPUNCTURE: CPT

## 2018-01-10 PROCEDURE — 84443 ASSAY THYROID STIM HORMONE: CPT

## 2018-01-10 PROCEDURE — 6360000002 HC RX W HCPCS: Performed by: INTERNAL MEDICINE

## 2018-01-10 PROCEDURE — 80053 COMPREHEN METABOLIC PANEL: CPT

## 2018-01-10 PROCEDURE — 6370000000 HC RX 637 (ALT 250 FOR IP): Performed by: INTERNAL MEDICINE

## 2018-01-10 PROCEDURE — 2580000003 HC RX 258: Performed by: INTERNAL MEDICINE

## 2018-01-10 PROCEDURE — 82948 REAGENT STRIP/BLOOD GLUCOSE: CPT

## 2018-01-10 RX ADMIN — HYDROMORPHONE HYDROCHLORIDE 1 MG: 1 INJECTION, SOLUTION INTRAMUSCULAR; INTRAVENOUS; SUBCUTANEOUS at 04:25

## 2018-01-10 RX ADMIN — CIPROFLOXACIN 400 MG: 2 INJECTION, SOLUTION INTRAVENOUS at 09:00

## 2018-01-10 RX ADMIN — ENOXAPARIN SODIUM 40 MG: 40 INJECTION SUBCUTANEOUS at 09:00

## 2018-01-10 RX ADMIN — HYDROMORPHONE HYDROCHLORIDE 1 MG: 1 INJECTION, SOLUTION INTRAMUSCULAR; INTRAVENOUS; SUBCUTANEOUS at 18:33

## 2018-01-10 RX ADMIN — FERROUS SULFATE TAB 325 MG (65 MG ELEMENTAL FE) 325 MG: 325 (65 FE) TAB at 17:05

## 2018-01-10 RX ADMIN — HYDROMORPHONE HYDROCHLORIDE 1 MG: 1 INJECTION, SOLUTION INTRAMUSCULAR; INTRAVENOUS; SUBCUTANEOUS at 07:30

## 2018-01-10 RX ADMIN — METRONIDAZOLE 500 MG: 500 TABLET, FILM COATED ORAL at 07:32

## 2018-01-10 RX ADMIN — FERROUS SULFATE TAB 325 MG (65 MG ELEMENTAL FE) 325 MG: 325 (65 FE) TAB at 07:32

## 2018-01-10 RX ADMIN — LEVOTHYROXINE SODIUM 75 MCG: 75 TABLET ORAL at 07:32

## 2018-01-10 RX ADMIN — HYDROMORPHONE HYDROCHLORIDE 1 MG: 1 INJECTION, SOLUTION INTRAMUSCULAR; INTRAVENOUS; SUBCUTANEOUS at 13:21

## 2018-01-10 RX ADMIN — SODIUM CHLORIDE TAB 1 GM 1 G: 1 TAB at 07:32

## 2018-01-10 RX ADMIN — SODIUM CHLORIDE: 9 INJECTION, SOLUTION INTRAVENOUS at 01:51

## 2018-01-10 RX ADMIN — VALSARTAN 320 MG: 320 TABLET ORAL at 07:32

## 2018-01-10 RX ADMIN — METRONIDAZOLE 500 MG: 500 TABLET, FILM COATED ORAL at 17:05

## 2018-01-10 ASSESSMENT — PAIN SCALES - GENERAL
PAINLEVEL_OUTOF10: 8
PAINLEVEL_OUTOF10: 0
PAINLEVEL_OUTOF10: 0
PAINLEVEL_OUTOF10: 7
PAINLEVEL_OUTOF10: 7
PAINLEVEL_OUTOF10: 8

## 2018-01-10 NOTE — PLAN OF CARE
Problem: SAFETY  Goal: Free from accidental physical injury  Outcome: Ongoing  Bed in lowest position. Call light within reach. Educated patient to use call light for assistance. Problem: DAILY CARE  Goal: Daily care needs are met  Outcome: Ongoing  Daily care needs met. Problem: PAIN  Goal: Patient's pain/discomfort is manageable  Outcome: Ongoing  Pain medication given as ordered. Problem: SKIN INTEGRITY  Goal: Skin integrity is maintained or improved  Outcome: Ongoing  Dressing changed to rectal wound. Problem: DISCHARGE BARRIERS  Goal: Patient's continuum of care needs are met  Outcome: Ongoing  Monitoring for discharge barriers. Problem: Pain:  Goal: Pain level will decrease  Pain level will decrease   Outcome: Completed Date Met: 69/33/99  Duplicate. Goal: Control of acute pain  Control of acute pain   Outcome: Completed Date Met: 21/29/37  Duplicate  Goal: Control of chronic pain  Control of chronic pain   Outcome: Completed Date Met: 79/25/34  Duplicate    Problem: Risk for Impaired Skin Integrity  Goal: Tissue integrity - skin and mucous membranes  Structural intactness and normal physiological function of skin and  mucous membranes.    Outcome: Completed Date Met: 92/65/53  Duplicate

## 2018-01-12 DIAGNOSIS — G89.3 CHRONIC PAIN DUE TO NEOPLASM: Primary | ICD-10-CM

## 2018-01-12 NOTE — TELEPHONE ENCOUNTER
Patient called office, just got out of hospital. Stated that Dr Sil Dunn wants us to increase her fentanyl from 37.5 mcg/hr tot 50 mcg/hr. Last filled 12/28/17. Qas admitted on 1/8/18- and discharged 1/12/18Please advise.

## 2018-01-15 ENCOUNTER — TELEPHONE (OUTPATIENT)
Dept: PHYSICAL MEDICINE AND REHAB | Age: 53
End: 2018-01-15

## 2018-01-15 DIAGNOSIS — K62.89 CHRONIC RECTAL PAIN: ICD-10-CM

## 2018-01-15 DIAGNOSIS — G89.29 CHRONIC RECTAL PAIN: ICD-10-CM

## 2018-01-15 DIAGNOSIS — C20 MALIGNANT NEOPLASM OF RECTUM (HCC): Primary | ICD-10-CM

## 2018-01-15 RX ORDER — FENTANYL 12 UG/H
1 PATCH TRANSDERMAL
Qty: 10 PATCH | Refills: 0 | Status: SHIPPED | OUTPATIENT
Start: 2018-01-15 | End: 2018-01-25 | Stop reason: DRUGHIGH

## 2018-01-15 NOTE — TELEPHONE ENCOUNTER
Just got 37.5 filled will add 12.5 mcg patches to make 50 mcg, patient educated per phone to apply both patches for dose change

## 2018-01-17 ENCOUNTER — HOSPITAL ENCOUNTER (OUTPATIENT)
Dept: INFUSION THERAPY | Age: 53
Discharge: HOME | End: 2018-01-17
Payer: COMMERCIAL

## 2018-01-17 VITALS
HEART RATE: 82 BPM | SYSTOLIC BLOOD PRESSURE: 161 MMHG | RESPIRATION RATE: 16 BRPM | BODY MASS INDEX: 24.19 KG/M2 | WEIGHT: 120 LBS | OXYGEN SATURATION: 97 % | DIASTOLIC BLOOD PRESSURE: 107 MMHG | TEMPERATURE: 97.6 F | HEIGHT: 59 IN

## 2018-01-17 DIAGNOSIS — K63.89 COLONIC MASS: ICD-10-CM

## 2018-01-17 DIAGNOSIS — R19.00 ABDOMINAL WALL MASS OF RIGHT FLANK: ICD-10-CM

## 2018-01-17 DIAGNOSIS — C20 MALIGNANT NEOPLASM OF RECTUM (HCC): ICD-10-CM

## 2018-01-17 PROCEDURE — 6360000002 HC RX W HCPCS: Performed by: RADIOLOGY

## 2018-01-17 PROCEDURE — 2580000003 HC RX 258: Performed by: RADIOLOGY

## 2018-01-17 PROCEDURE — 36593 DECLOT VASCULAR DEVICE: CPT

## 2018-01-17 RX ORDER — SODIUM CHLORIDE 0.9 % (FLUSH) 0.9 %
10 SYRINGE (ML) INJECTION PRN
Status: CANCELLED | OUTPATIENT
Start: 2018-01-17

## 2018-01-17 RX ORDER — HEPARIN SODIUM (PORCINE) LOCK FLUSH IV SOLN 100 UNIT/ML 100 UNIT/ML
500 SOLUTION INTRAVENOUS PRN
Status: ACTIVE | OUTPATIENT
Start: 2018-01-17 | End: 2018-01-18

## 2018-01-17 RX ORDER — SODIUM CHLORIDE 0.9 % (FLUSH) 0.9 %
10 SYRINGE (ML) INJECTION PRN
Status: ACTIVE | OUTPATIENT
Start: 2018-01-17 | End: 2018-01-18

## 2018-01-17 RX ORDER — SODIUM CHLORIDE 0.9 % (FLUSH) 0.9 %
20 SYRINGE (ML) INJECTION PRN
Status: CANCELLED | OUTPATIENT
Start: 2018-01-17

## 2018-01-17 RX ORDER — HEPARIN SODIUM (PORCINE) LOCK FLUSH IV SOLN 100 UNIT/ML 100 UNIT/ML
500 SOLUTION INTRAVENOUS PRN
Status: CANCELLED | OUTPATIENT
Start: 2018-01-17

## 2018-01-17 RX ADMIN — Medication 10 ML: at 10:35

## 2018-01-17 RX ADMIN — ALTEPLASE 2 MG: 2.2 INJECTION, POWDER, LYOPHILIZED, FOR SOLUTION INTRAVENOUS at 10:35

## 2018-01-17 RX ADMIN — Medication 500 UNITS: at 11:00

## 2018-01-17 RX ADMIN — Medication 20 ML: at 10:58

## 2018-01-17 RX ADMIN — WATER 2.2 ML: 1 INJECTION INTRAMUSCULAR; INTRAVENOUS; SUBCUTANEOUS at 10:35

## 2018-01-17 ASSESSMENT — PAIN DESCRIPTION - DESCRIPTORS: DESCRIPTORS: DISCOMFORT

## 2018-01-17 ASSESSMENT — PAIN DESCRIPTION - LOCATION: LOCATION: RECTUM

## 2018-01-17 ASSESSMENT — PAIN DESCRIPTION - PROGRESSION: CLINICAL_PROGRESSION: NOT CHANGED

## 2018-01-17 ASSESSMENT — PAIN DESCRIPTION - FREQUENCY: FREQUENCY: CONTINUOUS

## 2018-01-17 ASSESSMENT — PAIN SCALES - GENERAL: PAINLEVEL_OUTOF10: 3

## 2018-01-17 ASSESSMENT — PAIN DESCRIPTION - PAIN TYPE: TYPE: ACUTE PAIN

## 2018-01-17 NOTE — PROGRESS NOTES
Patient tolerated  Cath amanda instillation through mediport without any complications- able to obtain blood from mediport. Francisco Javier Sunshine Discharge instructions given to patient-verbalizes understanding. Ambulated off unit per self in stable condition accompanied by family.

## 2018-01-17 NOTE — PLAN OF CARE
Problem: Infection - Central Venous Catheter-Associated Bloodstream Infection:  Intervention: Infection risk assessment  Instructed to monitor for signs/symptoms of infection at [address] [facility] [address] [facility] and call MD if problems develop. Goal: Will show no infection signs and symptoms  Will show no infection signs and symptoms   Outcome: Met This Shift  Mediport site with no redness or warmth. Skin over port site intact with no signs of breakdown noted. Patient verbalizes signs/symptoms of port infection and when to notify the physician. Problem: Discharge Planning  Intervention: Interaction with patient/family and care team  Discuss understanding of discharge instructions,follow-up appointments, and when to call the physician. Goal: Knowledge of discharge instructions  Knowledge of discharge instructions     Outcome: Met This Shift  Verbalized understanding of discharge instructions, follow-up appointments, and when to call the physician. Comments: Care plan reviewed with patient and family. Patient and family verbalize understanding of the plan of care and contribute to goal setting.

## 2018-01-25 ENCOUNTER — OFFICE VISIT (OUTPATIENT)
Dept: PHYSICAL MEDICINE AND REHAB | Age: 53
End: 2018-01-25
Payer: COMMERCIAL

## 2018-01-25 VITALS
WEIGHT: 120 LBS | HEART RATE: 97 BPM | BODY MASS INDEX: 24.19 KG/M2 | HEIGHT: 59 IN | DIASTOLIC BLOOD PRESSURE: 107 MMHG | SYSTOLIC BLOOD PRESSURE: 156 MMHG

## 2018-01-25 DIAGNOSIS — G89.29 CHRONIC RECTAL PAIN: ICD-10-CM

## 2018-01-25 DIAGNOSIS — K62.89 CHRONIC RECTAL PAIN: ICD-10-CM

## 2018-01-25 DIAGNOSIS — C20 MALIGNANT NEOPLASM OF RECTUM (HCC): Primary | ICD-10-CM

## 2018-01-25 DIAGNOSIS — G89.3 CHRONIC PAIN DUE TO NEOPLASM: ICD-10-CM

## 2018-01-25 PROCEDURE — 99214 OFFICE O/P EST MOD 30 MIN: CPT | Performed by: PAIN MEDICINE

## 2018-01-25 PROCEDURE — 1111F DSCHRG MED/CURRENT MED MERGE: CPT | Performed by: PAIN MEDICINE

## 2018-01-25 PROCEDURE — 3014F SCREEN MAMMO DOC REV: CPT | Performed by: PAIN MEDICINE

## 2018-01-25 PROCEDURE — G8420 CALC BMI NORM PARAMETERS: HCPCS | Performed by: PAIN MEDICINE

## 2018-01-25 PROCEDURE — G8427 DOCREV CUR MEDS BY ELIG CLIN: HCPCS | Performed by: PAIN MEDICINE

## 2018-01-25 PROCEDURE — G8484 FLU IMMUNIZE NO ADMIN: HCPCS | Performed by: PAIN MEDICINE

## 2018-01-25 PROCEDURE — 3017F COLORECTAL CA SCREEN DOC REV: CPT | Performed by: PAIN MEDICINE

## 2018-01-25 PROCEDURE — 1036F TOBACCO NON-USER: CPT | Performed by: PAIN MEDICINE

## 2018-01-25 RX ORDER — FENTANYL 50 UG/H
1 PATCH TRANSDERMAL
COMMUNITY
End: 2018-02-01 | Stop reason: ALTCHOICE

## 2018-01-25 RX ORDER — LIDOCAINE 50 MG/G
OINTMENT TOPICAL
Qty: 50 G | Refills: 1 | Status: SHIPPED | OUTPATIENT
Start: 2018-01-25 | End: 2018-02-01 | Stop reason: SDUPTHER

## 2018-01-25 ASSESSMENT — ENCOUNTER SYMPTOMS
NAUSEA: 0
RECTAL PAIN: 1
SHORTNESS OF BREATH: 0
CHEST TIGHTNESS: 0
ABDOMINAL PAIN: 0
SORE THROAT: 0
SINUS PRESSURE: 0
COUGH: 0
WHEEZING: 0
EYE PAIN: 0
DIARRHEA: 0
PHOTOPHOBIA: 0
RHINORRHEA: 0
COLOR CHANGE: 0
VOMITING: 0
BACK PAIN: 0

## 2018-01-25 NOTE — PROGRESS NOTES
[facility]  [facility]  [address]  Dept: [phone]  Dept Fax: [phone]: [phone]    Visit Date: 1/25/2018    Functionality Assessment/Goals Worksheet     On a scale of 0 (Does not Interfere) to 10 (Completely Interferes)     1. Which number describes how during the past week pain has interfered with       the following:  A. General Activity:  3  B. Mood: 6  C. Walking Ability:  3  D. Normal Work (Includes both work outside the home and housework):  2  E. Relations with Other People:   3  F. Sleep:   3  G. Enjoyment of Life:   5    2. Patient Prefers to Take their Pain Medications:     []  On a regular basis   [x]  Only when necessary    []  Does not take pain medications    3. What are the Patient's Goals/Expectations for Visiting Pain Management? []  Learn about my pain    []  Receive Medication   []  Physical Therapy     []  Treat Depression   []  Receive Injections    []  Treat Sleep   []  Deal with Anxiety and Stress   []  Treat Opoid Dependence/Addiction   [x]  Other:      HPI:   Judit Martinez is a 47 y.o. female is here today for    Chief Complaint:      Rectal pain  Family present    HPI      Patient states breakthrough medication Subsys helps but not lasting. States Dr Sil Dunn increased Fentanyl patch to 50 mcg/hr while in the hospital.  States saw surgeon recently. Medications reviewed. Patient denies  side effects with medications. Patient states she is  taking medications as prescribed. She denies receiving pain medications from other sources. She denies any ER visits since last visit. Pain scale with out pain medications is 7/10. Pain scale with pain medications is  2/10. Last dose of Fentanyl patch on [place] , Subsys today   Drug screen reviewed from     The patient is allergic to codeine and dilaudid [hydromorphone hcl]. Past Medical History  Stone Reyna  has a past medical history of Blood transfusion;  Cancer ([facility] [address] Diabetes mellitus type 2 in nonobese [facility]); Hyperlipidemia; Hypertension; and Hyperthyroidism. Past Surgical History  The patient  has a past surgical history that includes Hysterectomy (5/2011); hysteroscopy (2004); Tubal ligation (1990's); Tunneled venous port placement (6/28/2012); Colon surgery (6-5-12); other surgical history (7/30/2013); Hemorrhoid surgery (8/2013); and colon ca scrn not hi rsk ind (Left, 10/27/2017). Family History  This patient's family history includes Cancer in her father; Diabetes in her mother; High Blood Pressure in her father and mother. Social History  Lotus Jones  reports that she has never smoked. She has never used smokeless tobacco. She reports that she drinks alcohol. She reports that she does not use drugs. Medications    Current Outpatient Prescriptions:     fentaNYL (DURAGESIC) 50 MCG/HR, Place 1 patch onto the skin every 72 hours. , Disp: , Rfl:     lidocaine (XYLOCAINE) 5 % ointment, Apply topically as needed. , Disp: 50 g, Rfl: 1    fentaNYL (SUBSYS) 400 MCG LIQD, Place 400 mcg under the tongue every 6 hours as needed for Pain for up to 30 days. , Disp: 60 each, Rfl: 0    ondansetron (ZOFRAN) 4 MG tablet, Take 1 tablet by mouth every 8 hours as needed for Nausea or Vomiting, Disp: 90 tablet, Rfl: 0    tiZANidine (ZANAFLEX) 4 MG tablet, Take 1 tablet by mouth every 8 hours as needed (spasms), Disp: 90 tablet, Rfl: 0    valsartan (DIOVAN) 320 MG tablet, Take 320 mg by mouth daily, Disp: , Rfl:     hydrochlorothiazide (HYDRODIURIL) 25 MG tablet, Take 25 mg by mouth daily, Disp: , Rfl:     albuterol sulfate  (90 Base) MCG/ACT inhaler, Inhale 1 puff into the lungs as needed, Disp: , Rfl:     sodium chloride 1 g tablet, Take 1 g by mouth daily, Disp: , Rfl:     ibuprofen (ADVIL;MOTRIN) 600 MG tablet, Take 1 tablet by mouth every 6 hours as needed for Pain, Disp: 30 tablet, Rfl: 0    hydrocortisone (ANUSOL-HC) 25 MG suppository, Place 25 mg rectally 2

## 2018-01-26 ENCOUNTER — TELEPHONE (OUTPATIENT)
Dept: PHYSICAL MEDICINE AND REHAB | Age: 53
End: 2018-01-26

## 2018-01-26 DIAGNOSIS — K62.89 CHRONIC RECTAL PAIN: ICD-10-CM

## 2018-01-26 DIAGNOSIS — C20 MALIGNANT NEOPLASM OF RECTUM (HCC): ICD-10-CM

## 2018-01-26 DIAGNOSIS — G89.29 CHRONIC RECTAL PAIN: ICD-10-CM

## 2018-01-26 NOTE — TELEPHONE ENCOUNTER
OARRS reviewed. UDS: consistent. Last seen: 1/25/2018. Follow-up: 2/1/18, was in office to see you yesterday and dose was increased to 800 mcg every 6 hours as needed. She called back today and only has 18 sprays left. I set to fill today. If this is ok, please sign, if not I can adjust date. [facility] is only open tomorrow and she will not have any for Monday.

## 2018-01-29 DIAGNOSIS — C20 MALIGNANT NEOPLASM OF RECTUM (HCC): Primary | ICD-10-CM

## 2018-01-29 RX ORDER — OXYCODONE AND ACETAMINOPHEN 10; 325 MG/1; MG/1
1 TABLET ORAL EVERY 6 HOURS PRN
Qty: 4 TABLET | Refills: 0 | Status: SHIPPED | OUTPATIENT
Start: 2018-01-29 | End: 2018-01-29 | Stop reason: SDUPTHER

## 2018-01-29 RX ORDER — OXYCODONE AND ACETAMINOPHEN 10; 325 MG/1; MG/1
1 TABLET ORAL EVERY 6 HOURS PRN
Qty: 4 TABLET | Refills: 0 | Status: SHIPPED | OUTPATIENT
Start: 2018-01-29 | End: 2018-01-30

## 2018-02-01 ENCOUNTER — OFFICE VISIT (OUTPATIENT)
Dept: PHYSICAL MEDICINE AND REHAB | Age: 53
End: 2018-02-01
Payer: COMMERCIAL

## 2018-02-01 VITALS
SYSTOLIC BLOOD PRESSURE: 161 MMHG | HEART RATE: 111 BPM | HEIGHT: 59 IN | BODY MASS INDEX: 24.19 KG/M2 | DIASTOLIC BLOOD PRESSURE: 98 MMHG | WEIGHT: 120 LBS

## 2018-02-01 DIAGNOSIS — L02.91 ABSCESS: ICD-10-CM

## 2018-02-01 DIAGNOSIS — R53.81 DEBILITY: ICD-10-CM

## 2018-02-01 DIAGNOSIS — G89.3 CHRONIC PAIN DUE TO NEOPLASM: ICD-10-CM

## 2018-02-01 DIAGNOSIS — C20 MALIGNANT NEOPLASM OF RECTUM (HCC): Primary | ICD-10-CM

## 2018-02-01 DIAGNOSIS — G89.29 CHRONIC RECTAL PAIN: ICD-10-CM

## 2018-02-01 DIAGNOSIS — K62.89 CHRONIC RECTAL PAIN: ICD-10-CM

## 2018-02-01 DIAGNOSIS — K59.03 DRUG-INDUCED CONSTIPATION: ICD-10-CM

## 2018-02-01 PROCEDURE — G8427 DOCREV CUR MEDS BY ELIG CLIN: HCPCS | Performed by: NURSE PRACTITIONER

## 2018-02-01 PROCEDURE — G8484 FLU IMMUNIZE NO ADMIN: HCPCS | Performed by: NURSE PRACTITIONER

## 2018-02-01 PROCEDURE — 1111F DSCHRG MED/CURRENT MED MERGE: CPT | Performed by: NURSE PRACTITIONER

## 2018-02-01 PROCEDURE — 3017F COLORECTAL CA SCREEN DOC REV: CPT | Performed by: NURSE PRACTITIONER

## 2018-02-01 PROCEDURE — G8420 CALC BMI NORM PARAMETERS: HCPCS | Performed by: NURSE PRACTITIONER

## 2018-02-01 PROCEDURE — 99213 OFFICE O/P EST LOW 20 MIN: CPT | Performed by: NURSE PRACTITIONER

## 2018-02-01 PROCEDURE — 1036F TOBACCO NON-USER: CPT | Performed by: NURSE PRACTITIONER

## 2018-02-01 PROCEDURE — 3014F SCREEN MAMMO DOC REV: CPT | Performed by: NURSE PRACTITIONER

## 2018-02-01 RX ORDER — AMOXICILLIN AND CLAVULANATE POTASSIUM 500; 125 MG/1; MG/1
1 TABLET, FILM COATED ORAL 3 TIMES DAILY
Status: ON HOLD | COMMUNITY
End: 2018-02-08 | Stop reason: HOSPADM

## 2018-02-01 RX ORDER — CIPROFLOXACIN 500 MG/1
500 TABLET, FILM COATED ORAL 2 TIMES DAILY
Status: ON HOLD | COMMUNITY
End: 2018-02-08 | Stop reason: HOSPADM

## 2018-02-01 RX ORDER — LIDOCAINE 50 MG/G
OINTMENT TOPICAL
Qty: 50 G | Refills: 3 | Status: ON HOLD | OUTPATIENT
Start: 2018-02-01 | End: 2018-11-05

## 2018-02-01 RX ORDER — FENTANYL 75 UG/H
1 PATCH TRANSDERMAL
Qty: 5 PATCH | Refills: 0 | Status: SHIPPED | OUTPATIENT
Start: 2018-02-01 | End: 2018-04-04 | Stop reason: ALTCHOICE

## 2018-02-01 RX ORDER — SENNA PLUS 8.6 MG/1
2 TABLET ORAL NIGHTLY
Qty: 60 TABLET | Refills: 11 | Status: SHIPPED | OUTPATIENT
Start: 2018-02-01 | End: 2018-03-21 | Stop reason: ALTCHOICE

## 2018-02-01 RX ORDER — DOCUSATE SODIUM 100 MG/1
100 CAPSULE, LIQUID FILLED ORAL 2 TIMES DAILY
Qty: 60 CAPSULE | Refills: 0 | Status: SHIPPED | OUTPATIENT
Start: 2018-02-01

## 2018-02-01 ASSESSMENT — ENCOUNTER SYMPTOMS: RECTAL PAIN: 1

## 2018-02-01 NOTE — PROGRESS NOTES
every 8 hours as needed for Nausea or Vomiting, Disp: 90 tablet, Rfl: 0    tiZANidine (ZANAFLEX) 4 MG tablet, Take 1 tablet by mouth every 8 hours as needed (spasms), Disp: 90 tablet, Rfl: 0    valsartan (DIOVAN) 320 MG tablet, Take 320 mg by mouth daily, Disp: , Rfl:     hydrochlorothiazide (HYDRODIURIL) 25 MG tablet, Take 25 mg by mouth daily, Disp: , Rfl:     albuterol sulfate  (90 Base) MCG/ACT inhaler, Inhale 1 puff into the lungs as needed, Disp: , Rfl:     sodium chloride 1 g tablet, Take 1 g by mouth daily, Disp: , Rfl:     ibuprofen (ADVIL;MOTRIN) 600 MG tablet, Take 1 tablet by mouth every 6 hours as needed for Pain, Disp: 30 tablet, Rfl: 0    hydrocortisone (ANUSOL-HC) 25 MG suppository, Place 25 mg rectally 2 times daily, Disp: , Rfl:     metFORMIN (GLUCOPHAGE) 500 MG tablet, Take 500 mg by mouth 2 times daily (with meals), Disp: , Rfl:     prochlorperazine (COMPAZINE) 10 MG tablet, Take 10 mg by mouth every 6 hours as needed. , Disp: , Rfl:     levothyroxine (LEVOTHROID) 75 MCG tablet, Take 75 mcg by mouth Daily. , Disp: , Rfl:     amlodipine (NORVASC) 10 MG tablet, Take 5 mg by mouth as needed (nightly) , Disp: , Rfl:     Subjective:      Review of Systems   Gastrointestinal: Positive for rectal pain. Musculoskeletal: Positive for arthralgias and myalgias. Skin: Positive for wound. Objective:     Vitals:    02/01/18 1134 02/01/18 1138   BP: (!) 160/102 (!) 161/98   Site: Left Arm Right Arm   Position: Sitting Sitting   Cuff Size: Small Adult Small Adult   Pulse: 105 111   Weight: 120 lb (54.4 kg)    Height: 4' 11\" (1.499 m)        Physical Exam   Constitutional: She is oriented to person, place, and time. She appears well-developed and well-nourished. No distress. HENT:   Head: Normocephalic and atraumatic. Right Ear: External ear normal.   Left Ear: External ear normal.   Nose: Nose normal.   Mouth/Throat: Oropharynx is clear and moist. No oropharyngeal exudate.    Eyes: Conjunctivae and EOM are normal. Pupils are equal, round, and reactive to light. Right eye exhibits no discharge. Left eye exhibits no discharge. No scleral icterus. Neck: Normal range of motion and full passive range of motion without pain. Neck supple. No muscular tenderness present. No neck rigidity. No edema, no erythema and normal range of motion present. No thyromegaly present. Cardiovascular: Normal rate, regular rhythm, normal heart sounds and intact distal pulses. Exam reveals no gallop and no friction rub. No murmur heard. Pulmonary/Chest: Effort normal and breath sounds normal. No respiratory distress. She has no wheezes. She has no rales. She exhibits no tenderness. Abdominal: Soft. Bowel sounds are normal. She exhibits no distension. There is generalized tenderness. There is no rebound and no guarding. Musculoskeletal:        Right ankle: She exhibits normal range of motion and no swelling. Left ankle: She exhibits swelling. Lumbar back: She exhibits decreased range of motion, tenderness and pain. Back:         Right lower leg: She exhibits swelling. Right foot: There is swelling. Neurological: She is alert and oriented to person, place, and time. She has normal strength and normal reflexes. She is not disoriented. She displays no atrophy. No cranial nerve deficit or sensory deficit. She exhibits normal muscle tone. She displays a negative Romberg sign. Gait abnormal. Coordination normal.   Skin: Skin is warm. No rash noted. She is not diaphoretic. No erythema. No pallor. Psychiatric: Her mood appears not anxious. Her affect is not angry, not blunt, not labile and not inappropriate. Her speech is not rapid and/or pressured, not delayed, not tangential and not slurred. She is not agitated, not aggressive, not hyperactive, not slowed, not withdrawn, not actively hallucinating and not combative.  Thought content is not paranoid and not delusional. Cognition and memory are not impaired. She does not express impulsivity or inappropriate judgment. She does not exhibit a depressed mood. She expresses no homicidal and no suicidal ideation. She expresses no suicidal plans and no homicidal plans. She is communicative. She exhibits normal recent memory and normal remote memory. She is attentive. Nursing note and vitals reviewed. Assessment:     1. Malignant neoplasm of rectum ([address]    2. Chronic rectal pain    3. Chronic pain due to neoplasm    4. Abscess    5. Drug-induced constipation    6. Debility            Plan:      · OARRS reviewed. · Discussed long term side effects of medications. · Discussed tolerance, dependency and addiction. · Previous UDS reviewed. · Patient told can not receive any pain medications from any other source. · Discussed with pt.may not be pain free. · No evidence of abuse, diversion or aberrant behavior. · Medications and/or procedures improve function and quality of life. · Continues to have very elevated pain at bedtime. Still not tolerated at times. · Increased Fentanyl patch to 75 mcg every 72 hours for better maintenance pain relief. Trial 14 days worth  · Continue Subsys 800 mcg QID prn   · Bowel program- senna and colace   · Will reevaluate in 2 weeks  · Patient brought in box of 12 mcg Fentanyl patches- will dispose   · Ordered physical therapy for debility   · Blood pressure elevated- instructed to call pcp     Meds. Prescribed:   Orders Placed This Encounter   Medications    fentaNYL (DURAGESIC) 75 MCG/HR     Sig: Place 1 patch onto the skin every 72 hours for 15 days.      Dispense:  5 patch     Refill:  0    senna (SENOKOT) 8.6 MG tablet     Sig: Take 2 tablets by mouth nightly     Dispense:  60 tablet     Refill:  11    docusate sodium (COLACE) 100 MG capsule     Sig: Take 1 capsule by mouth 2 times daily     Dispense:  60 capsule     Refill:  0    lidocaine (XYLOCAINE) 5 % ointment     Sig: Apply topically as

## 2018-02-03 ENCOUNTER — HOSPITAL ENCOUNTER (EMERGENCY)
Age: 53
Discharge: HOME | End: 2018-02-03
Attending: EMERGENCY MEDICINE
Payer: COMMERCIAL

## 2018-02-03 VITALS
OXYGEN SATURATION: 99 % | TEMPERATURE: 97.7 F | WEIGHT: 120 LBS | SYSTOLIC BLOOD PRESSURE: 151 MMHG | HEIGHT: 59 IN | HEART RATE: 69 BPM | BODY MASS INDEX: 24.19 KG/M2 | DIASTOLIC BLOOD PRESSURE: 87 MMHG | RESPIRATION RATE: 16 BRPM

## 2018-02-03 DIAGNOSIS — G89.29 RECTAL PAIN, CHRONIC: Primary | ICD-10-CM

## 2018-02-03 DIAGNOSIS — K62.89 RECTAL PAIN, CHRONIC: Primary | ICD-10-CM

## 2018-02-03 LAB
ALBUMIN SERPL-MCNC: 4.1 G/DL (ref 3.5–5.1)
ALP BLD-CCNC: 74 U/L (ref 38–126)
ALT SERPL-CCNC: 8 U/L (ref 11–66)
ANION GAP SERPL CALCULATED.3IONS-SCNC: 17 MEQ/L (ref 8–16)
ANISOCYTOSIS: ABNORMAL
AST SERPL-CCNC: 9 U/L (ref 5–40)
BASOPHILS # BLD: 0.2 %
BASOPHILS ABSOLUTE: 0 THOU/MM3 (ref 0–0.1)
BILIRUB SERPL-MCNC: 0.3 MG/DL (ref 0.3–1.2)
BUN BLDV-MCNC: 16 MG/DL (ref 7–22)
CALCIUM SERPL-MCNC: 10.2 MG/DL (ref 8.5–10.5)
CHLORIDE BLD-SCNC: 91 MEQ/L (ref 98–111)
CO2: 25 MEQ/L (ref 23–33)
CREAT SERPL-MCNC: 0.6 MG/DL (ref 0.4–1.2)
EOSINOPHIL # BLD: 1.9 %
EOSINOPHILS ABSOLUTE: 0.1 THOU/MM3 (ref 0–0.4)
GFR SERPL CREATININE-BSD FRML MDRD: > 90 ML/MIN/1.73M2
GLUCOSE BLD-MCNC: 95 MG/DL (ref 70–108)
HCT VFR BLD CALC: 34.5 % (ref 37–47)
HEMOGLOBIN: 11.1 GM/DL (ref 12–16)
LYMPHOCYTES # BLD: 10.9 %
LYMPHOCYTES ABSOLUTE: 0.5 THOU/MM3 (ref 1–4.8)
MCH RBC QN AUTO: 28.8 PG (ref 27–31)
MCHC RBC AUTO-ENTMCNC: 32.1 GM/DL (ref 33–37)
MCV RBC AUTO: 89.7 FL (ref 81–99)
MONOCYTES # BLD: 13.9 %
MONOCYTES ABSOLUTE: 0.7 THOU/MM3 (ref 0.4–1.3)
NUCLEATED RED BLOOD CELLS: 0 /100 WBC
OSMOLALITY CALCULATION: 267.4 MOSMOL/KG (ref 275–300)
PDW BLD-RTO: 17.8 % (ref 11.5–14.5)
PLATELET # BLD: 340 THOU/MM3 (ref 130–400)
PMV BLD AUTO: 7.4 FL (ref 7.4–10.4)
POTASSIUM REFLEX MAGNESIUM: 4.3 MEQ/L (ref 3.5–5.2)
RBC # BLD: 3.85 MILL/MM3 (ref 4.2–5.4)
SEG NEUTROPHILS: 73.1 %
SEGMENTED NEUTROPHILS ABSOLUTE COUNT: 3.7 THOU/MM3 (ref 1.8–7.7)
SODIUM BLD-SCNC: 133 MEQ/L (ref 135–145)
TOTAL PROTEIN: 7.6 G/DL (ref 6.1–8)
WBC # BLD: 5 THOU/MM3 (ref 4.8–10.8)

## 2018-02-03 PROCEDURE — 36415 COLL VENOUS BLD VENIPUNCTURE: CPT

## 2018-02-03 PROCEDURE — 85025 COMPLETE CBC W/AUTO DIFF WBC: CPT

## 2018-02-03 PROCEDURE — 80053 COMPREHEN METABOLIC PANEL: CPT

## 2018-02-03 PROCEDURE — 99283 EMERGENCY DEPT VISIT LOW MDM: CPT

## 2018-02-03 PROCEDURE — 6360000002 HC RX W HCPCS: Performed by: EMERGENCY MEDICINE

## 2018-02-03 PROCEDURE — 96372 THER/PROPH/DIAG INJ SC/IM: CPT

## 2018-02-03 RX ORDER — PROMETHAZINE HYDROCHLORIDE 25 MG/ML
25 INJECTION, SOLUTION INTRAMUSCULAR; INTRAVENOUS ONCE
Status: COMPLETED | OUTPATIENT
Start: 2018-02-03 | End: 2018-02-03

## 2018-02-03 RX ADMIN — HYDROMORPHONE HYDROCHLORIDE 1 MG: 1 INJECTION, SOLUTION INTRAMUSCULAR; INTRAVENOUS; SUBCUTANEOUS at 12:27

## 2018-02-03 RX ADMIN — PROMETHAZINE HYDROCHLORIDE 25 MG: 25 INJECTION INTRAMUSCULAR; INTRAVENOUS at 12:28

## 2018-02-03 ASSESSMENT — PAIN DESCRIPTION - FREQUENCY
FREQUENCY: CONTINUOUS
FREQUENCY: CONTINUOUS

## 2018-02-03 ASSESSMENT — PAIN DESCRIPTION - DESCRIPTORS
DESCRIPTORS: ACHING
DESCRIPTORS: ACHING

## 2018-02-03 ASSESSMENT — PAIN SCALES - GENERAL
PAINLEVEL_OUTOF10: 7

## 2018-02-03 ASSESSMENT — PAIN DESCRIPTION - PROGRESSION: CLINICAL_PROGRESSION: GRADUALLY WORSENING

## 2018-02-03 ASSESSMENT — PAIN DESCRIPTION - ONSET: ONSET: ON-GOING

## 2018-02-03 ASSESSMENT — PAIN DESCRIPTION - LOCATION
LOCATION: COCCYX
LOCATION: COCCYX

## 2018-02-03 ASSESSMENT — PAIN DESCRIPTION - PAIN TYPE
TYPE: ACUTE PAIN
TYPE: ACUTE PAIN

## 2018-02-03 NOTE — ED NOTES
Patient presents to ED with family. Patient states she had a surgery at [facility] to have an abscess removed in her lower buttocks. Patient is on a fentanyl patch and spray. Patient states she went to see Dr Jennifer Mata who told her there was no infection in the abscess because she felt like there was.  Patient states she has pain in that spot and nothing relieves it      Paulino Mcdonald RN  02/03/18 0721

## 2018-02-03 NOTE — ED PROVIDER NOTES
[facility]  eMERGENCY dEPARTMENT eNCOUnter          CHIEF COMPLAINT       Chief Complaint   Patient presents with    Post-op Problem     pain       Nurses Notes reviewed and I agree except as noted in the HPI. HISTORY OF PRESENT ILLNESS    Jakob Martínez is a 47 y.o. female with a past medical history of colon cancer, DM, and HTN who presents to the ED for a post operative problem. She has a hx of rectal adenocarcinoma with pelvic wall involvement s/p robotic LAR on 6/29/2012 with adjuvant chemoradiotherapy. The patient is status post incision and drainage of perineal soft tissue infection/perirectal abscess/fistula on 1/11/18 with Dr. Sirisha Arthur (General Surgery) at [facility]. The patient complains of ongoing pain at the surgical site with associated drainage. During the I&D, packing was placed to facilitate drainage of the abscess. The patient states the drainage has continued and has not increased. The abscess is followed by Dr. Hussein Guadalupe ([place]) who has the patient on antibiotics. The patient is followed by pain management, has fentanyl patches, fentanyl spray, and sublingual fentanyl for management of her pain which she states is not providing any relief at this time. She denies any fevers. The patient has chronic abdominal pain. Status post colostomy. REVIEW OF SYSTEMS     Constitutional: no fever or chills  Respiratory: no dyspnea  Cardiovascular: no chest pain  Gastrointestinal : no abdominal pain      Remainder of review of systems is otherwise reviewed as negative. PAST MEDICAL HISTORY    has a past medical history of Blood transfusion; Cancer ([facility] [address] Diabetes mellitus type 2 in nonobese [facility]); Hyperlipidemia; Hypertension; and Hyperthyroidism. SURGICAL HISTORY      has a past surgical history that includes Hysterectomy (5/2011); hysteroscopy (2004); Tubal ligation (1990's); Tunneled venous port placement (6/28/2012);  Colon surgery (6-5-12); other surgical history (7/30/2013);

## 2018-02-06 ENCOUNTER — HOSPITAL ENCOUNTER (INPATIENT)
Age: 53
LOS: 2 days | Discharge: HOME HEALTH | DRG: 389 | End: 2018-02-09
Attending: EMERGENCY MEDICINE | Admitting: INTERNAL MEDICINE
Payer: COMMERCIAL

## 2018-02-06 ENCOUNTER — APPOINTMENT (OUTPATIENT)
Dept: CT IMAGING | Age: 53
DRG: 389 | End: 2018-02-06
Payer: COMMERCIAL

## 2018-02-06 DIAGNOSIS — R19.00 ABDOMINAL WALL MASS OF RIGHT FLANK: ICD-10-CM

## 2018-02-06 DIAGNOSIS — C20 MALIGNANT NEOPLASM OF RECTUM (HCC): ICD-10-CM

## 2018-02-06 DIAGNOSIS — N13.30 HYDROURETERONEPHROSIS: ICD-10-CM

## 2018-02-06 DIAGNOSIS — K56.7 ILEUS (HCC): Primary | ICD-10-CM

## 2018-02-06 DIAGNOSIS — R11.2 INTRACTABLE VOMITING WITH NAUSEA, UNSPECIFIED VOMITING TYPE: ICD-10-CM

## 2018-02-06 LAB
ALBUMIN SERPL-MCNC: 4.1 G/DL (ref 3.5–5.1)
ALP BLD-CCNC: 69 U/L (ref 38–126)
ALT SERPL-CCNC: 8 U/L (ref 11–66)
AMPHETAMINE+METHAMPHETAMINE URINE SCREEN: NEGATIVE
ANION GAP SERPL CALCULATED.3IONS-SCNC: 15 MEQ/L (ref 8–16)
ANISOCYTOSIS: ABNORMAL
AST SERPL-CCNC: 8 U/L (ref 5–40)
BARBITURATE QUANTITATIVE URINE: NEGATIVE
BASOPHILS # BLD: 0.9 %
BASOPHILS ABSOLUTE: 0.1 THOU/MM3 (ref 0–0.1)
BENZODIAZEPINE QUANTITATIVE URINE: NEGATIVE
BILIRUB SERPL-MCNC: 0.4 MG/DL (ref 0.3–1.2)
BILIRUBIN DIRECT: < 0.2 MG/DL (ref 0–0.3)
BILIRUBIN URINE: NEGATIVE
BLOOD, URINE: NEGATIVE
BUN BLDV-MCNC: 11 MG/DL (ref 7–22)
C-REACTIVE PROTEIN: 2.44 MG/DL (ref 0–1)
CALCIUM SERPL-MCNC: 10.1 MG/DL (ref 8.5–10.5)
CANNABINOID QUANTITATIVE URINE: NEGATIVE
CHARACTER, URINE: CLEAR
CHLORIDE BLD-SCNC: 89 MEQ/L (ref 98–111)
CO2: 29 MEQ/L (ref 23–33)
COCAINE METABOLITE QUANTITATIVE URINE: NEGATIVE
COLOR: YELLOW
CREAT SERPL-MCNC: 0.5 MG/DL (ref 0.4–1.2)
EKG ATRIAL RATE: 106 BPM
EKG P AXIS: 82 DEGREES
EKG P-R INTERVAL: 126 MS
EKG Q-T INTERVAL: 352 MS
EKG QRS DURATION: 68 MS
EKG QTC CALCULATION (BAZETT): 467 MS
EKG R AXIS: 69 DEGREES
EKG T AXIS: 69 DEGREES
EKG VENTRICULAR RATE: 106 BPM
EOSINOPHIL # BLD: 1.1 %
EOSINOPHILS ABSOLUTE: 0.1 THOU/MM3 (ref 0–0.4)
FLU A ANTIGEN: NEGATIVE
FLU B ANTIGEN: NEGATIVE
GFR SERPL CREATININE-BSD FRML MDRD: > 90 ML/MIN/1.73M2
GLUCOSE BLD-MCNC: 107 MG/DL (ref 70–108)
GLUCOSE URINE: NEGATIVE MG/DL
HCT VFR BLD CALC: 33.3 % (ref 37–47)
HEMOGLOBIN: 10.8 GM/DL (ref 12–16)
KETONES, URINE: ABNORMAL
LACTIC ACID: 1.1 MMOL/L (ref 0.5–2.2)
LEUKOCYTE ESTERASE, URINE: NEGATIVE
LIPASE: 41.3 U/L (ref 5.6–51.3)
LYMPHOCYTES # BLD: 9.7 %
LYMPHOCYTES ABSOLUTE: 0.6 THOU/MM3 (ref 1–4.8)
MCH RBC QN AUTO: 28.9 PG (ref 27–31)
MCHC RBC AUTO-ENTMCNC: 32.6 GM/DL (ref 33–37)
MCV RBC AUTO: 88.7 FL (ref 81–99)
MONOCYTES # BLD: 11.2 %
MONOCYTES ABSOLUTE: 0.6 THOU/MM3 (ref 0.4–1.3)
NITRITE, URINE: NEGATIVE
NUCLEATED RED BLOOD CELLS: 0 /100 WBC
OPIATES, URINE: NEGATIVE
OSMOLALITY CALCULATION: 266.3 MOSMOL/KG (ref 275–300)
OXYCODONE: NEGATIVE
PDW BLD-RTO: 16.7 % (ref 11.5–14.5)
PH UA: 7
PHENCYCLIDINE QUANTITATIVE URINE: NEGATIVE
PLATELET # BLD: 342 THOU/MM3 (ref 130–400)
PMV BLD AUTO: 7.9 FL (ref 7.4–10.4)
POTASSIUM SERPL-SCNC: 3.4 MEQ/L (ref 3.5–5.2)
PROCALCITONIN: 0.1 NG/ML (ref 0.01–0.09)
PROTEIN UA: NEGATIVE
RBC # BLD: 3.75 MILL/MM3 (ref 4.2–5.4)
SEDIMENTATION RATE, ERYTHROCYTE: 62 MM/HR (ref 0–20)
SEG NEUTROPHILS: 77.1 %
SEGMENTED NEUTROPHILS ABSOLUTE COUNT: 4.5 THOU/MM3 (ref 1.8–7.7)
SODIUM BLD-SCNC: 133 MEQ/L (ref 135–145)
SPECIFIC GRAVITY, URINE: 1.01 (ref 1–1.03)
TOTAL PROTEIN: 7.6 G/DL (ref 6.1–8)
TROPONIN T: < 0.01 NG/ML
UROBILINOGEN, URINE: 0.2 EU/DL
WBC # BLD: 5.8 THOU/MM3 (ref 4.8–10.8)

## 2018-02-06 PROCEDURE — 93005 ELECTROCARDIOGRAM TRACING: CPT | Performed by: PHYSICIAN ASSISTANT

## 2018-02-06 PROCEDURE — 87804 INFLUENZA ASSAY W/OPTIC: CPT

## 2018-02-06 PROCEDURE — 74177 CT ABD & PELVIS W/CONTRAST: CPT

## 2018-02-06 PROCEDURE — 99285 EMERGENCY DEPT VISIT HI MDM: CPT

## 2018-02-06 PROCEDURE — 86140 C-REACTIVE PROTEIN: CPT

## 2018-02-06 PROCEDURE — 83690 ASSAY OF LIPASE: CPT

## 2018-02-06 PROCEDURE — 96375 TX/PRO/DX INJ NEW DRUG ADDON: CPT

## 2018-02-06 PROCEDURE — 87040 BLOOD CULTURE FOR BACTERIA: CPT

## 2018-02-06 PROCEDURE — 85025 COMPLETE CBC W/AUTO DIFF WBC: CPT

## 2018-02-06 PROCEDURE — 96372 THER/PROPH/DIAG INJ SC/IM: CPT

## 2018-02-06 PROCEDURE — 81003 URINALYSIS AUTO W/O SCOPE: CPT

## 2018-02-06 PROCEDURE — 80053 COMPREHEN METABOLIC PANEL: CPT

## 2018-02-06 PROCEDURE — 36415 COLL VENOUS BLD VENIPUNCTURE: CPT

## 2018-02-06 PROCEDURE — 80307 DRUG TEST PRSMV CHEM ANLYZR: CPT

## 2018-02-06 PROCEDURE — 85651 RBC SED RATE NONAUTOMATED: CPT

## 2018-02-06 PROCEDURE — 6360000004 HC RX CONTRAST MEDICATION: Performed by: EMERGENCY MEDICINE

## 2018-02-06 PROCEDURE — 84484 ASSAY OF TROPONIN QUANT: CPT

## 2018-02-06 PROCEDURE — 84145 PROCALCITONIN (PCT): CPT

## 2018-02-06 PROCEDURE — 96374 THER/PROPH/DIAG INJ IV PUSH: CPT

## 2018-02-06 PROCEDURE — 6360000002 HC RX W HCPCS: Performed by: PHYSICIAN ASSISTANT

## 2018-02-06 PROCEDURE — 6370000000 HC RX 637 (ALT 250 FOR IP): Performed by: PHYSICIAN ASSISTANT

## 2018-02-06 PROCEDURE — 83605 ASSAY OF LACTIC ACID: CPT

## 2018-02-06 PROCEDURE — 82248 BILIRUBIN DIRECT: CPT

## 2018-02-06 RX ORDER — FENTANYL CITRATE 50 UG/ML
50 INJECTION, SOLUTION INTRAMUSCULAR; INTRAVENOUS ONCE
Status: COMPLETED | OUTPATIENT
Start: 2018-02-06 | End: 2018-02-06

## 2018-02-06 RX ORDER — ONDANSETRON 2 MG/ML
4 INJECTION INTRAMUSCULAR; INTRAVENOUS ONCE
Status: COMPLETED | OUTPATIENT
Start: 2018-02-06 | End: 2018-02-06

## 2018-02-06 RX ORDER — LORAZEPAM 2 MG/ML
0.5 INJECTION INTRAMUSCULAR ONCE
Status: COMPLETED | OUTPATIENT
Start: 2018-02-06 | End: 2018-02-06

## 2018-02-06 RX ORDER — SUCRALFATE ORAL 1 G/10ML
1 SUSPENSION ORAL ONCE
Status: COMPLETED | OUTPATIENT
Start: 2018-02-06 | End: 2018-02-06

## 2018-02-06 RX ORDER — PROMETHAZINE HYDROCHLORIDE 25 MG/ML
25 INJECTION, SOLUTION INTRAMUSCULAR; INTRAVENOUS ONCE
Status: COMPLETED | OUTPATIENT
Start: 2018-02-06 | End: 2018-02-06

## 2018-02-06 RX ORDER — FENTANYL CITRATE 50 UG/ML
75 INJECTION, SOLUTION INTRAMUSCULAR; INTRAVENOUS
Status: ACTIVE | OUTPATIENT
Start: 2018-02-06 | End: 2018-02-06

## 2018-02-06 RX ADMIN — LORAZEPAM 0.5 MG: 2 INJECTION INTRAMUSCULAR; INTRAVENOUS at 21:45

## 2018-02-06 RX ADMIN — PROMETHAZINE HYDROCHLORIDE 25 MG: 25 INJECTION INTRAMUSCULAR; INTRAVENOUS at 21:08

## 2018-02-06 RX ADMIN — FENTANYL CITRATE 50 MCG: 50 INJECTION INTRAMUSCULAR; INTRAVENOUS at 19:39

## 2018-02-06 RX ADMIN — IOPAMIDOL 80 ML: 755 INJECTION, SOLUTION INTRAVENOUS at 23:01

## 2018-02-06 RX ADMIN — SUCRALFATE 1 G: 1 SUSPENSION ORAL at 19:14

## 2018-02-06 RX ADMIN — Medication 15 ML: at 19:14

## 2018-02-06 RX ADMIN — ONDANSETRON 4 MG: 2 INJECTION INTRAMUSCULAR; INTRAVENOUS at 19:39

## 2018-02-06 ASSESSMENT — PAIN SCALES - GENERAL
PAINLEVEL_OUTOF10: 6
PAINLEVEL_OUTOF10: 6
PAINLEVEL_OUTOF10: 8
PAINLEVEL_OUTOF10: 8

## 2018-02-06 ASSESSMENT — PAIN DESCRIPTION - PAIN TYPE
TYPE: ACUTE PAIN
TYPE: ACUTE PAIN

## 2018-02-06 ASSESSMENT — PAIN DESCRIPTION - FREQUENCY
FREQUENCY: CONTINUOUS
FREQUENCY: CONTINUOUS

## 2018-02-06 ASSESSMENT — ENCOUNTER SYMPTOMS
RECTAL PAIN: 1
NAUSEA: 1
BACK PAIN: 0
VOMITING: 1
SORE THROAT: 0
DIARRHEA: 0
CONSTIPATION: 0
ABDOMINAL PAIN: 1
BLOOD IN STOOL: 0
COLOR CHANGE: 0
COUGH: 0
SHORTNESS OF BREATH: 0

## 2018-02-06 ASSESSMENT — PAIN DESCRIPTION - LOCATION: LOCATION: RECTUM

## 2018-02-06 NOTE — ED NOTES
Patient to ED for N/V and hypertension. Patient had rectal CA which she had rectal surgery Jan 11. Patient coming into ER today because she is unable to keep any of her medications down. Patient concerned of BP which due to not being able to take medication. Patient rectal pain 8/10, patient on fentanyl patches and spray.       Harriet Osgood, RN  02/06/18 2307

## 2018-02-06 NOTE — ED PROVIDER NOTES
[facility]  eMERGENCY dEPARTMENT eNCOUnter          CHIEF COMPLAINT       Chief Complaint   Patient presents with    Nausea    Emesis    Hypertension       Nurses Notes reviewed and I agree except as noted in the HPI. HISTORY OF PRESENT ILLNESS    Meghna Perkins is a 47 y.o. female who presents to the Emergency Department for the evaluation of Nausea, vomiting, abdominal and rectal pain. Patient has history of colorectal cancer which she has been dealing with on and off for the past 5-6 years. States her last chemotherapy was in December, 2017 and her recent CT did not show any presence of active disease. She states she had a perirectal abscess in early January that required incision and drainage at [place]. Her last follow-up with him, he did not believe there is any active infection in the area and her drain was still intact. However, her oncologist felt she should be on antibiotics for referred her to infectious disease and for the past week she has been on Cipro and Augmentin. States she was doing okay on these medications. She's been having persistent perirectal pain which has been improving but continues to bother her. Denies any associated bleeding to the area and denies any associated drainage. States today she began having nausea and vomiting with any attempts at oral intake. She's been unable to keep down any oral medications including her antihypertensives. She reports some constant, upper abdominal tightness that worsens with vomiting. She tried to take antacids for this but couldn't keep them down. She doesn't nausea that she's been taking Advil for pain recently. States she's been taking it twice a day but hasn't had it for the past 2 days. Denies any known history of gastric ulcers. Denies any change in her stool color but has noticed some decreased output from her colostomy.   She tried a Phenergan at home and states it did provide some improvement in her height is 4' 11\" (1.499 m) and weight is 120 lb (54.4 kg). Her oral temperature is 98 °F (36.7 °C). Her blood pressure is 138/93 (abnormal) and her pulse is 97. Her respiration is 18 and oxygen saturation is 96%. Physical Exam   Constitutional: She is oriented to person, place, and time. HENT:   Head: Normocephalic and atraumatic. Right Ear: External ear normal.   Left Ear: External ear normal.   Eyes: Conjunctivae are normal.   Neck: Normal range of motion. Pulmonary/Chest: Effort normal. No respiratory distress. Abdominal: Soft. There is tenderness in the epigastric area. There is no rebound, no guarding and no CVA tenderness. Genitourinary:         Neurological: She is alert and oriented to person, place, and time. Skin: Skin is warm and dry. She is not diaphoretic. Psychiatric: She has a normal mood and affect. Nursing note and vitals reviewed. DIFFERENTIAL DIAGNOSIS:   Differential diagnoses are discussed    DIAGNOSTIC RESULTS     EKG: All EKG's are interpreted by the Emergency Department Physician who either signs or Co-signs this chart in the absence of a cardiologist.    Roxana Carrasco. Rate: 106 bpm  NV interval: 126 ms  QRS duration: 68 ms  QTc: 467 ms  P-R-T axes: 82, 69, 69  Sinus tachycardia. No STEMI. RADIOLOGY: non-plain film images(s) such as CT, Ultrasound and MRI are read by the radiologist.    CT ABDOMEN PELVIS W IV CONTRAST   Final Result   Interval placement of a surgical drain in the presacral fluid collection without significant interval change in size of the collection. Mild left hydroureteronephrosis. Mildly diminished left nephrogram relative to the right consistent with mild obstruction which may be physiologic due to the reimplantation of the ureter on the bladder dome. Mild to moderately dilated fluid-filled small bowel loops in the upper abdomen, ileus versus developing small bowel obstruction.    Dilated bowel loop with postoperative changes in the right DISPOSITION/PLAN   Admit    PATIENT REFERRED TO:  Charles Londono MD  [address] [place] [address]  [facility]  [phone]            DISCHARGE MEDICATIONS:  New Prescriptions    No medications on file       (Please note that portions of this note were completed with a voice recognition program.  Efforts were made to edit the dictations but occasionally words are mis-transcribed.)      Liam Powell PA-C  02/07/18 0037

## 2018-02-07 PROBLEM — K56.7 ILEUS (HCC): Status: ACTIVE | Noted: 2018-02-07

## 2018-02-07 PROBLEM — E44.0 MODERATE MALNUTRITION (HCC): Status: ACTIVE | Noted: 2018-02-07

## 2018-02-07 PROCEDURE — 93010 ELECTROCARDIOGRAM REPORT: CPT | Performed by: INTERNAL MEDICINE

## 2018-02-07 PROCEDURE — A6252 ABSORPT DRG >16 <=48 W/O BDR: HCPCS

## 2018-02-07 PROCEDURE — 6360000002 HC RX W HCPCS: Performed by: INTERNAL MEDICINE

## 2018-02-07 PROCEDURE — 6370000000 HC RX 637 (ALT 250 FOR IP): Performed by: INTERNAL MEDICINE

## 2018-02-07 PROCEDURE — 6360000002 HC RX W HCPCS

## 2018-02-07 PROCEDURE — 2580000003 HC RX 258: Performed by: PHYSICIAN ASSISTANT

## 2018-02-07 PROCEDURE — 36415 COLL VENOUS BLD VENIPUNCTURE: CPT

## 2018-02-07 PROCEDURE — 2580000003 HC RX 258: Performed by: INTERNAL MEDICINE

## 2018-02-07 PROCEDURE — 6360000002 HC RX W HCPCS: Performed by: NURSE PRACTITIONER

## 2018-02-07 PROCEDURE — 1200000000 HC SEMI PRIVATE

## 2018-02-07 PROCEDURE — 84134 ASSAY OF PREALBUMIN: CPT

## 2018-02-07 RX ORDER — SODIUM CHLORIDE 9 MG/ML
INJECTION, SOLUTION INTRAVENOUS CONTINUOUS
Status: DISCONTINUED | OUTPATIENT
Start: 2018-02-07 | End: 2018-02-07 | Stop reason: HOSPADM

## 2018-02-07 RX ORDER — POTASSIUM CHLORIDE 20 MEQ/1
20 TABLET, EXTENDED RELEASE ORAL 2 TIMES DAILY
Status: ON HOLD | COMMUNITY
End: 2018-03-23 | Stop reason: HOSPADM

## 2018-02-07 RX ORDER — DOCUSATE SODIUM 100 MG/1
100 CAPSULE, LIQUID FILLED ORAL 2 TIMES DAILY
Status: DISCONTINUED | OUTPATIENT
Start: 2018-02-07 | End: 2018-02-09 | Stop reason: HOSPADM

## 2018-02-07 RX ORDER — FLUCONAZOLE 2 MG/ML
200 INJECTION, SOLUTION INTRAVENOUS EVERY 24 HOURS
Status: DISCONTINUED | OUTPATIENT
Start: 2018-02-07 | End: 2018-02-09 | Stop reason: HOSPADM

## 2018-02-07 RX ORDER — HYDROCORTISONE ACETATE 25 MG/1
25 SUPPOSITORY RECTAL 2 TIMES DAILY
Status: DISCONTINUED | OUTPATIENT
Start: 2018-02-07 | End: 2018-02-09 | Stop reason: HOSPADM

## 2018-02-07 RX ORDER — HYDROCHLOROTHIAZIDE 25 MG/1
25 TABLET ORAL DAILY
Status: DISCONTINUED | OUTPATIENT
Start: 2018-02-07 | End: 2018-02-09 | Stop reason: HOSPADM

## 2018-02-07 RX ORDER — FENTANYL 75 UG/H
1 PATCH TRANSDERMAL
Status: DISCONTINUED | OUTPATIENT
Start: 2018-02-07 | End: 2018-02-09 | Stop reason: HOSPADM

## 2018-02-07 RX ORDER — VALSARTAN 160 MG/1
320 TABLET ORAL DAILY
Status: DISCONTINUED | OUTPATIENT
Start: 2018-02-07 | End: 2018-02-09 | Stop reason: HOSPADM

## 2018-02-07 RX ORDER — SODIUM CHLORIDE 9 MG/ML
INJECTION, SOLUTION INTRAVENOUS CONTINUOUS
Status: DISCONTINUED | OUTPATIENT
Start: 2018-02-07 | End: 2018-02-09 | Stop reason: HOSPADM

## 2018-02-07 RX ORDER — FENTANYL CITRATE 50 UG/ML
50 INJECTION, SOLUTION INTRAMUSCULAR; INTRAVENOUS ONCE
Status: COMPLETED | OUTPATIENT
Start: 2018-02-07 | End: 2018-02-07

## 2018-02-07 RX ORDER — SENNA PLUS 8.6 MG/1
2 TABLET ORAL NIGHTLY
Status: DISCONTINUED | OUTPATIENT
Start: 2018-02-07 | End: 2018-02-09 | Stop reason: HOSPADM

## 2018-02-07 RX ORDER — FENTANYL CITRATE 50 UG/ML
INJECTION, SOLUTION INTRAMUSCULAR; INTRAVENOUS
Status: COMPLETED
Start: 2018-02-07 | End: 2018-02-07

## 2018-02-07 RX ORDER — ALBUTEROL SULFATE 90 UG/1
1 AEROSOL, METERED RESPIRATORY (INHALATION) EVERY 4 HOURS PRN
Status: DISCONTINUED | OUTPATIENT
Start: 2018-02-07 | End: 2018-02-09 | Stop reason: HOSPADM

## 2018-02-07 RX ORDER — POTASSIUM CHLORIDE 20 MEQ/1
20 TABLET, EXTENDED RELEASE ORAL 2 TIMES DAILY
Status: DISCONTINUED | OUTPATIENT
Start: 2018-02-07 | End: 2018-02-09 | Stop reason: HOSPADM

## 2018-02-07 RX ORDER — PROMETHAZINE HYDROCHLORIDE 25 MG/1
25 TABLET ORAL EVERY 6 HOURS PRN
COMMUNITY

## 2018-02-07 RX ORDER — PROCHLORPERAZINE MALEATE 10 MG
10 TABLET ORAL EVERY 6 HOURS PRN
Status: DISCONTINUED | OUTPATIENT
Start: 2018-02-07 | End: 2018-02-09 | Stop reason: HOSPADM

## 2018-02-07 RX ORDER — LEVOTHYROXINE SODIUM 0.07 MG/1
75 TABLET ORAL DAILY
Status: DISCONTINUED | OUTPATIENT
Start: 2018-02-07 | End: 2018-02-09 | Stop reason: HOSPADM

## 2018-02-07 RX ORDER — GABAPENTIN 600 MG/1
300 TABLET ORAL 3 TIMES DAILY
Status: DISCONTINUED | OUTPATIENT
Start: 2018-02-07 | End: 2018-02-09 | Stop reason: HOSPADM

## 2018-02-07 RX ORDER — PRAVASTATIN SODIUM 20 MG
20 TABLET ORAL DAILY
Status: DISCONTINUED | OUTPATIENT
Start: 2018-02-07 | End: 2018-02-09 | Stop reason: HOSPADM

## 2018-02-07 RX ORDER — ONDANSETRON 2 MG/ML
4 INJECTION INTRAMUSCULAR; INTRAVENOUS EVERY 4 HOURS PRN
Status: DISCONTINUED | OUTPATIENT
Start: 2018-02-07 | End: 2018-02-09 | Stop reason: HOSPADM

## 2018-02-07 RX ORDER — PRAVASTATIN SODIUM 20 MG
20 TABLET ORAL DAILY
COMMUNITY

## 2018-02-07 RX ORDER — SODIUM CHLORIDE 1000 MG
1 TABLET, SOLUBLE MISCELLANEOUS DAILY
Status: DISCONTINUED | OUTPATIENT
Start: 2018-02-07 | End: 2018-02-09 | Stop reason: HOSPADM

## 2018-02-07 RX ADMIN — HYDROMORPHONE HYDROCHLORIDE 1 MG: 1 INJECTION, SOLUTION INTRAMUSCULAR; INTRAVENOUS; SUBCUTANEOUS at 19:17

## 2018-02-07 RX ADMIN — VALSARTAN 320 MG: 160 TABLET ORAL at 15:52

## 2018-02-07 RX ADMIN — GABAPENTIN 300 MG: 600 TABLET ORAL at 21:25

## 2018-02-07 RX ADMIN — PIPERACILLIN SODIUM,TAZOBACTAM SODIUM 3.38 G: 3; .375 INJECTION, POWDER, FOR SOLUTION INTRAVENOUS at 21:25

## 2018-02-07 RX ADMIN — PRAVASTATIN SODIUM 20 MG: 20 TABLET ORAL at 21:25

## 2018-02-07 RX ADMIN — SODIUM CHLORIDE: 9 INJECTION, SOLUTION INTRAVENOUS at 20:54

## 2018-02-07 RX ADMIN — SODIUM CHLORIDE: 9 INJECTION, SOLUTION INTRAVENOUS at 01:41

## 2018-02-07 RX ADMIN — ENOXAPARIN SODIUM 40 MG: 40 INJECTION SUBCUTANEOUS at 09:05

## 2018-02-07 RX ADMIN — FENTANYL CITRATE 50 MCG: 50 INJECTION, SOLUTION INTRAMUSCULAR; INTRAVENOUS at 01:54

## 2018-02-07 RX ADMIN — FLUCONAZOLE 200 MG: 2 INJECTION INTRAVENOUS at 15:42

## 2018-02-07 RX ADMIN — FENTANYL CITRATE 50 MCG: 50 INJECTION INTRAMUSCULAR; INTRAVENOUS at 01:54

## 2018-02-07 RX ADMIN — HYDROCHLOROTHIAZIDE 25 MG: 25 TABLET ORAL at 15:51

## 2018-02-07 RX ADMIN — SODIUM CHLORIDE: 9 INJECTION, SOLUTION INTRAVENOUS at 09:11

## 2018-02-07 RX ADMIN — POTASSIUM CHLORIDE 20 MEQ: 1500 TABLET, EXTENDED RELEASE ORAL at 15:52

## 2018-02-07 RX ADMIN — PIPERACILLIN SODIUM,TAZOBACTAM SODIUM 3.38 G: 3; .375 INJECTION, POWDER, FOR SOLUTION INTRAVENOUS at 13:16

## 2018-02-07 RX ADMIN — PIPERACILLIN SODIUM,TAZOBACTAM SODIUM 3.38 G: 3; .375 INJECTION, POWDER, FOR SOLUTION INTRAVENOUS at 05:01

## 2018-02-07 RX ADMIN — SODIUM CHLORIDE TAB 1 GM 1 G: 1 TAB at 15:52

## 2018-02-07 ASSESSMENT — PAIN SCALES - GENERAL
PAINLEVEL_OUTOF10: 4
PAINLEVEL_OUTOF10: 2
PAINLEVEL_OUTOF10: 4
PAINLEVEL_OUTOF10: 6
PAINLEVEL_OUTOF10: 4
PAINLEVEL_OUTOF10: 0
PAINLEVEL_OUTOF10: 7
PAINLEVEL_OUTOF10: 4

## 2018-02-07 ASSESSMENT — PAIN DESCRIPTION - DESCRIPTORS
DESCRIPTORS: ACHING;DISCOMFORT
DESCRIPTORS: ACHING;DISCOMFORT;JABBING

## 2018-02-07 ASSESSMENT — PAIN DESCRIPTION - FREQUENCY
FREQUENCY: CONTINUOUS

## 2018-02-07 ASSESSMENT — PAIN DESCRIPTION - PROGRESSION

## 2018-02-07 ASSESSMENT — PAIN DESCRIPTION - ONSET
ONSET: ON-GOING

## 2018-02-07 ASSESSMENT — PAIN DESCRIPTION - LOCATION
LOCATION: RECTUM

## 2018-02-07 ASSESSMENT — PAIN DESCRIPTION - PAIN TYPE
TYPE: ACUTE PAIN
TYPE: ACUTE PAIN;SURGICAL PAIN

## 2018-02-07 NOTE — ED PROVIDER NOTES
Patient was assessed at bedside. This 50-year-old female who has a history of colorectal cancer. She is seeing pain management. She is common for epigastric pain. Nausea and vomiting. Patient is on fentanyl at home she has a patch on currently. Patient sees Dr. Mahamed Stinson with chemotherapy treatments. Her primary care's Mae Mendoza. Patient is complaining of this abdominal pain. She states she has a colostomy, mild decrease in output, no change in consistency or color. EKG revealed sinus tachycardia ventricular rate 106 SC interval 126 QRS duration is 68 QT of 352 QTc 467. CT ABDOMEN PELVIS W IV CONTRAST   Final Result   Interval placement of a surgical drain in the presacral fluid collection without significant interval change in size of the collection. Mild left hydroureteronephrosis. Mildly diminished left nephrogram relative to the right consistent with mild obstruction which may be physiologic due to the reimplantation of the ureter on the bladder dome. Mild to moderately dilated fluid-filled small bowel loops in the upper abdomen, ileus versus developing small bowel obstruction. Dilated bowel loop with postoperative changes in the right iliac fossa just proximal to the ileocecal valve possibly an atonic ileus. Moderate to large amount of stool throughout the colon consistent with constipation. **This report has been created using voice recognition software. It may contain minor errors which are inherent in voice recognition technology. **      Final report electronically signed by Dr. Racquel Bueno on 2/7/2018 12:17 AM          I seen this patient with Joanie Altman and agree with her assessment and plan     Sharmaine Sherwood,   02/07/18 2007

## 2018-02-07 NOTE — CARE COORDINATION
2/7/18, 10:31 AM      Kizzy Hernandez       Admitted from: ER 2/6/2018/ [facility] day: 0   Location: -[facility] Reason for admit: Ileus ([facility] [address] [K56.7]  Ileus ([facility] [address] [K56.7] Status: IP   Admit order signed?: no  PMH:  has a past medical history of Blood transfusion; Cancer ([facility] [address] Diabetes mellitus type 2 in nonobese [facility]); Hyperlipidemia; Hypertension; and Hyperthyroidism. Pertinent abnormal Imaging: CT abd- Mild left hydroureteronephrosis. Ileus vs developing small bowel obstruction. Possible anotic ileus. Large amount of stool in colon, consistent with constipation. Medications:  Scheduled Meds:   [START ON 2/8/2018] influenza virus vaccine  0.5 mL Intramuscular Once    piperacillin-tazobactam  3.375 g Intravenous Q8H    enoxaparin  40 mg Subcutaneous Daily     Continuous Infusions:   sodium chloride 100 mL/hr at 02/07/18 0911      Pertinent Info/Orders/Treatment Plan:  K+ 3.4, Na+ 133, Cl 89, CRP 2.44. IVF, IV ATB, antiemetics. Await physician rounds for further plans. Diet: Diet NPO Effective Now   DVT Prophylaxis: Lovenox  Smoking status:  reports that she has never smoked. She has never used smokeless tobacco.   Influenza Vaccination Screening Completed: yes  Pneumonia Vaccination Screening Completed: yes  Core measures: vte  PCP: Naveed Crespo MD  Readmission:   Patient has been readmitted within 27 days. Patient went to f/u appointment? Was transferred to [facility] at discharge. She has had follow-up with pain management since discharge from [facility]  If yes, was it within 7 days? no  Patient was able to fill prescriptions? yes  Patient is taking medications as prescribed? yes  Cause for readmission?  Nausea, vomiting, abdominal and rectal pain   Risk Score: 19.5     Discharge Planning  Current Residence:  Private Residence  Living Arrangements:  Parent   Support Systems:  Parent, Family Members, [facility]  Current Services PTA:     Potential Assistance Needed:  N/A  Potential Assistance Purchasing Medications:  No  Does patient want to participate in local refill/ meds to beds program?  No  Type of Home Care Services:  None  Patient expects to be discharged to:  home  Expected Discharge date:  02/09/18  Follow Up Appointment: Best Day/ Time: Monday AM    Discharge Plan: Spoke with Stan Rivas, she and her mother live together. She was discharged from [address] [place] [address] without homecare services, but states her would care was minimal and she and her mother are able to handle it. She does not anticipate needs at this time.        Evaluation: no

## 2018-02-07 NOTE — ED NOTES
Reassessment of the patients Nausea; Emesis; and Hypertension   is unchanged, the patients pain reassessment is a 8//10, Side rails up times 2, call light in reach, will continue to monitor.      Edouard Collado RN  02/06/18 1950

## 2018-02-07 NOTE — PLAN OF CARE
Problem: Nutrition  Goal: Optimal nutrition therapy  Outcome: Ongoing  Nutrition Problem: Moderate malnutrition, in context of chronic illness  Intervention: Food and/or Nutrient Delivery: Continue NPO  Nutritional Goals: Pt. will meet nutritional needs in 1-4 days.

## 2018-02-07 NOTE — PLAN OF CARE
Problem:  PREVENTION OF SKIN BREAKDOWN-OSTOMY  Goal: STG - Patient demonstrates care and management of ostomy bag  Outcome: Ongoing  In to see pt today to assess needs for colostomy. Pt states she has a history of an ileostomy that was reversed and now has a colostomy that was placed this past November at [facility].  Currently pt is wearing a [place] 1 piece cut to fit pouch # [id number]. Pt stated she believes her stoma is 1 1/2 inch and has a clean pouch that she already cut to 1 1/2 inch. Pt agreeable to later visit to change pouch to measure stoma to provide her with pre-cut supply order number. Will stop in later to change pouch. Comments: Care plan reviewed with patient and RN. Patient and RN verbalize understanding of the plan of care and contribute to goal setting.

## 2018-02-07 NOTE — ED NOTES
Patient resting in bed. Respirations easy and unlabored. No distress noted. Call light within reach.         Jenelle Slaughter RN  02/07/18 5348

## 2018-02-07 NOTE — PROGRESS NOTES
Patient arrived to unit from the ED, accompanied by mother. Patient is awake and alert, has complaints of inability to keep liquids or foods down without vomiting. Patient has a 1000 ML bag of 0.9%NS infusing, open to gravity, in the Mediport present on patient left side of chest; there is 300 ML infused with 700 ML to be infused. Physical assessment performed, as charted. Physician paged for the admission orders; awaiting call back or orders to be placed. Patient is oriented to room and unit, lying in bed, denies any needs at this time. Mother is present at bedside. Home medications are locked up in med cabinet. Bed low position and locked, upper side rails up, call light in reach of patient and one in reach of patient mother. No further questions or concerns at this time.

## 2018-02-07 NOTE — FLOWSHEET NOTE
Subjective: During my contact with the patient, family members were present and                      supportive to the patient. Objective: The patient was awake and the family was approachable. The pt/family                     engaged in meaningful conversation. The patient is coping and in good                     spirits. The patient was receptive to my sustaining presence and words of                     hope. Assessment: The patient was receptive and approachable when I offered emotional                         support, nurtured hope, provided words of encouragement and a prayer of                         healing/comfort to the pt. The patient was thankful for the spiritual support                         that was given. The pt./family were grateful for the Spiritual support. Plan I placed a spiritual care card in the room. Spiritual plan: It would be helpful if           Spiritual Care would continue to follow up on this case. 02/07/18 1507   Encounter Summary   Services provided to: Patient and family together   Referral/Consult From: [facility] Parent; Children;Family members; [facility] [facility]   [facility] ([address])   Continue Visiting Yes  (2/7/18 )   Complexity of Encounter Moderate   Length of Encounter 15 minutes   Spiritual/Religion   Type Spiritual support   Assessment Calm; Approachable   Intervention Active listening;Explored feelings, thoughts, concerns;Nurtured hope;Prayer   Outcome Comfort;Expressed gratitude;Engaged in conversation;Coping

## 2018-02-07 NOTE — PROGRESS NOTES
Clinical Pharmacy Note  Metformin-IV Contrast Interaction Monitoring    Alena Lopez is a 47 y.o. female. Patient has received IV contrast and is on metformin. Current Metformin dose:   Metformin 500 mg BID c Meals    Height:   Ht Readings from Last 1 Encounters:   02/06/18 4' 11\" (1.499 m)     Weight:  Wt Readings from Last 1 Encounters:   02/06/18 120 lb (54.4 kg)       Recent Labs      02/06/18   1943   CREATININE  0.5       CrCl cannot be calculated (Unknown ideal weight. ). Assessment:  Contrast administered:   Yes  Metformin discontinued: Yes    Date/time of contrast administration:  Isovue 370 at 2301 on 2/6/2018      Plan:  1. Metformin will be held for at least 48 hours and restarted if serum creatinine is within FDA approved guidelines. 2.  Pharmacy will check serum creatinine with morning labs on 2/9/2018    Thank you.

## 2018-02-07 NOTE — H&P
problems, speech problems, visual disturbance and coordination problems  BEHAVIOR/PSYCH:  negative for depressed mood, increased agitation and anxiety    Physical Exam:    Vitals: BP (!) 124/92   Pulse 82   Temp 98.5 °F (36.9 °C) (Oral)   Resp 16   Ht 4' 11\" (1.499 m)   Wt 120 lb (54.4 kg)   LMP 05/01/2011   SpO2 99%   BMI 24.24 kg/m²   CONSTITUTIONAL:  awake, alert, cooperative, no apparent distress, and appears stated age  EYES:  extra-ocular muscles intact  ENT:  normocepalic, without obvious abnormality  NECK:  supple, symmetrical, trachea midline  LUNGS:  clear to auscultation  CARDIOVASCULAR:  normal S1 and S2  ABDOMEN:  normal bowel sounds, soft, non-distended, non-tender, no hepatosplenomegally and ileostomy. Perineal inspection: wick drain to rectal area  MUSCULOSKELETAL:  there is no redness, warmth, or swelling of the joints  NEUROLOGIC:  Awake, alert, oriented to name, place and time. Cranial nerves II-XII are grossly intact. Motor is 5 out of 5 bilaterally. SKIN:  normal skin color, texture, turgor      CBC:   Recent Labs      02/06/18 1943   WBC  5.8   HGB  10.8*   PLT  342     BMP:    Recent Labs      02/06/18 1943   NA  133*   K  3.4*   CL  89*   CO2  29   BUN  11   CREATININE  0.5   GLUCOSE  107     Hepatic:   Recent Labs      02/06/18 1943   AST  8   ALT  8*   BILITOT  0.4   ALKPHOS  69     CRP 2.44 (H)     Lactic Acid 1.1     Procalcitonin 0.10 (H)     EKG: sinus tachy, 106 bpm, LINDA, NI    CT abdomen:  Interval placement of a surgical drain in the presacral fluid collection without significant interval change in size of the collection.   Mild left hydroureteronephrosis. Mildly diminished left nephrogram relative to the right consistent with mild obstruction which may be physiologic due to the reimplantation of the ureter on the bladder dome. Mild to moderately dilated fluid-filled small bowel loops in the upper abdomen, ileus versus developing small bowel obstruction.   Dilated bowel loop with postoperative changes in the right iliac fossa just proximal to the ileocecal valve possibly an atonic ileus. Moderate to large amount of stool throughout the colon consistent with constipation. Assessment and Plan   1. Ileus  2. Presacral collection ? Abscess  3. Rectal cancer s/p colon resection with an ileostomy  4. HTN  5. hypothyroidism    Cont IV antibiotics  IV hydration. Antiemetics. onc and id consults. Am labs.     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

## 2018-02-07 NOTE — CONSULTS
Infectious Diseases  Consult Note  Dr Winston Seen AP-CWCN  Pt Name: Meghna Perkins  MRN: [id number]  YOB: 1965  Date of evaluation: 2/7/2018  Primary Care Physician: Galileo Hu MD    Patient evaluated at the request of Dr Clair Michel  Reason for evaluation: presacral abscess    IMPRESSIONS:  abdominal ileus   Nausea and vomiting   Presacral abscess--minimally improved with drain and po antibiotics   Previous presacral area with pseudomonas, enterococcus and anaerobes  Rectal adenocarcinoma  Dermatophytosis       PLAN  Agree with Zosyn  Add diflucan   AMD gauze to drain site   Prealbumin  May need long term IV antibiotics   Follow repeat KUB for resolution of ileus   Does not follow up with [facility] until March 2018  Dr Stephie Perry to see      History of Present Illness   Marcos Emanuel is a 47 y.o. female was admitted to hospital for complaints of nausea and vomiting. She is well known to our service and was seen in office 1/30/18 in follow up from [facility] visit. In reviewed her remote history , she had rectal cancer and history of radiation treatments and surgeries in the past. Problems started around 2012 with adenocarcinoma of the rectum. The patient did have surgical resection  and radiation treatment in 2012 and the patient had pelvic recurrence in 12/2014 and also 10/2015. We saw her in October 2017 with salvador rectal abscess growing pseudomonas, enterococcus and anaerobes. She was transferred to [facility] for further treatment and did not follow up with us in office. She was again in [facility] January 2018 with 1/9/18 ct scan of abdomen and pelvis showing Rim-enhancing collection of gas and fluid consistent with an abscess in the presacral space extending around the left side of the perirectal fat and extending laterally and inferiorly into the right ischio rectal fossa fat.  This collection extends   further inferiorly in the fat of the right buttock medial to the inferior margin of the right gluteus

## 2018-02-08 LAB
ANION GAP SERPL CALCULATED.3IONS-SCNC: 17 MEQ/L (ref 8–16)
BUN BLDV-MCNC: 15 MG/DL (ref 7–22)
CALCIUM SERPL-MCNC: 8.8 MG/DL (ref 8.5–10.5)
CHLORIDE BLD-SCNC: 100 MEQ/L (ref 98–111)
CO2: 22 MEQ/L (ref 23–33)
CREAT SERPL-MCNC: 0.6 MG/DL (ref 0.4–1.2)
GFR SERPL CREATININE-BSD FRML MDRD: > 90 ML/MIN/1.73M2
GLUCOSE BLD-MCNC: 118 MG/DL (ref 70–108)
GLUCOSE BLD-MCNC: 126 MG/DL (ref 70–108)
GLUCOSE BLD-MCNC: 49 MG/DL (ref 70–108)
GLUCOSE BLD-MCNC: 54 MG/DL (ref 70–108)
GLUCOSE BLD-MCNC: 89 MG/DL (ref 70–108)
POTASSIUM SERPL-SCNC: 4 MEQ/L (ref 3.5–5.2)
PREALBUMIN: 8.6 MG/DL (ref 20–40)
SODIUM BLD-SCNC: 139 MEQ/L (ref 135–145)

## 2018-02-08 PROCEDURE — 6360000002 HC RX W HCPCS: Performed by: INTERNAL MEDICINE

## 2018-02-08 PROCEDURE — 80048 BASIC METABOLIC PNL TOTAL CA: CPT

## 2018-02-08 PROCEDURE — 6360000002 HC RX W HCPCS: Performed by: NURSE PRACTITIONER

## 2018-02-08 PROCEDURE — 1200000000 HC SEMI PRIVATE

## 2018-02-08 PROCEDURE — A6252 ABSORPT DRG >16 <=48 W/O BDR: HCPCS

## 2018-02-08 PROCEDURE — 2580000003 HC RX 258: Performed by: INTERNAL MEDICINE

## 2018-02-08 PROCEDURE — 36415 COLL VENOUS BLD VENIPUNCTURE: CPT

## 2018-02-08 PROCEDURE — 82948 REAGENT STRIP/BLOOD GLUCOSE: CPT

## 2018-02-08 PROCEDURE — 6370000000 HC RX 637 (ALT 250 FOR IP): Performed by: INTERNAL MEDICINE

## 2018-02-08 RX ORDER — DEXTROSE AND SODIUM CHLORIDE 5; .9 G/100ML; G/100ML
INJECTION, SOLUTION INTRAVENOUS PRN
Status: DISCONTINUED | OUTPATIENT
Start: 2018-02-08 | End: 2018-02-09 | Stop reason: HOSPADM

## 2018-02-08 RX ORDER — DEXTROSE MONOHYDRATE 50 MG/ML
100 INJECTION, SOLUTION INTRAVENOUS PRN
Status: DISCONTINUED | OUTPATIENT
Start: 2018-02-08 | End: 2018-02-08 | Stop reason: RX

## 2018-02-08 RX ORDER — DEXTROSE MONOHYDRATE 25 G/50ML
12.5 INJECTION, SOLUTION INTRAVENOUS PRN
Status: DISCONTINUED | OUTPATIENT
Start: 2018-02-08 | End: 2018-02-09 | Stop reason: HOSPADM

## 2018-02-08 RX ORDER — HYDROMORPHONE HYDROCHLORIDE 2 MG/1
4 TABLET ORAL
Status: DISCONTINUED | OUTPATIENT
Start: 2018-02-08 | End: 2018-02-09 | Stop reason: HOSPADM

## 2018-02-08 RX ORDER — AMLODIPINE BESYLATE 5 MG/1
5 TABLET ORAL DAILY
Status: ON HOLD | COMMUNITY
End: 2018-03-23 | Stop reason: HOSPADM

## 2018-02-08 RX ORDER — NICOTINE POLACRILEX 4 MG
15 LOZENGE BUCCAL PRN
Status: DISCONTINUED | OUTPATIENT
Start: 2018-02-08 | End: 2018-02-09 | Stop reason: HOSPADM

## 2018-02-08 RX ADMIN — GABAPENTIN 300 MG: 600 TABLET ORAL at 14:01

## 2018-02-08 RX ADMIN — HYDROCHLOROTHIAZIDE 25 MG: 25 TABLET ORAL at 09:45

## 2018-02-08 RX ADMIN — PIPERACILLIN SODIUM,TAZOBACTAM SODIUM 3.38 G: 3; .375 INJECTION, POWDER, FOR SOLUTION INTRAVENOUS at 14:01

## 2018-02-08 RX ADMIN — GABAPENTIN 300 MG: 600 TABLET ORAL at 20:43

## 2018-02-08 RX ADMIN — SODIUM CHLORIDE TAB 1 GM 1 G: 1 TAB at 09:45

## 2018-02-08 RX ADMIN — POTASSIUM CHLORIDE 20 MEQ: 1500 TABLET, EXTENDED RELEASE ORAL at 20:43

## 2018-02-08 RX ADMIN — DOCUSATE SODIUM 100 MG: 100 CAPSULE ORAL at 09:45

## 2018-02-08 RX ADMIN — FLUCONAZOLE 200 MG: 2 INJECTION INTRAVENOUS at 14:05

## 2018-02-08 RX ADMIN — POTASSIUM CHLORIDE 20 MEQ: 1500 TABLET, EXTENDED RELEASE ORAL at 09:45

## 2018-02-08 RX ADMIN — ENOXAPARIN SODIUM 40 MG: 40 INJECTION SUBCUTANEOUS at 09:45

## 2018-02-08 RX ADMIN — VALSARTAN 320 MG: 160 TABLET ORAL at 10:26

## 2018-02-08 RX ADMIN — SODIUM CHLORIDE: 9 INJECTION, SOLUTION INTRAVENOUS at 16:58

## 2018-02-08 RX ADMIN — SENNA 17.2 MG: 8.6 TABLET, COATED ORAL at 20:42

## 2018-02-08 RX ADMIN — PIPERACILLIN SODIUM,TAZOBACTAM SODIUM 3.38 G: 3; .375 INJECTION, POWDER, FOR SOLUTION INTRAVENOUS at 20:48

## 2018-02-08 RX ADMIN — DOCUSATE SODIUM 100 MG: 100 CAPSULE ORAL at 20:43

## 2018-02-08 RX ADMIN — SODIUM CHLORIDE: 9 INJECTION, SOLUTION INTRAVENOUS at 05:57

## 2018-02-08 RX ADMIN — PIPERACILLIN SODIUM,TAZOBACTAM SODIUM 3.38 G: 3; .375 INJECTION, POWDER, FOR SOLUTION INTRAVENOUS at 04:58

## 2018-02-08 RX ADMIN — GABAPENTIN 300 MG: 600 TABLET ORAL at 09:45

## 2018-02-08 RX ADMIN — PRAVASTATIN SODIUM 20 MG: 20 TABLET ORAL at 20:43

## 2018-02-08 RX ADMIN — LEVOTHYROXINE SODIUM 75 MCG: 75 TABLET ORAL at 05:04

## 2018-02-08 RX ADMIN — HYDROMORPHONE HYDROCHLORIDE 1 MG: 1 INJECTION, SOLUTION INTRAMUSCULAR; INTRAVENOUS; SUBCUTANEOUS at 01:47

## 2018-02-08 ASSESSMENT — PAIN DESCRIPTION - DESCRIPTORS
DESCRIPTORS: ACHING;BURNING
DESCRIPTORS: ACHING

## 2018-02-08 ASSESSMENT — PAIN DESCRIPTION - PAIN TYPE
TYPE: SURGICAL PAIN
TYPE: SURGICAL PAIN

## 2018-02-08 ASSESSMENT — PAIN DESCRIPTION - LOCATION
LOCATION: RECTUM
LOCATION: RECTUM

## 2018-02-08 ASSESSMENT — PAIN SCALES - GENERAL
PAINLEVEL_OUTOF10: 2
PAINLEVEL_OUTOF10: 0
PAINLEVEL_OUTOF10: 3
PAINLEVEL_OUTOF10: 7

## 2018-02-08 ASSESSMENT — PAIN DESCRIPTION - FREQUENCY: FREQUENCY: INTERMITTENT

## 2018-02-08 NOTE — CARE COORDINATION
DISCHARGE BARRIERS  2/8/18, 2:16 PM    Reason for Referral: discharge planning with home health  Mental Status: alert and oriented  Decision Making: patient is able to make her own decisions. Family/Social/Home Environment: Patient known to  from previous admissions. Assessment completed on 1/9/18. Patient states that home environment remains the same. Patient has history of recurrent rectal cancer. At time of last admission-she was transferred to [facility] for management of  transferred to [facility] for management of pelvic abscess. She states that when she left there she was told that wound look good. She states that she now has an abscess (Presacral) and was told she would need IV antibiotics when discharged. Current Services: none  Current Equipment: N/A  Payment Source: [facility] [place]  Concerns or Barriers to Discharge: none indicated  Collabrative List of ECF/HH were provided: N/A-patient has used [facility] in the past and states that she will use them again. She will also use [facility]. Teach Back Method used with patient regarding care plan   Patient  verbalize understanding of the plan of care and contribute to goal setting. Anticipated Needs/Discharge Plan: patient will return home when discharged. Her mother currently resides with her. Planning home health for IV antibiotics.  Call to [address] [place] [address] ([place])-staff will verify patient's insurance and get back with .     Electronically signed by ALEX Pichardo on 2/8/2018 at 2:16 PM

## 2018-02-08 NOTE — PLAN OF CARE
Problem: Skin Integrity:  Goal: Risk for impaired skin integrity will decrease  Risk for impaired skin integrity will decrease   Outcome: Ongoing  No new skin issues, patient doing own salvador care prn. Problem: Infection:  Goal: Will remain free from infection  Will remain free from infection   Outcome: Ongoing  Remains on IV zosyn and dilflucan    Problem: Safety:  Goal: Free from accidental physical injury  Free from accidental physical injury   Outcome: Ongoing  Ambulates in room independently, took a long walk in the [place] today with family with steady gait. Problem: Discharge Planning:  Goal: Patients continuum of care needs are met  Patients continuum of care needs are met   Outcome: Ongoing  Home with mother and HH    Problem: HH PREVENTION OF SKIN BREAKDOWN-OSTOMY  Goal: STG - Patient demonstrates care and management of ostomy bag  Outcome: Ongoing  Wound/ostomy team in to see today. Problem: Pain Control  Goal: Maintain pain level at or below patient's acceptable level (or 5 if patient is unable to determine acceptable level)  Outcome: Ongoing  Denies pain    Problem: Nutrition  Goal: Optimal nutrition therapy  Outcome: Ongoing  Has a good appetite    Comments: Care plan reviewed with patient. Patient verbalizes understanding of the plan of care and contributes to goal setting.

## 2018-02-08 NOTE — CARE COORDINATION
2/8/18, 3:00 PM    DISCHARGE BARRIERS    Received call from Mariel Rodriguez with HIS-patient has coverage for IV's/ Her deductible and out of pocket has been met for the year, service is covered up to 100% of the allowed expense. Information shared with CM.

## 2018-02-08 NOTE — PROGRESS NOTES
Patient up in room and able to eat. Pt/Chart review last 24 hours:  Fever No, Diarrhea No, Dyspnea No,     Objective:      Vitals: BP (!) 156/70   Pulse 83   Temp 98.1 °F (36.7 °C) (Axillary)   Resp 16   Ht 4' 11\" (1.499 m)   Wt 120 lb (54.4 kg)   LMP 05/01/2011   SpO2 97%   BMI 24.24 kg/m²    HEENT: Head: Normal, normocephalic, atraumatic. Heart: regular rate and rhythm  Lungs: clear to auscultation bilaterally  Abdomen: soft, non-tender; bowel sounds normal; no masses,  no organomegaly ostomy left +  Extremities: extremities normal, atraumatic, no cyanosis or edema  Neurologic: Mental status: Alert, oriented, thought content appropriate   Wound/Ulcers: perirectal drain sutures noted     Data:   Current ATBs: zosyn 2/7   Scheduled Meds:   influenza virus vaccine  0.5 mL Intramuscular Once    piperacillin-tazobactam  3.375 g Intravenous Q8H    enoxaparin  40 mg Subcutaneous Daily    docusate sodium  100 mg Oral BID    fentaNYL  1 patch Transdermal Q72H    hydrochlorothiazide  25 mg Oral Daily    hydrocortisone  25 mg Rectal BID    levothyroxine  75 mcg Oral Daily    potassium chloride  20 mEq Oral BID    pravastatin  20 mg Oral Daily    senna  2 tablet Oral Nightly    sodium chloride  1 g Oral Daily    valsartan  320 mg Oral Daily    fluconazole  200 mg Intravenous Q24H    gabapentin  300 mg Oral TID     Continuous Infusions:   dextrose 5 % and 0.9 % NaCl      sodium chloride 100 mL/hr at 02/08/18 0557       I/O last 3 completed shifts:   In: 3196 [I.V.:2275]  Out: 1100 [Urine:850; Stool:250]  I/O this shift:  In: 260 [P.O.:260]  Out: -     Intake/Output Summary (Last 24 hours) at 02/08/18 1237  Last data filed at 02/08/18 1052   Gross per 24 hour   Intake             2535 ml   Output             1100 ml   Net             1435 ml     CBC:   Recent Labs      02/06/18 1943   WBC  5.8   HGB  10.8*   HCT  33.3*   PLT  342     BMP:  Recent Labs      02/06/18 1943 02/08/18   0638   NA 133*  139   K  3.4*  4.0   CL  89*  100   CO2  29  22*   BUN  11  15   CREATININE  0.5  0.6   GLUCOSE  107  54*     Hepatic: Recent Labs      02/06/18   1943   AST  8   ALT  8*   BILITOT  0.4   ALKPHOS  69     Urine:       MICRO:   Lab Results   Component Value Date    BC No growth-preliminary 02/06/2018       IMAGING per radiologist interpretation: Ct abd pelvis:  Impression:        Interval placement of a surgical drain in the presacral fluid collection without significant interval change in size of the collection.   Mild left hydroureteronephrosis. Mildly diminished left nephrogram relative to the right consistent with mild obstruction which may be physiologic due to the reimplantation of the ureter on the bladder dome. Mild to moderately dilated fluid-filled small bowel loops in the upper abdomen, ileus versus developing small bowel obstruction. Dilated bowel loop with postoperative changes in the right iliac fossa just proximal to the ileocecal valve possibly an atonic ileus. Moderate to large amount of stool throughout the colon consistent with constipation. Assessment    abdominal ileus   Nausea and vomiting   Presacral abscess--minimally improved with drain and po antibiotics   Previous presacral area with pseudomonas, enterococcus and anaerobes  ? Radiation necrosis of presacral area  Rectal adenocarcinoma  Dermatophytosis       Plan   Tentatively plan antibiotics with Zosyn 2 weeks   If presacral area is radiated tissue and not abscess, no need for IV antibiotics--Dr Jose Cruz Prado will D/W Dr Jag Stern and decide in am   Follow up Oncology - will d/w   Labs twice a week if going on IV antibiotics     Please see today's orders for updated patient plan of care. Noted scribed in real time of face to face encounter by Dr Jose Cruz Prado, transcribed by JOSE Matthew in [facility] after rounds were completed.       Electronically signed by Hunter Pond CNP on 2/8/2018 at 12:37 PM    I have reviewed and agree with the above note. The information is true and accurate.    Electronically signed by Trinity Potter MD on 2/8/18 at 9:27 PM

## 2018-02-08 NOTE — CONSULTS
[address] [place] [address]                                   CONSULTATION    PATIENT NAME: Willow Mcallister                     :        1965  MED REC NO:   [id number]                           ROOM:       3387  ACCOUNT NO:   [de-identified]                           ADMIT DATE: 2018  PROVIDER:     Felisha Chapman DATE:  2018    REASON FOR CONSULTATION:  Rectal cancer. HISTORY OF PRESENT ILLNESS:  The patient is a 81-year-old -American  female very well known to me with adenocarcinoma of the rectum, originally  stage III in . Had surgical resection and adjuvant chemoradiotherapy  and 8 cycles of FOLFOX. Had two pelvic recurrences, one in 2014 and  10/2015 with disease in the presacral soft tissues. She was managed with  chemo and radiotherapy in the past.  The patient had a temporary colostomy  from  through , had 6 cycles of FOLFIRI on 2015, completed  then and she unfortunately developed recurrence of the malignancy again and  was seen in 2017. The PET scan showed a presacral soft tissue mass that  was suspicious for cancer with SUV of 14.4. She saw Dr. Shantelle Laura at [facility]. She  was not a surgical candidate for recurrence of disease. Did  have another diverting colostomy placed and debridement of wound in 10/2017  for disease progression. She is on  irinotecan and Avastin starting that  in 2017. She just had a scan on 2018 that shows a collection of  gas and fluid consistent with an abscess in presacral space, was  transferred again to [facility] for further debridement.   She has a drain in there  at this present time and Dr. Shantelle Laura stated that this is not an infection  and was actually radiation necrosis and tumor that is causing her disease  with the drainage and right now she is readmitted to the hospital again  with nausea, vomiting, abdominal pain,

## 2018-02-09 ENCOUNTER — APPOINTMENT (OUTPATIENT)
Dept: INTERVENTIONAL RADIOLOGY/VASCULAR | Age: 53
DRG: 389 | End: 2018-02-09
Payer: COMMERCIAL

## 2018-02-09 VITALS
BODY MASS INDEX: 24.19 KG/M2 | DIASTOLIC BLOOD PRESSURE: 80 MMHG | RESPIRATION RATE: 16 BRPM | SYSTOLIC BLOOD PRESSURE: 130 MMHG | OXYGEN SATURATION: 98 % | WEIGHT: 120 LBS | HEART RATE: 83 BPM | TEMPERATURE: 97.6 F | HEIGHT: 59 IN

## 2018-02-09 LAB
CREAT SERPL-MCNC: 0.6 MG/DL (ref 0.4–1.2)
GFR SERPL CREATININE-BSD FRML MDRD: > 90 ML/MIN/1.73M2
GLUCOSE BLD-MCNC: 110 MG/DL (ref 70–108)
GLUCOSE BLD-MCNC: 91 MG/DL (ref 70–108)

## 2018-02-09 PROCEDURE — 36415 COLL VENOUS BLD VENIPUNCTURE: CPT

## 2018-02-09 PROCEDURE — 36598 INJ W/FLUOR EVAL CV DEVICE: CPT | Performed by: RADIOLOGY

## 2018-02-09 PROCEDURE — G0008 ADMIN INFLUENZA VIRUS VAC: HCPCS | Performed by: INTERNAL MEDICINE

## 2018-02-09 PROCEDURE — 82565 ASSAY OF CREATININE: CPT

## 2018-02-09 PROCEDURE — 3C1ZX8Z IRRIGATION OF INDWELLING DEVICE USING IRRIGATING SUBSTANCE, EXTERNAL APPROACH: ICD-10-PCS | Performed by: RADIOLOGY

## 2018-02-09 PROCEDURE — 2580000003 HC RX 258: Performed by: INTERNAL MEDICINE

## 2018-02-09 PROCEDURE — 6370000000 HC RX 637 (ALT 250 FOR IP): Performed by: INTERNAL MEDICINE

## 2018-02-09 PROCEDURE — A6402 STERILE GAUZE <= 16 SQ IN: HCPCS

## 2018-02-09 PROCEDURE — 90686 IIV4 VACC NO PRSV 0.5 ML IM: CPT | Performed by: INTERNAL MEDICINE

## 2018-02-09 PROCEDURE — 6360000004 HC RX CONTRAST MEDICATION: Performed by: RADIOLOGY

## 2018-02-09 PROCEDURE — 82948 REAGENT STRIP/BLOOD GLUCOSE: CPT

## 2018-02-09 PROCEDURE — 6360000002 HC RX W HCPCS: Performed by: INTERNAL MEDICINE

## 2018-02-09 RX ORDER — HYDROMORPHONE HYDROCHLORIDE 4 MG/1
4 TABLET ORAL
Qty: 100 TABLET | Refills: 0 | Status: SHIPPED | OUTPATIENT
Start: 2018-02-09 | End: 2018-02-27 | Stop reason: ALTCHOICE

## 2018-02-09 RX ORDER — HEPARIN SODIUM (PORCINE) LOCK FLUSH IV SOLN 100 UNIT/ML 100 UNIT/ML
100 SOLUTION INTRAVENOUS ONCE
Status: DISCONTINUED | OUTPATIENT
Start: 2018-02-09 | End: 2018-02-09

## 2018-02-09 RX ORDER — HEPARIN SODIUM (PORCINE) LOCK FLUSH IV SOLN 100 UNIT/ML 100 UNIT/ML
500 SOLUTION INTRAVENOUS ONCE
Status: COMPLETED | OUTPATIENT
Start: 2018-02-09 | End: 2018-02-09

## 2018-02-09 RX ADMIN — HYDROMORPHONE HYDROCHLORIDE 4 MG: 2 TABLET ORAL at 05:51

## 2018-02-09 RX ADMIN — SODIUM CHLORIDE, PRESERVATIVE FREE 500 UNITS: 5 INJECTION INTRAVENOUS at 16:01

## 2018-02-09 RX ADMIN — ENOXAPARIN SODIUM 40 MG: 40 INJECTION SUBCUTANEOUS at 08:50

## 2018-02-09 RX ADMIN — DOCUSATE SODIUM 100 MG: 100 CAPSULE ORAL at 08:50

## 2018-02-09 RX ADMIN — LEVOTHYROXINE SODIUM 75 MCG: 75 TABLET ORAL at 06:25

## 2018-02-09 RX ADMIN — HYDROCHLOROTHIAZIDE 25 MG: 25 TABLET ORAL at 08:50

## 2018-02-09 RX ADMIN — IOVERSOL 2 ML: 509 INJECTION INTRA-ARTERIAL; INTRAVENOUS at 09:47

## 2018-02-09 RX ADMIN — GABAPENTIN 300 MG: 600 TABLET ORAL at 08:50

## 2018-02-09 RX ADMIN — PIPERACILLIN SODIUM,TAZOBACTAM SODIUM 3.38 G: 3; .375 INJECTION, POWDER, FOR SOLUTION INTRAVENOUS at 05:46

## 2018-02-09 RX ADMIN — VALSARTAN 320 MG: 160 TABLET ORAL at 08:50

## 2018-02-09 RX ADMIN — HYDROMORPHONE HYDROCHLORIDE 4 MG: 2 TABLET ORAL at 00:01

## 2018-02-09 RX ADMIN — INFLUENZA A VIRUS A/SINGAPORE/GP1908/2015 IVR-180A (H1N1) ANTIGEN (PROPIOLACTONE INACTIVATED), INFLUENZA A VIRUS A/HONG KONG/4801/2014 X-263B (H3N2) ANTIGEN (PROPIOLACTONE INACTIVATED), INFLUENZA B VIRUS B/BRISBANE/46/2015 ANTIGEN (PROPIOLACTONE INACTIVATED), AND INFLUENZA B VIRUS B/PHUKET/3073/2013 BVR-1B ANTIGEN (PROPIOLACTONE INACTIVATED) 0.5 ML: 15; 15; 15; 15 INJECTION, SUSPENSION INTRAMUSCULAR at 08:51

## 2018-02-09 RX ADMIN — SODIUM CHLORIDE TAB 1 GM 1 G: 1 TAB at 08:50

## 2018-02-09 RX ADMIN — POTASSIUM CHLORIDE 20 MEQ: 1500 TABLET, EXTENDED RELEASE ORAL at 08:50

## 2018-02-09 RX ADMIN — METFORMIN HYDROCHLORIDE 500 MG: 500 TABLET ORAL at 09:14

## 2018-02-09 ASSESSMENT — PAIN DESCRIPTION - PAIN TYPE
TYPE: ACUTE PAIN
TYPE: ACUTE PAIN

## 2018-02-09 ASSESSMENT — PAIN SCALES - GENERAL
PAINLEVEL_OUTOF10: 8
PAINLEVEL_OUTOF10: 7
PAINLEVEL_OUTOF10: 5
PAINLEVEL_OUTOF10: 8

## 2018-02-09 ASSESSMENT — PAIN DESCRIPTION - LOCATION
LOCATION: RECTUM
LOCATION: RECTUM

## 2018-02-09 NOTE — PLAN OF CARE
Problem: Nutrition  Goal: Optimal nutrition therapy  Outcome: Ongoing  Nutrition Problem: Inadequate oral intake  Intervention: Food and/or Nutrient Delivery: Continue current diet  Nutritional Goals: Patient will consume 75% or greater of meals during LOS

## 2018-02-09 NOTE — PROGRESS NOTES
Per Dr. Major, use only PO dilaudid and fentanyl patch for pain management at d/c, also follow up with Dr. Momo Ching for rectal abscess and drain management. Going home with [facility], need an order for Dr. Momo Ching to follow for [facility], Vince Tate NP called, waiting on call back.

## 2018-02-09 NOTE — PROGRESS NOTES
colon resection with an ileostomy  4. HTN  5. Hypothyroidism  6. Hypokalemia, improved    improved  Advance diet.   Antiemetics. Stable for dc home.       Shiv Cobb MD

## 2018-02-09 NOTE — PROGRESS NOTES
Infectious Diseases Progress Note  Pt Name: Dejuan Lamb  MRN: [id number]  YOB: 1965  Admit Date: 2/6/2018  6:24 PM  Date of evaluation: 2/9/2018  Primary Care Physician: Eagle Collier MD   [id number] 47 y.o. female was admitted to hospital for complaints of nausea and vomiting. She is well known to our service and was seen in office 1/30/18 in follow up from [facility] visit. In reviewed her remote history , she had rectal cancer and history of radiation treatments and surgeries in the past. Problems started around 2012 with adenocarcinoma of the rectum. The patient did have surgical resection  and radiation treatment in 2012 and the patient had pelvic recurrence in 12/2014 and also 10/2015. We saw her in October 2017 with salvador rectal abscess growing pseudomonas, enterococcus and anaerobes. She was transferred to [facility] for further treatment and did not follow up with us in office. She was [facility] in [facility] 2018 with 1/9/18 ct scan of abdomen and pelvis showing Rim-enhancing collection of gas and fluid consistent with an abscess in the presacral space extending around the left side of the perirectal fat and extending laterally and inferiorly into the right ischio rectal fossa fat. This collection extends   further inferiorly in the fat of the right buttock medial to the inferior margin of the right gluteus damien. Presence of gas could be secondary to fistulization, or gas forming bacteria. She was again transferred to [facility] and she has procedure with drain inserted. Dr Major referred her to see us in office and she was seen 1/30/18. She was started on Augmentin and Cipro. She developed nausea 2/6/18 and was advised to stop po antibiotics. Symptoms did not improve and she was admitted 2/7/18 with nausea, vomiting and presumed ileus. CT scan abdomen not showing much change in presacral abscess despite drain and po antibiotics. Denies any fever or chills. Subjective:    Interval History: Labs      02/06/18 1943 02/08/18   0638  02/09/18   0645   NA  133*  139   --    K  3.4*  4.0   --    CL  89*  100   --    CO2  29  22*   --    BUN  11  15   --    CREATININE  0.5  0.6  0.6   GLUCOSE  107  54*   --      Hepatic:   Recent Labs      02/06/18 1943   AST  8   ALT  8*   BILITOT  0.4   ALKPHOS  69     Urine:       MICRO:   Lab Results   Component Value Date    BC No growth-preliminary 02/06/2018       IMAGING per radiologist interpretation: Ct abd pelvis:  Impression:        Interval placement of a surgical drain in the presacral fluid collection without significant interval change in size of the collection.   Mild left hydroureteronephrosis. Mildly diminished left nephrogram relative to the right consistent with mild obstruction which may be physiologic due to the reimplantation of the ureter on the bladder dome. Mild to moderately dilated fluid-filled small bowel loops in the upper abdomen, ileus versus developing small bowel obstruction. Dilated bowel loop with postoperative changes in the right iliac fossa just proximal to the ileocecal valve possibly an atonic ileus. Moderate to large amount of stool throughout the colon consistent with constipation. Assessment    abdominal ileus   Nausea and vomiting   Presacral abscess--minimally improved with drain and po antibiotics--probable radiation necrosis  Previous presacral area with pseudomonas, enterococcus and anaerobes  ? Radiation necrosis of presacral area  Rectal adenocarcinoma  Dermatophytosis       Plan   Stop Zosyn at discharge   If presacral area is radiated tissue and not abscess, no need for IV antibiotics--Dr Mike Moran D/W Dr Brii Cuello and will hold off on antibiotics   Follow up Oncology  No need for labs if no IV antibiotics   Rolled gauze 4 gauze 4x4 with tape on the margin change  in the rectal area   Please see today's orders for updated patient plan of care.     Noted scribed in real time of face to face encounter by

## 2018-02-09 NOTE — PROGRESS NOTES
Department of Radiology  Post Procedure Progress Note      Pre-Procedure Diagnosis:  mediport malfunction    Procedure Performed:  mediport check    Anesthesia: none    Findings: no fibrin sheath, mediport flushes and aspirates fine. .may have been a small clot on end of catheter    Immediate Complications:  None    Estimated Blood Loss: minimal    SEE DICTATED PROCEDURE NOTE FOR COMPLETE DETAILS.     Melquiades Purcell MD   2/9/2018 9:47 AM

## 2018-02-09 NOTE — DISCHARGE SUMMARY
Discharge Summary    Wellington Forrest  :  1965  MRN:  [id number]    Admit date:  2018  Discharge date:      Admitting Physician:  Melissa Greer MD    Discharge Diagnoses:      1. Ileus, improved  2. Presacral collection / seroma  3. Rectal cancer s/p colon resection with an ileostomy  4. HTN  5. Hypothyroidism  6. Hypokalemia, improved       Admission Condition:  stable  Discharged Condition:  good    Discharge Medications:       [place]   Home Medication Instructions [facility]:[id number]    Printed on:18 3393   Medication Information                      amLODIPine (NORVASC) 5 MG tablet  Take 5 mg by mouth daily             docusate sodium (COLACE) 100 MG capsule  Take 1 capsule by mouth 2 times daily             fentaNYL (DURAGESIC) 75 MCG/HR  Place 1 patch onto the skin every 72 hours for 15 days. hydrochlorothiazide (HYDRODIURIL) 25 MG tablet  Take 25 mg by mouth daily             HYDROmorphone (DILAUDID) 4 MG tablet  Take 1 tablet by mouth every 3 hours as needed for Pain for up to 100 doses. ibuprofen (ADVIL;MOTRIN) 600 MG tablet  Take 1 tablet by mouth every 6 hours as needed for Pain             levothyroxine (LEVOTHROID) 75 MCG tablet  Take 75 mcg by mouth Daily.             lidocaine (XYLOCAINE) 5 % ointment  Apply topically as needed. metFORMIN (GLUCOPHAGE) 500 MG tablet  Take 500 mg by mouth 2 times daily (with meals)             potassium chloride (KLOR-CON M) 20 MEQ extended release tablet  Take 20 mEq by mouth 2 times daily             pravastatin (PRAVACHOL) 20 MG tablet  Take 20 mg by mouth daily             prochlorperazine (COMPAZINE) 10 MG tablet  Take 10 mg by mouth every 6 hours as needed.              promethazine (PHENERGAN) 25 MG tablet  Take 25 mg by mouth every 6 hours as needed for Nausea             senna (SENOKOT) 8.6 MG tablet  Take 2 tablets by mouth nightly             sodium chloride 1 g tablet  Take 1 g by mouth daily tiZANidine (ZANAFLEX) 4 MG tablet  Take 1 tablet by mouth every 8 hours as needed (spasms)             valsartan (DIOVAN) 320 MG tablet  Take 320 mg by mouth daily                 Consults:  ID and hematology/oncology    Significant Diagnostic Studies: labs: CBC:       Recent Labs      02/06/18 1943   WBC  5.8   HGB  10.8*   PLT  342      BMP:          Recent Labs      02/06/18 1943 02/08/18   0638  02/09/18   0645   NA  133*  139   --    K  3.4*  4.0   --    CL  89*  100   --    CO2  29  22*   --    BUN  11  15   --    CREATININE  0.5  0.6  0.6   GLUCOSE  107  54*   --       Hepatic:   Recent Labs      02/06/18 1943   AST  8   ALT  8*   BILITOT  0.4   ALKPHOS  69      TSH:          Lab Results   Component Value Date     TSH 1.580 01/10/2018         Assessment and Plan:   7. Ileus, improved  8. Presacral collection / seroma  9. Rectal cancer s/p colon resection with an ileostomy  10. HTN  11. Hypothyroidism  12. Hypokalemia, improved     improved  Advance diet.   Antiemetics. Stable for dc home.       Treatments:   Analgesic, antemetics, hydration    Disposition:   Home with [facility].     Signed:  Brianna Ashford  2/9/2018, 3:01 PM

## 2018-02-12 LAB
BLOOD CULTURE, ROUTINE: NORMAL
BLOOD CULTURE, ROUTINE: NORMAL

## 2018-02-16 ENCOUNTER — TELEPHONE (OUTPATIENT)
Dept: PHYSICAL MEDICINE AND REHAB | Age: 53
End: 2018-02-16

## 2018-02-18 ENCOUNTER — APPOINTMENT (OUTPATIENT)
Dept: CT IMAGING | Age: 53
End: 2018-02-18
Payer: COMMERCIAL

## 2018-02-18 ENCOUNTER — HOSPITAL ENCOUNTER (EMERGENCY)
Age: 53
Discharge: ANOTHER HOSPITAL | End: 2018-02-18
Attending: FAMILY MEDICINE
Payer: COMMERCIAL

## 2018-02-18 VITALS
WEIGHT: 118 LBS | HEART RATE: 118 BPM | BODY MASS INDEX: 23.79 KG/M2 | DIASTOLIC BLOOD PRESSURE: 85 MMHG | HEIGHT: 59 IN | OXYGEN SATURATION: 90 % | SYSTOLIC BLOOD PRESSURE: 146 MMHG | TEMPERATURE: 98.7 F | RESPIRATION RATE: 18 BRPM

## 2018-02-18 DIAGNOSIS — G89.3 CANCER RELATED PAIN: ICD-10-CM

## 2018-02-18 DIAGNOSIS — E87.1 ACUTE HYPONATREMIA: ICD-10-CM

## 2018-02-18 DIAGNOSIS — N13.30 HYDRONEPHROSIS, UNSPECIFIED HYDRONEPHROSIS TYPE: ICD-10-CM

## 2018-02-18 DIAGNOSIS — C20 RECTAL CANCER (HCC): Primary | ICD-10-CM

## 2018-02-18 LAB
ALBUMIN SERPL-MCNC: 3.6 G/DL (ref 3.5–5.1)
ALP BLD-CCNC: 99 U/L (ref 38–126)
ALT SERPL-CCNC: 12 U/L (ref 11–66)
ANION GAP SERPL CALCULATED.3IONS-SCNC: 14 MEQ/L (ref 8–16)
ANISOCYTOSIS: ABNORMAL
APTT: 23.9 SECONDS (ref 22–38)
AST SERPL-CCNC: 10 U/L (ref 5–40)
BASOPHILS # BLD: 0 %
BASOPHILS ABSOLUTE: 0 THOU/MM3 (ref 0–0.1)
BILIRUB SERPL-MCNC: 0.6 MG/DL (ref 0.3–1.2)
BUN BLDV-MCNC: 18 MG/DL (ref 7–22)
CALCIUM SERPL-MCNC: 9.9 MG/DL (ref 8.5–10.5)
CHLORIDE BLD-SCNC: 77 MEQ/L (ref 98–111)
CO2: 27 MEQ/L (ref 23–33)
CREAT SERPL-MCNC: 0.8 MG/DL (ref 0.4–1.2)
EOSINOPHIL # BLD: 0.1 %
EOSINOPHILS ABSOLUTE: 0 THOU/MM3 (ref 0–0.4)
GFR SERPL CREATININE-BSD FRML MDRD: > 90 ML/MIN/1.73M2
GLUCOSE BLD-MCNC: 111 MG/DL (ref 70–108)
HCT VFR BLD CALC: 35.2 % (ref 37–47)
HEMOGLOBIN: 11.1 GM/DL (ref 12–16)
INR BLD: 1.19 (ref 0.85–1.13)
LACTIC ACID: 1.9 MMOL/L (ref 0.5–2.2)
LIPASE: 40.4 U/L (ref 5.6–51.3)
LYMPHOCYTES # BLD: 1.8 %
LYMPHOCYTES ABSOLUTE: 0.3 THOU/MM3 (ref 1–4.8)
MCH RBC QN AUTO: 27.9 PG (ref 27–31)
MCHC RBC AUTO-ENTMCNC: 31.5 GM/DL (ref 33–37)
MCV RBC AUTO: 88.7 FL (ref 81–99)
MONOCYTES # BLD: 7.5 %
MONOCYTES ABSOLUTE: 1.2 THOU/MM3 (ref 0.4–1.3)
NUCLEATED RED BLOOD CELLS: 0 /100 WBC
OSMOLALITY CALCULATION: 241.1 MOSMOL/KG (ref 275–300)
PDW BLD-RTO: 17.1 % (ref 11.5–14.5)
PLATELET # BLD: 297 THOU/MM3 (ref 130–400)
PMV BLD AUTO: 7.7 FL (ref 7.4–10.4)
POTASSIUM REFLEX MAGNESIUM: 4.3 MEQ/L (ref 3.5–5.2)
RBC # BLD: 3.97 MILL/MM3 (ref 4.2–5.4)
SEG NEUTROPHILS: 90.6 %
SEGMENTED NEUTROPHILS ABSOLUTE COUNT: 14.2 THOU/MM3 (ref 1.8–7.7)
SODIUM BLD-SCNC: 118 MEQ/L (ref 135–145)
TOTAL PROTEIN: 7.4 G/DL (ref 6.1–8)
TROPONIN T: < 0.01 NG/ML
WBC # BLD: 15.7 THOU/MM3 (ref 4.8–10.8)

## 2018-02-18 PROCEDURE — 96374 THER/PROPH/DIAG INJ IV PUSH: CPT

## 2018-02-18 PROCEDURE — 83605 ASSAY OF LACTIC ACID: CPT

## 2018-02-18 PROCEDURE — 85730 THROMBOPLASTIN TIME PARTIAL: CPT

## 2018-02-18 PROCEDURE — 96361 HYDRATE IV INFUSION ADD-ON: CPT

## 2018-02-18 PROCEDURE — 6360000002 HC RX W HCPCS: Performed by: FAMILY MEDICINE

## 2018-02-18 PROCEDURE — 96375 TX/PRO/DX INJ NEW DRUG ADDON: CPT

## 2018-02-18 PROCEDURE — 84484 ASSAY OF TROPONIN QUANT: CPT

## 2018-02-18 PROCEDURE — 96376 TX/PRO/DX INJ SAME DRUG ADON: CPT

## 2018-02-18 PROCEDURE — 2580000003 HC RX 258: Performed by: FAMILY MEDICINE

## 2018-02-18 PROCEDURE — 74176 CT ABD & PELVIS W/O CONTRAST: CPT

## 2018-02-18 PROCEDURE — 36415 COLL VENOUS BLD VENIPUNCTURE: CPT

## 2018-02-18 PROCEDURE — 85610 PROTHROMBIN TIME: CPT

## 2018-02-18 PROCEDURE — 83690 ASSAY OF LIPASE: CPT

## 2018-02-18 PROCEDURE — 99284 EMERGENCY DEPT VISIT MOD MDM: CPT

## 2018-02-18 PROCEDURE — 85025 COMPLETE CBC W/AUTO DIFF WBC: CPT

## 2018-02-18 PROCEDURE — 80053 COMPREHEN METABOLIC PANEL: CPT

## 2018-02-18 RX ORDER — 0.9 % SODIUM CHLORIDE 0.9 %
1000 INTRAVENOUS SOLUTION INTRAVENOUS ONCE
Status: COMPLETED | OUTPATIENT
Start: 2018-02-18 | End: 2018-02-18

## 2018-02-18 RX ORDER — FENTANYL CITRATE 50 UG/ML
100 INJECTION, SOLUTION INTRAMUSCULAR; INTRAVENOUS
Status: COMPLETED | OUTPATIENT
Start: 2018-02-18 | End: 2018-02-18

## 2018-02-18 RX ORDER — ONDANSETRON 2 MG/ML
4 INJECTION INTRAMUSCULAR; INTRAVENOUS EVERY 30 MIN PRN
Status: COMPLETED | OUTPATIENT
Start: 2018-02-18 | End: 2018-02-18

## 2018-02-18 RX ADMIN — FENTANYL CITRATE 100 MCG: 50 INJECTION INTRAMUSCULAR; INTRAVENOUS at 16:24

## 2018-02-18 RX ADMIN — ONDANSETRON 4 MG: 2 INJECTION INTRAMUSCULAR; INTRAVENOUS at 19:16

## 2018-02-18 RX ADMIN — FENTANYL CITRATE 100 MCG: 50 INJECTION INTRAMUSCULAR; INTRAVENOUS at 19:17

## 2018-02-18 RX ADMIN — ONDANSETRON 4 MG: 2 INJECTION INTRAMUSCULAR; INTRAVENOUS at 16:24

## 2018-02-18 RX ADMIN — SODIUM CHLORIDE 1000 ML: 9 INJECTION, SOLUTION INTRAVENOUS at 16:24

## 2018-02-18 ASSESSMENT — PAIN DESCRIPTION - PAIN TYPE: TYPE: ACUTE PAIN;CHRONIC PAIN

## 2018-02-18 ASSESSMENT — PAIN SCALES - GENERAL
PAINLEVEL_OUTOF10: 8
PAINLEVEL_OUTOF10: 10
PAINLEVEL_OUTOF10: 9

## 2018-02-18 ASSESSMENT — PAIN DESCRIPTION - LOCATION: LOCATION: RECTUM

## 2018-02-18 NOTE — ED TRIAGE NOTES
Pt to ED room 7 for c/o rectal pain. Pt states, \" I have rectal cancer and I take dilaudid at home and I have fentanyl patches and they aren't helping. \"  Rates pain 10/10.

## 2018-02-18 NOTE — ED PROVIDER NOTES
[facility]  eMERGENCY dEPARTMENT eNCOUnter          CHIEF COMPLAINT       Chief Complaint   Patient presents with    Rectal Pain       Nurses Notes reviewed and I agree except as noted in the HPI. HISTORY OF PRESENT ILLNESS    Lester Sol is a 47 y.o. female who presents to the Emergency Department for the evaluation of rectal pain that has been constant for the past 6 months. The patient was diagnosed with rectal cancer in 2012. She has completed 3 rounds of chemo with radiation, which seemed to help for a little bit, but the pain came back. The patient states that the pain is worse at night and she is unable to sleep. Patient has been taking Dilaudid and a Fentanyl patch. She has been here 3 times in the last week and a half for her ongoing pain. The patient just had surgery on her rectum for fluid collections (seroma) done by Dr. Steven Wade in [place]. She was told that she was healing well. The patient's cancer doctor is Dr. Major. She denies any fevers since she last left here. The patient was here on 02/03/2018 for post operative problem. Patient was complaining of ongoing pain at the surgical site with associated drainage. She was given a shot of Dilaudid and was told to follow-up with her pain management specialist.  Currently the patient is taking 4 mg of Dilaudid tablets every 3 hours and in addition to that she is having a fentanyl patch at 125 mics. Despite taking these heavy doses of narcotic she stated pain. CT scan that was done on February 6th of this year revealed presacral collection that is 6 x 6.2 x 1 cm. Patient has a surgical drain in these presacral fluid collection area without significant interval change. She has an associated mild left hydronephrosis . Patient is back here requesting us for pain control. Her  suggesting that we do surgery to remove the nerves that supply the area.   This is because according to her  the patient is not sleeping at night because of the pain. We did discuss with the  and the family the fact that that is not a possibility at this time. However she can be referred to the operating surgeon and the oncologist to give us the final word on this severe pain most likely due to the cancer/seroma or wether or not it is a recurrence. The HPI was provided by the patient. REVIEW OF SYSTEMS     Review of Systems   Constitutional: Negative for appetite change, chills, fatigue and fever. HENT: Negative for congestion, ear pain, rhinorrhea and sore throat. Eyes: Negative for pain, discharge and visual disturbance. Respiratory: Negative for cough, shortness of breath and wheezing. Cardiovascular: Negative for chest pain, palpitations and leg swelling. Gastrointestinal: Positive for rectal pain. Negative for abdominal pain, diarrhea, nausea and vomiting. Genitourinary: Negative for difficulty urinating, dysuria and vaginal discharge. Musculoskeletal: Negative for arthralgias, back pain, joint swelling and neck pain. Skin: Negative for pallor and rash. Neurological: Negative for dizziness, syncope, weakness, light-headedness and headaches. Hematological: Negative for adenopathy. Psychiatric/Behavioral: Positive for sleep disturbance (due to pain). Negative for confusion, dysphoric mood and suicidal ideas. The patient is not nervous/anxious. PAST MEDICAL HISTORY    has a past medical history of Blood transfusion; Cancer ([facility] [address] [facility] Hyperlipidemia; Hypertension; and Hyperthyroidism. SURGICAL HISTORY      has a past surgical history that includes Hysterectomy (5/2011); hysteroscopy (2004); Tubal ligation (1990's); Tunneled venous port placement (6/28/2012); Colon surgery (6-5-12); other surgical history (7/30/2013); Hemorrhoid surgery (8/2013); and colon ca scrn not hi rsk ind (Left, 10/27/2017).     CURRENT MEDICATIONS       Previous Medications tobacco. She reports that she drinks alcohol. She reports that she does not use drugs. PHYSICAL EXAM     INITIAL VITALS:  height is 4' 11\" (1.499 m) and weight is 118 lb (53.5 kg). Her oral temperature is 98.7 °F (37.1 °C). Her blood pressure is 149/98 (abnormal) and her pulse is 120. Her respiration is 16 and oxygen saturation is 97%. Physical Exam   Constitutional: She is oriented to person, place, and time. She appears well-developed and well-nourished. No distress. HENT:   Head: Normocephalic and atraumatic. Right Ear: External ear normal.   Left Ear: External ear normal.   Nose: Nose normal.   Mouth/Throat: Oropharynx is clear and moist. No oropharyngeal exudate. Eyes: Conjunctivae and EOM are normal. Pupils are equal, round, and reactive to light. Right eye exhibits no discharge. Left eye exhibits no discharge. No scleral icterus. Neck: Normal range of motion. Neck supple. No JVD present. No tracheal deviation present. No thyromegaly present. Cardiovascular: Normal rate, regular rhythm, normal heart sounds and intact distal pulses. Exam reveals no gallop. No murmur heard. Pulmonary/Chest: Effort normal and breath sounds normal. No stridor. No respiratory distress. She has no wheezes. She has no rales. She exhibits no tenderness. Abdominal: Soft. Bowel sounds are normal. She exhibits no distension and no mass. There is no tenderness. There is no rebound and no guarding. The rectal exam reveals that she has 2 drains that are active and appears to be working okay and the area is dry. No digital rectal exam is performed because of pain. Patient's colostomy is functioning normally. The abdominal exam is otherwise unremarkable   Genitourinary: Vagina normal and uterus normal. No vaginal discharge found. Musculoskeletal: Normal range of motion. She exhibits no edema or tenderness. Lymphadenopathy:     She has no cervical adenopathy.    Neurological: She is alert and oriented to person, MD  02/18/18 [address] [facility] Jed Carney MD  02/20/18 6524

## 2018-02-19 ENCOUNTER — TELEPHONE (OUTPATIENT)
Dept: PHYSICAL MEDICINE AND REHAB | Age: 53
End: 2018-02-19

## 2018-02-19 DIAGNOSIS — R53.1 WEAKNESS: ICD-10-CM

## 2018-02-19 DIAGNOSIS — R29.898 DECONDITIONED LOW BACK: Primary | ICD-10-CM

## 2018-02-20 ASSESSMENT — ENCOUNTER SYMPTOMS
NAUSEA: 0
RHINORRHEA: 0
RECTAL PAIN: 1
SHORTNESS OF BREATH: 0
DIARRHEA: 0
SORE THROAT: 0
ABDOMINAL PAIN: 0
VOMITING: 0
EYE DISCHARGE: 0
BACK PAIN: 0
WHEEZING: 0
EYE PAIN: 0
COUGH: 0

## 2018-02-21 NOTE — DISCHARGE SUMMARY
Rim-enhancing collection of gas and fluid consistent with an abscess in the presacral space extending around the left side of the perirectal fat and extending laterally and inferiorly into the right ischio rectal fossa fat. This collection extends   further inferiorly in the fat of the right buttock medial to the inferior margin of the right gluteus damien. Presence of gas could be secondary to fistulization, or gas forming bacteria. 2.  The urinary bladder is moderately distended. This may be the reason for the mild to bilateral hydroureteronephrosis. 3.  Findings suggestive of constipation with a moderate to large amount stool in the ascending and transverse colon.     Assessment and Plan:   7. Rectal pain / drainage  8. Pelvic abscess  9. Recurrent rectal cancer - s/p resection, diverting colostomy, on adj chemoRx  10. HTN  11. Hypothyroidism  12. H/o iron def anemia     Analgesics,  IV hydration. cont antibiotics. Pelvic abscess is not amenable to percut.  drainage per IR, thus transfer to her surgeon at [facility] was advised, pt accepted by [facility] surgical team .     Disposition:  [facility] transfer    Signed:  Phil Butcher  2/20/2018, 7:34 PM

## 2018-02-26 ENCOUNTER — HOSPITAL ENCOUNTER (OUTPATIENT)
Age: 53
Setting detail: SPECIMEN
Discharge: HOME | End: 2018-02-26
Payer: COMMERCIAL

## 2018-02-26 LAB
ALBUMIN SERPL-MCNC: 3.6 G/DL (ref 3.5–5.1)
ALP BLD-CCNC: 86 U/L (ref 38–126)
ALT SERPL-CCNC: 9 U/L (ref 11–66)
ANION GAP SERPL CALCULATED.3IONS-SCNC: 13 MEQ/L (ref 8–16)
AST SERPL-CCNC: 7 U/L (ref 5–40)
AVERAGE GLUCOSE: 87 MG/DL (ref 70–126)
BILIRUB SERPL-MCNC: 0.2 MG/DL (ref 0.3–1.2)
BILIRUBIN DIRECT: < 0.2 MG/DL (ref 0–0.3)
BUN BLDV-MCNC: 21 MG/DL (ref 7–22)
CALCIUM SERPL-MCNC: 9.7 MG/DL (ref 8.5–10.5)
CHLORIDE BLD-SCNC: 98 MEQ/L (ref 98–111)
CO2: 26 MEQ/L (ref 23–33)
CREAT SERPL-MCNC: 0.9 MG/DL (ref 0.4–1.2)
GFR SERPL CREATININE-BSD FRML MDRD: 80 ML/MIN/1.73M2
GLUCOSE BLD-MCNC: 80 MG/DL (ref 70–108)
HBA1C MFR BLD: 4.9 % (ref 4.4–6.4)
HCT VFR BLD CALC: 28.6 % (ref 37–47)
HEMOGLOBIN: 9.2 GM/DL (ref 12–16)
MCH RBC QN AUTO: 29.3 PG (ref 27–31)
MCHC RBC AUTO-ENTMCNC: 32.3 GM/DL (ref 33–37)
MCV RBC AUTO: 90.7 FL (ref 81–99)
PDW BLD-RTO: 17.8 % (ref 11.5–14.5)
PLATELET # BLD: 316 THOU/MM3 (ref 130–400)
PMV BLD AUTO: 7 FL (ref 7.4–10.4)
POTASSIUM SERPL-SCNC: 4.4 MEQ/L (ref 3.5–5.2)
RBC # BLD: 3.15 MILL/MM3 (ref 4.2–5.4)
SODIUM BLD-SCNC: 137 MEQ/L (ref 135–145)
TOTAL PROTEIN: 6.9 G/DL (ref 6.1–8)
WBC # BLD: 7.2 THOU/MM3 (ref 4.8–10.8)

## 2018-02-26 PROCEDURE — 85027 COMPLETE CBC AUTOMATED: CPT

## 2018-02-26 PROCEDURE — 83036 HEMOGLOBIN GLYCOSYLATED A1C: CPT

## 2018-02-26 PROCEDURE — 80053 COMPREHEN METABOLIC PANEL: CPT

## 2018-02-26 PROCEDURE — 82248 BILIRUBIN DIRECT: CPT

## 2018-02-27 ENCOUNTER — OFFICE VISIT (OUTPATIENT)
Dept: PHYSICAL MEDICINE AND REHAB | Age: 53
End: 2018-02-27
Payer: COMMERCIAL

## 2018-02-27 ENCOUNTER — TELEPHONE (OUTPATIENT)
Dept: PHYSICAL MEDICINE AND REHAB | Age: 53
End: 2018-02-27

## 2018-02-27 VITALS
SYSTOLIC BLOOD PRESSURE: 164 MMHG | WEIGHT: 120.4 LBS | HEIGHT: 59 IN | HEART RATE: 105 BPM | BODY MASS INDEX: 24.27 KG/M2 | DIASTOLIC BLOOD PRESSURE: 106 MMHG

## 2018-02-27 DIAGNOSIS — C20 MALIGNANT NEOPLASM OF RECTUM (HCC): Primary | ICD-10-CM

## 2018-02-27 DIAGNOSIS — K59.03 DRUG-INDUCED CONSTIPATION: ICD-10-CM

## 2018-02-27 DIAGNOSIS — G89.3 CHRONIC PAIN DUE TO NEOPLASM: ICD-10-CM

## 2018-02-27 DIAGNOSIS — G89.29 CHRONIC RECTAL PAIN: ICD-10-CM

## 2018-02-27 DIAGNOSIS — K62.89 CHRONIC RECTAL PAIN: ICD-10-CM

## 2018-02-27 PROCEDURE — G8420 CALC BMI NORM PARAMETERS: HCPCS | Performed by: NURSE PRACTITIONER

## 2018-02-27 PROCEDURE — 1036F TOBACCO NON-USER: CPT | Performed by: NURSE PRACTITIONER

## 2018-02-27 PROCEDURE — 3014F SCREEN MAMMO DOC REV: CPT | Performed by: NURSE PRACTITIONER

## 2018-02-27 PROCEDURE — 3017F COLORECTAL CA SCREEN DOC REV: CPT | Performed by: NURSE PRACTITIONER

## 2018-02-27 PROCEDURE — 99214 OFFICE O/P EST MOD 30 MIN: CPT | Performed by: NURSE PRACTITIONER

## 2018-02-27 PROCEDURE — G8427 DOCREV CUR MEDS BY ELIG CLIN: HCPCS | Performed by: NURSE PRACTITIONER

## 2018-02-27 PROCEDURE — G8484 FLU IMMUNIZE NO ADMIN: HCPCS | Performed by: NURSE PRACTITIONER

## 2018-02-27 PROCEDURE — 1111F DSCHRG MED/CURRENT MED MERGE: CPT | Performed by: NURSE PRACTITIONER

## 2018-02-27 RX ORDER — FENTANYL 25 UG/H
1 PATCH TRANSDERMAL
COMMUNITY
End: 2018-04-04 | Stop reason: ALTCHOICE

## 2018-02-27 RX ORDER — IBUPROFEN 600 MG/1
600 TABLET ORAL EVERY 6 HOURS PRN
COMMUNITY
Start: 2018-02-23 | End: 2018-02-27 | Stop reason: SDUPTHER

## 2018-02-27 RX ORDER — FENTANYL 100 UG/H
1 PATCH TRANSDERMAL
COMMUNITY
End: 2018-05-01 | Stop reason: SDUPTHER

## 2018-02-27 RX ORDER — DESIPRAMINE HYDROCHLORIDE 25 MG/1
25 TABLET ORAL
COMMUNITY
Start: 2018-02-23 | End: 2018-08-12

## 2018-02-27 ASSESSMENT — ENCOUNTER SYMPTOMS
RECTAL PAIN: 1
BACK PAIN: 1

## 2018-02-27 NOTE — PROGRESS NOTES
[facility]  [address]  Dept: [phone]  Dept Fax: [phone]  Loc: [phone]    Visit Date: 2/27/2018    Functionality Assessment/Goals Worksheet     On a scale of 0 (Does not Interfere) to 10 (Completely Interferes)     1. Which number describes how during the past week pain has interfered with       the following:  A. General Activity:  4  B. Mood: 2  C. Walking Ability:  9  D. Normal Work (Includes both work outside the home and housework):  8  E. Relations with Other People:   10  F. Sleep:   3  G. Enjoyment of Life:   8    2. Patient Prefers to Take their Pain Medications:     [x]  On a regular basis   []  Only when necessary    []  Does not take pain medications    3. What are the Patient's Goals/Expectations for Visiting Pain Management? []  Learn about my pain    []  Receive Medication   []  Physical Therapy     []  Treat Depression   []  Receive Injections    [x]  Treat Sleep   []  Deal with Anxiety and Stress   []  Treat Opoid Dependence/Addiction   []  Other:      HPI:   Jakob Martínez is a 47 y.o. female is here today for    Chief Complaint:  Rectal pain     Family present     HPI   4 week. On 2/18 went to hospital for infection and positive blood culture and spent a week in the hospital. Was having increased rectal pain. Patient called oncologist Dr. Johnathan Waller and he increased Fentanyl patch to 125 and prescribed po dilaudid. Patient continues to have rectal pain. At this time patient is taking Subsys spay, ibuprofen and, 125 mcg of Fentanyl patch. Port was removed in hospital and patient had picc line place and is on IV antibiotics now with home health. Medications reviewed. Patient denies  side effects with medications. Patient states she is  taking medications as prescribed. She denies receiving pain medications from other sources. She denies any ER visits since last visit.   Pain scale with out pain medications is 7-8/10. Pain scale with pain medications is  2-3/10. Last dose of Fentanyl patch on mid upper back 100 mcg patch and 25 mcg patch, Subsys was 2/27/2018- am, dilaudid po was day of discharge. Drug screen reviewed from 1/25/2018- tez     The patient is allergic to codeine. Past Medical History  Marcos Emanuel  has a past medical history of Blood transfusion; Cancer ([facility] [address] Diabetes mellitus type 2 in nonobese [facility]); Hyperlipidemia; Hypertension; and Hyperthyroidism. Past Surgical History  The patient  has a past surgical history that includes Hysterectomy (5/2011); hysteroscopy (2004); Tubal ligation (1990's); Tunneled venous port placement (6/28/2012); Colon surgery (6-5-12); other surgical history (7/30/2013); Hemorrhoid surgery (8/2013); and pr colon ca scrn not hi rsk ind (Left, 10/27/2017). Family History  This patient's family history includes Cancer in her father; Diabetes in her mother; High Blood Pressure in her father and mother. Social History  Marcos Emanuel  reports that she has never smoked. She has never used smokeless tobacco. She reports that she does not drink alcohol or use drugs. Medications    Current Outpatient Prescriptions:     desipramine (NORPRAMIN) 25 MG tablet, Take 25 mg by mouth, Disp: , Rfl:     fentaNYL 800 MCG LIQD, Place 800 mcg under the tongue every 6 hours as needed for Pain., Disp: , Rfl:     fentaNYL (DURAGESIC) 100 MCG/HR, Place 1 patch onto the skin every 72 hours. , Disp: , Rfl:     fentaNYL (DURAGESIC) 25 MCG/HR, Place 1 patch onto the skin every 72 hours. , Disp: , Rfl:     amLODIPine (NORVASC) 5 MG tablet, Take 5 mg by mouth daily, Disp: , Rfl:     pravastatin (PRAVACHOL) 20 MG tablet, Take 20 mg by mouth daily, Disp: , Rfl:     potassium chloride (KLOR-CON M) 20 MEQ extended release tablet, Take 20 mEq by mouth 2 times daily, Disp: , Rfl:     docusate sodium (COLACE) 100 MG capsule, Take 1 capsule by mouth 2 times daily, Disp: 60 capsule, Rfl: 0   lidocaine (XYLOCAINE) 5 % ointment, Apply topically as needed. , Disp: 50 g, Rfl: 3    tiZANidine (ZANAFLEX) 4 MG tablet, Take 1 tablet by mouth every 8 hours as needed (spasms), Disp: 90 tablet, Rfl: 0    valsartan (DIOVAN) 320 MG tablet, Take 320 mg by mouth daily, Disp: , Rfl:     hydrochlorothiazide (HYDRODIURIL) 25 MG tablet, Take 25 mg by mouth daily, Disp: , Rfl:     sodium chloride 1 g tablet, Take 1 g by mouth daily, Disp: , Rfl:     ibuprofen (ADVIL;MOTRIN) 600 MG tablet, Take 1 tablet by mouth every 6 hours as needed for Pain, Disp: 30 tablet, Rfl: 0    metFORMIN (GLUCOPHAGE) 500 MG tablet, Take 500 mg by mouth 2 times daily (with meals), Disp: , Rfl:     prochlorperazine (COMPAZINE) 10 MG tablet, Take 10 mg by mouth every 6 hours as needed. , Disp: , Rfl:     levothyroxine (LEVOTHROID) 75 MCG tablet, Take 50 mcg by mouth Daily , Disp: , Rfl:     promethazine (PHENERGAN) 25 MG tablet, Take 25 mg by mouth every 6 hours as needed for Nausea, Disp: , Rfl:     senna (SENOKOT) 8.6 MG tablet, Take 2 tablets by mouth nightly, Disp: 60 tablet, Rfl: 11    Subjective:      Review of Systems   Gastrointestinal: Positive for rectal pain. Musculoskeletal: Positive for arthralgias, back pain and myalgias. Neurological: Positive for weakness. Objective:     Vitals:    02/27/18 0831   BP: (!) 152/101   Site: Right Arm   Position: Sitting   Cuff Size: Small Adult   Pulse: 105   Weight: 120 lb 6.4 oz (54.6 kg)   Height: 4' 11\" (1.499 m)       Physical Exam   Constitutional: She is oriented to person, place, and time. She appears well-developed and well-nourished. No distress. HENT:   Head: Normocephalic and atraumatic. Right Ear: External ear normal.   Left Ear: External ear normal.   Nose: Nose normal.   Mouth/Throat: Oropharynx is clear and moist. No oropharyngeal exudate. Eyes: Conjunctivae and EOM are normal. Pupils are equal, round, and reactive to light. Right eye exhibits no discharge.  Left eye exhibits no discharge. No scleral icterus. Neck: Normal range of motion and full passive range of motion without pain. Neck supple. No muscular tenderness present. No neck rigidity. No edema, no erythema and normal range of motion present. No thyromegaly present. Cardiovascular: Normal rate, regular rhythm, normal heart sounds and intact distal pulses. Exam reveals no gallop and no friction rub. No murmur heard. Pulmonary/Chest: Effort normal and breath sounds normal. No respiratory distress. She has no wheezes. She has no rales. She exhibits no tenderness. Abdominal: Soft. Bowel sounds are normal. She exhibits no distension. There is generalized tenderness. There is no rebound and no guarding. Musculoskeletal:        Right ankle: She exhibits normal range of motion and no swelling. Left ankle: She exhibits swelling. Lumbar back: She exhibits decreased range of motion, tenderness and pain. Back:         Right lower leg: She exhibits swelling. Right foot: There is swelling. Neurological: She is alert and oriented to person, place, and time. She has normal strength and normal reflexes. She is not disoriented. She displays no atrophy. No cranial nerve deficit or sensory deficit. She exhibits normal muscle tone. She displays a negative Romberg sign. Gait abnormal. Coordination normal.   Skin: Skin is warm. No rash noted. She is not diaphoretic. No erythema. No pallor. Psychiatric: Her mood appears not anxious. Her affect is not angry, not blunt, not labile and not inappropriate. Her speech is not rapid and/or pressured, not delayed, not tangential and not slurred. She is not agitated, not aggressive, not hyperactive, not slowed, not withdrawn, not actively hallucinating and not combative. Thought content is not paranoid and not delusional. Cognition and memory are not impaired. She does not express impulsivity or inappropriate judgment.  She does not exhibit a

## 2018-03-01 ENCOUNTER — TELEPHONE (OUTPATIENT)
Dept: SPIRITUAL SERVICES | Facility: CLINIC | Age: 53
End: 2018-03-01

## 2018-03-02 ENCOUNTER — APPOINTMENT (OUTPATIENT)
Dept: GENERAL RADIOLOGY | Age: 53
End: 2018-03-02
Payer: COMMERCIAL

## 2018-03-02 ENCOUNTER — HOSPITAL ENCOUNTER (EMERGENCY)
Age: 53
Discharge: HOME | End: 2018-03-02
Attending: FAMILY MEDICINE
Payer: COMMERCIAL

## 2018-03-02 VITALS
HEART RATE: 88 BPM | BODY MASS INDEX: 23.83 KG/M2 | DIASTOLIC BLOOD PRESSURE: 88 MMHG | WEIGHT: 118 LBS | RESPIRATION RATE: 20 BRPM | TEMPERATURE: 98.4 F | OXYGEN SATURATION: 98 % | SYSTOLIC BLOOD PRESSURE: 132 MMHG

## 2018-03-02 DIAGNOSIS — T82.898A OCCLUDED PICC LINE, INITIAL ENCOUNTER (HCC): Primary | ICD-10-CM

## 2018-03-02 PROCEDURE — 99283 EMERGENCY DEPT VISIT LOW MDM: CPT

## 2018-03-02 PROCEDURE — 96365 THER/PROPH/DIAG IV INF INIT: CPT

## 2018-03-02 PROCEDURE — 6360000002 HC RX W HCPCS: Performed by: FAMILY MEDICINE

## 2018-03-02 PROCEDURE — 71045 X-RAY EXAM CHEST 1 VIEW: CPT

## 2018-03-02 PROCEDURE — 2580000003 HC RX 258: Performed by: FAMILY MEDICINE

## 2018-03-02 RX ADMIN — CEFEPIME 2 G: 2 INJECTION, POWDER, FOR SOLUTION INTRAMUSCULAR; INTRAVENOUS at 21:39

## 2018-03-02 ASSESSMENT — PAIN DESCRIPTION - FREQUENCY: FREQUENCY: CONTINUOUS

## 2018-03-02 ASSESSMENT — PAIN SCALES - GENERAL: PAINLEVEL_OUTOF10: 3

## 2018-03-02 ASSESSMENT — ENCOUNTER SYMPTOMS
SHORTNESS OF BREATH: 0
ABDOMINAL PAIN: 0

## 2018-03-02 ASSESSMENT — PAIN DESCRIPTION - PAIN TYPE: TYPE: ACUTE PAIN;CHRONIC PAIN

## 2018-03-02 ASSESSMENT — PAIN DESCRIPTION - LOCATION: LOCATION: ABDOMEN

## 2018-03-03 NOTE — ED PROVIDER NOTES
[facility]  eMERGENCY dEPARTMENT eNCOUnter          CHIEF COMPLAINT       Chief Complaint   Patient presents with   [facility] Vascular Access Problem       Nurses Notes reviewed and I agree except as noted in the HPI. HISTORY OF PRESENT ILLNESS    Jakob Martínez is a 47 y.o. female who presents to the Emergency Department for the evaluation of a vascular access problem. The patient reports that she had a PIC line place 1 week ago due to her port becoming infected. She states that she has to take an antibiotic through her [facility] line 3 times a day. She says that she last had her medication at 1 pm and was supposed to get it again at 9 pm. She states that her family and nurse were unable to flush the line to get the antibiotics in. Her nurse called [facility] and they advised her to come to the ED to get the line flushed out and get her dose of antibiotics. The patient has no other physical complaints at this time. Location/Symptom: vascular access problem  Timing/Onset: 9 pm   Context/Setting: pt and her nurse is unable to flush her PIC line in order to get her dose of antibiotics. Quality: none  Duration: none  Modifying Factors: none  Severity: mild    Associated symptoms: none       REVIEW OF SYSTEMS     Review of Systems   Constitutional: Negative for chills and fever. Respiratory: Negative for shortness of breath. Cardiovascular: Negative for chest pain. Gastrointestinal: Negative for abdominal pain. Genitourinary: Negative for difficulty urinating. Skin: Negative for rash. Hematological: Negative for adenopathy. Psychiatric/Behavioral: Negative for confusion. PAST MEDICAL HISTORY    has a past medical history of Blood transfusion; Cancer ([facility] [address] Diabetes mellitus type 2 in nonobese [facility]); Hyperlipidemia; Hypertension; and Hyperthyroidism. SURGICAL HISTORY      has a past surgical history that includes Hysterectomy (5/2011); hysteroscopy (2004); Tubal ligation (1990's);  Tunneled

## 2018-03-06 ENCOUNTER — HOSPITAL ENCOUNTER (OUTPATIENT)
Age: 53
Setting detail: SPECIMEN
Discharge: HOME | End: 2018-03-06
Payer: COMMERCIAL

## 2018-03-06 LAB
ALBUMIN SERPL-MCNC: 4.1 G/DL (ref 3.5–5.1)
ALP BLD-CCNC: 86 U/L (ref 38–126)
ALT SERPL-CCNC: 9 U/L (ref 11–66)
ANION GAP SERPL CALCULATED.3IONS-SCNC: 11 MEQ/L (ref 8–16)
AST SERPL-CCNC: 9 U/L (ref 5–40)
BILIRUB SERPL-MCNC: 0.2 MG/DL (ref 0.3–1.2)
BILIRUBIN DIRECT: < 0.2 MG/DL (ref 0–0.3)
BUN BLDV-MCNC: 14 MG/DL (ref 7–22)
CALCIUM SERPL-MCNC: 10.3 MG/DL (ref 8.5–10.5)
CHLORIDE BLD-SCNC: 97 MEQ/L (ref 98–111)
CO2: 30 MEQ/L (ref 23–33)
CREAT SERPL-MCNC: 0.6 MG/DL (ref 0.4–1.2)
GFR SERPL CREATININE-BSD FRML MDRD: > 90 ML/MIN/1.73M2
GLUCOSE BLD-MCNC: 101 MG/DL (ref 70–108)
HCT VFR BLD CALC: 33.4 % (ref 37–47)
HEMOGLOBIN: 10.6 GM/DL (ref 12–16)
MCH RBC QN AUTO: 28.8 PG (ref 27–31)
MCHC RBC AUTO-ENTMCNC: 31.9 GM/DL (ref 33–37)
MCV RBC AUTO: 90.4 FL (ref 81–99)
PDW BLD-RTO: 18.2 % (ref 11.5–14.5)
PLATELET # BLD: 362 THOU/MM3 (ref 130–400)
PMV BLD AUTO: 7.5 FL (ref 7.4–10.4)
POTASSIUM SERPL-SCNC: 4.4 MEQ/L (ref 3.5–5.2)
RBC # BLD: 3.69 MILL/MM3 (ref 4.2–5.4)
SODIUM BLD-SCNC: 138 MEQ/L (ref 135–145)
TOTAL PROTEIN: 8 G/DL (ref 6.1–8)
WBC # BLD: 5.9 THOU/MM3 (ref 4.8–10.8)

## 2018-03-06 PROCEDURE — 82248 BILIRUBIN DIRECT: CPT

## 2018-03-06 PROCEDURE — 85027 COMPLETE CBC AUTOMATED: CPT

## 2018-03-06 PROCEDURE — 80053 COMPREHEN METABOLIC PANEL: CPT

## 2018-03-16 ENCOUNTER — HOSPITAL ENCOUNTER (OUTPATIENT)
Dept: INTERVENTIONAL RADIOLOGY/VASCULAR | Age: 53
Discharge: HOME | End: 2018-03-16
Payer: COMMERCIAL

## 2018-03-16 VITALS
BODY MASS INDEX: 23.83 KG/M2 | OXYGEN SATURATION: 99 % | HEART RATE: 111 BPM | DIASTOLIC BLOOD PRESSURE: 80 MMHG | TEMPERATURE: 98.6 F | RESPIRATION RATE: 16 BRPM | SYSTOLIC BLOOD PRESSURE: 112 MMHG | WEIGHT: 118 LBS

## 2018-03-16 LAB
HCT VFR BLD CALC: 33.5 % (ref 37–47)
HEMOGLOBIN: 10.7 GM/DL (ref 12–16)
MCH RBC QN AUTO: 28.7 PG (ref 27–31)
MCHC RBC AUTO-ENTMCNC: 32 GM/DL (ref 33–37)
MCV RBC AUTO: 89.5 FL (ref 81–99)
PDW BLD-RTO: 17.3 % (ref 11.5–14.5)
PLATELET # BLD: 388 THOU/MM3 (ref 130–400)
PMV BLD AUTO: 7.3 FL (ref 7.4–10.4)
RBC # BLD: 3.74 MILL/MM3 (ref 4.2–5.4)
WBC # BLD: 8.5 THOU/MM3 (ref 4.8–10.8)

## 2018-03-16 PROCEDURE — C1894 INTRO/SHEATH, NON-LASER: HCPCS

## 2018-03-16 PROCEDURE — 85027 COMPLETE CBC AUTOMATED: CPT

## 2018-03-16 PROCEDURE — C1788 PORT, INDWELLING, IMP: HCPCS

## 2018-03-16 PROCEDURE — 2580000003 HC RX 258: Performed by: RADIOLOGY

## 2018-03-16 PROCEDURE — 6360000002 HC RX W HCPCS

## 2018-03-16 PROCEDURE — 76937 US GUIDE VASCULAR ACCESS: CPT | Performed by: RADIOLOGY

## 2018-03-16 PROCEDURE — 36561 INSERT TUNNELED CV CATH: CPT | Performed by: RADIOLOGY

## 2018-03-16 PROCEDURE — 36415 COLL VENOUS BLD VENIPUNCTURE: CPT

## 2018-03-16 PROCEDURE — 77001 FLUOROGUIDE FOR VEIN DEVICE: CPT | Performed by: RADIOLOGY

## 2018-03-16 PROCEDURE — 6360000002 HC RX W HCPCS: Performed by: RADIOLOGY

## 2018-03-16 PROCEDURE — 2500000003 HC RX 250 WO HCPCS: Performed by: RADIOLOGY

## 2018-03-16 PROCEDURE — 2500000003 HC RX 250 WO HCPCS

## 2018-03-16 RX ORDER — MIDAZOLAM HYDROCHLORIDE 1 MG/ML
0.5 INJECTION INTRAMUSCULAR; INTRAVENOUS ONCE
Status: COMPLETED | OUTPATIENT
Start: 2018-03-16 | End: 2018-03-16

## 2018-03-16 RX ORDER — FENTANYL CITRATE 50 UG/ML
50 INJECTION, SOLUTION INTRAMUSCULAR; INTRAVENOUS ONCE
Status: COMPLETED | OUTPATIENT
Start: 2018-03-16 | End: 2018-03-16

## 2018-03-16 RX ORDER — SODIUM CHLORIDE 450 MG/100ML
INJECTION, SOLUTION INTRAVENOUS CONTINUOUS
Status: DISCONTINUED | OUTPATIENT
Start: 2018-03-16 | End: 2018-03-17 | Stop reason: HOSPADM

## 2018-03-16 RX ORDER — FENTANYL CITRATE 50 UG/ML
25 INJECTION, SOLUTION INTRAMUSCULAR; INTRAVENOUS ONCE
Status: COMPLETED | OUTPATIENT
Start: 2018-03-16 | End: 2018-03-16

## 2018-03-16 RX ORDER — MIDAZOLAM HYDROCHLORIDE 1 MG/ML
1 INJECTION INTRAMUSCULAR; INTRAVENOUS ONCE
Status: COMPLETED | OUTPATIENT
Start: 2018-03-16 | End: 2018-03-16

## 2018-03-16 RX ORDER — HEPARIN SODIUM (PORCINE) LOCK FLUSH IV SOLN 100 UNIT/ML 100 UNIT/ML
500 SOLUTION INTRAVENOUS ONCE
Status: COMPLETED | OUTPATIENT
Start: 2018-03-16 | End: 2018-03-16

## 2018-03-16 RX ADMIN — FENTANYL CITRATE 50 MCG: 50 INJECTION, SOLUTION INTRAMUSCULAR; INTRAVENOUS at 08:15

## 2018-03-16 RX ADMIN — MIDAZOLAM HYDROCHLORIDE 0.5 MG: 1 INJECTION INTRAMUSCULAR; INTRAVENOUS at 08:19

## 2018-03-16 RX ADMIN — HEPARIN SODIUM (PORCINE) LOCK FLUSH IV SOLN 100 UNIT/ML 500 UNITS: 100 SOLUTION at 08:38

## 2018-03-16 RX ADMIN — CEFAZOLIN SODIUM 1 G: 1 INJECTION, SOLUTION INTRAVENOUS at 06:54

## 2018-03-16 RX ADMIN — SODIUM CHLORIDE 50000 UNITS: 900 IRRIGANT IRRIGATION at 08:38

## 2018-03-16 RX ADMIN — FENTANYL CITRATE 25 MCG: 50 INJECTION, SOLUTION INTRAMUSCULAR; INTRAVENOUS at 08:19

## 2018-03-16 RX ADMIN — MIDAZOLAM HYDROCHLORIDE 1 MG: 1 INJECTION INTRAMUSCULAR; INTRAVENOUS at 08:15

## 2018-03-16 RX ADMIN — MIDAZOLAM HYDROCHLORIDE 0.5 MG: 1 INJECTION INTRAMUSCULAR; INTRAVENOUS at 08:24

## 2018-03-16 RX ADMIN — SODIUM CHLORIDE: 4.5 INJECTION, SOLUTION INTRAVENOUS at 06:52

## 2018-03-16 RX ADMIN — FENTANYL CITRATE 25 MCG: 50 INJECTION, SOLUTION INTRAMUSCULAR; INTRAVENOUS at 08:24

## 2018-03-16 ASSESSMENT — PAIN SCALES - GENERAL
PAINLEVEL_OUTOF10: 4
PAINLEVEL_OUTOF10: 6
PAINLEVEL_OUTOF10: 6
PAINLEVEL_OUTOF10: 4
PAINLEVEL_OUTOF10: 5
PAINLEVEL_OUTOF10: 6

## 2018-03-16 ASSESSMENT — PAIN DESCRIPTION - LOCATION
LOCATION: RECTUM
LOCATION: RECTUM

## 2018-03-16 ASSESSMENT — PAIN DESCRIPTION - PAIN TYPE: TYPE: ACUTE PAIN

## 2018-03-16 ASSESSMENT — PAIN - FUNCTIONAL ASSESSMENT: PAIN_FUNCTIONAL_ASSESSMENT: 0-10

## 2018-03-16 NOTE — PROGRESS NOTES
tablet, Take 20 mg by mouth daily, Disp: , Rfl:     potassium chloride (KLOR-CON M) 20 MEQ extended release tablet, Take 20 mEq by mouth 2 times daily, Disp: , Rfl:     senna (SENOKOT) 8.6 MG tablet, Take 2 tablets by mouth nightly, Disp: 60 tablet, Rfl: 11    docusate sodium (COLACE) 100 MG capsule, Take 1 capsule by mouth 2 times daily, Disp: 60 capsule, Rfl: 0    lidocaine (XYLOCAINE) 5 % ointment, Apply topically as needed. , Disp: 50 g, Rfl: 3    tiZANidine (ZANAFLEX) 4 MG tablet, Take 1 tablet by mouth every 8 hours as needed (spasms), Disp: 90 tablet, Rfl: 0    valsartan (DIOVAN) 320 MG tablet, Take 320 mg by mouth daily, Disp: , Rfl:     hydrochlorothiazide (HYDRODIURIL) 25 MG tablet, Take 25 mg by mouth daily, Disp: , Rfl:     sodium chloride 1 g tablet, Take 1 g by mouth daily, Disp: , Rfl:     ibuprofen (ADVIL;MOTRIN) 600 MG tablet, Take 1 tablet by mouth every 6 hours as needed for Pain, Disp: 30 tablet, Rfl: 0    metFORMIN (GLUCOPHAGE) 500 MG tablet, Take 500 mg by mouth 2 times daily (with meals), Disp: , Rfl:     prochlorperazine (COMPAZINE) 10 MG tablet, Take 10 mg by mouth every 6 hours as needed. , Disp: , Rfl:     levothyroxine (LEVOTHROID) 75 MCG tablet, Take 50 mcg by mouth Daily , Disp: , Rfl:     Current Facility-Administered Medications:     0.45 % sodium chloride infusion, , Intravenous, Continuous, Loyd Ellsworth MD, Last Rate: 20 mL/hr at 03/16/18 4093  Prior to Admission medications    Medication Sig Start Date End Date Taking? Authorizing Provider   desipramine (NORPRAMIN) 25 MG tablet Take 25 mg by mouth 2/23/18   Historical Provider, MD   fentaNYL 800 MCG LIQD Place 800 mcg under the tongue every 6 hours as needed for Pain. Historical Provider, MD   fentaNYL (DURAGESIC) 100 MCG/HR Place 1 patch onto the skin every 72 hours. Historical Provider, MD   fentaNYL (DURAGESIC) 25 MCG/HR Place 1 patch onto the skin every 72 hours.     Historical Provider, MD   amLODIPine []Drainage   [x]Mediport Insertion  []Fistulogram []IV access       []Vertebroplasty / Augmentation  []IVC filter []Dialysis catheter []Biliary drainage  []Other: []CAPD Catheter []Nephrostomy Tube / Stent  SEDATION  Planned agent:[x]Midazolam []Meperidine [x]Sublimaze []Dilaudid []Morphine     []Diazepam  []Other:     ASA Classification:  []1 [x]2 []3 []4 []5  Class 1: A normal healthy patient  Class 2: Pt with mild to moderate systemic disease  Class 3: Severe systemic disease or disturbance  Class 4: Severe systemic disorders that are already life threatening. Class 5: Moribund pt with little chances of survival, for more than 24 hours. Mallampati I Airway Classification:   []1 [x]2 []3 []4    [x]Pre-procedure diagnostic studies complete and results available. Comment:    [x]Previous sedation/anesthesia experiences assessed. Comment:    [x]The patient is an appropriate candidate to undergo the planned procedure sedation and anesthesia. (Refer to nursing sedation/analgesia documentation record)  [x]Formulation and discussion of sedation/procedure plan, risks, and expectations with patient and/or responsible adult completed. [x]Patient examined immediately prior to the procedure.  (Refer to nursing sedation/analgesia documentation record)    Mary Ann Mitchell MD  Electronically signed 3/16/2018 at 9:01 AM

## 2018-03-21 ENCOUNTER — APPOINTMENT (OUTPATIENT)
Dept: CT IMAGING | Age: 53
DRG: 690 | End: 2018-03-21
Payer: COMMERCIAL

## 2018-03-21 ENCOUNTER — HOSPITAL ENCOUNTER (INPATIENT)
Age: 53
LOS: 2 days | Discharge: HOME HEALTH | DRG: 690 | End: 2018-03-23
Attending: FAMILY MEDICINE | Admitting: INTERNAL MEDICINE
Payer: COMMERCIAL

## 2018-03-21 ENCOUNTER — APPOINTMENT (OUTPATIENT)
Dept: GENERAL RADIOLOGY | Age: 53
DRG: 690 | End: 2018-03-21
Payer: COMMERCIAL

## 2018-03-21 ENCOUNTER — TELEPHONE (OUTPATIENT)
Dept: PHYSICAL MEDICINE AND REHAB | Age: 53
End: 2018-03-21

## 2018-03-21 DIAGNOSIS — R00.0 TACHYCARDIA: ICD-10-CM

## 2018-03-21 DIAGNOSIS — C20 RECTAL CANCER (HCC): ICD-10-CM

## 2018-03-21 DIAGNOSIS — D72.829 LEUKOCYTOSIS, UNSPECIFIED TYPE: Primary | ICD-10-CM

## 2018-03-21 PROBLEM — N39.0 UTI (URINARY TRACT INFECTION): Status: ACTIVE | Noted: 2018-03-21

## 2018-03-21 PROBLEM — R65.10 SIRS (SYSTEMIC INFLAMMATORY RESPONSE SYNDROME) (HCC): Status: ACTIVE | Noted: 2018-03-21

## 2018-03-21 LAB
ALBUMIN SERPL-MCNC: 3.6 G/DL (ref 3.5–5.1)
ALP BLD-CCNC: 129 U/L (ref 38–126)
ALT SERPL-CCNC: 11 U/L (ref 11–66)
AMORPHOUS: ABNORMAL
ANION GAP SERPL CALCULATED.3IONS-SCNC: 16 MEQ/L (ref 8–16)
ANISOCYTOSIS: ABNORMAL
AST SERPL-CCNC: < 5 U/L (ref 5–40)
BACTERIA: ABNORMAL /HPF
BASOPHILS # BLD: 0.1 %
BASOPHILS ABSOLUTE: 0 THOU/MM3 (ref 0–0.1)
BILIRUB SERPL-MCNC: 0.5 MG/DL (ref 0.3–1.2)
BILIRUBIN DIRECT: < 0.2 MG/DL (ref 0–0.3)
BILIRUBIN URINE: NEGATIVE
BLOOD, URINE: ABNORMAL
BUN BLDV-MCNC: 22 MG/DL (ref 7–22)
CALCIUM SERPL-MCNC: 10 MG/DL (ref 8.5–10.5)
CASTS 2: ABNORMAL /LPF
CASTS UA: ABNORMAL /LPF
CHARACTER, URINE: ABNORMAL
CHLORIDE BLD-SCNC: 86 MEQ/L (ref 98–111)
CO2: 25 MEQ/L (ref 23–33)
COLOR: YELLOW
CREAT SERPL-MCNC: 0.9 MG/DL (ref 0.4–1.2)
CRYSTALS, UA: ABNORMAL
D-DIMER QUANTITATIVE: 858 NG/ML FEU (ref 0–500)
EKG ATRIAL RATE: 135 BPM
EKG P AXIS: 68 DEGREES
EKG P-R INTERVAL: 130 MS
EKG Q-T INTERVAL: 276 MS
EKG QRS DURATION: 68 MS
EKG QTC CALCULATION (BAZETT): 414 MS
EKG R AXIS: 54 DEGREES
EKG T AXIS: 52 DEGREES
EKG VENTRICULAR RATE: 135 BPM
EOSINOPHIL # BLD: 0.1 %
EOSINOPHILS ABSOLUTE: 0 THOU/MM3 (ref 0–0.4)
EPITHELIAL CELLS, UA: ABNORMAL /HPF
FLU A ANTIGEN: NEGATIVE
FLU B ANTIGEN: NEGATIVE
GLUCOSE BLD-MCNC: 117 MG/DL (ref 70–108)
GLUCOSE URINE: NEGATIVE MG/DL
HCT VFR BLD CALC: 30.1 % (ref 37–47)
HEMOGLOBIN: 10 GM/DL (ref 12–16)
KETONES, URINE: NEGATIVE
LACTIC ACID: 1.8 MMOL/L (ref 0.5–2.2)
LEUKOCYTE ESTERASE, URINE: ABNORMAL
LYMPHOCYTES # BLD: 3.8 %
LYMPHOCYTES ABSOLUTE: 0.5 THOU/MM3 (ref 1–4.8)
MCH RBC QN AUTO: 29.4 PG (ref 27–31)
MCHC RBC AUTO-ENTMCNC: 33.2 GM/DL (ref 33–37)
MCV RBC AUTO: 88.6 FL (ref 81–99)
MISCELLANEOUS 2: ABNORMAL
MONOCYTES # BLD: 14.6 %
MONOCYTES ABSOLUTE: 2 THOU/MM3 (ref 0.4–1.3)
MUCUS: ABNORMAL
NITRITE, URINE: POSITIVE
NUCLEATED RED BLOOD CELLS: 0 /100 WBC
OSMOLALITY CALCULATION: 259.6 MOSMOL/KG (ref 275–300)
PDW BLD-RTO: 16.8 % (ref 11.5–14.5)
PH UA: 6.5
PLATELET # BLD: 352 THOU/MM3 (ref 130–400)
PMV BLD AUTO: 7.4 FL (ref 7.4–10.4)
POTASSIUM SERPL-SCNC: 4.6 MEQ/L (ref 3.5–5.2)
PROCALCITONIN: 1.83 NG/ML (ref 0.01–0.09)
PROTEIN UA: 100
RBC # BLD: 3.4 MILL/MM3 (ref 4.2–5.4)
RBC URINE: ABNORMAL /HPF
RENAL EPITHELIAL, UA: ABNORMAL
SEG NEUTROPHILS: 81.4 %
SEGMENTED NEUTROPHILS ABSOLUTE COUNT: 11.2 THOU/MM3 (ref 1.8–7.7)
SODIUM BLD-SCNC: 127 MEQ/L (ref 135–145)
SPECIFIC GRAVITY, URINE: 1.01 (ref 1–1.03)
TOTAL PROTEIN: 7.3 G/DL (ref 6.1–8)
TROPONIN T: < 0.01 NG/ML
TSH SERPL DL<=0.05 MIU/L-ACNC: 1.95 UIU/ML (ref 0.4–4.2)
UROBILINOGEN, URINE: 0.2 EU/DL
WBC # BLD: 13.8 THOU/MM3 (ref 4.8–10.8)
WBC UA: > 200 /HPF
YEAST: ABNORMAL

## 2018-03-21 PROCEDURE — 87804 INFLUENZA ASSAY W/OPTIC: CPT

## 2018-03-21 PROCEDURE — 96365 THER/PROPH/DIAG IV INF INIT: CPT

## 2018-03-21 PROCEDURE — 71275 CT ANGIOGRAPHY CHEST: CPT

## 2018-03-21 PROCEDURE — 85379 FIBRIN DEGRADATION QUANT: CPT

## 2018-03-21 PROCEDURE — 93010 ELECTROCARDIOGRAM REPORT: CPT | Performed by: NUCLEAR MEDICINE

## 2018-03-21 PROCEDURE — 82248 BILIRUBIN DIRECT: CPT

## 2018-03-21 PROCEDURE — 84484 ASSAY OF TROPONIN QUANT: CPT

## 2018-03-21 PROCEDURE — 2580000003 HC RX 258: Performed by: FAMILY MEDICINE

## 2018-03-21 PROCEDURE — 84443 ASSAY THYROID STIM HORMONE: CPT

## 2018-03-21 PROCEDURE — 1200000003 HC TELEMETRY R&B

## 2018-03-21 PROCEDURE — 6360000004 HC RX CONTRAST MEDICATION: Performed by: FAMILY MEDICINE

## 2018-03-21 PROCEDURE — 71045 X-RAY EXAM CHEST 1 VIEW: CPT

## 2018-03-21 PROCEDURE — 83605 ASSAY OF LACTIC ACID: CPT

## 2018-03-21 PROCEDURE — 87040 BLOOD CULTURE FOR BACTERIA: CPT

## 2018-03-21 PROCEDURE — 85025 COMPLETE CBC W/AUTO DIFF WBC: CPT

## 2018-03-21 PROCEDURE — 80053 COMPREHEN METABOLIC PANEL: CPT

## 2018-03-21 PROCEDURE — 36415 COLL VENOUS BLD VENIPUNCTURE: CPT

## 2018-03-21 PROCEDURE — 2580000003 HC RX 258: Performed by: INTERNAL MEDICINE

## 2018-03-21 PROCEDURE — 99285 EMERGENCY DEPT VISIT HI MDM: CPT

## 2018-03-21 PROCEDURE — 87086 URINE CULTURE/COLONY COUNT: CPT

## 2018-03-21 PROCEDURE — 84145 PROCALCITONIN (PCT): CPT

## 2018-03-21 PROCEDURE — 74176 CT ABD & PELVIS W/O CONTRAST: CPT

## 2018-03-21 PROCEDURE — 6360000002 HC RX W HCPCS: Performed by: INTERNAL MEDICINE

## 2018-03-21 PROCEDURE — 93005 ELECTROCARDIOGRAM TRACING: CPT | Performed by: FAMILY MEDICINE

## 2018-03-21 PROCEDURE — 87186 SC STD MICRODIL/AGAR DIL: CPT

## 2018-03-21 PROCEDURE — 6360000002 HC RX W HCPCS: Performed by: FAMILY MEDICINE

## 2018-03-21 PROCEDURE — C1751 CATH, INF, PER/CENT/MIDLINE: HCPCS

## 2018-03-21 PROCEDURE — 81001 URINALYSIS AUTO W/SCOPE: CPT

## 2018-03-21 PROCEDURE — 6370000000 HC RX 637 (ALT 250 FOR IP): Performed by: INTERNAL MEDICINE

## 2018-03-21 RX ORDER — SODIUM CHLORIDE 0.9 % (FLUSH) 0.9 %
10 SYRINGE (ML) INJECTION PRN
Status: DISCONTINUED | OUTPATIENT
Start: 2018-03-21 | End: 2018-03-23 | Stop reason: HOSPADM

## 2018-03-21 RX ORDER — TIZANIDINE 4 MG/1
4 TABLET ORAL EVERY 8 HOURS PRN
Status: DISCONTINUED | OUTPATIENT
Start: 2018-03-21 | End: 2018-03-23 | Stop reason: HOSPADM

## 2018-03-21 RX ORDER — DESIPRAMINE HYDROCHLORIDE 25 MG/1
25 TABLET ORAL NIGHTLY
Status: DISCONTINUED | OUTPATIENT
Start: 2018-03-21 | End: 2018-03-23 | Stop reason: HOSPADM

## 2018-03-21 RX ORDER — PROMETHAZINE HYDROCHLORIDE 25 MG/1
25 TABLET ORAL EVERY 6 HOURS PRN
Status: DISCONTINUED | OUTPATIENT
Start: 2018-03-21 | End: 2018-03-23 | Stop reason: HOSPADM

## 2018-03-21 RX ORDER — SODIUM CHLORIDE 1000 MG
1 TABLET, SOLUBLE MISCELLANEOUS DAILY
Status: DISCONTINUED | OUTPATIENT
Start: 2018-03-21 | End: 2018-03-23 | Stop reason: HOSPADM

## 2018-03-21 RX ORDER — SODIUM CHLORIDE 9 MG/ML
INJECTION, SOLUTION INTRAVENOUS CONTINUOUS
Status: DISCONTINUED | OUTPATIENT
Start: 2018-03-21 | End: 2018-03-23 | Stop reason: HOSPADM

## 2018-03-21 RX ORDER — 0.9 % SODIUM CHLORIDE 0.9 %
1000 INTRAVENOUS SOLUTION INTRAVENOUS ONCE
Status: COMPLETED | OUTPATIENT
Start: 2018-03-21 | End: 2018-03-21

## 2018-03-21 RX ORDER — PROCHLORPERAZINE MALEATE 10 MG
10 TABLET ORAL EVERY 6 HOURS PRN
Status: DISCONTINUED | OUTPATIENT
Start: 2018-03-21 | End: 2018-03-23 | Stop reason: HOSPADM

## 2018-03-21 RX ORDER — LEVOTHYROXINE SODIUM 0.05 MG/1
50 TABLET ORAL DAILY
Status: DISCONTINUED | OUTPATIENT
Start: 2018-03-21 | End: 2018-03-23 | Stop reason: HOSPADM

## 2018-03-21 RX ORDER — PRAVASTATIN SODIUM 20 MG
20 TABLET ORAL DAILY
Status: DISCONTINUED | OUTPATIENT
Start: 2018-03-21 | End: 2018-03-23 | Stop reason: HOSPADM

## 2018-03-21 RX ORDER — ONDANSETRON 4 MG/1
4 TABLET, ORALLY DISINTEGRATING ORAL EVERY 6 HOURS PRN
Status: DISCONTINUED | OUTPATIENT
Start: 2018-03-21 | End: 2018-03-23 | Stop reason: HOSPADM

## 2018-03-21 RX ORDER — SODIUM CHLORIDE 0.9 % (FLUSH) 0.9 %
10 SYRINGE (ML) INJECTION EVERY 12 HOURS SCHEDULED
Status: DISCONTINUED | OUTPATIENT
Start: 2018-03-21 | End: 2018-03-23 | Stop reason: HOSPADM

## 2018-03-21 RX ORDER — ONDANSETRON 2 MG/ML
4 INJECTION INTRAMUSCULAR; INTRAVENOUS EVERY 6 HOURS PRN
Status: DISCONTINUED | OUTPATIENT
Start: 2018-03-21 | End: 2018-03-21 | Stop reason: CLARIF

## 2018-03-21 RX ORDER — LEVOTHYROXINE SODIUM 0.05 MG/1
50 TABLET ORAL DAILY
COMMUNITY

## 2018-03-21 RX ORDER — IBUPROFEN 600 MG/1
600 TABLET ORAL EVERY 6 HOURS PRN
Status: DISCONTINUED | OUTPATIENT
Start: 2018-03-21 | End: 2018-03-23 | Stop reason: HOSPADM

## 2018-03-21 RX ORDER — DOCUSATE SODIUM 100 MG/1
100 CAPSULE, LIQUID FILLED ORAL 2 TIMES DAILY
Status: DISCONTINUED | OUTPATIENT
Start: 2018-03-21 | End: 2018-03-23 | Stop reason: HOSPADM

## 2018-03-21 RX ORDER — FENTANYL 100 UG/H
1 PATCH TRANSDERMAL
Status: DISCONTINUED | OUTPATIENT
Start: 2018-03-24 | End: 2018-03-23 | Stop reason: HOSPADM

## 2018-03-21 RX ORDER — FENTANYL 25 UG/H
1 PATCH TRANSDERMAL
Status: DISCONTINUED | OUTPATIENT
Start: 2018-03-24 | End: 2018-03-23 | Stop reason: HOSPADM

## 2018-03-21 RX ORDER — ACETAMINOPHEN 325 MG/1
650 TABLET ORAL EVERY 4 HOURS PRN
Status: DISCONTINUED | OUTPATIENT
Start: 2018-03-21 | End: 2018-03-23 | Stop reason: HOSPADM

## 2018-03-21 RX ADMIN — SODIUM CHLORIDE 1000 ML: 9 INJECTION, SOLUTION INTRAVENOUS at 11:35

## 2018-03-21 RX ADMIN — IOPAMIDOL 80 ML: 755 INJECTION, SOLUTION INTRAVENOUS at 13:07

## 2018-03-21 RX ADMIN — CEFTRIAXONE 1 G: 1 INJECTION, POWDER, FOR SOLUTION INTRAMUSCULAR; INTRAVENOUS at 15:09

## 2018-03-21 RX ADMIN — IBUPROFEN 600 MG: 600 TABLET ORAL at 18:20

## 2018-03-21 RX ADMIN — DOCUSATE SODIUM 100 MG: 100 CAPSULE ORAL at 21:12

## 2018-03-21 RX ADMIN — DESIPRAMINE HYDROCHLORIDE 25 MG: 25 TABLET, FILM COATED ORAL at 21:12

## 2018-03-21 RX ADMIN — PRAVASTATIN SODIUM 20 MG: 20 TABLET ORAL at 21:12

## 2018-03-21 RX ADMIN — SODIUM CHLORIDE: 9 INJECTION, SOLUTION INTRAVENOUS at 18:22

## 2018-03-21 RX ADMIN — TIZANIDINE 4 MG: 4 TABLET ORAL at 21:12

## 2018-03-21 RX ADMIN — ENOXAPARIN SODIUM 40 MG: 40 INJECTION SUBCUTANEOUS at 21:12

## 2018-03-21 RX ADMIN — SODIUM CHLORIDE 1000 ML: 9 INJECTION, SOLUTION INTRAVENOUS at 14:01

## 2018-03-21 ASSESSMENT — ENCOUNTER SYMPTOMS
VOMITING: 0
RHINORRHEA: 0
DIARRHEA: 0
SHORTNESS OF BREATH: 0
NAUSEA: 0
SORE THROAT: 0
BACK PAIN: 0
EYE DISCHARGE: 0
COUGH: 0
WHEEZING: 0
EYE PAIN: 0
ABDOMINAL PAIN: 0

## 2018-03-21 ASSESSMENT — PAIN DESCRIPTION - PROGRESSION: CLINICAL_PROGRESSION: GRADUALLY IMPROVING

## 2018-03-21 ASSESSMENT — PAIN DESCRIPTION - PAIN TYPE
TYPE: CHRONIC PAIN

## 2018-03-21 ASSESSMENT — PAIN DESCRIPTION - ONSET: ONSET: ON-GOING

## 2018-03-21 ASSESSMENT — PAIN DESCRIPTION - LOCATION
LOCATION: RECTUM

## 2018-03-21 ASSESSMENT — PAIN DESCRIPTION - DESCRIPTORS: DESCRIPTORS: ACHING;CONSTANT

## 2018-03-21 ASSESSMENT — PAIN SCALES - GENERAL
PAINLEVEL_OUTOF10: 4
PAINLEVEL_OUTOF10: 3
PAINLEVEL_OUTOF10: 7
PAINLEVEL_OUTOF10: 7

## 2018-03-21 ASSESSMENT — PAIN DESCRIPTION - FREQUENCY: FREQUENCY: CONTINUOUS

## 2018-03-21 NOTE — ED NOTES
Pt updated on POC. Second bag of fluids started. Call light within reach. No concerns voiced.      Pallavi Liu RN  03/21/18 [id number]

## 2018-03-21 NOTE — TELEPHONE ENCOUNTER
Received a call from Dr. Lynne Chow office, Darlin Blackwell - They somehow missed that the patient was in pain management when they prescribed for the patient. Dr. Earnest Masters is not comfortable prescribing Subsys which worked well for the patient. If Dr. Jae Figueroa will take over prescribing again they will not prescribe anymore pain medication. Spoke with Dr. Jae Figueroa about prescribing medication for her again and he said that he would. Iza at Dr. Lynne Chow office notified.

## 2018-03-22 ENCOUNTER — APPOINTMENT (OUTPATIENT)
Dept: ULTRASOUND IMAGING | Age: 53
DRG: 690 | End: 2018-03-22
Payer: COMMERCIAL

## 2018-03-22 LAB
ANION GAP SERPL CALCULATED.3IONS-SCNC: 14 MEQ/L (ref 8–16)
BUN BLDV-MCNC: 15 MG/DL (ref 7–22)
CALCIUM SERPL-MCNC: 8.2 MG/DL (ref 8.5–10.5)
CHLORIDE BLD-SCNC: 89 MEQ/L (ref 98–111)
CO2: 22 MEQ/L (ref 23–33)
CREAT SERPL-MCNC: 0.6 MG/DL (ref 0.4–1.2)
GFR SERPL CREATININE-BSD FRML MDRD: > 90 ML/MIN/1.73M2
GLUCOSE BLD-MCNC: 114 MG/DL (ref 70–108)
HCT VFR BLD CALC: 23.7 % (ref 37–47)
HEMOGLOBIN: 7.8 GM/DL (ref 12–16)
MCH RBC QN AUTO: 29.3 PG (ref 27–31)
MCHC RBC AUTO-ENTMCNC: 32.9 GM/DL (ref 33–37)
MCV RBC AUTO: 89.1 FL (ref 81–99)
PDW BLD-RTO: 16.7 % (ref 11.5–14.5)
PLATELET # BLD: 265 THOU/MM3 (ref 130–400)
PMV BLD AUTO: 6.9 FL (ref 7.4–10.4)
POTASSIUM REFLEX MAGNESIUM: 4.1 MEQ/L (ref 3.5–5.2)
RBC # BLD: 2.66 MILL/MM3 (ref 4.2–5.4)
SODIUM BLD-SCNC: 125 MEQ/L (ref 135–145)
WBC # BLD: 10.3 THOU/MM3 (ref 4.8–10.8)

## 2018-03-22 PROCEDURE — 76770 US EXAM ABDO BACK WALL COMP: CPT

## 2018-03-22 PROCEDURE — 6370000000 HC RX 637 (ALT 250 FOR IP): Performed by: INTERNAL MEDICINE

## 2018-03-22 PROCEDURE — 2580000003 HC RX 258: Performed by: INTERNAL MEDICINE

## 2018-03-22 PROCEDURE — 36591 DRAW BLOOD OFF VENOUS DEVICE: CPT

## 2018-03-22 PROCEDURE — 1200000003 HC TELEMETRY R&B

## 2018-03-22 PROCEDURE — 80048 BASIC METABOLIC PNL TOTAL CA: CPT

## 2018-03-22 PROCEDURE — 6360000002 HC RX W HCPCS: Performed by: INTERNAL MEDICINE

## 2018-03-22 PROCEDURE — 85027 COMPLETE CBC AUTOMATED: CPT

## 2018-03-22 PROCEDURE — 51798 US URINE CAPACITY MEASURE: CPT

## 2018-03-22 RX ORDER — LEVOFLOXACIN 5 MG/ML
500 INJECTION, SOLUTION INTRAVENOUS EVERY 24 HOURS
Status: DISCONTINUED | OUTPATIENT
Start: 2018-03-22 | End: 2018-03-22

## 2018-03-22 RX ORDER — 0.9 % SODIUM CHLORIDE 0.9 %
500 INTRAVENOUS SOLUTION INTRAVENOUS ONCE
Status: COMPLETED | OUTPATIENT
Start: 2018-03-22 | End: 2018-03-22

## 2018-03-22 RX ADMIN — ENOXAPARIN SODIUM 40 MG: 40 INJECTION SUBCUTANEOUS at 21:06

## 2018-03-22 RX ADMIN — LEVOTHYROXINE SODIUM 50 MCG: 50 TABLET ORAL at 05:28

## 2018-03-22 RX ADMIN — Medication 10 ML: at 21:06

## 2018-03-22 RX ADMIN — SODIUM CHLORIDE 500 ML: 9 INJECTION, SOLUTION INTRAVENOUS at 03:24

## 2018-03-22 RX ADMIN — ACETAMINOPHEN 650 MG: 325 TABLET ORAL at 21:05

## 2018-03-22 RX ADMIN — IBUPROFEN 600 MG: 600 TABLET ORAL at 01:43

## 2018-03-22 RX ADMIN — PRAVASTATIN SODIUM 20 MG: 20 TABLET ORAL at 21:05

## 2018-03-22 RX ADMIN — CEFEPIME HYDROCHLORIDE 2 G: 2 INJECTION, POWDER, FOR SOLUTION INTRAVENOUS at 15:01

## 2018-03-22 RX ADMIN — DOCUSATE SODIUM 100 MG: 100 CAPSULE ORAL at 21:05

## 2018-03-22 RX ADMIN — SODIUM CHLORIDE TAB 1 GM 1 G: 1 TAB at 08:49

## 2018-03-22 RX ADMIN — DOCUSATE SODIUM 100 MG: 100 CAPSULE ORAL at 08:49

## 2018-03-22 RX ADMIN — IBUPROFEN 600 MG: 600 TABLET ORAL at 10:45

## 2018-03-22 RX ADMIN — DESIPRAMINE HYDROCHLORIDE 25 MG: 25 TABLET, FILM COATED ORAL at 21:05

## 2018-03-22 RX ADMIN — SODIUM CHLORIDE: 9 INJECTION, SOLUTION INTRAVENOUS at 05:26

## 2018-03-22 ASSESSMENT — PAIN SCALES - GENERAL
PAINLEVEL_OUTOF10: 3
PAINLEVEL_OUTOF10: 0
PAINLEVEL_OUTOF10: 8

## 2018-03-22 NOTE — PROCEDURES
A Bladder scan was performed at 1815 . The patient's last void was 400ml, just prior . The residual amount was measured to be 240ml ML. Report of results was given to [facility].

## 2018-03-22 NOTE — H&P
03/21/18   1108  03/22/18   0325   NA  127*  125*   K  4.6  4.1   CL  86*  89*   CO2  25  22*   BUN  22  15   CREATININE  0.9  0.6   GLUCOSE  117*  114*     Hepatic:   Recent Labs      03/21/18   1108   AST  <5   ALT  11   BILITOT  0.5   ALKPHOS  129*     03/22/18 0834    Specimen Source: Urine, clean catch     Organism Pseudomonas aeruginosa (A)    Urine Culture Reflex [place] count: >100,000 CFU/mL       Assessment and Plan   1. Pseudomonas UTI  2. Tachycardia / SIRS  3. Rectal cancer  4. Hyponatremia  5. anemia    Stop HCTZ  IV hydration. IV antibiotics. ID and oncology follow up. Am labs.       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

## 2018-03-22 NOTE — PROCEDURES
A Bladder scan was performed at 1540 . The patient's last void was at 1535 with 625ml . The residual amount was measured to be 125 ML. Report of results was given to [facility].

## 2018-03-22 NOTE — PLAN OF CARE
joey mccormick. Patient and mother verbalize understanding of the plan of care and contribute to goal setting.

## 2018-03-23 VITALS
TEMPERATURE: 98.2 F | BODY MASS INDEX: 23.51 KG/M2 | SYSTOLIC BLOOD PRESSURE: 145 MMHG | DIASTOLIC BLOOD PRESSURE: 82 MMHG | WEIGHT: 116.6 LBS | HEART RATE: 103 BPM | RESPIRATION RATE: 18 BRPM | OXYGEN SATURATION: 93 % | HEIGHT: 59 IN

## 2018-03-23 LAB
ANION GAP SERPL CALCULATED.3IONS-SCNC: 16 MEQ/L (ref 8–16)
BUN BLDV-MCNC: 8 MG/DL (ref 7–22)
CALCIUM SERPL-MCNC: 9 MG/DL (ref 8.5–10.5)
CHLORIDE BLD-SCNC: 99 MEQ/L (ref 98–111)
CO2: 21 MEQ/L (ref 23–33)
CREAT SERPL-MCNC: 0.5 MG/DL (ref 0.4–1.2)
GFR SERPL CREATININE-BSD FRML MDRD: > 90 ML/MIN/1.73M2
GLUCOSE BLD-MCNC: 109 MG/DL (ref 70–108)
HCT VFR BLD CALC: 23.8 % (ref 37–47)
HEMOGLOBIN: 7.7 GM/DL (ref 12–16)
MCH RBC QN AUTO: 29 PG (ref 27–31)
MCHC RBC AUTO-ENTMCNC: 32.3 GM/DL (ref 33–37)
MCV RBC AUTO: 89.8 FL (ref 81–99)
ORGANISM: ABNORMAL
PDW BLD-RTO: 16.6 % (ref 11.5–14.5)
PLATELET # BLD: 260 THOU/MM3 (ref 130–400)
PMV BLD AUTO: 6.7 FL (ref 7.4–10.4)
POTASSIUM SERPL-SCNC: 4.2 MEQ/L (ref 3.5–5.2)
RBC # BLD: 2.65 MILL/MM3 (ref 4.2–5.4)
SODIUM BLD-SCNC: 136 MEQ/L (ref 135–145)
URINE CULTURE REFLEX: ABNORMAL
WBC # BLD: 7.3 THOU/MM3 (ref 4.8–10.8)

## 2018-03-23 PROCEDURE — 51798 US URINE CAPACITY MEASURE: CPT

## 2018-03-23 PROCEDURE — 6360000002 HC RX W HCPCS: Performed by: INTERNAL MEDICINE

## 2018-03-23 PROCEDURE — 36592 COLLECT BLOOD FROM PICC: CPT

## 2018-03-23 PROCEDURE — 85027 COMPLETE CBC AUTOMATED: CPT

## 2018-03-23 PROCEDURE — 80048 BASIC METABOLIC PNL TOTAL CA: CPT

## 2018-03-23 PROCEDURE — 6370000000 HC RX 637 (ALT 250 FOR IP): Performed by: INTERNAL MEDICINE

## 2018-03-23 PROCEDURE — 2580000003 HC RX 258: Performed by: INTERNAL MEDICINE

## 2018-03-23 RX ORDER — HEPARIN SODIUM (PORCINE) LOCK FLUSH IV SOLN 100 UNIT/ML 100 UNIT/ML
500 SOLUTION INTRAVENOUS PRN
Status: DISCONTINUED | OUTPATIENT
Start: 2018-03-23 | End: 2018-03-23 | Stop reason: HOSPADM

## 2018-03-23 RX ORDER — METOPROLOL SUCCINATE 50 MG/1
50 TABLET, EXTENDED RELEASE ORAL DAILY
Status: DISCONTINUED | OUTPATIENT
Start: 2018-03-23 | End: 2018-03-23 | Stop reason: HOSPADM

## 2018-03-23 RX ORDER — CIPROFLOXACIN 500 MG/1
500 TABLET, FILM COATED ORAL EVERY 12 HOURS SCHEDULED
Qty: 20 TABLET | Refills: 0 | Status: SHIPPED | OUTPATIENT
Start: 2018-03-23 | End: 2018-04-02

## 2018-03-23 RX ORDER — METOPROLOL SUCCINATE 50 MG/1
50 TABLET, EXTENDED RELEASE ORAL DAILY
Qty: 30 TABLET | Refills: 3 | Status: ON HOLD | OUTPATIENT
Start: 2018-03-23 | End: 2018-11-05

## 2018-03-23 RX ORDER — HEPARIN SODIUM (PORCINE) LOCK FLUSH IV SOLN 100 UNIT/ML 100 UNIT/ML
300 SOLUTION INTRAVENOUS PRN
Status: DISCONTINUED | OUTPATIENT
Start: 2018-03-23 | End: 2018-03-23

## 2018-03-23 RX ORDER — CIPROFLOXACIN 500 MG/1
500 TABLET, FILM COATED ORAL EVERY 12 HOURS SCHEDULED
Status: DISCONTINUED | OUTPATIENT
Start: 2018-03-23 | End: 2018-03-23 | Stop reason: HOSPADM

## 2018-03-23 RX ADMIN — CIPROFLOXACIN 500 MG: 500 TABLET, FILM COATED ORAL at 12:07

## 2018-03-23 RX ADMIN — SODIUM CHLORIDE, PRESERVATIVE FREE 500 UNITS: 5 INJECTION INTRAVENOUS at 14:47

## 2018-03-23 RX ADMIN — SODIUM CHLORIDE: 9 INJECTION, SOLUTION INTRAVENOUS at 04:51

## 2018-03-23 RX ADMIN — DOCUSATE SODIUM 100 MG: 100 CAPSULE ORAL at 09:10

## 2018-03-23 RX ADMIN — SODIUM CHLORIDE TAB 1 GM 1 G: 1 TAB at 09:10

## 2018-03-23 RX ADMIN — LEVOTHYROXINE SODIUM 50 MCG: 50 TABLET ORAL at 06:48

## 2018-03-23 RX ADMIN — IBUPROFEN 600 MG: 600 TABLET ORAL at 11:46

## 2018-03-23 RX ADMIN — IBUPROFEN 600 MG: 600 TABLET ORAL at 00:15

## 2018-03-23 RX ADMIN — TIZANIDINE 4 MG: 4 TABLET ORAL at 00:56

## 2018-03-23 RX ADMIN — METOPROLOL SUCCINATE 50 MG: 50 TABLET, FILM COATED, EXTENDED RELEASE ORAL at 12:07

## 2018-03-23 RX ADMIN — CEFEPIME HYDROCHLORIDE 2 G: 2 INJECTION, POWDER, FOR SOLUTION INTRAVENOUS at 00:55

## 2018-03-23 RX ADMIN — ACETAMINOPHEN 650 MG: 325 TABLET ORAL at 04:50

## 2018-03-23 ASSESSMENT — PAIN SCALES - GENERAL
PAINLEVEL_OUTOF10: 3
PAINLEVEL_OUTOF10: 3
PAINLEVEL_OUTOF10: 4

## 2018-03-23 NOTE — PROCEDURES
A Bladder scan was performed at 2118 . The patient's last void was at 2110 on 3/22/18 . The residual amount was measured to be 168 ML. Report of results was given to [facility].

## 2018-03-23 NOTE — PROCEDURES
A Bladder scan was performed at 0830 . The patient's last void was at [address] . The residual amount was measured to be 23 ML. Report of results was given to [facility].

## 2018-03-23 NOTE — PROGRESS NOTES
[address] [address]  Hematology/Oncology  Dr. Fields Form  Daily Progress Note  Pt Name: Tia Esquivel  Medical Record Number: [id number]  Date of Birth 1965   Today's Date: 3/23/2018  ASSESSMENT   1. Hospital day # 2   2. Rectal cancer recurrence - to restart chemo next week  3. Tachycardia was sinus tach, which is better  4. Fever from UTI- better  PLAN   1. Home soon and restart chemo in office in 1-2 weeks  Nellie Correa is doing better from yesterday. Pain is well controlled. She has no nausea and no vomiting. Not febrile and NAD when seen. Hoping to go home today or tomorrow. Rectal drainage stable. CURRENT MEDICATIONS   Scheduled Meds:   cefepime  2 g Intravenous Q12H    desipramine  25 mg Oral Nightly    docusate sodium  100 mg Oral BID    [START ON 3/24/2018] fentaNYL  1 patch Transdermal Q72H    [START ON 3/24/2018] fentaNYL  1 patch Transdermal Q72H    levothyroxine  50 mcg Oral Daily    pravastatin  20 mg Oral Daily    sodium chloride  1 g Oral Daily    sodium chloride flush  10 mL Intravenous 2 times per day    enoxaparin  40 mg Subcutaneous Q24H     Continuous Infusions:   sodium chloride 100 mL/hr at 03/23/18 0451     PRN Meds:.ibuprofen, prochlorperazine, promethazine, tiZANidine, sodium chloride flush, acetaminophen, magnesium hydroxide, ondansetron  OBJECTIVE   VITALS:  height is 4' 11\" (1.499 m) and weight is 116 lb 9.6 oz (52.9 kg). Her oral temperature is 98.5 °F (36.9 °C). Her blood pressure is 154/78 (abnormal) and her pulse is 110. Her respiration is 16 and oxygen saturation is 98%. GENERAL: alert, no distress  LUNGS: clear to ausculation, without wheezes, rales or rhonchi  HEART: normal rate and regular rhythm  ABDOMEN: soft, non-tender, non-distended, and no guarding or peritoneal signs  EXTERMITY: no cyanosis, clubbing or edema  I/O last 3 completed shifts: In: 4969.7 [P.O.:1990;  I.V.:2979.7]  Out: 3350 [Urine:3350]  No intake/output data
Dr. Almendarez Read bladder test results  1st void:625 Bladder scan:125  2nd void:400 Bladder scan:240  3rd void:450 Bladder scan:168
reviewed and plan of care discussed    Pt/Chart review last 24 hours:  Fever No, Diarrhea No, Dyspnea No,    Objective:      Vitals: /65   Pulse 95   Temp 98 °F (36.7 °C) (Oral)   Resp 16   Ht 4' 11\" (1.499 m)   Wt 116 lb 9.6 oz (52.9 kg)   LMP 05/01/2011   SpO2 98%   BMI 23.55 kg/m²    HEENT: Head: Normal, normocephalic, atraumatic. Heart: regular rate and rhythm  Lungs: clear to auscultation bilaterally  Abdomen: soft, active BS, no specific area of tenderness   Extremities: extremities normal, atraumatic, no cyanosis or edema  Neurologic: Mental status: Alert, oriented, thought content appropriate   Wound/Ulcers: perirectal area noted; penrose +    Data:   Current ATBs: cefepime   Scheduled Meds:   cefepime  2 g Intravenous Q12H    desipramine  25 mg Oral Nightly    docusate sodium  100 mg Oral BID    [START ON 3/24/2018] fentaNYL  1 patch Transdermal Q72H    [START ON 3/24/2018] fentaNYL  1 patch Transdermal Q72H    levothyroxine  50 mcg Oral Daily    pravastatin  20 mg Oral Daily    sodium chloride  1 g Oral Daily    sodium chloride flush  10 mL Intravenous 2 times per day    enoxaparin  40 mg Subcutaneous Q24H     Continuous Infusions:   sodium chloride 100 mL/hr at 03/23/18 0451       I/O last 3 completed shifts: In: 4969.7 [P.O.:1990; I.V.:2979.7]  Out: 3350 [Urine:3350]  No intake/output data recorded.     Intake/Output Summary (Last 24 hours) at 03/23/18 0747  Last data filed at 03/23/18 0448   Gross per 24 hour   Intake          4969.73 ml   Output             3350 ml   Net          1619.73 ml     CBC:   Recent Labs      03/21/18   1108  03/22/18   0325  03/23/18   0550   WBC  13.8*  10.3  7.3   HGB  10.0*  7.8*  7.7*   HCT  30.1*  23.7*  23.8*   PLT  352  265  260     BMP:  Recent Labs      03/21/18   1108  03/22/18   0325  03/23/18   0550   NA  127*  125*  136   K  4.6  4.1  4.2   CL  86*  89*  99   CO2  25  22*  21*   BUN  22  15  8   CREATININE  0.9  0.6  0.5   GLUCOSE  117*

## 2018-03-26 NOTE — CARE COORDINATION
Late Entry  3/26/18, 7:11 AM    Discharge plan discussed by  and . Discharge plan reviewed with patient/ family. Patient/ family verbalize understanding of discharge plan and are in agreement with plan. Understanding was demonstrated using the teach back method.        IMM Letter  IMM Letter given to Patient/Family/Significant other/Guardian/POA/by[de-identified]

## 2018-03-27 LAB
BLOOD CULTURE, ROUTINE: NORMAL
BLOOD CULTURE, ROUTINE: NORMAL

## 2018-04-04 ENCOUNTER — OFFICE VISIT (OUTPATIENT)
Dept: PHYSICAL MEDICINE AND REHAB | Age: 53
End: 2018-04-04
Payer: COMMERCIAL

## 2018-04-04 VITALS
WEIGHT: 116.62 LBS | HEIGHT: 59 IN | BODY MASS INDEX: 23.51 KG/M2 | DIASTOLIC BLOOD PRESSURE: 100 MMHG | HEART RATE: 86 BPM | SYSTOLIC BLOOD PRESSURE: 164 MMHG

## 2018-04-04 DIAGNOSIS — G89.29 CHRONIC RECTAL PAIN: ICD-10-CM

## 2018-04-04 DIAGNOSIS — K59.03 DRUG-INDUCED CONSTIPATION: ICD-10-CM

## 2018-04-04 DIAGNOSIS — C20 MALIGNANT NEOPLASM OF RECTUM (HCC): Primary | ICD-10-CM

## 2018-04-04 DIAGNOSIS — G89.3 CHRONIC PAIN DUE TO NEOPLASM: ICD-10-CM

## 2018-04-04 DIAGNOSIS — K62.89 CHRONIC RECTAL PAIN: ICD-10-CM

## 2018-04-04 PROCEDURE — 1036F TOBACCO NON-USER: CPT | Performed by: NURSE PRACTITIONER

## 2018-04-04 PROCEDURE — G8420 CALC BMI NORM PARAMETERS: HCPCS | Performed by: NURSE PRACTITIONER

## 2018-04-04 PROCEDURE — 99214 OFFICE O/P EST MOD 30 MIN: CPT | Performed by: NURSE PRACTITIONER

## 2018-04-04 PROCEDURE — G8427 DOCREV CUR MEDS BY ELIG CLIN: HCPCS | Performed by: NURSE PRACTITIONER

## 2018-04-04 PROCEDURE — 3017F COLORECTAL CA SCREEN DOC REV: CPT | Performed by: NURSE PRACTITIONER

## 2018-04-04 PROCEDURE — 3014F SCREEN MAMMO DOC REV: CPT | Performed by: NURSE PRACTITIONER

## 2018-04-04 PROCEDURE — 1111F DSCHRG MED/CURRENT MED MERGE: CPT | Performed by: NURSE PRACTITIONER

## 2018-04-04 RX ORDER — FENTANYL 100 UG/H
1 PATCH TRANSDERMAL
Qty: 5 PATCH | Refills: 0 | Status: SHIPPED | OUTPATIENT
Start: 2018-04-04 | End: 2018-04-18 | Stop reason: SDUPTHER

## 2018-04-04 ASSESSMENT — ENCOUNTER SYMPTOMS: BACK PAIN: 1

## 2018-04-05 LAB — GFR SERPL CREATININE-BSD FRML MDRD: 66 ML/MIN/1.73M2

## 2018-04-18 ENCOUNTER — OFFICE VISIT (OUTPATIENT)
Dept: PHYSICAL MEDICINE AND REHAB | Age: 53
End: 2018-04-18
Payer: COMMERCIAL

## 2018-04-18 VITALS
WEIGHT: 116.62 LBS | HEIGHT: 59 IN | HEART RATE: 72 BPM | DIASTOLIC BLOOD PRESSURE: 118 MMHG | SYSTOLIC BLOOD PRESSURE: 193 MMHG | BODY MASS INDEX: 23.51 KG/M2

## 2018-04-18 DIAGNOSIS — G89.29 CHRONIC RECTAL PAIN: ICD-10-CM

## 2018-04-18 DIAGNOSIS — K62.89 CHRONIC RECTAL PAIN: ICD-10-CM

## 2018-04-18 DIAGNOSIS — K59.03 DRUG-INDUCED CONSTIPATION: ICD-10-CM

## 2018-04-18 DIAGNOSIS — C20 MALIGNANT NEOPLASM OF RECTUM (HCC): Primary | ICD-10-CM

## 2018-04-18 DIAGNOSIS — G89.3 CHRONIC PAIN DUE TO NEOPLASM: ICD-10-CM

## 2018-04-18 DIAGNOSIS — C20 MALIGNANT NEOPLASM OF RECTUM (HCC): ICD-10-CM

## 2018-04-18 PROCEDURE — G8420 CALC BMI NORM PARAMETERS: HCPCS | Performed by: NURSE PRACTITIONER

## 2018-04-18 PROCEDURE — 1036F TOBACCO NON-USER: CPT | Performed by: NURSE PRACTITIONER

## 2018-04-18 PROCEDURE — 99214 OFFICE O/P EST MOD 30 MIN: CPT | Performed by: NURSE PRACTITIONER

## 2018-04-18 PROCEDURE — 1111F DSCHRG MED/CURRENT MED MERGE: CPT | Performed by: NURSE PRACTITIONER

## 2018-04-18 PROCEDURE — 3017F COLORECTAL CA SCREEN DOC REV: CPT | Performed by: NURSE PRACTITIONER

## 2018-04-18 PROCEDURE — G8427 DOCREV CUR MEDS BY ELIG CLIN: HCPCS | Performed by: NURSE PRACTITIONER

## 2018-04-18 PROCEDURE — 3014F SCREEN MAMMO DOC REV: CPT | Performed by: NURSE PRACTITIONER

## 2018-04-18 RX ORDER — FENTANYL 100 UG/H
1 PATCH TRANSDERMAL
Qty: 5 PATCH | Refills: 0 | Status: SHIPPED | OUTPATIENT
Start: 2018-04-18 | End: 2018-05-03

## 2018-04-18 RX ORDER — FENTANYL 100 UG/H
1 PATCH TRANSDERMAL
Qty: 5 PATCH | Refills: 0 | Status: SHIPPED | OUTPATIENT
Start: 2018-04-18 | End: 2018-04-18 | Stop reason: SDUPTHER

## 2018-04-18 ASSESSMENT — ENCOUNTER SYMPTOMS
RECTAL PAIN: 1
BACK PAIN: 1

## 2018-04-19 ENCOUNTER — TELEPHONE (OUTPATIENT)
Dept: PHYSICAL MEDICINE AND REHAB | Age: 53
End: 2018-04-19

## 2018-04-20 PROBLEM — N39.0 UTI (URINARY TRACT INFECTION): Status: RESOLVED | Noted: 2018-03-21 | Resolved: 2018-04-20

## 2018-04-23 DIAGNOSIS — G89.3 CANCER ASSOCIATED PAIN: Primary | ICD-10-CM

## 2018-04-24 DIAGNOSIS — G89.3 CANCER ASSOCIATED PAIN: ICD-10-CM

## 2018-04-24 DIAGNOSIS — G89.3 CANCER ASSOCIATED PAIN: Primary | ICD-10-CM

## 2018-04-24 RX ORDER — FENTANYL 25 UG/H
1 PATCH TRANSDERMAL
Qty: 10 PATCH | Refills: 0 | Status: SHIPPED | OUTPATIENT
Start: 2018-04-24 | End: 2018-04-24 | Stop reason: SDUPTHER

## 2018-04-25 RX ORDER — FENTANYL 25 UG/H
1 PATCH TRANSDERMAL
Qty: 10 PATCH | Refills: 0 | Status: SHIPPED | OUTPATIENT
Start: 2018-04-25 | End: 2018-05-23 | Stop reason: SDUPTHER

## 2018-05-01 DIAGNOSIS — C20 MALIGNANT NEOPLASM OF RECTUM (HCC): Primary | ICD-10-CM

## 2018-05-01 DIAGNOSIS — G89.3 CANCER ASSOCIATED PAIN: ICD-10-CM

## 2018-05-01 RX ORDER — FENTANYL 100 UG/H
1 PATCH TRANSDERMAL
Qty: 10 PATCH | Refills: 0 | Status: SHIPPED | OUTPATIENT
Start: 2018-05-02 | End: 2018-05-23 | Stop reason: SDUPTHER

## 2018-05-09 ENCOUNTER — OFFICE VISIT (OUTPATIENT)
Dept: PHYSICAL MEDICINE AND REHAB | Age: 53
End: 2018-05-09
Payer: COMMERCIAL

## 2018-05-09 VITALS
BODY MASS INDEX: 23.51 KG/M2 | WEIGHT: 116.62 LBS | SYSTOLIC BLOOD PRESSURE: 135 MMHG | DIASTOLIC BLOOD PRESSURE: 90 MMHG | HEIGHT: 59 IN | HEART RATE: 71 BPM

## 2018-05-09 DIAGNOSIS — C20 MALIGNANT NEOPLASM OF RECTUM (HCC): Primary | ICD-10-CM

## 2018-05-09 DIAGNOSIS — G89.3 CANCER ASSOCIATED PAIN: ICD-10-CM

## 2018-05-09 DIAGNOSIS — K59.03 DRUG-INDUCED CONSTIPATION: ICD-10-CM

## 2018-05-09 DIAGNOSIS — G89.3 CHRONIC PAIN DUE TO NEOPLASM: ICD-10-CM

## 2018-05-09 PROCEDURE — 1036F TOBACCO NON-USER: CPT | Performed by: NURSE PRACTITIONER

## 2018-05-09 PROCEDURE — G8420 CALC BMI NORM PARAMETERS: HCPCS | Performed by: NURSE PRACTITIONER

## 2018-05-09 PROCEDURE — 3017F COLORECTAL CA SCREEN DOC REV: CPT | Performed by: NURSE PRACTITIONER

## 2018-05-09 PROCEDURE — 99213 OFFICE O/P EST LOW 20 MIN: CPT | Performed by: NURSE PRACTITIONER

## 2018-05-09 PROCEDURE — G8427 DOCREV CUR MEDS BY ELIG CLIN: HCPCS | Performed by: NURSE PRACTITIONER

## 2018-05-09 RX ORDER — HYDROCHLOROTHIAZIDE 25 MG/1
25 TABLET ORAL DAILY
Status: ON HOLD | COMMUNITY
Start: 2018-04-24 | End: 2018-09-28 | Stop reason: HOSPADM

## 2018-05-09 RX ORDER — AMLODIPINE BESYLATE 10 MG/1
10 TABLET ORAL DAILY
COMMUNITY
Start: 2018-04-18

## 2018-05-09 ASSESSMENT — ENCOUNTER SYMPTOMS
RECTAL PAIN: 1
CONSTIPATION: 1

## 2018-05-16 ENCOUNTER — HOSPITAL ENCOUNTER (OUTPATIENT)
Dept: WOMENS IMAGING | Age: 53
Discharge: HOME | End: 2018-05-16
Payer: COMMERCIAL

## 2018-05-16 DIAGNOSIS — Z13.9 VISIT FOR SCREENING: ICD-10-CM

## 2018-05-16 PROCEDURE — 77063 BREAST TOMOSYNTHESIS BI: CPT

## 2018-05-23 DIAGNOSIS — G89.3 CANCER ASSOCIATED PAIN: ICD-10-CM

## 2018-05-23 DIAGNOSIS — C20 MALIGNANT NEOPLASM OF RECTUM (HCC): ICD-10-CM

## 2018-05-23 RX ORDER — FENTANYL 25 UG/H
1 PATCH TRANSDERMAL
Qty: 10 PATCH | Refills: 0 | Status: SHIPPED | OUTPATIENT
Start: 2018-05-31 | End: 2018-05-31 | Stop reason: SDUPTHER

## 2018-05-23 RX ORDER — FENTANYL 100 UG/H
1 PATCH TRANSDERMAL
Qty: 10 PATCH | Refills: 0 | Status: SHIPPED | OUTPATIENT
Start: 2018-05-31 | End: 2018-06-25 | Stop reason: SDUPTHER

## 2018-05-31 DIAGNOSIS — G89.3 CANCER ASSOCIATED PAIN: ICD-10-CM

## 2018-05-31 RX ORDER — FENTANYL 25 UG/H
1 PATCH TRANSDERMAL
Qty: 10 PATCH | Refills: 0 | Status: SHIPPED | OUTPATIENT
Start: 2018-05-31 | End: 2018-06-25 | Stop reason: SDUPTHER

## 2018-06-02 ENCOUNTER — HOSPITAL ENCOUNTER (EMERGENCY)
Age: 53
Discharge: HOME | End: 2018-06-02
Attending: INTERNAL MEDICINE
Payer: COMMERCIAL

## 2018-06-02 VITALS
RESPIRATION RATE: 18 BRPM | TEMPERATURE: 98.6 F | HEIGHT: 59 IN | HEART RATE: 72 BPM | OXYGEN SATURATION: 98 % | BODY MASS INDEX: 24.19 KG/M2 | WEIGHT: 120 LBS | SYSTOLIC BLOOD PRESSURE: 146 MMHG | DIASTOLIC BLOOD PRESSURE: 89 MMHG

## 2018-06-02 DIAGNOSIS — L02.91 ABSCESS: Primary | ICD-10-CM

## 2018-06-02 DIAGNOSIS — I10 ESSENTIAL HYPERTENSION: ICD-10-CM

## 2018-06-02 LAB
ANION GAP SERPL CALCULATED.3IONS-SCNC: 16 MEQ/L (ref 8–16)
ANISOCYTOSIS: ABNORMAL
BASOPHILS # BLD: 2.3 %
BASOPHILS ABSOLUTE: 0.1 THOU/MM3 (ref 0–0.1)
BUN BLDV-MCNC: 8 MG/DL (ref 7–22)
CALCIUM SERPL-MCNC: 10.2 MG/DL (ref 8.5–10.5)
CHLORIDE BLD-SCNC: 86 MEQ/L (ref 98–111)
CO2: 27 MEQ/L (ref 23–33)
CREAT SERPL-MCNC: 0.5 MG/DL (ref 0.4–1.2)
EOSINOPHIL # BLD: 4.1 %
EOSINOPHILS ABSOLUTE: 0.3 THOU/MM3 (ref 0–0.4)
GFR SERPL CREATININE-BSD FRML MDRD: > 90 ML/MIN/1.73M2
GLUCOSE BLD-MCNC: 104 MG/DL (ref 70–108)
HCT VFR BLD CALC: 35.7 % (ref 37–47)
HEMOGLOBIN: 11.7 GM/DL (ref 12–16)
LYMPHOCYTES # BLD: 7.3 %
LYMPHOCYTES ABSOLUTE: 0.5 THOU/MM3 (ref 1–4.8)
MCH RBC QN AUTO: 29.7 PG (ref 27–31)
MCHC RBC AUTO-ENTMCNC: 32.8 GM/DL (ref 33–37)
MCV RBC AUTO: 90.6 FL (ref 81–99)
MONOCYTES # BLD: 10.7 %
MONOCYTES ABSOLUTE: 0.7 THOU/MM3 (ref 0.4–1.3)
NUCLEATED RED BLOOD CELLS: 0 /100 WBC
OSMOLALITY CALCULATION: 257.6 MOSMOL/KG (ref 275–300)
PDW BLD-RTO: 15.3 % (ref 11.5–14.5)
PLATELET # BLD: 335 THOU/MM3 (ref 130–400)
PMV BLD AUTO: 6.4 FL (ref 7.4–10.4)
POTASSIUM SERPL-SCNC: 3.4 MEQ/L (ref 3.5–5.2)
RBC # BLD: 3.94 MILL/MM3 (ref 4.2–5.4)
SEG NEUTROPHILS: 75.6 %
SEGMENTED NEUTROPHILS ABSOLUTE COUNT: 4.9 THOU/MM3 (ref 1.8–7.7)
SODIUM BLD-SCNC: 129 MEQ/L (ref 135–145)
WBC # BLD: 6.5 THOU/MM3 (ref 4.8–10.8)

## 2018-06-02 PROCEDURE — 96372 THER/PROPH/DIAG INJ SC/IM: CPT

## 2018-06-02 PROCEDURE — 6370000000 HC RX 637 (ALT 250 FOR IP): Performed by: INTERNAL MEDICINE

## 2018-06-02 PROCEDURE — 6360000002 HC RX W HCPCS: Performed by: INTERNAL MEDICINE

## 2018-06-02 PROCEDURE — 99283 EMERGENCY DEPT VISIT LOW MDM: CPT

## 2018-06-02 PROCEDURE — 80048 BASIC METABOLIC PNL TOTAL CA: CPT

## 2018-06-02 PROCEDURE — 36415 COLL VENOUS BLD VENIPUNCTURE: CPT

## 2018-06-02 PROCEDURE — 85025 COMPLETE CBC W/AUTO DIFF WBC: CPT

## 2018-06-02 RX ORDER — METOCLOPRAMIDE 10 MG/1
10 TABLET ORAL ONCE
Status: COMPLETED | OUTPATIENT
Start: 2018-06-02 | End: 2018-06-02

## 2018-06-02 RX ORDER — FENTANYL CITRATE 50 UG/ML
25 INJECTION, SOLUTION INTRAMUSCULAR; INTRAVENOUS ONCE
Status: COMPLETED | OUTPATIENT
Start: 2018-06-02 | End: 2018-06-02

## 2018-06-02 RX ORDER — ONDANSETRON 4 MG/1
4 TABLET, ORALLY DISINTEGRATING ORAL ONCE
Status: DISCONTINUED | OUTPATIENT
Start: 2018-06-02 | End: 2018-06-02 | Stop reason: HOSPADM

## 2018-06-02 RX ADMIN — METOCLOPRAMIDE 10 MG: 10 TABLET ORAL at 10:55

## 2018-06-02 RX ADMIN — FENTANYL CITRATE 25 MCG: 50 INJECTION, SOLUTION INTRAMUSCULAR; INTRAVENOUS at 09:13

## 2018-06-02 ASSESSMENT — PAIN SCALES - GENERAL
PAINLEVEL_OUTOF10: 6
PAINLEVEL_OUTOF10: 5

## 2018-06-02 ASSESSMENT — ENCOUNTER SYMPTOMS
COUGH: 0
EYE DISCHARGE: 0
ABDOMINAL PAIN: 0
DIARRHEA: 0
VOMITING: 0
RHINORRHEA: 0
SORE THROAT: 0
RECTAL PAIN: 1
EYE PAIN: 0
BACK PAIN: 0
SHORTNESS OF BREATH: 0
WHEEZING: 0
NAUSEA: 0

## 2018-06-02 ASSESSMENT — PAIN DESCRIPTION - LOCATION: LOCATION: BUTTOCKS

## 2018-06-02 ASSESSMENT — PAIN DESCRIPTION - PAIN TYPE: TYPE: ACUTE PAIN

## 2018-06-02 ASSESSMENT — PAIN DESCRIPTION - ORIENTATION: ORIENTATION: LEFT

## 2018-06-04 ENCOUNTER — TELEPHONE (OUTPATIENT)
Dept: PHYSICAL MEDICINE AND REHAB | Age: 53
End: 2018-06-04

## 2018-06-18 DIAGNOSIS — G89.3 CANCER ASSOCIATED PAIN: ICD-10-CM

## 2018-06-21 ENCOUNTER — TELEPHONE (OUTPATIENT)
Dept: PHYSICAL MEDICINE AND REHAB | Age: 53
End: 2018-06-21

## 2018-06-22 ENCOUNTER — HOSPITAL ENCOUNTER (EMERGENCY)
Age: 53
Discharge: HOME | End: 2018-06-22
Attending: FAMILY MEDICINE
Payer: COMMERCIAL

## 2018-06-22 VITALS
TEMPERATURE: 99.6 F | BODY MASS INDEX: 24.19 KG/M2 | HEART RATE: 90 BPM | DIASTOLIC BLOOD PRESSURE: 97 MMHG | HEIGHT: 59 IN | WEIGHT: 120 LBS | OXYGEN SATURATION: 97 % | SYSTOLIC BLOOD PRESSURE: 150 MMHG | RESPIRATION RATE: 18 BRPM

## 2018-06-22 DIAGNOSIS — G89.18 POST-OP PAIN: ICD-10-CM

## 2018-06-22 DIAGNOSIS — I10 ESSENTIAL HYPERTENSION: ICD-10-CM

## 2018-06-22 DIAGNOSIS — Z48.89 ENCOUNTER FOR POST SURGICAL WOUND CHECK: ICD-10-CM

## 2018-06-22 DIAGNOSIS — K62.89 RECTAL PAIN: Primary | ICD-10-CM

## 2018-06-22 LAB
ANION GAP SERPL CALCULATED.3IONS-SCNC: 15 MEQ/L (ref 8–16)
BASOPHILS # BLD: 0.7 %
BASOPHILS ABSOLUTE: 0 THOU/MM3 (ref 0–0.1)
BUN BLDV-MCNC: 10 MG/DL (ref 7–22)
CALCIUM SERPL-MCNC: 10.4 MG/DL (ref 8.5–10.5)
CHLORIDE BLD-SCNC: 91 MEQ/L (ref 98–111)
CO2: 28 MEQ/L (ref 23–33)
CREAT SERPL-MCNC: 0.6 MG/DL (ref 0.4–1.2)
EOSINOPHIL # BLD: 6.5 %
EOSINOPHILS ABSOLUTE: 0.4 THOU/MM3 (ref 0–0.4)
GFR SERPL CREATININE-BSD FRML MDRD: > 90 ML/MIN/1.73M2
GLUCOSE BLD-MCNC: 109 MG/DL (ref 70–108)
HCT VFR BLD CALC: 35.1 % (ref 37–47)
HEMOGLOBIN: 11.8 GM/DL (ref 12–16)
LYMPHOCYTES # BLD: 10.2 %
LYMPHOCYTES ABSOLUTE: 0.7 THOU/MM3 (ref 1–4.8)
MCH RBC QN AUTO: 30.4 PG (ref 27–31)
MCHC RBC AUTO-ENTMCNC: 33.5 GM/DL (ref 33–37)
MCV RBC AUTO: 90.7 FL (ref 81–99)
MONOCYTES # BLD: 9.8 %
MONOCYTES ABSOLUTE: 0.6 THOU/MM3 (ref 0.4–1.3)
NUCLEATED RED BLOOD CELLS: 0 /100 WBC
OSMOLALITY CALCULATION: 267.9 MOSMOL/KG (ref 275–300)
PDW BLD-RTO: 14.5 % (ref 11.5–14.5)
PLATELET # BLD: 363 THOU/MM3 (ref 130–400)
PMV BLD AUTO: 6.5 FL (ref 7.4–10.4)
POTASSIUM SERPL-SCNC: 3.9 MEQ/L (ref 3.5–5.2)
RBC # BLD: 3.87 MILL/MM3 (ref 4.2–5.4)
SEG NEUTROPHILS: 72.8 %
SEGMENTED NEUTROPHILS ABSOLUTE COUNT: 4.8 THOU/MM3 (ref 1.8–7.7)
SODIUM BLD-SCNC: 134 MEQ/L (ref 135–145)
WBC # BLD: 6.6 THOU/MM3 (ref 4.8–10.8)

## 2018-06-22 PROCEDURE — 80048 BASIC METABOLIC PNL TOTAL CA: CPT

## 2018-06-22 PROCEDURE — 85025 COMPLETE CBC W/AUTO DIFF WBC: CPT

## 2018-06-22 PROCEDURE — 6370000000 HC RX 637 (ALT 250 FOR IP): Performed by: FAMILY MEDICINE

## 2018-06-22 PROCEDURE — 96372 THER/PROPH/DIAG INJ SC/IM: CPT

## 2018-06-22 PROCEDURE — 6360000002 HC RX W HCPCS: Performed by: FAMILY MEDICINE

## 2018-06-22 PROCEDURE — 36415 COLL VENOUS BLD VENIPUNCTURE: CPT

## 2018-06-22 PROCEDURE — 99283 EMERGENCY DEPT VISIT LOW MDM: CPT

## 2018-06-22 RX ORDER — KETOROLAC TROMETHAMINE 30 MG/ML
30 INJECTION, SOLUTION INTRAMUSCULAR; INTRAVENOUS ONCE
Status: COMPLETED | OUTPATIENT
Start: 2018-06-22 | End: 2018-06-22

## 2018-06-22 RX ORDER — MORPHINE SULFATE 2 MG/ML
2 INJECTION, SOLUTION INTRAMUSCULAR; INTRAVENOUS ONCE
Status: DISCONTINUED | OUTPATIENT
Start: 2018-06-22 | End: 2018-06-22

## 2018-06-22 RX ORDER — MORPHINE SULFATE 2 MG/ML
2 INJECTION, SOLUTION INTRAMUSCULAR; INTRAVENOUS ONCE
Status: COMPLETED | OUTPATIENT
Start: 2018-06-22 | End: 2018-06-22

## 2018-06-22 RX ORDER — HYDROCODONE BITARTRATE AND ACETAMINOPHEN 5; 325 MG/1; MG/1
1 TABLET ORAL ONCE
Status: COMPLETED | OUTPATIENT
Start: 2018-06-22 | End: 2018-06-22

## 2018-06-22 RX ADMIN — HYDROCODONE BITARTRATE AND ACETAMINOPHEN 1 TABLET: 5; 325 TABLET ORAL at 15:01

## 2018-06-22 RX ADMIN — MORPHINE SULFATE 2 MG: 2 INJECTION, SOLUTION INTRAMUSCULAR; INTRAVENOUS at 16:01

## 2018-06-22 RX ADMIN — KETOROLAC TROMETHAMINE 30 MG: 30 INJECTION, SOLUTION INTRAMUSCULAR at 15:01

## 2018-06-22 ASSESSMENT — ENCOUNTER SYMPTOMS
BACK PAIN: 0
NAUSEA: 0
COUGH: 0
BLOOD IN STOOL: 0
RECTAL PAIN: 1
WHEEZING: 0
SHORTNESS OF BREATH: 0
SORE THROAT: 0
ABDOMINAL PAIN: 0
VOMITING: 0
DIARRHEA: 0

## 2018-06-22 ASSESSMENT — PAIN DESCRIPTION - LOCATION: LOCATION: RECTUM

## 2018-06-22 ASSESSMENT — PAIN SCALES - GENERAL
PAINLEVEL_OUTOF10: 5
PAINLEVEL_OUTOF10: 10

## 2018-06-22 ASSESSMENT — PAIN DESCRIPTION - PAIN TYPE: TYPE: ACUTE PAIN

## 2018-06-22 ASSESSMENT — PAIN DESCRIPTION - DESCRIPTORS: DESCRIPTORS: BURNING

## 2018-06-22 ASSESSMENT — PAIN DESCRIPTION - FREQUENCY: FREQUENCY: CONTINUOUS

## 2018-06-25 DIAGNOSIS — G89.3 CANCER ASSOCIATED PAIN: ICD-10-CM

## 2018-06-25 DIAGNOSIS — C20 MALIGNANT NEOPLASM OF RECTUM (HCC): ICD-10-CM

## 2018-06-25 RX ORDER — FENTANYL 100 UG/H
1 PATCH TRANSDERMAL
Qty: 10 PATCH | Refills: 0 | Status: SHIPPED | OUTPATIENT
Start: 2018-06-30 | End: 2018-07-23 | Stop reason: SDUPTHER

## 2018-06-25 RX ORDER — FENTANYL 25 UG/H
1 PATCH TRANSDERMAL
Qty: 10 PATCH | Refills: 0 | Status: SHIPPED | OUTPATIENT
Start: 2018-06-30 | End: 2018-07-23 | Stop reason: SDUPTHER

## 2018-07-11 ENCOUNTER — OFFICE VISIT (OUTPATIENT)
Dept: PHYSICAL MEDICINE AND REHAB | Age: 53
End: 2018-07-11
Payer: COMMERCIAL

## 2018-07-11 VITALS
HEIGHT: 59 IN | HEART RATE: 95 BPM | DIASTOLIC BLOOD PRESSURE: 85 MMHG | SYSTOLIC BLOOD PRESSURE: 133 MMHG | WEIGHT: 120 LBS | BODY MASS INDEX: 24.19 KG/M2

## 2018-07-11 DIAGNOSIS — K62.89 CHRONIC RECTAL PAIN: ICD-10-CM

## 2018-07-11 DIAGNOSIS — G89.3 CANCER ASSOCIATED PAIN: ICD-10-CM

## 2018-07-11 DIAGNOSIS — G89.3 CHRONIC PAIN DUE TO NEOPLASM: ICD-10-CM

## 2018-07-11 DIAGNOSIS — G89.29 CHRONIC RECTAL PAIN: ICD-10-CM

## 2018-07-11 DIAGNOSIS — C20 MALIGNANT NEOPLASM OF RECTUM (HCC): Primary | ICD-10-CM

## 2018-07-11 DIAGNOSIS — K59.03 DRUG-INDUCED CONSTIPATION: ICD-10-CM

## 2018-07-11 PROCEDURE — 1036F TOBACCO NON-USER: CPT | Performed by: NURSE PRACTITIONER

## 2018-07-11 PROCEDURE — G8420 CALC BMI NORM PARAMETERS: HCPCS | Performed by: NURSE PRACTITIONER

## 2018-07-11 PROCEDURE — 99213 OFFICE O/P EST LOW 20 MIN: CPT | Performed by: NURSE PRACTITIONER

## 2018-07-11 PROCEDURE — G8427 DOCREV CUR MEDS BY ELIG CLIN: HCPCS | Performed by: NURSE PRACTITIONER

## 2018-07-11 PROCEDURE — 3017F COLORECTAL CA SCREEN DOC REV: CPT | Performed by: NURSE PRACTITIONER

## 2018-07-11 ASSESSMENT — ENCOUNTER SYMPTOMS: RECTAL PAIN: 1

## 2018-07-11 NOTE — PROGRESS NOTES
[facility]  [address]  Dept: [phone]  Dept Fax: [phone]  Loc: [phone]    Visit Date: 7/11/2018    Functionality Assessment/Goals Worksheet     On a scale of 0 (Does not Interfere) to 10 (Completely Interferes)     1. Which number describes how during the past week pain has interfered with       the following:  A. General Activity:  7  B. Mood: 7  C. Walking Ability:  8  D. Normal Work (Includes both work outside the home and housework):  8  E. Relations with Other People:   8  F. Sleep:   6  G. Enjoyment of Life:   8    2. Patient Prefers to Take their Pain Medications:     []  On a regular basis   []  Only when necessary    [x]  Does not take pain medications    3. What are the Patient's Goals/Expectations for Visiting Pain Management? []  Learn about my pain    []  Receive Medication   []  Physical Therapy     []  Treat Depression   []  Receive Injections    []  Treat Sleep   []  Deal with Anxiety and Stress   []  Treat Opoid Dependence/Addiction   [x]  Other:      HPI:   Mona Moon is a 47 y.o. female is here today for    Chief Complaint: Rectal pain     HPI   2 month FU. Continues to have rectal pain. Sometimes well controlled and sometimes is increased. Medications continues to help but patient states that Subsys starting to hurt aft 5 hour lluvia. States walking 30 minutes per day. Looks better today. Continues to follow with wound care. Medications reviewed. Patient denies  side effects with medications. Patient states she is  taking medications as prescribed. She denies receiving pain medications from other sources. She had 1 ER vissit for rectal pain since last visit. Pain scale with out pain medications is 9/10. Pain scale with pain medications is  3/10.   Last dose of Fentanyl patch on left mid back (100 + 25), Subsys was 7/11/2018   Drug screen reviewed from 5/9/2018 and was rigidity. No edema, no erythema and normal range of motion present. No thyromegaly present. Cardiovascular: Normal rate, regular rhythm, normal heart sounds and intact distal pulses. Exam reveals no gallop and no friction rub. No murmur heard. Pulmonary/Chest: Effort normal and breath sounds normal. No respiratory distress. She has no wheezes. She has no rales. She exhibits no tenderness. Abdominal: Soft. Bowel sounds are normal. She exhibits no distension. There is generalized tenderness. There is no rebound and no guarding. Musculoskeletal:        Right ankle: She exhibits normal range of motion and no swelling. Left ankle: She exhibits swelling. Lumbar back: She exhibits decreased range of motion, tenderness and pain. Back:         Right lower leg: She exhibits swelling. Right foot: There is swelling. Neurological: She is alert and oriented to person, place, and time. She has normal strength and normal reflexes. She is not disoriented. She displays no atrophy. No cranial nerve deficit or sensory deficit. She exhibits normal muscle tone. She displays a negative Romberg sign. Gait abnormal. Coordination normal.   Skin: Skin is warm. No rash noted. She is not diaphoretic. No erythema. No pallor. Psychiatric: Her mood appears not anxious. Her affect is not angry, not blunt, not labile and not inappropriate. Her speech is not rapid and/or pressured, not delayed, not tangential and not slurred. She is not agitated, not aggressive, not hyperactive, not slowed, not withdrawn, not actively hallucinating and not combative. Thought content is not paranoid and not delusional. Cognition and memory are not impaired. She does not express impulsivity or inappropriate judgment. She does not exhibit a depressed mood. She expresses no homicidal and no suicidal ideation. She expresses no suicidal plans and no homicidal plans. She is communicative.  She exhibits normal recent memory and

## 2018-07-18 ENCOUNTER — OFFICE VISIT (OUTPATIENT)
Dept: SURGERY | Age: 53
End: 2018-07-18
Payer: COMMERCIAL

## 2018-07-18 VITALS
SYSTOLIC BLOOD PRESSURE: 120 MMHG | RESPIRATION RATE: 18 BRPM | HEART RATE: 82 BPM | BODY MASS INDEX: 24.19 KG/M2 | TEMPERATURE: 98.8 F | OXYGEN SATURATION: 100 % | WEIGHT: 120 LBS | HEIGHT: 59 IN | DIASTOLIC BLOOD PRESSURE: 80 MMHG

## 2018-07-18 DIAGNOSIS — K62.89 RECTAL PAIN: Primary | ICD-10-CM

## 2018-07-18 DIAGNOSIS — C20 RECTAL CANCER (HCC): ICD-10-CM

## 2018-07-18 PROCEDURE — G8420 CALC BMI NORM PARAMETERS: HCPCS | Performed by: SURGERY

## 2018-07-18 PROCEDURE — 1036F TOBACCO NON-USER: CPT | Performed by: SURGERY

## 2018-07-18 PROCEDURE — 3017F COLORECTAL CA SCREEN DOC REV: CPT | Performed by: SURGERY

## 2018-07-18 PROCEDURE — 99214 OFFICE O/P EST MOD 30 MIN: CPT | Performed by: SURGERY

## 2018-07-18 PROCEDURE — G8427 DOCREV CUR MEDS BY ELIG CLIN: HCPCS | Performed by: SURGERY

## 2018-07-18 RX ORDER — GABAPENTIN 100 MG/1
100 CAPSULE ORAL 3 TIMES DAILY
Qty: 90 CAPSULE | Refills: 0 | Status: ON HOLD | OUTPATIENT
Start: 2018-07-18 | End: 2018-09-28 | Stop reason: HOSPADM

## 2018-07-18 NOTE — TELEPHONE ENCOUNTER
Pt was seen by Dr. Karla Tong today. Pt states that he suggested that she try gabepentin 100mg, three times daily for her rectal pain. Pt would like to try that as well.

## 2018-07-19 ASSESSMENT — ENCOUNTER SYMPTOMS
CHEST TIGHTNESS: 0
RHINORRHEA: 0
EYE ITCHING: 0
DIARRHEA: 0
APNEA: 0
CONSTIPATION: 1
ABDOMINAL DISTENTION: 0
CHOKING: 0
WHEEZING: 0
ANAL BLEEDING: 0
FACIAL SWELLING: 0
ABDOMINAL PAIN: 0
EYE DISCHARGE: 0
SINUS PRESSURE: 0
ALLERGIC/IMMUNOLOGIC NEGATIVE: 1
COLOR CHANGE: 0
EYE REDNESS: 0
RECTAL PAIN: 1
VOICE CHANGE: 0
BLOOD IN STOOL: 0
PHOTOPHOBIA: 0
SORE THROAT: 0
VOMITING: 0
BACK PAIN: 0
COUGH: 0
STRIDOR: 0
EYE PAIN: 0
SHORTNESS OF BREATH: 0
TROUBLE SWALLOWING: 0
NAUSEA: 0

## 2018-07-20 NOTE — PROGRESS NOTES
Subjective:      Patient ID: Ann Parra is a 47 y.o. female. Chief Complaint   Patient presents with    Surgical Consult     Est patient-s/p robotic assisted low anterior resection with primary anastmosis-6/5/12 having rectal pain CT scan done 5/18/18     HPI  Vonnie Lopez is a 61-year-old female who I originally saw back in 2012 secondary to rectal adenocarcinoma. At that time she underwent a low anterior resection. Postoperatively did well. Later developed recurrence requiring a ultralow LAR in 2014 at [facility] by Dr. Tony Peña. She then had a complicated history requiring exploration with debridements. She had developed a pelvic abscess. It appears this has been drained which developed into a fistula. She currently now has a seton in place. Major complaint is rectal pain. Better with movement and worse with sitting. Much worse at night as it greatly affects her sleep. She has been seeing pain team for pain control. She states this only marginally helps. She states she does have periodic drainage around the seton. Denies any bleeding. Denies any abdominal pain. No chest pain or shortness of breath. Complains of constipation. No hematochezia or melena. Ostomy functioning. She denies any fever, chills or sweats. She states she really had a rough go of it over the last few months with the abscess but thinks she is slowly starting to improve. She is just hoping to get the rectal pain improved now and making sure there is no other surgery that should be done. Review of Systems   Constitutional: Negative for activity change, appetite change, chills, diaphoresis, fatigue, fever and unexpected weight change. HENT: Negative for congestion, dental problem, drooling, ear discharge, ear pain, facial swelling, hearing loss, mouth sores, nosebleeds, postnasal drip, rhinorrhea, sinus pressure, sneezing, sore throat, tinnitus, trouble swallowing and voice change.     Eyes: Negative for photophobia, pain, discharge, redness, itching and visual disturbance. Respiratory: Negative for apnea, cough, choking, chest tightness, shortness of breath, wheezing and stridor. Cardiovascular: Negative for chest pain, palpitations and leg swelling. Gastrointestinal: Positive for constipation and rectal pain. Negative for abdominal distention, abdominal pain, anal bleeding, blood in stool, diarrhea, nausea and vomiting. Endocrine: Negative. Genitourinary: Negative for decreased urine volume, difficulty urinating, dyspareunia, dysuria, enuresis, flank pain, frequency, genital sores, hematuria, menstrual problem, pelvic pain, urgency, vaginal bleeding, vaginal discharge and vaginal pain. Musculoskeletal: Negative for arthralgias, back pain, gait problem, joint swelling, myalgias, neck pain and neck stiffness. Skin: Negative for color change, pallor, rash and wound. Allergic/Immunologic: Negative. Neurological: Negative for dizziness, tremors, seizures, syncope, facial asymmetry, speech difficulty, weakness, light-headedness, numbness and headaches. Hematological: Negative for adenopathy. Does not bruise/bleed easily. Psychiatric/Behavioral: Positive for sleep disturbance. Negative for agitation, behavioral problems, confusion, decreased concentration, dysphoric mood, hallucinations, self-injury and suicidal ideas. The patient is not nervous/anxious and is not hyperactive. Past Medical History:   Diagnosis Date    Blood transfusion     Cancer [facility])     colorectal    Diabetes mellitus type 2 in nonobese ([facility] [address] 10/27/2017    Hyperlipidemia     Hypertension     Hyperthyroidism        Past Surgical History:   Procedure Laterality Date    COLON SURGERY  6-5-12    Robotic assisted left hemicolectomy-Dr. Alvarez    COLONOSCOPY      HEMORRHOID SURGERY  8/2013    banding     HYSTERECTOMY  5/2011    HYSTEROSCOPY  2004    OTHER SURGICAL HISTORY  7/30/2013    RIGHT ABD.  WALL MASS EXCISION- [place]    [place] COLON CA SCRN NOT  [address] IND Left 10/27/2017    COLONOSCOPY performed by Radha Rivas MD at [address] [facility]    TUBAL LIGATION  1990's    TUNNELED VENOUS PORT PLACEMENT  6/28/2012    purple power port-Dr. [facility]        Current Outpatient Prescriptions   Medication Sig Dispense Refill    fentaNYL (DURAGESIC) 100 MCG/HR Place 1 patch onto the skin every 72 hours for 30 days. . 10 patch 0    fentaNYL (DURAGESIC) 25 MCG/HR Place 1 patch onto the skin every 72 hours for 30 days. In addition to the 100mcg patch. Cancer pain. 10 patch 0    fentaNYL (SUBSYS) 800 MCG LIQD Place 1 spray under the tongue every 6 hours as needed for Pain for up to 30 days. . 120 each 0    amLODIPine (NORVASC) 10 MG tablet       hydrochlorothiazide (HYDRODIURIL) 25 MG tablet       naloxegol (MOVANTIK) 25 MG TABS tablet Take 1 tablet by mouth every morning 30 tablet 0    metoprolol succinate (TOPROL XL) 50 MG extended release tablet Take 1 tablet by mouth daily (Patient taking differently: Take 100 mg by mouth daily ) 30 tablet 3    levothyroxine (SYNTHROID) 50 MCG tablet Take 50 mcg by mouth Daily      desipramine (NORPRAMIN) 25 MG tablet Take 25 mg by mouth      promethazine (PHENERGAN) 25 MG tablet Take 25 mg by mouth every 6 hours as needed for Nausea      pravastatin (PRAVACHOL) 20 MG tablet Take 20 mg by mouth daily      docusate sodium (COLACE) 100 MG capsule Take 1 capsule by mouth 2 times daily 60 capsule 0    lidocaine (XYLOCAINE) 5 % ointment Apply topically as needed. 50 g 3    valsartan (DIOVAN) 320 MG tablet Take 320 mg by mouth daily      sodium chloride 1 g tablet Take 1 g by mouth daily      ibuprofen (ADVIL;MOTRIN) 600 MG tablet Take 1 tablet by mouth every 6 hours as needed for Pain 30 tablet 0    metFORMIN (GLUCOPHAGE) 500 MG tablet Take 500 mg by mouth 2 times daily (with meals)      prochlorperazine (COMPAZINE) 10 MG tablet Take 10 mg by mouth every 6 hours as needed.       gabapentin no wheezes. She has no rhonchi. She has no rales. She exhibits no tenderness and no deformity. Abdominal: Soft. Bowel sounds are normal. She exhibits no distension, no abdominal bruit and no mass. There is no hepatosplenomegaly. There is no tenderness. There is no rigidity, no rebound and no guarding. No hernia. Hernia confirmed negative in the ventral area. Genitourinary: Rectal exam shows tenderness. Musculoskeletal: Normal range of motion. She exhibits no edema or tenderness. Neurological: She is alert and oriented to person, place, and time. She has normal strength. No cranial nerve deficit or sensory deficit. Skin: Skin is warm and dry. No rash noted. She is not diaphoretic. No erythema. No pallor. Psychiatric: She has a normal mood and affect. Her speech is normal and behavior is normal. Judgment and thought content normal. Cognition and memory are normal.   Vitals reviewed.     Lab Results   Component Value Date     (L) 06/22/2018    K 3.9 06/22/2018    CL 91 (L) 06/22/2018    CO2 28 06/22/2018     Lab Results   Component Value Date    CREATININE 0.6 06/22/2018     Lab Results   Component Value Date    WBC 6.6 06/22/2018    HGB 11.8 (L) 06/22/2018    HCT 35.1 (L) 06/22/2018    MCV 90.7 06/22/2018     06/22/2018     Lab Results   Component Value Date    ALT 11 03/21/2018    AST <5 03/21/2018    ALKPHOS 129 (H) 03/21/2018    BILITOT 0.5 03/21/2018     Lab Results   Component Value Date    LIPASE 40.4 02/18/2018     Imaging -   Narrative   PROCEDURE: CT ABDOMEN PELVIS WO CONTRAST       CLINICAL INFORMATION: patient with history of colorectal cancer and perirectal abscess x2 drainage, having tachycardia and per pateitn low grade fever .       COMPARISON: No prior study.       TECHNIQUE: 5 mm noncontrast images of the abdomen and pelvis   All CT scans at this facility use dose modulation, iterative reconstruction, and/or weight-based dosing when appropriate to reduce radiation dose to as low as reasonably achievable.       FINDINGS: Lung bases   Lung bases are clear undersurface of the heart is unremarkable other than the presence of a infusion port catheter tip and a few aortic wall calcifications. Abdomen pelvis   Solid or evaluation is limited without IV contrast. Liver, spleen, are normal. Pancreas is normal.   Gallbladder may have sludge. Adrenals and kidneys appear normal.   Aorta is mildly atherosclerotic. There is severe constipation. Postsurgical changes of the right colon. Left anterior abdominal colostomy.       Fluid and gas collection in the presacral space is similar to the prior examination this measures 6.8 x 2.6 cm. There is a drainage catheter artery in place within this collection. Urinary bladder shows thickened wall which is likely reactive.           Impression   1. Interval improvement in the appearance of the kidneys with resolution of bilateral hydronephrosis   2. Stable presacral air and fluid collection. 3. Thickened bladder wall possibly remote due to cystitis. 4. Severe constipation. Narrative   PROCEDURE: CT ABDOMEN PELVIS WO CONTRAST       CLINICAL INFORMATION: rectal cancer with seroma/to compare the present and the one done on 2/6 . Additional history obtained from electronic medical record indicates the patient has rectal pain and a history of rectal cancer.       COMPARISON: CT of the abdomen and pelvis dated February 6, 2018       TECHNIQUE: Axial 5 mm CT images were obtained through the abdomen and pelvis. No contrast was given.  Coronal reconstructions were obtained.       All CT scans at this facility use dose modulation, iterative reconstruction, and/or weight-based dosing when appropriate to reduce radiation dose to as low as reasonably achievable.       FINDINGS:    Evaluation of the solid and hollow viscera is limited without contrast.       The visualized aspects of the lung bases are clear.   The base of the heart is within appropriate limits.       No liver masses are noted. The spleen is unremarkable. The adrenal glands and pancreas are within acceptable limits. The gallbladder is nondistended no calcified gallstones. No intrahepatic or extrahepatic biliary ductal dilation. There is worsened    hydronephrosis involving the bilateral kidneys. No contour deforming renal masses are noted.           Postsurgical changes are noted within the colon with a colostomy at the left lower quadrant. No dilated loops of bowel are identified. No free intraperitoneal gas is seen. .  The IVC and aorta are of normal caliber. There is no adenopathy.           Surgical changes are present in the region of the rectum. A gas containing fluid collection is again noted at the presacral space which measures approximately 7.1 x 1.5 cm in the transverse dimension. This is similar compared to the prior exam. The    urinary bladder is largely distended. There is no adenopathy. No suspicious osseous lesions are present.                   Impression       1. The urinary bladder is largely distended and there is worsened, moderate hydronephrosis bilaterally.       2. There is redemonstration of a gas containing fluid collection in the presacral space adjacent to surgical changes involving the rectum. The collection is grossly similar in size compared to the prior exam.         Patient Active Problem List   Diagnosis    Colonic mass    Abdominal wall mass of right flank    Malignant neoplasm of rectum (HCC)    Rectal bleed    BRBPR (bright red blood per rectum)    Chronic rectal pain    Benign hypertension    Diabetes mellitus type 2, controlled ([address]    Hypothyroidism    Perirectal fistula    Coagulopathy (HCC)    Lower GI bleed    Refractory nausea and vomiting    Ileus (HCC)    Moderate malnutrition (HCC)    SIRS (systemic inflammatory response syndrome) (HCC)     Assessment:      1. Rectal pain  2. Rectal carcinoma  3.   Anal fistula      Plan: 1.  Rectal discomfort/pain likely secondary to seton which has been placed for anal fistula. Long discussion with patient and  about her current situation. Explained recent rectal abscess and developing fistula. Discussed purpose of seton. No residual fluid collections in need of drainage. I would defer removing seton unless okayed by Dr. Pennie Griffin at [facility].  2.  Follow-up with oncology as directed. Further rectal cancer treatment per [facility] and oncology. 3.  Pain control per chronic pain team.  4.  No surgical intervention from my standpoint at this time. 5.  Follow-up as needed. 6.  Signs and symptoms reviewed with patient that would be concerning and need her to return to office for re-evaluation. Patient states She will call if She has questions or concerns. 7.  Follow-up with PCP as directed.

## 2018-07-23 DIAGNOSIS — C20 MALIGNANT NEOPLASM OF RECTUM (HCC): ICD-10-CM

## 2018-07-23 DIAGNOSIS — G89.3 CANCER ASSOCIATED PAIN: ICD-10-CM

## 2018-07-23 RX ORDER — FENTANYL 25 UG/H
1 PATCH TRANSDERMAL
Qty: 10 PATCH | Refills: 0 | Status: SHIPPED | OUTPATIENT
Start: 2018-07-30 | End: 2018-08-29

## 2018-07-23 RX ORDER — FENTANYL 100 UG/H
1 PATCH TRANSDERMAL
Qty: 10 PATCH | Refills: 0 | Status: SHIPPED | OUTPATIENT
Start: 2018-07-30 | End: 2018-08-29

## 2018-07-25 ENCOUNTER — HOSPITAL ENCOUNTER (EMERGENCY)
Age: 53
Discharge: HOME | End: 2018-07-25
Attending: EMERGENCY MEDICINE
Payer: COMMERCIAL

## 2018-07-25 ENCOUNTER — APPOINTMENT (OUTPATIENT)
Dept: INTERVENTIONAL RADIOLOGY/VASCULAR | Age: 53
End: 2018-07-25
Payer: COMMERCIAL

## 2018-07-25 VITALS
OXYGEN SATURATION: 96 % | SYSTOLIC BLOOD PRESSURE: 156 MMHG | RESPIRATION RATE: 16 BRPM | BODY MASS INDEX: 25.45 KG/M2 | DIASTOLIC BLOOD PRESSURE: 86 MMHG | HEART RATE: 71 BPM | WEIGHT: 126 LBS | TEMPERATURE: 98.2 F

## 2018-07-25 DIAGNOSIS — M79.662 PAIN OF LEFT CALF: Primary | ICD-10-CM

## 2018-07-25 PROCEDURE — 96372 THER/PROPH/DIAG INJ SC/IM: CPT

## 2018-07-25 PROCEDURE — 99214 OFFICE O/P EST MOD 30 MIN: CPT

## 2018-07-25 PROCEDURE — 6360000002 HC RX W HCPCS: Performed by: NURSE PRACTITIONER

## 2018-07-25 PROCEDURE — 99213 OFFICE O/P EST LOW 20 MIN: CPT | Performed by: EMERGENCY MEDICINE

## 2018-07-25 PROCEDURE — 93971 EXTREMITY STUDY: CPT

## 2018-07-25 PROCEDURE — 99283 EMERGENCY DEPT VISIT LOW MDM: CPT

## 2018-07-25 RX ORDER — KETOROLAC TROMETHAMINE 30 MG/ML
30 INJECTION, SOLUTION INTRAMUSCULAR; INTRAVENOUS ONCE
Status: COMPLETED | OUTPATIENT
Start: 2018-07-25 | End: 2018-07-25

## 2018-07-25 RX ORDER — OXYCODONE HYDROCHLORIDE AND ACETAMINOPHEN 5; 325 MG/1; MG/1
1 TABLET ORAL EVERY 4 HOURS PRN
COMMUNITY
End: 2018-09-05 | Stop reason: ALTCHOICE

## 2018-07-25 RX ADMIN — KETOROLAC TROMETHAMINE 30 MG: 30 INJECTION, SOLUTION INTRAMUSCULAR at 19:31

## 2018-07-25 ASSESSMENT — ENCOUNTER SYMPTOMS
EYE DISCHARGE: 0
RHINORRHEA: 0
SINUS PRESSURE: 0
CONSTIPATION: 0
SORE THROAT: 0
NAUSEA: 0
BACK PAIN: 0
COUGH: 0
VOICE CHANGE: 0
WHEEZING: 0
STRIDOR: 0
BLOOD IN STOOL: 0
CHOKING: 0
DIARRHEA: 0
VOMITING: 0
EYE REDNESS: 0
EYE PAIN: 0
SHORTNESS OF BREATH: 0
FACIAL SWELLING: 0
TROUBLE SWALLOWING: 0
ABDOMINAL PAIN: 0

## 2018-07-25 ASSESSMENT — PAIN DESCRIPTION - LOCATION: LOCATION: ANKLE;LEG

## 2018-07-25 ASSESSMENT — PAIN SCALES - GENERAL
PAINLEVEL_OUTOF10: 8
PAINLEVEL_OUTOF10: 8

## 2018-07-25 ASSESSMENT — PAIN DESCRIPTION - PAIN TYPE: TYPE: ACUTE PAIN

## 2018-07-25 ASSESSMENT — PAIN DESCRIPTION - ORIENTATION: ORIENTATION: LEFT;POSTERIOR;LOWER

## 2018-07-25 ASSESSMENT — PAIN DESCRIPTION - DESCRIPTORS: DESCRIPTORS: ACHING

## 2018-07-25 ASSESSMENT — PAIN DESCRIPTION - FREQUENCY: FREQUENCY: CONTINUOUS

## 2018-07-25 NOTE — ED PROVIDER NOTES
light-headedness, numbness and headaches. Hematological: Negative for adenopathy. Psychiatric/Behavioral: Negative for confusion and suicidal ideas. The patient is not nervous/anxious. PAST MEDICAL HISTORY    has a past medical history of Blood transfusion; Cancer ([facility] [address] Diabetes mellitus type 2 in nonobese [facility]); Hyperlipidemia; Hypertension; and Hyperthyroidism. SURGICAL HISTORY      has a past surgical history that includes Hysterectomy (5/2011); hysteroscopy (2004); Tubal ligation (1990's); Tunneled venous port placement (6/28/2012); Colon surgery (6-5-12); other surgical history (7/30/2013); Hemorrhoid surgery (8/2013); pr colon ca scrn not hi rsk ind (Left, 10/27/2017); and Colonoscopy. CURRENT MEDICATIONS       Discharge Medication List as of 7/25/2018  7:20 PM      CONTINUE these medications which have NOT CHANGED    Details   oxyCODONE-acetaminophen (PERCOCET) 5-325 MG per tablet Take 1 tablet by mouth every 4 hours as needed for Pain. Rafy Wagner Historical Med      fentaNYL (DURAGESIC) 100 MCG/HR Place 1 patch onto the skin every 72 hours for 30 days. ., Disp-10 patch, R-0Normal      fentaNYL (SUBSYS) 800 MCG LIQD Place 1 spray under the tongue every 6 hours as needed for Pain for up to 30 days. ., Disp-120 each, R-0Normal      gabapentin (NEURONTIN) 100 MG capsule Take 1 capsule by mouth 3 times daily for 30 days. ., Disp-90 capsule, R-0Normal      amLODIPine (NORVASC) 10 MG tablet Historical Med      hydrochlorothiazide (HYDRODIURIL) 25 MG tablet Historical Med      metoprolol succinate (TOPROL XL) 50 MG extended release tablet Take 1 tablet by mouth daily, Disp-30 tablet, R-3Normal      levothyroxine (SYNTHROID) 50 MCG tablet Take 50 mcg by mouth DailyHistorical Med      pravastatin (PRAVACHOL) 20 MG tablet Take 20 mg by mouth dailyHistorical Med      docusate sodium (COLACE) 100 MG capsule Take 1 capsule by mouth 2 times daily, Disp-60 capsule, R-0Normal      lidocaine (XYLOCAINE) 5 % ointment Apply topically as needed. , Disp-50 g, R-3, Normal      valsartan (DIOVAN) 320 MG tablet Take 320 mg by mouth dailyHistorical Med      sodium chloride 1 g tablet Take 1 g by mouth dailyHistorical Med      metFORMIN (GLUCOPHAGE) 500 MG tablet Take 500 mg by mouth 2 times daily (with meals)      prochlorperazine (COMPAZINE) 10 MG tablet Take 10 mg by mouth every 6 hours as needed. fentaNYL (DURAGESIC) 25 MCG/HR Place 1 patch onto the skin every 72 hours for 30 days. In addition to the 100mcg patch. Cancer pain., Disp-10 patch, R-0Normal      naloxegol (MOVANTIK) 25 MG TABS tablet Take 1 tablet by mouth every morning, Disp-30 tablet, R-0Normal      desipramine (NORPRAMIN) 25 MG tablet Take 25 mg by mouthHistorical Med      promethazine (PHENERGAN) 25 MG tablet Take 25 mg by mouth every 6 hours as needed for NauseaHistorical Med      ibuprofen (ADVIL;MOTRIN) 600 MG tablet Take 1 tablet by mouth every 6 hours as needed for Pain, Disp-30 tablet, R-0Print             ALLERGIES     has No Known Allergies. FAMILY HISTORY     indicated that her mother is alive. She indicated that her father is . She indicated that her brother is alive. She indicated that her maternal grandmother is . She indicated that her maternal grandfather is . She indicated that her paternal grandmother is . She indicated that her paternal grandfather is . family history includes Cancer in her father; Diabetes in her mother; High Blood Pressure in her father and mother. SOCIAL HISTORY      reports that she has never smoked. She has never used smokeless tobacco. She reports that she does not drink alcohol or use drugs. PHYSICAL EXAM     INITIAL VITALS:  weight is 126 lb (57.2 kg). Her temporal temperature is 98.2 °F (36.8 °C). Her blood pressure is 156/86 (abnormal) and her pulse is 71. Her respiration is 16 and oxygen saturation is 96%.     Physical Exam   Constitutional: She is oriented to person, place, and time. She appears well-developed and well-nourished. Non-toxic appearance. HENT:   Head: Normocephalic and atraumatic. Right Ear: Tympanic membrane and external ear normal.   Left Ear: Tympanic membrane and external ear normal.   Nose: Nose normal.   Mouth/Throat: Oropharynx is clear and moist and mucous membranes are normal. No oropharyngeal exudate, posterior oropharyngeal edema or posterior oropharyngeal erythema. Eyes: Conjunctivae and EOM are normal.   Neck: Normal range of motion. Neck supple. No JVD present. Cardiovascular: Normal rate, regular rhythm, normal heart sounds, intact distal pulses and normal pulses. Exam reveals no gallop and no friction rub. No murmur heard. Pulmonary/Chest: Effort normal and breath sounds normal. No respiratory distress. She has no decreased breath sounds. She has no wheezes. She has no rhonchi. She has no rales. Abdominal: Soft. Bowel sounds are normal. She exhibits no distension. There is no tenderness. There is no rebound, no guarding and no CVA tenderness. Musculoskeletal: Normal range of motion. She exhibits no edema. Left lower leg: She exhibits tenderness (posterior aspect). Neurological: She is alert and oriented to person, place, and time. She exhibits normal muscle tone. Coordination normal.   Skin: Skin is warm and dry. No rash noted. She is not diaphoretic. Nursing note and vitals reviewed. DIFFERENTIAL DIAGNOSIS:   Including but not limited to DVT, claudication, muscle strain    DIAGNOSTIC RESULTS     EKG: All EKG's are interpreted by the Emergency Department Physician who either signs or Co-signs this chart in the absence of a cardiologist.  None    RADIOLOGY: non-plain film images(s) such as CT, Ultrasound and MRI are read by the radiologist.  Plain radiographic images are visualized and preliminarily interpreted by the emergency physician unless otherwise stated below.   VL DUP LOWER EXTREMITY VENOUS LEFT   Final plan, questions answered, verbalizes understanding and is in agreement. ED Medications administered this visit:    Medications   ketorolac (TORADOL) injection 30 mg (30 mg Intramuscular Given 7/25/18 1931)           I have given the patient strict written and verbal instructions about care at home, follow-up, and signs and symptoms of worsening of condition and they did verbalize understanding. Patient was seen independently by myself. The Patient's final impression and disposition and plan was determined by myself. CRITICAL CARE:   None    CONSULTS:  None    PROCEDURES:  None    FINAL IMPRESSION      1.  Pain of left calf          DISPOSITION/PLAN   DISPOSITION Decision To Discharge 07/25/2018 08:05:04 PM        PATIENT REFERRED TO:  MD SALLY Kumar [address]  [phone]    Schedule an appointment as soon as possible for a visit in 1 day  For follow up      DISCHARGE MEDICATIONS:  Discharge Medication List as of 7/25/2018  7:20 PM          (Please note that portions of this note were completed with a voice recognition program.  Efforts were made to edit the dictations but occasionally words are mis-transcribed.)    JOSE Caicedo CNP, APRN - CNP  07/27/18 [id number]

## 2018-07-25 NOTE — ED PROVIDER NOTES
transfusion     [facility])     colorectal    Diabetes mellitus type 2 in nonobese ([address] 10/27/2017    Hyperlipidemia     Hypertension     Hyperthyroidism        SURGICAL HISTORY     Patient  has a past surgical history that includes Hysterectomy (5/2011); hysteroscopy (2004); Tubal ligation (1990's); Tunneled venous port placement (6/28/2012); Colon surgery (6-5-12); other surgical history (7/30/2013); Hemorrhoid surgery (8/2013); pr colon ca scrn not hi rsk ind (Left, 10/27/2017); and Colonoscopy. CURRENT MEDICATIONS       Previous Medications    AMLODIPINE (NORVASC) 10 MG TABLET        DESIPRAMINE (NORPRAMIN) 25 MG TABLET    Take 25 mg by mouth    DOCUSATE SODIUM (COLACE) 100 MG CAPSULE    Take 1 capsule by mouth 2 times daily    FENTANYL (DURAGESIC) 100 MCG/HR    Place 1 patch onto the skin every 72 hours for 30 days. Aston Goodpasture (DURAGESIC) 25 MCG/HR    Place 1 patch onto the skin every 72 hours for 30 days. In addition to the 100mcg patch. Cancer pain. FENTANYL (SUBSYS) 800 MCG LIQD    Place 1 spray under the tongue every 6 hours as needed for Pain for up to 30 days. Tariq Rotunda GABAPENTIN (NEURONTIN) 100 MG CAPSULE    Take 1 capsule by mouth 3 times daily for 30 days. Tariq Rotunda HYDROCHLOROTHIAZIDE (HYDRODIURIL) 25 MG TABLET        IBUPROFEN (ADVIL;MOTRIN) 600 MG TABLET    Take 1 tablet by mouth every 6 hours as needed for Pain    LEVOTHYROXINE (SYNTHROID) 50 MCG TABLET    Take 50 mcg by mouth Daily    LIDOCAINE (XYLOCAINE) 5 % OINTMENT    Apply topically as needed. METFORMIN (GLUCOPHAGE) 500 MG TABLET    Take 500 mg by mouth 2 times daily (with meals)    METOPROLOL SUCCINATE (TOPROL XL) 50 MG EXTENDED RELEASE TABLET    Take 1 tablet by mouth daily    NALOXEGOL (MOVANTIK) 25 MG TABS TABLET    Take 1 tablet by mouth every morning    OXYCODONE-ACETAMINOPHEN (PERCOCET) 5-325 MG PER TABLET    Take 1 tablet by mouth every 4 hours as needed for Pain. Tariq Rotunda     PRAVASTATIN (PRAVACHOL) 20 MG TABLET    Take 20 mg by mouth Current Discharge Medication List          MD Fiordaliza Carpenter MD  07/25/18 Lj Rockwell MD  07/25/18 2128

## 2018-07-26 ENCOUNTER — TELEPHONE (OUTPATIENT)
Dept: PHYSICAL MEDICINE AND REHAB | Age: 53
End: 2018-07-26

## 2018-08-01 DIAGNOSIS — C79.89 SECONDARY MALIGNANT NEOPLASM OF OTHER SPECIFIED SITES (CODE): Primary | ICD-10-CM

## 2018-08-04 ENCOUNTER — HOSPITAL ENCOUNTER (OUTPATIENT)
Age: 53
Discharge: HOME | End: 2018-08-04
Payer: COMMERCIAL

## 2018-08-04 LAB
ALBUMIN SERPL-MCNC: 4.4 G/DL (ref 3.5–5.1)
ALP BLD-CCNC: 87 U/L (ref 38–126)
ALT SERPL-CCNC: 12 U/L (ref 11–66)
ANION GAP SERPL CALCULATED.3IONS-SCNC: 12 MEQ/L (ref 8–16)
AST SERPL-CCNC: 11 U/L (ref 5–40)
BASOPHILS # BLD: 0.4 %
BASOPHILS ABSOLUTE: 0 THOU/MM3 (ref 0–0.1)
BILIRUB SERPL-MCNC: 0.4 MG/DL (ref 0.3–1.2)
BUN BLDV-MCNC: 11 MG/DL (ref 7–22)
CALCIUM SERPL-MCNC: 10.5 MG/DL (ref 8.5–10.5)
CHLORIDE BLD-SCNC: 94 MEQ/L (ref 98–111)
CHOLESTEROL, TOTAL: 180 MG/DL (ref 100–199)
CO2: 32 MEQ/L (ref 23–33)
CREAT SERPL-MCNC: 0.7 MG/DL (ref 0.4–1.2)
EOSINOPHIL # BLD: 10.9 %
EOSINOPHILS ABSOLUTE: 0.8 THOU/MM3 (ref 0–0.4)
ERYTHROCYTE [DISTWIDTH] IN BLOOD BY AUTOMATED COUNT: 12.3 % (ref 11.5–14.5)
ERYTHROCYTE [DISTWIDTH] IN BLOOD BY AUTOMATED COUNT: 42.2 FL (ref 35–45)
GFR SERPL CREATININE-BSD FRML MDRD: > 90 ML/MIN/1.73M2
GLUCOSE BLD-MCNC: 99 MG/DL (ref 70–108)
HCT VFR BLD CALC: 38.8 % (ref 37–47)
HDLC SERPL-MCNC: 52 MG/DL
HEMOGLOBIN: 12.9 GM/DL (ref 12–16)
IMMATURE GRANS (ABS): 0.04 THOU/MM3 (ref 0–0.07)
IMMATURE GRANULOCYTES: 0.6 %
LDL CHOLESTEROL CALCULATED: 101 MG/DL
LYMPHOCYTES # BLD: 11.5 %
LYMPHOCYTES ABSOLUTE: 0.8 THOU/MM3 (ref 1–4.8)
MCH RBC QN AUTO: 31.1 PG (ref 26–33)
MCHC RBC AUTO-ENTMCNC: 33.2 GM/DL (ref 32.2–35.5)
MCV RBC AUTO: 93.5 FL (ref 81–99)
MONOCYTES # BLD: 10.7 %
MONOCYTES ABSOLUTE: 0.8 THOU/MM3 (ref 0.4–1.3)
NUCLEATED RED BLOOD CELLS: 0 /100 WBC
PLATELET # BLD: 315 THOU/MM3 (ref 130–400)
PMV BLD AUTO: 9.2 FL (ref 9.4–12.4)
POTASSIUM SERPL-SCNC: 4 MEQ/L (ref 3.5–5.2)
RBC # BLD: 4.15 MILL/MM3 (ref 4.2–5.4)
SEG NEUTROPHILS: 65.9 %
SEGMENTED NEUTROPHILS ABSOLUTE COUNT: 4.7 THOU/MM3 (ref 1.8–7.7)
SODIUM BLD-SCNC: 138 MEQ/L (ref 135–145)
TOTAL PROTEIN: 8.1 G/DL (ref 6.1–8)
TRIGL SERPL-MCNC: 136 MG/DL (ref 0–199)
TSH SERPL DL<=0.05 MIU/L-ACNC: 0.65 UIU/ML (ref 0.4–4.2)
WBC # BLD: 7.1 THOU/MM3 (ref 4.8–10.8)

## 2018-08-04 PROCEDURE — 80053 COMPREHEN METABOLIC PANEL: CPT

## 2018-08-04 PROCEDURE — 84443 ASSAY THYROID STIM HORMONE: CPT

## 2018-08-04 PROCEDURE — 80061 LIPID PANEL: CPT

## 2018-08-04 PROCEDURE — 36415 COLL VENOUS BLD VENIPUNCTURE: CPT

## 2018-08-04 PROCEDURE — 85025 COMPLETE CBC W/AUTO DIFF WBC: CPT

## 2018-08-04 PROCEDURE — 82652 VIT D 1 25-DIHYDROXY: CPT

## 2018-08-07 LAB — VITAMIN D 1,25-DIHYDROXY: 32.9 PG/ML (ref 19.9–79.3)

## 2018-08-12 ENCOUNTER — HOSPITAL ENCOUNTER (EMERGENCY)
Age: 53
Discharge: HOME | End: 2018-08-12
Attending: FAMILY MEDICINE
Payer: COMMERCIAL

## 2018-08-12 VITALS — SYSTOLIC BLOOD PRESSURE: 132 MMHG | TEMPERATURE: 98.6 F | OXYGEN SATURATION: 97 % | DIASTOLIC BLOOD PRESSURE: 97 MMHG

## 2018-08-12 DIAGNOSIS — Z51.89 WOUND CHECK, ABSCESS: Primary | ICD-10-CM

## 2018-08-12 PROCEDURE — 99282 EMERGENCY DEPT VISIT SF MDM: CPT

## 2018-08-12 ASSESSMENT — ENCOUNTER SYMPTOMS
ABDOMINAL PAIN: 0
WHEEZING: 0
RHINORRHEA: 0
SHORTNESS OF BREATH: 0
COUGH: 0
EYE DISCHARGE: 0
NAUSEA: 0
VOMITING: 0
SORE THROAT: 0
DIARRHEA: 0
BACK PAIN: 0
EYE PAIN: 0

## 2018-08-12 ASSESSMENT — PAIN DESCRIPTION - PAIN TYPE: TYPE: ACUTE PAIN

## 2018-08-12 ASSESSMENT — PAIN SCALES - GENERAL: PAINLEVEL_OUTOF10: 8

## 2018-08-12 ASSESSMENT — PAIN DESCRIPTION - LOCATION: LOCATION: RECTUM

## 2018-08-12 NOTE — ED PROVIDER NOTES
syncope, weakness, light-headedness, numbness and headaches. Hematological: Negative for adenopathy. Psychiatric/Behavioral: Negative for confusion and suicidal ideas. The patient is not nervous/anxious. PAST MEDICAL HISTORY    has a past medical history of Blood transfusion; Cancer ([facility] [address] Diabetes mellitus type 2 in nonobese [facility]); Hyperlipidemia; Hypertension; and Hyperthyroidism. SURGICAL HISTORY      has a past surgical history that includes Hysterectomy (5/2011); hysteroscopy (2004); Tubal ligation (1990's); Tunneled venous port placement (6/28/2012); Colon surgery (6-5-12); other surgical history (7/30/2013); Hemorrhoid surgery (8/2013); pr colon ca scrn not hi rsk ind (Left, 10/27/2017); and Colonoscopy. CURRENT MEDICATIONS       Discharge Medication List as of 8/12/2018  2:20 PM      CONTINUE these medications which have NOT CHANGED    Details   fentaNYL (DURAGESIC) 100 MCG/HR Place 1 patch onto the skin every 72 hours for 30 days. ., Disp-10 patch, R-0Normal      fentaNYL (DURAGESIC) 25 MCG/HR Place 1 patch onto the skin every 72 hours for 30 days. In addition to the 100mcg patch. Cancer pain., Disp-10 patch, R-0Normal      fentaNYL (SUBSYS) 800 MCG LIQD Place 1 spray under the tongue every 6 hours as needed for Pain for up to 30 days. ., Disp-120 each, R-0Normal      gabapentin (NEURONTIN) 100 MG capsule Take 1 capsule by mouth 3 times daily for 30 days. ., Disp-90 capsule, R-0Normal      amLODIPine (NORVASC) 10 MG tablet Historical Med      metoprolol succinate (TOPROL XL) 50 MG extended release tablet Take 1 tablet by mouth daily, Disp-30 tablet, R-3Normal      levothyroxine (SYNTHROID) 50 MCG tablet Take 50 mcg by mouth DailyHistorical Med      pravastatin (PRAVACHOL) 20 MG tablet Take 20 mg by mouth dailyHistorical Med      docusate sodium (COLACE) 100 MG capsule Take 1 capsule by mouth 2 times daily, Disp-60 capsule, R-0Normal      valsartan (DIOVAN) 320 MG tablet Take 320 mg by mouth dailyHistorical Med      ibuprofen (ADVIL;MOTRIN) 600 MG tablet Take 1 tablet by mouth every 6 hours as needed for Pain, Disp-30 tablet, R-0Print      metFORMIN (GLUCOPHAGE) 500 MG tablet Take 500 mg by mouth 2 times daily (with meals)      oxyCODONE-acetaminophen (PERCOCET) 5-325 MG per tablet Take 1 tablet by mouth every 4 hours as needed for Pain. Thyra Hermanns Historical Med      hydrochlorothiazide (HYDRODIURIL) 25 MG tablet Historical Med      naloxegol (MOVANTIK) 25 MG TABS tablet Take 1 tablet by mouth every morning, Disp-30 tablet, R-0Normal      desipramine (NORPRAMIN) 25 MG tablet Take 25 mg by mouthHistorical Med      promethazine (PHENERGAN) 25 MG tablet Take 25 mg by mouth every 6 hours as needed for NauseaHistorical Med      lidocaine (XYLOCAINE) 5 % ointment Apply topically as needed. , Disp-50 g, R-3, Normal      sodium chloride 1 g tablet Take 1 g by mouth dailyHistorical Med      prochlorperazine (COMPAZINE) 10 MG tablet Take 10 mg by mouth every 6 hours as needed. ALLERGIES     has No Known Allergies. FAMILY HISTORY     indicated that her mother is alive. She indicated that her father is . She indicated that her brother is alive. She indicated that her maternal grandmother is . She indicated that her maternal grandfather is . She indicated that her paternal grandmother is . She indicated that her paternal grandfather is . family history includes Cancer in her father; Diabetes in her mother; High Blood Pressure in her father and mother. SOCIAL HISTORY      reports that she has never smoked. She has never used smokeless tobacco. She reports that she does not drink alcohol or use drugs. PHYSICAL EXAM     INITIAL VITALS:  oral temperature is 98.6 °F (37 °C). Her blood pressure is 132/97 (abnormal). Her oxygen saturation is 97%. Physical Exam   Constitutional: She is oriented to person, place, and time. She appears well-developed and well-nourished.

## 2018-08-15 DIAGNOSIS — G89.3 CANCER ASSOCIATED PAIN: ICD-10-CM

## 2018-08-15 DIAGNOSIS — C20 MALIGNANT NEOPLASM OF RECTUM (HCC): ICD-10-CM

## 2018-08-18 ENCOUNTER — HOSPITAL ENCOUNTER (EMERGENCY)
Age: 53
Discharge: OTHER INSTITUTION | End: 2018-08-18
Attending: INTERNAL MEDICINE
Payer: COMMERCIAL

## 2018-08-18 ENCOUNTER — APPOINTMENT (OUTPATIENT)
Dept: CT IMAGING | Age: 53
End: 2018-08-18
Payer: COMMERCIAL

## 2018-08-18 VITALS
HEART RATE: 98 BPM | DIASTOLIC BLOOD PRESSURE: 98 MMHG | TEMPERATURE: 99.2 F | RESPIRATION RATE: 18 BRPM | SYSTOLIC BLOOD PRESSURE: 138 MMHG | OXYGEN SATURATION: 98 %

## 2018-08-18 DIAGNOSIS — K61.2 ABSCESS OF ANAL OR RECTAL REGION: Primary | ICD-10-CM

## 2018-08-18 DIAGNOSIS — N13.30 BILATERAL HYDRONEPHROSIS: ICD-10-CM

## 2018-08-18 DIAGNOSIS — S32.10XA CLOSED FRACTURE OF SACRUM, UNSPECIFIED FRACTURE MORPHOLOGY, INITIAL ENCOUNTER (HCC): ICD-10-CM

## 2018-08-18 LAB
ALBUMIN SERPL-MCNC: 4.7 G/DL (ref 3.5–5.1)
ALP BLD-CCNC: 89 U/L (ref 38–126)
ALT SERPL-CCNC: 13 U/L (ref 11–66)
ANION GAP SERPL CALCULATED.3IONS-SCNC: 17 MEQ/L (ref 8–16)
AST SERPL-CCNC: 12 U/L (ref 5–40)
BASOPHILS # BLD: 0.3 %
BASOPHILS ABSOLUTE: 0 THOU/MM3 (ref 0–0.1)
BILIRUB SERPL-MCNC: 0.3 MG/DL (ref 0.3–1.2)
BILIRUBIN DIRECT: < 0.2 MG/DL (ref 0–0.3)
BUN BLDV-MCNC: 9 MG/DL (ref 7–22)
CALCIUM SERPL-MCNC: 10.2 MG/DL (ref 8.5–10.5)
CHLORIDE BLD-SCNC: 84 MEQ/L (ref 98–111)
CO2: 27 MEQ/L (ref 23–33)
CREAT SERPL-MCNC: 0.7 MG/DL (ref 0.4–1.2)
EOSINOPHIL # BLD: 2.5 %
EOSINOPHILS ABSOLUTE: 0.2 THOU/MM3 (ref 0–0.4)
ERYTHROCYTE [DISTWIDTH] IN BLOOD BY AUTOMATED COUNT: 11.7 % (ref 11.5–14.5)
ERYTHROCYTE [DISTWIDTH] IN BLOOD BY AUTOMATED COUNT: 38.8 FL (ref 35–45)
GLUCOSE BLD-MCNC: 143 MG/DL (ref 70–108)
HCT VFR BLD CALC: 38.2 % (ref 37–47)
HEMOGLOBIN: 13.1 GM/DL (ref 12–16)
IMMATURE GRANS (ABS): 0.06 THOU/MM3 (ref 0–0.07)
IMMATURE GRANULOCYTES: 0.8 %
LIPASE: 40.2 U/L (ref 5.6–51.3)
LYMPHOCYTES # BLD: 9 %
LYMPHOCYTES ABSOLUTE: 0.6 THOU/MM3 (ref 1–4.8)
MCH RBC QN AUTO: 31.1 PG (ref 26–33)
MCHC RBC AUTO-ENTMCNC: 34.3 GM/DL (ref 32.2–35.5)
MCV RBC AUTO: 90.7 FL (ref 81–99)
MONOCYTES # BLD: 6.7 %
MONOCYTES ABSOLUTE: 0.5 THOU/MM3 (ref 0.4–1.3)
NUCLEATED RED BLOOD CELLS: 0 /100 WBC
OSMOLALITY CALCULATION: 258.2 MOSMOL/KG (ref 275–300)
PLATELET # BLD: 333 THOU/MM3 (ref 130–400)
PMV BLD AUTO: 8.2 FL (ref 9.4–12.4)
POTASSIUM SERPL-SCNC: 4 MEQ/L (ref 3.5–5.2)
RBC # BLD: 4.21 MILL/MM3 (ref 4.2–5.4)
SEG NEUTROPHILS: 80.7 %
SEGMENTED NEUTROPHILS ABSOLUTE COUNT: 5.7 THOU/MM3 (ref 1.8–7.7)
SODIUM BLD-SCNC: 128 MEQ/L (ref 135–145)
TOTAL PROTEIN: 8.6 G/DL (ref 6.1–8)
WBC # BLD: 7.1 THOU/MM3 (ref 4.8–10.8)

## 2018-08-18 PROCEDURE — 85025 COMPLETE CBC W/AUTO DIFF WBC: CPT

## 2018-08-18 PROCEDURE — 76376 3D RENDER W/INTRP POSTPROCES: CPT

## 2018-08-18 PROCEDURE — 6360000002 HC RX W HCPCS: Performed by: EMERGENCY MEDICINE

## 2018-08-18 PROCEDURE — 36415 COLL VENOUS BLD VENIPUNCTURE: CPT

## 2018-08-18 PROCEDURE — 83690 ASSAY OF LIPASE: CPT

## 2018-08-18 PROCEDURE — 6360000004 HC RX CONTRAST MEDICATION: Performed by: INTERNAL MEDICINE

## 2018-08-18 PROCEDURE — 96375 TX/PRO/DX INJ NEW DRUG ADDON: CPT

## 2018-08-18 PROCEDURE — 6360000002 HC RX W HCPCS: Performed by: INTERNAL MEDICINE

## 2018-08-18 PROCEDURE — 82248 BILIRUBIN DIRECT: CPT

## 2018-08-18 PROCEDURE — 96376 TX/PRO/DX INJ SAME DRUG ADON: CPT

## 2018-08-18 PROCEDURE — 99283 EMERGENCY DEPT VISIT LOW MDM: CPT

## 2018-08-18 PROCEDURE — 2580000003 HC RX 258: Performed by: INTERNAL MEDICINE

## 2018-08-18 PROCEDURE — 96374 THER/PROPH/DIAG INJ IV PUSH: CPT

## 2018-08-18 PROCEDURE — 74177 CT ABD & PELVIS W/CONTRAST: CPT

## 2018-08-18 PROCEDURE — 80053 COMPREHEN METABOLIC PANEL: CPT

## 2018-08-18 RX ORDER — ONDANSETRON 4 MG/1
4 TABLET, ORALLY DISINTEGRATING ORAL ONCE
Status: COMPLETED | OUTPATIENT
Start: 2018-08-18 | End: 2018-08-18

## 2018-08-18 RX ORDER — MORPHINE SULFATE 4 MG/ML
4 INJECTION, SOLUTION INTRAMUSCULAR; INTRAVENOUS ONCE
Status: COMPLETED | OUTPATIENT
Start: 2018-08-18 | End: 2018-08-18

## 2018-08-18 RX ORDER — 0.9 % SODIUM CHLORIDE 0.9 %
1000 INTRAVENOUS SOLUTION INTRAVENOUS ONCE
Status: COMPLETED | OUTPATIENT
Start: 2018-08-18 | End: 2018-08-18

## 2018-08-18 RX ADMIN — SODIUM CHLORIDE 1000 ML: 9 INJECTION, SOLUTION INTRAVENOUS at 14:07

## 2018-08-18 RX ADMIN — MORPHINE SULFATE 4 MG: 4 INJECTION, SOLUTION INTRAMUSCULAR; INTRAVENOUS at 17:56

## 2018-08-18 RX ADMIN — ONDANSETRON 4 MG: 4 TABLET, ORALLY DISINTEGRATING ORAL at 14:06

## 2018-08-18 RX ADMIN — IOPAMIDOL 80 ML: 755 INJECTION, SOLUTION INTRAVENOUS at 15:15

## 2018-08-18 RX ADMIN — ONDANSETRON 4 MG: 4 TABLET, ORALLY DISINTEGRATING ORAL at 17:56

## 2018-08-18 RX ADMIN — HYDROMORPHONE HYDROCHLORIDE 1 MG: 1 INJECTION, SOLUTION INTRAMUSCULAR; INTRAVENOUS; SUBCUTANEOUS at 16:45

## 2018-08-18 RX ADMIN — HYDROMORPHONE HYDROCHLORIDE 1 MG: 1 INJECTION, SOLUTION INTRAMUSCULAR; INTRAVENOUS; SUBCUTANEOUS at 14:06

## 2018-08-18 ASSESSMENT — ENCOUNTER SYMPTOMS
COUGH: 0
EYE DISCHARGE: 0
EYE PAIN: 0
WHEEZING: 0
DIARRHEA: 0
NAUSEA: 0
BACK PAIN: 0
SORE THROAT: 0
SHORTNESS OF BREATH: 0
ABDOMINAL PAIN: 0
VOMITING: 0
RHINORRHEA: 0

## 2018-08-18 ASSESSMENT — PAIN DESCRIPTION - PAIN TYPE
TYPE: ACUTE PAIN;CHRONIC PAIN
TYPE: ACUTE PAIN;CHRONIC PAIN

## 2018-08-18 ASSESSMENT — PAIN DESCRIPTION - LOCATION
LOCATION: RECTUM
LOCATION: RECTUM

## 2018-08-18 ASSESSMENT — PAIN SCALES - GENERAL
PAINLEVEL_OUTOF10: 10

## 2018-08-18 NOTE — ED PROVIDER NOTES
tablet by mouth every 4 hours as needed for Pain. Clerance Riis PRAVASTATIN (PRAVACHOL) 20 MG TABLET    Take 20 mg by mouth daily    PROCHLORPERAZINE (COMPAZINE) 10 MG TABLET    Take 10 mg by mouth every 6 hours as needed. PROMETHAZINE (PHENERGAN) 25 MG TABLET    Take 25 mg by mouth every 6 hours as needed for Nausea    SODIUM CHLORIDE 1 G TABLET    Take 1 g by mouth daily    VALSARTAN (DIOVAN) 320 MG TABLET    Take 320 mg by mouth daily       ALLERGIES     has No Known Allergies. FAMILY HISTORY     indicated that her mother is alive. She indicated that her father is . She indicated that her brother is alive. She indicated that her maternal grandmother is . She indicated that her maternal grandfather is . She indicated that her paternal grandmother is . She indicated that her paternal grandfather is . family history includes Cancer in her father; Diabetes in her mother; High Blood Pressure in her father and mother. SOCIAL HISTORY      reports that she has never smoked. She has never used smokeless tobacco. She reports that she does not drink alcohol or use drugs. PHYSICAL EXAM     INITIAL VITALS:  oral temperature is 99.2 °F (37.3 °C). Her blood pressure is 155/97 (abnormal) and her pulse is 99. Her respiration is 19 and oxygen saturation is 98%. Physical Exam   Constitutional: She is oriented to person, place, and time. She appears well-developed and well-nourished. HENT:   Head: Normocephalic and atraumatic. Right Ear: External ear normal.   Left Ear: External ear normal.   Eyes: Conjunctivae are normal. Right eye exhibits no discharge. Left eye exhibits no discharge. No scleral icterus. Neck: Normal range of motion. Neck supple. No JVD present. Pulmonary/Chest: Effort normal. No stridor. No respiratory distress. Abdominal: Soft. She exhibits no distension. Musculoskeletal: Normal range of motion. She exhibits no edema.    Neurological: She is alert and

## 2018-08-18 NOTE — ED NOTES
Pt calling out in severe stating that the morphine hasn't worked yet. Pt provided with extra pillows and encouraged non-pharmacological pain management. Pt voiced understanding.      Christian Barr RN  08/18/18 8467

## 2018-08-18 NOTE — ED NOTES
Dr Governor Sarkar in room performing visual rectal exam. Drain tube still in place, no obvious abnormalities.      Lashon Weston RN  08/18/18 9669

## 2018-08-18 NOTE — ED TRIAGE NOTES
Pt presents to ER with increased rectal pain. Pt has history colon cancer and colon abscesses that required a drain to be placed. Pain is currently \"10\" out of 10.

## 2018-08-18 NOTE — ED NOTES
Pt requesting more pain medications stating the first shot didn't help. Dr Bunn Leader updated.      Roxann Andrew, RN  08/18/18 3069

## 2018-08-20 ENCOUNTER — TELEPHONE (OUTPATIENT)
Dept: PHYSICAL MEDICINE AND REHAB | Age: 53
End: 2018-08-20

## 2018-08-20 NOTE — TELEPHONE ENCOUNTER
Pt. Has a fractured tailbone - imaging in the chart. She will call when she knows more information about the plan and when she is discharged from [place].  LIDIA

## 2018-09-05 ENCOUNTER — OFFICE VISIT (OUTPATIENT)
Dept: PHYSICAL MEDICINE AND REHAB | Age: 53
End: 2018-09-05
Payer: COMMERCIAL

## 2018-09-05 VITALS — SYSTOLIC BLOOD PRESSURE: 151 MMHG | DIASTOLIC BLOOD PRESSURE: 100 MMHG | HEART RATE: 112 BPM

## 2018-09-05 DIAGNOSIS — G89.3 CHRONIC PAIN DUE TO NEOPLASM: ICD-10-CM

## 2018-09-05 DIAGNOSIS — S32.110A CLOSED NONDISPLACED ZONE I FRACTURE OF SACRUM, INITIAL ENCOUNTER (HCC): ICD-10-CM

## 2018-09-05 DIAGNOSIS — G89.4 CHRONIC PAIN SYNDROME: Primary | ICD-10-CM

## 2018-09-05 DIAGNOSIS — C20 MALIGNANT NEOPLASM OF RECTUM (HCC): Primary | ICD-10-CM

## 2018-09-05 DIAGNOSIS — G89.3 CANCER ASSOCIATED PAIN: ICD-10-CM

## 2018-09-05 DIAGNOSIS — K59.03 DRUG-INDUCED CONSTIPATION: ICD-10-CM

## 2018-09-05 PROCEDURE — 3017F COLORECTAL CA SCREEN DOC REV: CPT | Performed by: NURSE PRACTITIONER

## 2018-09-05 PROCEDURE — G8417 CALC BMI ABV UP PARAM F/U: HCPCS | Performed by: NURSE PRACTITIONER

## 2018-09-05 PROCEDURE — 99214 OFFICE O/P EST MOD 30 MIN: CPT | Performed by: NURSE PRACTITIONER

## 2018-09-05 PROCEDURE — G8427 DOCREV CUR MEDS BY ELIG CLIN: HCPCS | Performed by: NURSE PRACTITIONER

## 2018-09-05 PROCEDURE — 1036F TOBACCO NON-USER: CPT | Performed by: NURSE PRACTITIONER

## 2018-09-05 RX ORDER — DEXAMETHASONE 4 MG/1
4 TABLET ORAL 2 TIMES DAILY
Status: ON HOLD | COMMUNITY
End: 2018-11-08 | Stop reason: HOSPADM

## 2018-09-05 RX ORDER — DESIPRAMINE HYDROCHLORIDE 50 MG/1
50 TABLET ORAL NIGHTLY
COMMUNITY
End: 2018-09-21 | Stop reason: SDUPTHER

## 2018-09-05 RX ORDER — ACETAMINOPHEN 500 MG
500 TABLET ORAL EVERY 6 HOURS PRN
COMMUNITY

## 2018-09-05 RX ORDER — METHADONE HYDROCHLORIDE 10 MG/1
20 TABLET ORAL EVERY 8 HOURS PRN
Status: ON HOLD | COMMUNITY
End: 2018-09-28 | Stop reason: HOSPADM

## 2018-09-05 RX ORDER — METHADONE HYDROCHLORIDE 10 MG/1
20-30 TABLET ORAL EVERY 8 HOURS PRN
Qty: 98 TABLET | Refills: 0 | Status: CANCELLED | OUTPATIENT
Start: 2018-09-08 | End: 2018-09-22

## 2018-09-05 RX ORDER — CYCLOBENZAPRINE HCL 5 MG
5 TABLET ORAL 3 TIMES DAILY PRN
Status: ON HOLD | COMMUNITY
End: 2018-11-08 | Stop reason: HOSPADM

## 2018-09-05 RX ORDER — HYDROMORPHONE HYDROCHLORIDE 8 MG/1
8 TABLET ORAL EVERY 4 HOURS PRN
COMMUNITY
End: 2018-09-05

## 2018-09-05 RX ORDER — SENNA PLUS 8.6 MG/1
3 TABLET ORAL 2 TIMES DAILY
COMMUNITY

## 2018-09-05 RX ORDER — POLYETHYLENE GLYCOL 3350 17 G/17G
17 POWDER, FOR SOLUTION ORAL DAILY
Status: ON HOLD | COMMUNITY
End: 2018-11-08 | Stop reason: HOSPADM

## 2018-09-05 RX ORDER — GABAPENTIN 400 MG/1
400 CAPSULE ORAL 3 TIMES DAILY
Status: ON HOLD | COMMUNITY
End: 2018-11-05

## 2018-09-05 ASSESSMENT — ENCOUNTER SYMPTOMS: RECTAL PAIN: 1

## 2018-09-05 NOTE — PROGRESS NOTES
[facility] [address] [facility] [place]  [address] [facility] [address].  Dept: [phone]  Dept Fax: [phone]: [phone]    Visit Date: 9/5/2018    Functionality Assessment/Goals Worksheet     On a scale of 0 (Does not Interfere) to 10 (Completely Interferes)     1. Which number describes how during the past week pain has interfered with       the following:  A. General Activity:  9  B. Mood: 3  C. Walking Ability:  8  D. Normal Work (Includes both work outside the home and housework):  3  E. Relations with Other People:   9  F. Sleep:   7  G. Enjoyment of Life:   10    2. Patient Prefers to Take their Pain Medications:     [x]  On a regular basis   []  Only when necessary    []  Does not take pain medications    3. What are the Patient's Goals/Expectations for Visiting Pain Management? []  Learn about my pain    []  Receive Medication   []  Physical Therapy     []  Treat Depression   []  Receive Injections    []  Treat Sleep   []  Deal with Anxiety and Stress   []  Treat Opoid Dependence/Addiction   [x]  Other: get off medications       HPI:   Roxie Lagos is a 47 y.o. female is here today for    Chief Complaint: Rectal pain, leg pain      Son present     HPI   2 month FU and 1 week FU from [facility] hospital visit for uncontrolled pain. Reviewed [facility] palliative care notes. Continues to have rectal and leg pain. Medications adjusted at [facility] and now on Metadone 20 mg in the morning, 20 mg in the afternoon and 30 mg at bedtime. Taking Dilaudid 24 mg every 4 hours prn. Pain is better controlled since discharge but continues to have pain. Ambulating with strait cane. Continues to follow with wound care      Following with oncology today     Medications reviewed. Patient denies  side effects with medications. Patient states she is  taking medications as prescribed.   Pain scale with out pain medications is Systems   Gastrointestinal: Positive for rectal pain. Colostomy with output   Musculoskeletal: Positive for arthralgias and myalgias. Neurological: Positive for weakness and numbness. Objective:     Vitals:    09/05/18 0819 09/05/18 0827   BP: (!) 157/111 (!) 151/100   Site: Left Arm    Position: Sitting    Pulse: 105 112       Physical Exam   Constitutional: She is oriented to person, place, and time. She appears well-developed and well-nourished. No distress. HENT:   Head: Normocephalic and atraumatic. Right Ear: External ear normal.   Left Ear: External ear normal.   Nose: Nose normal.   Mouth/Throat: Oropharynx is clear and moist. No oropharyngeal exudate. Eyes: Pupils are equal, round, and reactive to light. Conjunctivae and EOM are normal. Right eye exhibits no discharge. Left eye exhibits no discharge. No scleral icterus. Neck: Normal range of motion and full passive range of motion without pain. Neck supple. No muscular tenderness present. No neck rigidity. No edema, no erythema and normal range of motion present. No thyromegaly present. Cardiovascular: Normal rate, regular rhythm, normal heart sounds and intact distal pulses. Exam reveals no gallop and no friction rub. No murmur heard. Pulmonary/Chest: Effort normal and breath sounds normal. No respiratory distress. She has no wheezes. She has no rales. She exhibits no tenderness. Abdominal: Soft. Bowel sounds are normal. She exhibits no distension. There is generalized tenderness. There is no rebound and no guarding. Musculoskeletal:        Right ankle: She exhibits normal range of motion and no swelling. Left ankle: She exhibits swelling. Lumbar back: She exhibits decreased range of motion, tenderness and pain. Back:         Right lower leg: She exhibits swelling. Right foot: There is swelling. Neurological: She is alert and oriented to person, place, and time.  She has normal strength and MRI, C-MRI, Sacral MRI, Lumbar MRI   · Fentanyl and subsys has been discontinued and pain is now better controlled with Methadone and dilaudid po   · Continue medications set by [facility] Methadone 20 mg in am, 20 mg in afternoon, and 30 mg at bedtime. Continue dilaudid 24 mg every 4 to 6 hours prn breakthrough pain   · Continue Neurontin 400 mg TID, currently on steroid  · Ordered referral to [facility] interventional pain for evaluation of intrathecal pain pump for better pain control   · Following with Oncology today   · Will monitor patient with EKG on methadone   · Will order refill of medications for 30 days, abnd will continue current medications while waiting evaluation for pain pump. Meds. Prescribed:   No orders of the defined types were placed in this encounter. Return in about 2 months (around 11/5/2018), or if symptoms worsen or fail to improve, for follow up  for medications.          Electronically signed by JOSE Crenshaw CNP on 9/5/2018 at 8:48 AM

## 2018-09-06 RX ORDER — METHADONE HYDROCHLORIDE 10 MG/1
20 TABLET ORAL DAILY
Qty: 98 TABLET | Refills: 0 | Status: SHIPPED | OUTPATIENT
Start: 2018-09-06 | End: 2018-09-19 | Stop reason: SDUPTHER

## 2018-09-06 RX ORDER — HYDROMORPHONE HYDROCHLORIDE 8 MG/1
24 TABLET ORAL
Qty: 252 TABLET | Refills: 0 | Status: ON HOLD | OUTPATIENT
Start: 2018-09-08 | End: 2018-09-28 | Stop reason: HOSPADM

## 2018-09-10 ENCOUNTER — TELEPHONE (OUTPATIENT)
Dept: PHYSICAL MEDICINE AND REHAB | Age: 53
End: 2018-09-10

## 2018-09-10 LAB — GFR SERPL CREATININE-BSD FRML MDRD: 88 ML/MIN/1.73M2

## 2018-09-11 RX ORDER — LIDOCAINE 50 MG/G
1 PATCH TOPICAL DAILY
Qty: 30 PATCH | Refills: 0 | Status: ON HOLD | OUTPATIENT
Start: 2018-09-11 | End: 2018-11-05

## 2018-09-12 ENCOUNTER — TELEPHONE (OUTPATIENT)
Dept: PHYSICAL MEDICINE AND REHAB | Age: 53
End: 2018-09-12

## 2018-09-12 NOTE — TELEPHONE ENCOUNTER
Pt. Notified she needs to bring her fentanyl into the office to discard. She also needs to do a repeat screen bc her last screen was positive for fentanyl and she should not be taking fentanyl any longer. Pt. States she will be in tomorrow.

## 2018-09-13 DIAGNOSIS — G89.4 CHRONIC PAIN SYNDROME: Primary | ICD-10-CM

## 2018-09-13 DIAGNOSIS — G89.4 CHRONIC PAIN SYNDROME: ICD-10-CM

## 2018-09-19 DIAGNOSIS — G89.3 CANCER ASSOCIATED PAIN: Primary | ICD-10-CM

## 2018-09-19 DIAGNOSIS — G89.4 CHRONIC PAIN SYNDROME: ICD-10-CM

## 2018-09-19 RX ORDER — METHADONE HYDROCHLORIDE 10 MG/1
20 TABLET ORAL DAILY
Qty: 49 TABLET | Refills: 0 | Status: ON HOLD | OUTPATIENT
Start: 2018-09-19 | End: 2018-09-28 | Stop reason: HOSPADM

## 2018-09-19 NOTE — TELEPHONE ENCOUNTER
Left message for patient to call the office. Needs to bring methadone and dilaudid in for pill count and for a repeat UDS.

## 2018-09-19 NOTE — TELEPHONE ENCOUNTER
Pt. Came into the office for UDS and pill count. Pill count appropriate. Waiting on repeat UDS. Set up 1 week of Methadone until screen back.

## 2018-09-21 RX ORDER — DESIPRAMINE HYDROCHLORIDE 50 MG/1
50 TABLET ORAL NIGHTLY
Qty: 30 TABLET | Refills: 0 | Status: ON HOLD | OUTPATIENT
Start: 2018-09-21 | End: 2018-11-05

## 2018-09-21 NOTE — TELEPHONE ENCOUNTER
Pt. Received Desipramine 50 1 tab at bedtime when she was in the hospital at [facility]. She is asking if we can refill it for her. She is a rectal cancer patient. Please advise.

## 2018-09-22 ENCOUNTER — APPOINTMENT (OUTPATIENT)
Dept: CT IMAGING | Age: 53
DRG: 871 | End: 2018-09-22
Payer: COMMERCIAL

## 2018-09-22 ENCOUNTER — HOSPITAL ENCOUNTER (INPATIENT)
Age: 53
LOS: 6 days | Discharge: HOME HEALTH | DRG: 871 | End: 2018-09-28
Attending: INTERNAL MEDICINE | Admitting: INTERNAL MEDICINE
Payer: COMMERCIAL

## 2018-09-22 ENCOUNTER — APPOINTMENT (OUTPATIENT)
Dept: ULTRASOUND IMAGING | Age: 53
DRG: 871 | End: 2018-09-22
Payer: COMMERCIAL

## 2018-09-22 ENCOUNTER — APPOINTMENT (OUTPATIENT)
Dept: GENERAL RADIOLOGY | Age: 53
DRG: 871 | End: 2018-09-22
Payer: COMMERCIAL

## 2018-09-22 DIAGNOSIS — N39.0 URINARY TRACT INFECTION DUE TO PROTEUS: ICD-10-CM

## 2018-09-22 DIAGNOSIS — R31.9 URINARY TRACT INFECTION WITH HEMATURIA, SITE UNSPECIFIED: ICD-10-CM

## 2018-09-22 DIAGNOSIS — R77.8 ELEVATED TROPONIN: ICD-10-CM

## 2018-09-22 DIAGNOSIS — A41.9 SEPTIC SHOCK (HCC): ICD-10-CM

## 2018-09-22 DIAGNOSIS — N39.0 URINARY TRACT INFECTION WITH HEMATURIA, SITE UNSPECIFIED: ICD-10-CM

## 2018-09-22 DIAGNOSIS — R65.21 SEPTIC SHOCK (HCC): ICD-10-CM

## 2018-09-22 DIAGNOSIS — N17.9 AKI (ACUTE KIDNEY INJURY) (HCC): ICD-10-CM

## 2018-09-22 DIAGNOSIS — B96.4 URINARY TRACT INFECTION DUE TO PROTEUS: ICD-10-CM

## 2018-09-22 DIAGNOSIS — C20 MALIGNANT NEOPLASM OF RECTUM (HCC): ICD-10-CM

## 2018-09-22 DIAGNOSIS — R41.82 ALTERED MENTAL STATUS, UNSPECIFIED ALTERED MENTAL STATUS TYPE: ICD-10-CM

## 2018-09-22 DIAGNOSIS — A41.9 SEPTICEMIA (HCC): Primary | ICD-10-CM

## 2018-09-22 LAB
ALBUMIN SERPL-MCNC: 3.5 G/DL (ref 3.5–5.1)
ALLEN TEST: ABNORMAL
ALP BLD-CCNC: 55 U/L (ref 38–126)
ALT SERPL-CCNC: 27 U/L (ref 11–66)
AMORPHOUS: ABNORMAL
AMPHETAMINE+METHAMPHETAMINE URINE SCREEN: NEGATIVE
ANION GAP SERPL CALCULATED.3IONS-SCNC: 15 MEQ/L (ref 8–16)
AST SERPL-CCNC: 16 U/L (ref 5–40)
BACTERIA: ABNORMAL /HPF
BARBITURATE QUANTITATIVE URINE: NEGATIVE
BASE EXCESS (CALCULATED): 2.6 MMOL/L (ref -2.5–2.5)
BASOPHILS # BLD: 0.2 %
BASOPHILS ABSOLUTE: 0 THOU/MM3 (ref 0–0.1)
BENZODIAZEPINE QUANTITATIVE URINE: NEGATIVE
BILIRUB SERPL-MCNC: 0.6 MG/DL (ref 0.3–1.2)
BILIRUBIN DIRECT: < 0.2 MG/DL (ref 0–0.3)
BILIRUBIN URINE: NEGATIVE
BLOOD, URINE: ABNORMAL
BUN BLDV-MCNC: 45 MG/DL (ref 7–22)
CALCIUM SERPL-MCNC: 9.6 MG/DL (ref 8.5–10.5)
CANNABINOID QUANTITATIVE URINE: NEGATIVE
CASTS UA: ABNORMAL /LPF
CHARACTER, URINE: ABNORMAL
CHLORIDE BLD-SCNC: 95 MEQ/L (ref 98–111)
CO2: 27 MEQ/L (ref 23–33)
COCAINE METABOLITE QUANTITATIVE URINE: NEGATIVE
COLLECTED BY:: ABNORMAL
COLOR: YELLOW
CREAT SERPL-MCNC: 1.8 MG/DL (ref 0.4–1.2)
CRYSTALS, UA: ABNORMAL
DEVICE: ABNORMAL
EKG ATRIAL RATE: 139 BPM
EKG P AXIS: 65 DEGREES
EKG P-R INTERVAL: 134 MS
EKG Q-T INTERVAL: 276 MS
EKG QRS DURATION: 72 MS
EKG QTC CALCULATION (BAZETT): 419 MS
EKG R AXIS: 36 DEGREES
EKG T AXIS: 76 DEGREES
EKG VENTRICULAR RATE: 139 BPM
EOSINOPHIL # BLD: 0.6 %
EOSINOPHILS ABSOLUTE: 0 THOU/MM3 (ref 0–0.4)
EPITHELIAL CELLS, UA: ABNORMAL /HPF
ERYTHROCYTE [DISTWIDTH] IN BLOOD BY AUTOMATED COUNT: 13.8 % (ref 11.5–14.5)
ERYTHROCYTE [DISTWIDTH] IN BLOOD BY AUTOMATED COUNT: 50.9 FL (ref 35–45)
ETHYL ALCOHOL, SERUM: < 0.01 %
GFR SERPL CREATININE-BSD FRML MDRD: 36 ML/MIN/1.73M2
GLUCOSE BLD-MCNC: 118 MG/DL (ref 70–108)
GLUCOSE BLD-MCNC: 135 MG/DL (ref 70–108)
GLUCOSE BLD-MCNC: 203 MG/DL (ref 70–108)
GLUCOSE BLD-MCNC: 64 MG/DL (ref 70–108)
GLUCOSE BLD-MCNC: 85 MG/DL (ref 70–108)
GLUCOSE BLD-MCNC: 87 MG/DL (ref 70–108)
GLUCOSE BLD-MCNC: 99 MG/DL (ref 70–108)
GLUCOSE URINE: NEGATIVE MG/DL
HCO3: 28 MMOL/L (ref 23–28)
HCT VFR BLD CALC: 35.4 % (ref 37–47)
HEMOGLOBIN: 11.3 GM/DL (ref 12–16)
IMMATURE GRANS (ABS): 0.09 THOU/MM3 (ref 0–0.07)
IMMATURE GRANULOCYTES: 1.7 %
KETONES, URINE: NEGATIVE
LACTIC ACID, SEPSIS: 1.4 MMOL/L (ref 0.5–1.9)
LACTIC ACID, SEPSIS: 2.2 MMOL/L (ref 0.5–1.9)
LACTIC ACID: 1.4 MMOL/L (ref 0.5–2.2)
LEUKOCYTE ESTERASE, URINE: ABNORMAL
LIPASE: 24.9 U/L (ref 5.6–51.3)
LYMPHOCYTES # BLD: 9.1 %
LYMPHOCYTES ABSOLUTE: 0.5 THOU/MM3 (ref 1–4.8)
MCH RBC QN AUTO: 32.3 PG (ref 26–33)
MCHC RBC AUTO-ENTMCNC: 31.9 GM/DL (ref 32.2–35.5)
MCV RBC AUTO: 101.1 FL (ref 81–99)
MONOCYTES # BLD: 11 %
MONOCYTES ABSOLUTE: 0.6 THOU/MM3 (ref 0.4–1.3)
MRSA SCREEN RT-PCR: NEGATIVE
NITRITE, URINE: NEGATIVE
NUCLEATED RED BLOOD CELLS: 0 /100 WBC
O2 SATURATION: 97 %
OPIATES, URINE: NORMAL
OSMOLALITY CALCULATION: 287.4 MOSMOL/KG (ref 275–300)
OXYCODONE: NEGATIVE
PCO2: 44 MMHG (ref 35–45)
PH BLOOD GAS: 7.41 (ref 7.35–7.45)
PH UA: 8.5
PHENCYCLIDINE QUANTITATIVE URINE: NEGATIVE
PLATELET # BLD: 119 THOU/MM3 (ref 130–400)
PLATELET ESTIMATE: ADEQUATE
PMV BLD AUTO: 8.9 FL (ref 9.4–12.4)
PO2: 92 MMHG (ref 71–104)
POTASSIUM SERPL-SCNC: 4.9 MEQ/L (ref 3.5–5.2)
PROCALCITONIN: 1.96 NG/ML (ref 0.01–0.09)
PROCALCITONIN: 3.3 NG/ML (ref 0.01–0.09)
PROTEIN UA: >= 300
RBC # BLD: 3.5 MILL/MM3 (ref 4.2–5.4)
RBC URINE: ABNORMAL /HPF
RENAL EPITHELIAL, UA: ABNORMAL
SCAN OF BLOOD SMEAR: NORMAL
SEG NEUTROPHILS: 77.4 %
SEGMENTED NEUTROPHILS ABSOLUTE COUNT: 4.1 THOU/MM3 (ref 1.8–7.7)
SODIUM BLD-SCNC: 137 MEQ/L (ref 135–145)
SOURCE, BLOOD GAS: ABNORMAL
SPECIFIC GRAVITY, URINE: 1.01 (ref 1–1.03)
TOTAL PROTEIN: 6.2 G/DL (ref 6.1–8)
TROPONIN T: 0.02 NG/ML
UROBILINOGEN, URINE: 0.2 EU/DL
WBC # BLD: 5.3 THOU/MM3 (ref 4.8–10.8)
WBC UA: > 200 /HPF
YEAST: ABNORMAL

## 2018-09-22 PROCEDURE — 2580000003 HC RX 258: Performed by: INTERNAL MEDICINE

## 2018-09-22 PROCEDURE — 36600 WITHDRAWAL OF ARTERIAL BLOOD: CPT

## 2018-09-22 PROCEDURE — 74176 CT ABD & PELVIS W/O CONTRAST: CPT

## 2018-09-22 PROCEDURE — 87801 DETECT AGNT MULT DNA AMPLI: CPT

## 2018-09-22 PROCEDURE — 81001 URINALYSIS AUTO W/SCOPE: CPT

## 2018-09-22 PROCEDURE — 99285 EMERGENCY DEPT VISIT HI MDM: CPT

## 2018-09-22 PROCEDURE — 87081 CULTURE SCREEN ONLY: CPT

## 2018-09-22 PROCEDURE — 96365 THER/PROPH/DIAG IV INF INIT: CPT

## 2018-09-22 PROCEDURE — 87184 SC STD DISK METHOD PER PLATE: CPT

## 2018-09-22 PROCEDURE — 87077 CULTURE AEROBIC IDENTIFY: CPT

## 2018-09-22 PROCEDURE — 2500000003 HC RX 250 WO HCPCS: Performed by: EMERGENCY MEDICINE

## 2018-09-22 PROCEDURE — 70450 CT HEAD/BRAIN W/O DYE: CPT

## 2018-09-22 PROCEDURE — 96367 TX/PROPH/DG ADDL SEQ IV INF: CPT

## 2018-09-22 PROCEDURE — 71045 X-RAY EXAM CHEST 1 VIEW: CPT

## 2018-09-22 PROCEDURE — 6360000002 HC RX W HCPCS: Performed by: EMERGENCY MEDICINE

## 2018-09-22 PROCEDURE — 2500000003 HC RX 250 WO HCPCS: Performed by: ANESTHESIOLOGY

## 2018-09-22 PROCEDURE — 87186 SC STD MICRODIL/AGAR DIL: CPT

## 2018-09-22 PROCEDURE — 6370000000 HC RX 637 (ALT 250 FOR IP): Performed by: ANESTHESIOLOGY

## 2018-09-22 PROCEDURE — 82803 BLOOD GASES ANY COMBINATION: CPT

## 2018-09-22 PROCEDURE — 99253 IP/OBS CNSLTJ NEW/EST LOW 45: CPT | Performed by: UROLOGY

## 2018-09-22 PROCEDURE — 87086 URINE CULTURE/COLONY COUNT: CPT

## 2018-09-22 PROCEDURE — 6360000002 HC RX W HCPCS: Performed by: INTERNAL MEDICINE

## 2018-09-22 PROCEDURE — 83605 ASSAY OF LACTIC ACID: CPT

## 2018-09-22 PROCEDURE — 2580000003 HC RX 258: Performed by: NURSE PRACTITIONER

## 2018-09-22 PROCEDURE — 2580000003 HC RX 258: Performed by: EMERGENCY MEDICINE

## 2018-09-22 PROCEDURE — 51702 INSERT TEMP BLADDER CATH: CPT

## 2018-09-22 PROCEDURE — 93005 ELECTROCARDIOGRAM TRACING: CPT | Performed by: EMERGENCY MEDICINE

## 2018-09-22 PROCEDURE — 3E033XZ INTRODUCTION OF VASOPRESSOR INTO PERIPHERAL VEIN, PERCUTANEOUS APPROACH: ICD-10-PCS | Performed by: INTERNAL MEDICINE

## 2018-09-22 PROCEDURE — 84145 PROCALCITONIN (PCT): CPT

## 2018-09-22 PROCEDURE — 6370000000 HC RX 637 (ALT 250 FOR IP): Performed by: EMERGENCY MEDICINE

## 2018-09-22 PROCEDURE — 80305 DRUG TEST PRSMV DIR OPT OBS: CPT

## 2018-09-22 PROCEDURE — 36415 COLL VENOUS BLD VENIPUNCTURE: CPT

## 2018-09-22 PROCEDURE — 82248 BILIRUBIN DIRECT: CPT

## 2018-09-22 PROCEDURE — 87641 MR-STAPH DNA AMP PROBE: CPT

## 2018-09-22 PROCEDURE — 85025 COMPLETE CBC W/AUTO DIFF WBC: CPT

## 2018-09-22 PROCEDURE — 6360000002 HC RX W HCPCS

## 2018-09-22 PROCEDURE — 76770 US EXAM ABDO BACK WALL COMP: CPT

## 2018-09-22 PROCEDURE — 6370000000 HC RX 637 (ALT 250 FOR IP): Performed by: NURSE PRACTITIONER

## 2018-09-22 PROCEDURE — 6360000002 HC RX W HCPCS: Performed by: ANESTHESIOLOGY

## 2018-09-22 PROCEDURE — 93010 ELECTROCARDIOGRAM REPORT: CPT | Performed by: NUCLEAR MEDICINE

## 2018-09-22 PROCEDURE — 2580000003 HC RX 258: Performed by: ANESTHESIOLOGY

## 2018-09-22 PROCEDURE — 2000000000 HC ICU R&B

## 2018-09-22 PROCEDURE — 83690 ASSAY OF LIPASE: CPT

## 2018-09-22 PROCEDURE — 84484 ASSAY OF TROPONIN QUANT: CPT

## 2018-09-22 PROCEDURE — 87147 CULTURE TYPE IMMUNOLOGIC: CPT

## 2018-09-22 PROCEDURE — 82948 REAGENT STRIP/BLOOD GLUCOSE: CPT

## 2018-09-22 PROCEDURE — 2709999900 HC NON-CHARGEABLE SUPPLY

## 2018-09-22 PROCEDURE — 87040 BLOOD CULTURE FOR BACTERIA: CPT

## 2018-09-22 PROCEDURE — G0480 DRUG TEST DEF 1-7 CLASSES: HCPCS

## 2018-09-22 PROCEDURE — 99223 1ST HOSP IP/OBS HIGH 75: CPT | Performed by: NURSE PRACTITIONER

## 2018-09-22 PROCEDURE — 80053 COMPREHEN METABOLIC PANEL: CPT

## 2018-09-22 RX ORDER — METOPROLOL TARTRATE 5 MG/5ML
2.5 INJECTION INTRAVENOUS ONCE
Status: COMPLETED | OUTPATIENT
Start: 2018-09-22 | End: 2018-09-22

## 2018-09-22 RX ORDER — LEVOTHYROXINE SODIUM 0.05 MG/1
50 TABLET ORAL DAILY
Status: DISCONTINUED | OUTPATIENT
Start: 2018-09-22 | End: 2018-09-22

## 2018-09-22 RX ORDER — METOPROLOL TARTRATE 5 MG/5ML
2.5 INJECTION INTRAVENOUS EVERY 6 HOURS
Status: DISCONTINUED | OUTPATIENT
Start: 2018-09-22 | End: 2018-09-23

## 2018-09-22 RX ORDER — PRAVASTATIN SODIUM 20 MG
20 TABLET ORAL DAILY
Status: DISCONTINUED | OUTPATIENT
Start: 2018-09-22 | End: 2018-09-28 | Stop reason: HOSPADM

## 2018-09-22 RX ORDER — SODIUM CHLORIDE 0.9 % (FLUSH) 0.9 %
10 SYRINGE (ML) INJECTION EVERY 12 HOURS SCHEDULED
Status: DISCONTINUED | OUTPATIENT
Start: 2018-09-22 | End: 2018-09-28 | Stop reason: HOSPADM

## 2018-09-22 RX ORDER — NALOXONE HYDROCHLORIDE 0.4 MG/ML
0.4 INJECTION, SOLUTION INTRAMUSCULAR; INTRAVENOUS; SUBCUTANEOUS ONCE
Status: DISCONTINUED | OUTPATIENT
Start: 2018-09-22 | End: 2018-09-23

## 2018-09-22 RX ORDER — ACETAMINOPHEN 500 MG
1000 TABLET ORAL ONCE
Status: COMPLETED | OUTPATIENT
Start: 2018-09-22 | End: 2018-09-22

## 2018-09-22 RX ORDER — 0.9 % SODIUM CHLORIDE 0.9 %
30 INTRAVENOUS SOLUTION INTRAVENOUS ONCE
Status: COMPLETED | OUTPATIENT
Start: 2018-09-22 | End: 2018-09-22

## 2018-09-22 RX ORDER — CYCLOBENZAPRINE HCL 10 MG
5 TABLET ORAL 2 TIMES DAILY PRN
Status: DISCONTINUED | OUTPATIENT
Start: 2018-09-22 | End: 2018-09-28 | Stop reason: HOSPADM

## 2018-09-22 RX ORDER — METHADONE HYDROCHLORIDE 10 MG/1
10 TABLET ORAL EVERY 12 HOURS
Status: DISCONTINUED | OUTPATIENT
Start: 2018-09-22 | End: 2018-09-23

## 2018-09-22 RX ORDER — MIDODRINE HYDROCHLORIDE 10 MG/1
10 TABLET ORAL
Status: DISCONTINUED | OUTPATIENT
Start: 2018-09-22 | End: 2018-09-23

## 2018-09-22 RX ORDER — 0.9 % SODIUM CHLORIDE 0.9 %
5 VIAL (ML) INJECTION DAILY
Status: DISCONTINUED | OUTPATIENT
Start: 2018-09-22 | End: 2018-09-22 | Stop reason: CLARIF

## 2018-09-22 RX ORDER — LEVOTHYROXINE SODIUM ANHYDROUS 100 UG/5ML
25 INJECTION, POWDER, LYOPHILIZED, FOR SOLUTION INTRAVENOUS DAILY
Status: DISCONTINUED | OUTPATIENT
Start: 2018-09-22 | End: 2018-09-22 | Stop reason: CLARIF

## 2018-09-22 RX ORDER — GABAPENTIN 100 MG/1
100 CAPSULE ORAL 3 TIMES DAILY
Status: DISCONTINUED | OUTPATIENT
Start: 2018-09-22 | End: 2018-09-25

## 2018-09-22 RX ORDER — DEXTROSE MONOHYDRATE 25 G/50ML
12.5 INJECTION, SOLUTION INTRAVENOUS PRN
Status: DISCONTINUED | OUTPATIENT
Start: 2018-09-22 | End: 2018-09-28 | Stop reason: HOSPADM

## 2018-09-22 RX ORDER — NALOXONE HYDROCHLORIDE 1 MG/ML
INJECTION INTRAMUSCULAR; INTRAVENOUS; SUBCUTANEOUS
Status: COMPLETED
Start: 2018-09-22 | End: 2018-09-22

## 2018-09-22 RX ORDER — SODIUM CHLORIDE 9 MG/ML
INJECTION, SOLUTION INTRAVENOUS CONTINUOUS
Status: DISCONTINUED | OUTPATIENT
Start: 2018-09-22 | End: 2018-09-25

## 2018-09-22 RX ORDER — 0.9 % SODIUM CHLORIDE 0.9 %
1000 INTRAVENOUS SOLUTION INTRAVENOUS ONCE
Status: COMPLETED | OUTPATIENT
Start: 2018-09-22 | End: 2018-09-22

## 2018-09-22 RX ORDER — SODIUM CHLORIDE 0.9 % (FLUSH) 0.9 %
10 SYRINGE (ML) INJECTION PRN
Status: DISCONTINUED | OUTPATIENT
Start: 2018-09-22 | End: 2018-09-28 | Stop reason: HOSPADM

## 2018-09-22 RX ORDER — NICOTINE POLACRILEX 4 MG
15 LOZENGE BUCCAL PRN
Status: DISCONTINUED | OUTPATIENT
Start: 2018-09-22 | End: 2018-09-28 | Stop reason: HOSPADM

## 2018-09-22 RX ORDER — DEXAMETHASONE 4 MG/1
4 TABLET ORAL 2 TIMES DAILY
Status: DISCONTINUED | OUTPATIENT
Start: 2018-09-22 | End: 2018-09-22

## 2018-09-22 RX ORDER — SENNA PLUS 8.6 MG/1
3 TABLET ORAL 2 TIMES DAILY
Status: DISCONTINUED | OUTPATIENT
Start: 2018-09-22 | End: 2018-09-28 | Stop reason: HOSPADM

## 2018-09-22 RX ORDER — DEXTROSE MONOHYDRATE 50 MG/ML
100 INJECTION, SOLUTION INTRAVENOUS PRN
Status: DISCONTINUED | OUTPATIENT
Start: 2018-09-22 | End: 2018-09-28 | Stop reason: HOSPADM

## 2018-09-22 RX ORDER — LEVOTHYROXINE SODIUM 0.05 MG/1
50 TABLET ORAL DAILY
Status: DISCONTINUED | OUTPATIENT
Start: 2018-09-22 | End: 2018-09-28 | Stop reason: HOSPADM

## 2018-09-22 RX ORDER — POLYETHYLENE GLYCOL 3350 17 G/17G
17 POWDER, FOR SOLUTION ORAL DAILY
Status: DISCONTINUED | OUTPATIENT
Start: 2018-09-22 | End: 2018-09-28 | Stop reason: HOSPADM

## 2018-09-22 RX ORDER — LIDOCAINE 50 MG/G
1 PATCH TOPICAL DAILY
Status: DISCONTINUED | OUTPATIENT
Start: 2018-09-22 | End: 2018-09-25

## 2018-09-22 RX ORDER — MIDODRINE HYDROCHLORIDE 10 MG/1
10 TABLET ORAL
Status: DISCONTINUED | OUTPATIENT
Start: 2018-09-22 | End: 2018-09-22

## 2018-09-22 RX ADMIN — Medication 10 ML: at 17:31

## 2018-09-22 RX ADMIN — ENOXAPARIN SODIUM 40 MG: 40 INJECTION SUBCUTANEOUS at 12:34

## 2018-09-22 RX ADMIN — METHADONE HYDROCHLORIDE 10 MG: 10 TABLET ORAL at 21:37

## 2018-09-22 RX ADMIN — VANCOMYCIN HYDROCHLORIDE 750 MG: 750 INJECTION, POWDER, LYOPHILIZED, FOR SOLUTION INTRAVENOUS at 10:01

## 2018-09-22 RX ADMIN — SODIUM CHLORIDE 716 ML: 9 INJECTION, SOLUTION INTRAVENOUS at 08:02

## 2018-09-22 RX ADMIN — METOPROLOL TARTRATE 2.5 MG: 1 INJECTION, SOLUTION INTRAVENOUS at 21:36

## 2018-09-22 RX ADMIN — ACETAMINOPHEN 1000 MG: 500 TABLET, FILM COATED ORAL at 07:00

## 2018-09-22 RX ADMIN — MIDODRINE HYDROCHLORIDE 10 MG: 10 TABLET ORAL at 17:36

## 2018-09-22 RX ADMIN — SODIUM CHLORIDE 1000 ML: 9 INJECTION, SOLUTION INTRAVENOUS at 14:33

## 2018-09-22 RX ADMIN — HYDROCORTISONE SODIUM SUCCINATE 50 MG: 100 INJECTION, POWDER, FOR SOLUTION INTRAMUSCULAR; INTRAVENOUS at 23:54

## 2018-09-22 RX ADMIN — METOPROLOL TARTRATE 2.5 MG: 1 INJECTION, SOLUTION INTRAVENOUS at 18:06

## 2018-09-22 RX ADMIN — CEFEPIME 2 G: 2 INJECTION, POWDER, FOR SOLUTION INTRAMUSCULAR; INTRAVENOUS at 08:30

## 2018-09-22 RX ADMIN — HYDROCORTISONE SODIUM SUCCINATE 50 MG: 100 INJECTION, POWDER, FOR SOLUTION INTRAMUSCULAR; INTRAVENOUS at 17:49

## 2018-09-22 RX ADMIN — NALOXONE HYDROCHLORIDE 0.4 MG: 1 INJECTION PARENTERAL at 14:20

## 2018-09-22 RX ADMIN — Medication 10 ML: at 11:25

## 2018-09-22 RX ADMIN — PIPERACILLIN SODIUM,TAZOBACTAM SODIUM 3.38 G: 3; .375 INJECTION, POWDER, FOR SOLUTION INTRAVENOUS at 23:55

## 2018-09-22 RX ADMIN — GABAPENTIN 100 MG: 100 CAPSULE ORAL at 21:39

## 2018-09-22 RX ADMIN — LEVOTHYROXINE SODIUM 50 MCG: 50 TABLET ORAL at 17:36

## 2018-09-22 RX ADMIN — METOPROLOL TARTRATE 2.5 MG: 1 INJECTION, SOLUTION INTRAVENOUS at 19:08

## 2018-09-22 RX ADMIN — SENNOSIDES 25.8 MG: 8.6 TABLET, FILM COATED ORAL at 21:37

## 2018-09-22 RX ADMIN — Medication 2 UNITS: at 13:11

## 2018-09-22 RX ADMIN — Medication 10 ML: at 21:37

## 2018-09-22 RX ADMIN — SODIUM CHLORIDE: 9 INJECTION, SOLUTION INTRAVENOUS at 23:52

## 2018-09-22 RX ADMIN — SODIUM CHLORIDE 1000 ML: 9 INJECTION, SOLUTION INTRAVENOUS at 06:32

## 2018-09-22 RX ADMIN — PIPERACILLIN SODIUM,TAZOBACTAM SODIUM 3.38 G: 3; .375 INJECTION, POWDER, FOR SOLUTION INTRAVENOUS at 18:15

## 2018-09-22 RX ADMIN — Medication 2 MCG/MIN: at 09:19

## 2018-09-22 RX ADMIN — DEXTROSE MONOHYDRATE 12.5 G: 25 INJECTION, SOLUTION INTRAVENOUS at 17:25

## 2018-09-22 ASSESSMENT — PAIN DESCRIPTION - ORIENTATION
ORIENTATION_2: OTHER (COMMENT)
ORIENTATION: RIGHT;LEFT

## 2018-09-22 ASSESSMENT — ENCOUNTER SYMPTOMS
ABDOMINAL PAIN: 0
RHINORRHEA: 0
DIARRHEA: 0
VOMITING: 0
EYE DISCHARGE: 0
BACK PAIN: 0
EYE PAIN: 0
WHEEZING: 0
SORE THROAT: 0
COUGH: 0
SHORTNESS OF BREATH: 0
NAUSEA: 0

## 2018-09-22 ASSESSMENT — PAIN DESCRIPTION - ONSET
ONSET_2: ON-GOING
ONSET: ON-GOING

## 2018-09-22 ASSESSMENT — PAIN DESCRIPTION - DESCRIPTORS
DESCRIPTORS_2: CRAMPING
DESCRIPTORS: ACHING;CRAMPING
DESCRIPTORS: ACHING

## 2018-09-22 ASSESSMENT — PAIN DESCRIPTION - FREQUENCY: FREQUENCY: CONTINUOUS

## 2018-09-22 ASSESSMENT — PAIN SCALES - GENERAL
PAINLEVEL_OUTOF10: 4
PAINLEVEL_OUTOF10: 4
PAINLEVEL_OUTOF10: 10

## 2018-09-22 ASSESSMENT — PAIN DESCRIPTION - DURATION: DURATION_2: CONTINUOUS

## 2018-09-22 ASSESSMENT — PAIN DESCRIPTION - PAIN TYPE
TYPE: CHRONIC PAIN
TYPE_2: CHRONIC PAIN
TYPE: CHRONIC PAIN

## 2018-09-22 ASSESSMENT — PAIN DESCRIPTION - LOCATION
LOCATION: LEG
LOCATION_2: LEG
LOCATION: RECTUM

## 2018-09-22 ASSESSMENT — PAIN DESCRIPTION - PROGRESSION
CLINICAL_PROGRESSION: GRADUALLY WORSENING
CLINICAL_PROGRESSION_2: GRADUALLY WORSENING

## 2018-09-22 ASSESSMENT — PAIN DESCRIPTION - INTENSITY: RATING_2: 10

## 2018-09-22 NOTE — ED NOTES
Pt and family updated on plan of care. ICU admission.  Levophed started and explained why she needs it at this time      Gregoria Welch, [address] [facility]  09/22/18 6119

## 2018-09-22 NOTE — CONSULTS
Urology Consult Note      History Obtained From:  Son and mother    ED Chief complaint:   Chief Complaint   Patient presents with    Other     possible overdose       Reason for consultation: Left hydronephrosis    HISTORY OF PRESENT ILLNESS:      The patient is a 47 y.o. female with significant past medical history of rectal cancer, admitted for overdose of pain medication who presented with left sided hydronephrosis. History of rectal cancer with metastatic disease to the colon. Previous resection and colostomy in 2012. Known abscess and fistula with drain. Follows with pain management. Previous CT from August, 2018 she presented bilateral hydronephrosis with an overly distended bladder (worst than the recent CT scan findings). Creatinine = 1.8  Chart review: creatinine = 0.6 in March, 2018      Past Medical History:        Diagnosis Date    Blood transfusion     Cancer [facility])     colorectal    Diabetes mellitus type 2 in nonobese ([facility] [address] 10/27/2017    Hyperlipidemia     Hypertension     Hyperthyroidism      Past Surgical History:        Procedure Laterality Date    COLON SURGERY  6-5-12    Robotic assisted left hemicolectomy-Dr. Alvarez    COLONOSCOPY      HEMORRHOID SURGERY  8/2013    banding     HYSTERECTOMY  5/2011    HYSTEROSCOPY  2004    OTHER SURGICAL HISTORY  7/30/2013    RIGHT ABD.  WALL MASS EXCISION-Dr. Kymberly Dangelo    [place] COLON CA SCRN NOT  [address] Left 10/27/2017    COLONOSCOPY performed by Fab Parker MD at [facility]   [address]   1990's    TUNNELED VENOUS PORT PLACEMENT  6/28/2012    purple power port-Dr. Kymberly Dangelo        Social History     Social History    Marital status:      Spouse name: N/A    Number of children: 3    Years of education: 12     Occupational History    Lookback      Social History Main Topics    Smoking status: Never Smoker    Smokeless tobacco: Never Used    Alcohol use No    Drug use: No    Sexual activity: Not on file     Other Topics Concern    Not on file     Social History Narrative    No narrative on file       AL  Family History   Problem Relation Age of Onset    Diabetes Mother     High Blood Pressure Mother     High Blood Pressure Father     Cancer Father         small intestine       Allergies:  No Known Allergies    ROS:  Constitutional: Negative for chills, fatigue, fever, or weight loss. Eyes: Denies reported visual changes. ENT: Denies headache, difficulty swallowing, nose bleeds, ringing in ears, or earaches. Cardiovascular: Negative for chest pain, palpitations, tachycardia or edema. Respiratory: Denies cough or SOB. GI:The patient denies abdominal or flank pain, anorexia, nausea or vomiting. : See HPI  Musculoskeletal: Patient denies low back pain or painful or reduced ROM of the back or extremities. Neurological: The patient denies any symptoms of neurological impairment or               TIA's; no history of stroke. Lymphatic: Denies swollen glands in neck, axillary or inguinal areas. Psychiatric: Denies anxiety or depression. Skin: Denies rash or lesions. The remainder of the complete ROS is negative    PHYSICAL EXAM:  VITALS:  BP 92/63   Pulse 117   Temp 99.9 °F (37.7 °C) (Core)   Resp 17   Ht 4' 11\" (1.499 m)   Wt 129 lb 6.6 oz (58.7 kg)   LMP 05/01/2011   SpO2 100%   BMI 26.14 kg/m² . Nursing note and vitals reviewed. Constitutional:   Patient non-responsive in ICU bed surrounded by family  HEENT:   Head::Normocephalic and atraumatic. Nose:The external appearance of the nose is normal  Ears:  The ears appear normal to external inspection     DATA:  Labs:  CBC:   Recent Labs      09/22/18   0722   WBC  5.3   HGB  11.3*   PLT  119*     BMP:    Recent Labs      09/22/18   0640   NA  137   K  4.9   CL  95*   CO2  27   BUN  45*   CREATININE  1.8*   GLUCOSE  135*   CALCIUM  9.6     Hepatic:   Recent Labs      09/22/18   0640   AST  16   ALT  27   BILITOT  0.6   ALKPHOS 55                   INR: No results for input(s): INR in the last 72 hours. U/A:    Lab Results   Component Value Date    COLORU YELLOW 09/22/2018    PHUR 8.5 09/22/2018    WBCUA > 200 09/22/2018    RBCUA 3-5 09/22/2018    MUCUS OBSCURED 03/21/2018    YEAST NONE SEEN 09/22/2018    BACTERIA MODERATE 09/22/2018    SPECGRAV 1.020 08/06/2016    LEUKOCYTESUR LARGE 09/22/2018    UROBILINOGEN 0.2 09/22/2018    BILIRUBINUR NEGATIVE 09/22/2018    BILIRUBINUR NEGATIVE 04/29/2012    BLOODU MODERATE 09/22/2018    GLUCOSEU NEGATIVE 09/22/2018    AMORPHOUS PHOSPHATES 09/22/2018       Imaging: The patient has had a CT WITHOUT CONTRAST done today 9/22/2018 which I have reviewed along with its accompanying report. The study demonstrates   2. There is mild right adrenal hypertrophy. 3. There is mild left adrenal hypertrophy and a stable 1.4 cm left adrenal nodule. Metastatic disease cannot be excluded. 4. The right kidney is unremarkable. There is resolution of the right hydronephrosis. 5. There is stable moderate left pelvocaliectasis and moderate left hydroureter. There is stable thickening of the wall the left ureter and stable stranding densities within the fat adjacent to the left ureter. There is stable distention of the urinary    bladder with mild thickening of the urinary bladder wall. CT from August, 2018  1. Bilateral hydronephrosis and hydroureter with marked distention of the urinary bladder highly suspicious for distal outlet obstruction. 2. Heterogeneous fluid collection in the presacral space extending inferiorly to the perirectal space, consistent with known abscesses. A drainage catheter is in place. 3. New pathologic fracture of the upper sacrum and additional lytic metastatic disease in the lower sacrum.          IMPRESSION:   Left ureterohydronephrosis - improved from previous when bilat hydro was present)  [place] distended bladder - may have a neurogenic bladder  Hx of rectal cancer with

## 2018-09-22 NOTE — H&P
09/22/2018    SPECGRAV 1.020 08/06/2016    GLUCOSEU NEGATIVE 09/22/2018       Intake & Output:  No intake/output data recorded. I/O this shift:  In: 10 [I.V.:10]  Out: 100 [Urine:100]      Radiology:     EKG:  I have reviewed the EKG with the following interpretation: ST    CT ABDOMEN PELVIS WO CONTRAST Additional Contrast? None   Final Result   1. There is consolidation within the right lower chest suggesting atelectasis and/or infiltrates. Follow-up imaging is recommended to confirm complete resolution in order to exclude a developing mass within this location. 2. There is mild right adrenal hypertrophy. 3. There is mild left adrenal hypertrophy and a stable 1.4 cm left adrenal nodule. Metastatic disease cannot be excluded. 4. The right kidney is unremarkable. There is resolution of the right hydronephrosis. 5. There is stable moderate left pelvocaliectasis and moderate left hydroureter. There is stable thickening of the wall the left ureter and stable stranding densities within the fat adjacent to the left ureter. There is stable distention of the urinary    bladder with mild thickening of the urinary bladder wall. 6. There is a Cathryn's pouch. There is a colostomy along the anterior pelvic wall on the left. There is mesenteric fat extending into the subcutaneous tissues through this. There are also mild stranding densities within the subcutaneous fat of the    anterior pelvic wall and a few nodules within the subcutaneous fat which may be related to subcutaneous injections. The bowel gas pattern is nonobstructive. There is a large amount of stool within the colon. There are surgical sutures along the proximal    ascending colon. The appendix is not definitively identified. 7. The distal esophagus is unremarkable. There is thickening of the wall of the gastric fundus. The small bowel is unremarkable.    8. There is a 7.5 x 7.3 x 2.6 cm complex fluid collection within the presacral space containing Other-    ASSESSMENT / PLAN:    1. Sepsis secondary to urinary tract infection. Lactic acid 2.2, PCT 1.96. Max temp 102.9. BC and urine culture pending. Imaging with pulmonary infiltrate. Consult ID with known absences and fistula. IV Cefepime / Vanc. 2. Septic shock with tachycardia and hypotension, requiring pressors. Wean Levophed as able. Give an additional 1L fluid bolus. 3. Encephalopathy, metabolic and infectious. Hold Norpramin, Gabapentin, Methadone and Dilaudid. Monitor. 4. Lactic acidosis, trend. IVF. 5. Acute kidney injury. IVF. Creatinine 1.7, baseline 0.8. Trend creatinine. 6. Elevated troponin, mildly bump, likely due to strain for hypotension. Trend. No EKG changes. 7. Rectal adenocarcinoma with mets. Followed by oncology. Code status reviewed full code. Consult palliative care. 8. Type 2 diabetes, uncontrolled. Hold Metformin. ISS ac/hs. Trend. 9. Hypothyroidism, continue Synthroid. Check TSH.   10. Essential hypertension. Holding HCTZ, ARB, BB and CCB. 11. Moderate left hydroureter with resolving hydronephrosis. Consult urology. Obtain renal US. 12. Bilateral adrenal mass with 1.4 cm nodule to left gland. 13. Macrocytic anemia. Trend HH. 14. Thrombocytopenia. Monitor. 15. Chronic pain. Follows with outpatient pain team. Holding medications due to AMS. 16. Opoid induced constipation. Home medications resumed. Case discussed with Dr. Juan Blanco. Thank you Heraclio Ponce MD for the opportunity to be involved in this patient's care.     Electronically signed by JOSE Mcqueen CNP on 9/22/2018 at 11:44 AM

## 2018-09-22 NOTE — ED PROVIDER NOTES
99/66 (!) 140/97   Pulse: 108 109 110    Resp: 17 24  14   Temp:    100 °F (37.8 °C)   TempSrc:    Core   SpO2: 90% 91% 96% 97%   Weight:       Height:           6:20 AM: The patient was seen and evaluated. Patient has altered mental status. She is alert with her eyes open  But very slow to follow commands. She is tachycardic, hypotensive,  Febrile. Appropriate labs and imaging were ordered. 30 ML per kilogram bolus of fluids was initially ordered. There is concern for septic shock. Family states that she was acting her normal self until yesterday. The patient has a rectal abscess/fistula per patient report. They state that Dr. Kaylee Villalobos, infectious disease follows her for this. Site was evaluated and is noticed to have the Penrose drain in place. The area surrounding it is not red, warm, swollen. CT of the abdomen and pelvis was ordered to further evaluate this. IV contrast was not administered for the CT scan due to acute kidney injury. Patient's heart rate responded well to IV fluids. Her blood pressure dropped into the 35R systolically shortly after arrival.  She had a map of 55. This initially responded well to IV fluids but then decreased  After the fluid bolus was near complete. The patient was put on low dose Levophed while in the department and responded well to this. The patient has very cloudy/turbid urine. Cefepime was initially ordered as this may be the source of her infection. Vancomycin was added after basilar infiltrate was noted on the CT scan. CT scan of the head is reassuring. Patient does not have elevation in her white blood cell count. Her urinalysis has greater than 200 WBCs, moderate bacteria. Initial lactic acid is 2.2. A repeat lactic acid is ordered for 3 hours from this point. Patient has acute kidney injury with creatinine 1.8, . This is an increase from creatinine 0.7 1 month ago. MDM:  I contacted Dr. Cora Crowley regarding this patient.   He states

## 2018-09-22 NOTE — ED NOTES
O2 is 91% moved from  to [facility], [facility]  09/22/18 8561

## 2018-09-22 NOTE — FLOWSHEET NOTE
80 Dr. Roebrt Vallejo informed of patient's inability to take liquids from a straw, and that no oral medications were given.

## 2018-09-23 PROBLEM — N39.0 URINARY TRACT INFECTION DUE TO PROTEUS: Status: ACTIVE | Noted: 2018-09-23

## 2018-09-23 PROBLEM — B96.4 URINARY TRACT INFECTION DUE TO PROTEUS: Status: ACTIVE | Noted: 2018-09-23

## 2018-09-23 LAB
ANION GAP SERPL CALCULATED.3IONS-SCNC: 12 MEQ/L (ref 8–16)
BASOPHILS # BLD: 0.5 %
BASOPHILS ABSOLUTE: 0 THOU/MM3 (ref 0–0.1)
BUN BLDV-MCNC: 28 MG/DL (ref 7–22)
CALCIUM IONIZED: 1.18 MMOL/L (ref 1.12–1.32)
CALCIUM SERPL-MCNC: 8.3 MG/DL (ref 8.5–10.5)
CHLORIDE BLD-SCNC: 107 MEQ/L (ref 98–111)
CO2: 24 MEQ/L (ref 23–33)
CREAT SERPL-MCNC: 1 MG/DL (ref 0.4–1.2)
EOSINOPHIL # BLD: 0.3 %
EOSINOPHILS ABSOLUTE: 0 THOU/MM3 (ref 0–0.4)
ERYTHROCYTE [DISTWIDTH] IN BLOOD BY AUTOMATED COUNT: 13.9 % (ref 11.5–14.5)
ERYTHROCYTE [DISTWIDTH] IN BLOOD BY AUTOMATED COUNT: 51.9 FL (ref 35–45)
GFR SERPL CREATININE-BSD FRML MDRD: 70 ML/MIN/1.73M2
GLUCOSE BLD-MCNC: 103 MG/DL (ref 70–108)
GLUCOSE BLD-MCNC: 150 MG/DL (ref 70–108)
GLUCOSE BLD-MCNC: 211 MG/DL (ref 70–108)
GLUCOSE BLD-MCNC: 232 MG/DL (ref 70–108)
HCT VFR BLD CALC: 33.4 % (ref 37–47)
HEMOGLOBIN: 10.5 GM/DL (ref 12–16)
IMMATURE GRANS (ABS): 0.21 THOU/MM3 (ref 0–0.07)
IMMATURE GRANULOCYTES: 3.4 %
LYMPHOCYTES # BLD: 3.9 %
LYMPHOCYTES ABSOLUTE: 0.2 THOU/MM3 (ref 1–4.8)
MCH RBC QN AUTO: 31.9 PG (ref 26–33)
MCHC RBC AUTO-ENTMCNC: 31.4 GM/DL (ref 32.2–35.5)
MCV RBC AUTO: 101.5 FL (ref 81–99)
MONOCYTES # BLD: 7.7 %
MONOCYTES ABSOLUTE: 0.5 THOU/MM3 (ref 0.4–1.3)
NUCLEATED RED BLOOD CELLS: 0 /100 WBC
PLATELET # BLD: 102 THOU/MM3 (ref 130–400)
PMV BLD AUTO: 8.6 FL (ref 9.4–12.4)
POTASSIUM REFLEX MAGNESIUM: 4.9 MEQ/L (ref 3.5–5.2)
RBC # BLD: 3.29 MILL/MM3 (ref 4.2–5.4)
SEG NEUTROPHILS: 84.2 %
SEGMENTED NEUTROPHILS ABSOLUTE COUNT: 5.2 THOU/MM3 (ref 1.8–7.7)
SODIUM BLD-SCNC: 143 MEQ/L (ref 135–145)
T4 FREE: 0.74 NG/DL (ref 0.93–1.76)
TSH SERPL DL<=0.05 MIU/L-ACNC: 0.11 UIU/ML (ref 0.4–4.2)
WBC # BLD: 6.2 THOU/MM3 (ref 4.8–10.8)

## 2018-09-23 PROCEDURE — 2060000000 HC ICU INTERMEDIATE R&B

## 2018-09-23 PROCEDURE — 80048 BASIC METABOLIC PNL TOTAL CA: CPT

## 2018-09-23 PROCEDURE — 6370000000 HC RX 637 (ALT 250 FOR IP): Performed by: ANESTHESIOLOGY

## 2018-09-23 PROCEDURE — 84443 ASSAY THYROID STIM HORMONE: CPT

## 2018-09-23 PROCEDURE — 6360000002 HC RX W HCPCS: Performed by: INTERNAL MEDICINE

## 2018-09-23 PROCEDURE — 99291 CRITICAL CARE FIRST HOUR: CPT | Performed by: ANESTHESIOLOGY

## 2018-09-23 PROCEDURE — 82948 REAGENT STRIP/BLOOD GLUCOSE: CPT

## 2018-09-23 PROCEDURE — 84439 ASSAY OF FREE THYROXINE: CPT

## 2018-09-23 PROCEDURE — 36415 COLL VENOUS BLD VENIPUNCTURE: CPT

## 2018-09-23 PROCEDURE — 6370000000 HC RX 637 (ALT 250 FOR IP): Performed by: NURSE PRACTITIONER

## 2018-09-23 PROCEDURE — 6370000000 HC RX 637 (ALT 250 FOR IP): Performed by: INTERNAL MEDICINE

## 2018-09-23 PROCEDURE — 85025 COMPLETE CBC W/AUTO DIFF WBC: CPT

## 2018-09-23 PROCEDURE — 82330 ASSAY OF CALCIUM: CPT

## 2018-09-23 PROCEDURE — 6360000002 HC RX W HCPCS: Performed by: ANESTHESIOLOGY

## 2018-09-23 PROCEDURE — 2500000003 HC RX 250 WO HCPCS: Performed by: ANESTHESIOLOGY

## 2018-09-23 PROCEDURE — 6360000002 HC RX W HCPCS: Performed by: NURSE PRACTITIONER

## 2018-09-23 PROCEDURE — 2580000003 HC RX 258: Performed by: NURSE PRACTITIONER

## 2018-09-23 PROCEDURE — 2580000003 HC RX 258: Performed by: ANESTHESIOLOGY

## 2018-09-23 PROCEDURE — 2709999900 HC NON-CHARGEABLE SUPPLY

## 2018-09-23 PROCEDURE — 2580000003 HC RX 258: Performed by: INTERNAL MEDICINE

## 2018-09-23 RX ORDER — METHADONE HYDROCHLORIDE 10 MG/1
10 TABLET ORAL ONCE
Status: COMPLETED | OUTPATIENT
Start: 2018-09-23 | End: 2018-09-23

## 2018-09-23 RX ORDER — MIDODRINE HYDROCHLORIDE 5 MG/1
5 TABLET ORAL 3 TIMES DAILY PRN
Status: DISCONTINUED | OUTPATIENT
Start: 2018-09-23 | End: 2018-09-28 | Stop reason: HOSPADM

## 2018-09-23 RX ORDER — METHADONE HYDROCHLORIDE 10 MG/1
20 TABLET ORAL EVERY 12 HOURS
Status: DISCONTINUED | OUTPATIENT
Start: 2018-09-23 | End: 2018-09-24

## 2018-09-23 RX ORDER — DEXAMETHASONE 4 MG/1
4 TABLET ORAL EVERY 12 HOURS SCHEDULED
Status: DISCONTINUED | OUTPATIENT
Start: 2018-09-23 | End: 2018-09-28 | Stop reason: HOSPADM

## 2018-09-23 RX ORDER — HYDROMORPHONE HYDROCHLORIDE 2 MG/1
4 TABLET ORAL EVERY 8 HOURS PRN
Status: DISCONTINUED | OUTPATIENT
Start: 2018-09-23 | End: 2018-09-24

## 2018-09-23 RX ADMIN — DEXAMETHASONE 4 MG: 4 TABLET ORAL at 08:02

## 2018-09-23 RX ADMIN — DEXAMETHASONE 4 MG: 4 TABLET ORAL at 20:37

## 2018-09-23 RX ADMIN — METOPROLOL TARTRATE 2.5 MG: 1 INJECTION, SOLUTION INTRAVENOUS at 05:11

## 2018-09-23 RX ADMIN — METHADONE HYDROCHLORIDE 10 MG: 10 TABLET ORAL at 08:01

## 2018-09-23 RX ADMIN — Medication 10 ML: at 20:45

## 2018-09-23 RX ADMIN — SENNOSIDES 25.8 MG: 8.6 TABLET, FILM COATED ORAL at 20:39

## 2018-09-23 RX ADMIN — METOPROLOL TARTRATE 2.5 MG: 1 INJECTION, SOLUTION INTRAVENOUS at 10:48

## 2018-09-23 RX ADMIN — GABAPENTIN 100 MG: 100 CAPSULE ORAL at 20:37

## 2018-09-23 RX ADMIN — Medication 2 UNITS: at 20:47

## 2018-09-23 RX ADMIN — Medication 2 UNITS: at 17:57

## 2018-09-23 RX ADMIN — ENOXAPARIN SODIUM 40 MG: 40 INJECTION SUBCUTANEOUS at 12:36

## 2018-09-23 RX ADMIN — POLYETHYLENE GLYCOL 3350 17 G: 17 POWDER, FOR SOLUTION ORAL at 08:01

## 2018-09-23 RX ADMIN — CYCLOBENZAPRINE 5 MG: 10 TABLET, FILM COATED ORAL at 00:50

## 2018-09-23 RX ADMIN — METHADONE HYDROCHLORIDE 10 MG: 10 TABLET ORAL at 09:46

## 2018-09-23 RX ADMIN — GABAPENTIN 100 MG: 100 CAPSULE ORAL at 08:05

## 2018-09-23 RX ADMIN — HYDROMORPHONE HYDROCHLORIDE 4 MG: 2 TABLET ORAL at 23:28

## 2018-09-23 RX ADMIN — SENNOSIDES 25.8 MG: 8.6 TABLET, FILM COATED ORAL at 08:05

## 2018-09-23 RX ADMIN — PRAVASTATIN SODIUM 20 MG: 20 TABLET ORAL at 08:07

## 2018-09-23 RX ADMIN — GABAPENTIN 100 MG: 100 CAPSULE ORAL at 12:36

## 2018-09-23 RX ADMIN — MIDODRINE HYDROCHLORIDE 10 MG: 10 TABLET ORAL at 08:05

## 2018-09-23 RX ADMIN — METHADONE HYDROCHLORIDE 20 MG: 10 TABLET ORAL at 20:37

## 2018-09-23 RX ADMIN — PIPERACILLIN SODIUM,TAZOBACTAM SODIUM 3.38 G: 3; .375 INJECTION, POWDER, FOR SOLUTION INTRAVENOUS at 15:39

## 2018-09-23 RX ADMIN — LEVOTHYROXINE SODIUM 50 MCG: 50 TABLET ORAL at 08:05

## 2018-09-23 RX ADMIN — CYCLOBENZAPRINE 5 MG: 10 TABLET, FILM COATED ORAL at 08:38

## 2018-09-23 RX ADMIN — NALOXEGOL OXALATE 25 MG: 25 TABLET, FILM COATED ORAL at 08:07

## 2018-09-23 RX ADMIN — PIPERACILLIN SODIUM,TAZOBACTAM SODIUM 3.38 G: 3; .375 INJECTION, POWDER, FOR SOLUTION INTRAVENOUS at 08:03

## 2018-09-23 RX ADMIN — SODIUM CHLORIDE: 9 INJECTION, SOLUTION INTRAVENOUS at 20:29

## 2018-09-23 ASSESSMENT — PAIN DESCRIPTION - LOCATION
LOCATION: RECTUM
LOCATION: RECTUM

## 2018-09-23 ASSESSMENT — PAIN SCALES - GENERAL
PAINLEVEL_OUTOF10: 0
PAINLEVEL_OUTOF10: 4
PAINLEVEL_OUTOF10: 3
PAINLEVEL_OUTOF10: 6
PAINLEVEL_OUTOF10: 8
PAINLEVEL_OUTOF10: 0
PAINLEVEL_OUTOF10: 8
PAINLEVEL_OUTOF10: 0

## 2018-09-23 ASSESSMENT — PAIN DESCRIPTION - FREQUENCY
FREQUENCY: CONTINUOUS
FREQUENCY: CONTINUOUS

## 2018-09-23 ASSESSMENT — PAIN DESCRIPTION - ONSET
ONSET: ON-GOING
ONSET: ON-GOING

## 2018-09-23 ASSESSMENT — PAIN DESCRIPTION - DESCRIPTORS
DESCRIPTORS: ACHING;CRAMPING;DISCOMFORT
DESCRIPTORS: ACHING;CONSTANT;DISCOMFORT

## 2018-09-23 ASSESSMENT — PAIN DESCRIPTION - PAIN TYPE
TYPE: CHRONIC PAIN
TYPE: CHRONIC PAIN

## 2018-09-23 ASSESSMENT — PAIN DESCRIPTION - PROGRESSION
CLINICAL_PROGRESSION: GRADUALLY WORSENING
CLINICAL_PROGRESSION: GRADUALLY WORSENING

## 2018-09-23 NOTE — PROGRESS NOTES
 naloxone  0.4 mg Intravenous Once    levothyroxine  50 mcg Oral Daily    gabapentin  100 mg Oral TID    methadone  10 mg Oral Q12H    metoprolol  2.5 mg Intravenous Q6H    midodrine  10 mg Oral TID WC    piperacillin-tazobactam  3.375 g Intravenous Q8H     PRN Meds: sodium chloride flush, glucose, dextrose, glucagon (rDNA), dextrose, cyclobenzaprine      Intake/Output Summary (Last 24 hours) at 09/23/18 0714  Last data filed at 09/23/18 0507   Gross per 24 hour   Intake          5202.43 ml   Output             3825 ml   Net          1377.43 ml       Diet:  DIET GENERAL;    Exam:  BP 94/73   Pulse 89   Temp 98.2 °F (36.8 °C) (Core)   Resp 10   Ht 4' 11\" (1.499 m)   Wt 129 lb 6.6 oz (58.7 kg)   LMP 05/01/2011   SpO2 99%   BMI 26.14 kg/m²     General appearance: No apparent distress, appears stated age and cooperative. HEENT: Pupils equal, round, and reactive to light. Conjunctivae/corneas clear. Neck: Supple, with full range of motion. No jugular venous distention. Trachea midline. Respiratory:  Normal respiratory effort. Clear to auscultation, bilaterally without Rales/Wheezes/Rhonchi. Cardiovascular: Regular rate and rhythm with normal S1/S2 without murmurs, rubs or gallops. Abdomen: Soft, tender, non-distended with normal bowel sounds. Colostomy. Musculoskeletal: passive and active ROM x 4 extremities. Skin: Skin color, texture, turgor normal.  No rashes or lesions. Neurologic:  Neurovascularly intact without any focal sensory/motor deficits.  Cranial nerves: II-XII intact, grossly non-focal.  Psychiatric: Alert and oriented, thought content appropriate, normal insight  Capillary Refill: Brisk,< 3 seconds   Peripheral Pulses: +2 palpable, equal bilaterally       Labs:   Recent Labs      09/22/18   0722  09/23/18   0435   WBC  5.3  6.2   HGB  11.3*  10.5*   HCT  35.4*  33.4*   PLT  119*  102*     Recent Labs      09/22/18   0640  09/23/18   0435   NA  137  143   K  4.9  4.9   CL  95*  107 CO2  27  24   BUN  45*  28*   CREATININE  1.8*  1.0   CALCIUM  9.6  8.3*     Recent Labs      09/22/18   0640   AST  16   ALT  27   BILIDIR  <0.2   BILITOT  0.6   ALKPHOS  55       Urinalysis:    Lab Results   Component Value Date    NITRU NEGATIVE 09/22/2018    WBCUA > 200 09/22/2018    BACTERIA MODERATE 09/22/2018    RBCUA 3-5 09/22/2018    BLOODU MODERATE 09/22/2018    SPECGRAV 1.020 08/06/2016    GLUCOSEU NEGATIVE 09/22/2018       Radiology:  US RENAL COMPLETE   Final Result      Interval placement of a left ureteral stent with persistent moderate left hydronephrosis and moderate dilatation of the proximal left ureter. Mild dilatation of the upper pole of the right renal collecting system, unchanged in retrospect compared to the prior CT. **This report has been created using voice recognition software. It may contain minor errors which are inherent in voice recognition technology. **       Final report electronically signed by Dr. Angela Herrmann on 9/23/2018 2:11 AM      CT ABDOMEN PELVIS WO CONTRAST Additional Contrast? None   Final Result   1. There is consolidation within the right lower chest suggesting atelectasis and/or infiltrates. Follow-up imaging is recommended to confirm complete resolution in order to exclude a developing mass within this location. 2. There is mild right adrenal hypertrophy. 3. There is mild left adrenal hypertrophy and a stable 1.4 cm left adrenal nodule. Metastatic disease cannot be excluded. 4. The right kidney is unremarkable. There is resolution of the right hydronephrosis. 5. There is stable moderate left pelvocaliectasis and moderate left hydroureter. There is stable thickening of the wall the left ureter and stable stranding densities within the fat adjacent to the left ureter. There is stable distention of the urinary    bladder with mild thickening of the urinary bladder wall. 6. There is a Cathryn's pouch.  There is a colostomy along the anterior

## 2018-09-23 NOTE — PROGRESS NOTES
right hydronephrosis. 5. There is stable moderate left pelvocaliectasis and moderate left hydroureter. There is stable thickening of the wall the left ureter and stable stranding densities within the fat adjacent to the left ureter. There is stable distention of the urinary   bladder with mild thickening of the urinary bladder wall. 6. There is a Cathryn's pouch. There is a colostomy along the anterior pelvic wall on the left. There is mesenteric fat extending into the subcutaneous tissues through this. There are also mild stranding densities within the subcutaneous fat of the   anterior pelvic wall and a few nodules within the subcutaneous fat which may be related to subcutaneous injections. The bowel gas pattern is nonobstructive. There is a large amount of stool within the colon. There are surgical sutures along the proximal   ascending colon. The appendix is not definitively identified. 7. The distal esophagus is unremarkable. There is thickening of the wall of the gastric fundus. The small bowel is unremarkable. 8. There is a 7.5 x 7.3 x 2.6 cm complex fluid collection within the presacral space containing gas shadows and a percutaneous drain consistent with an abscess. The size of this abscess is similar to that seen on the previous examination. 9. There are stable stranding densities within the left side of the abdomen/pelvis suggesting inflammation. 10. There are numerous stable small retroperitoneal lymph nodes. 11. There is a stable comminuted pathologic fracture and destruction of the S1 vertebral body. Infectious and neoplastic etiologies should be considered. A posttraumatic etiologies should also be considered. There is mild spinal canal stenosis at this   level and mild narrowing of the left neuroforamen. Urine, catheter     Organism Proteus species (A)    Urine Culture Reflex [place] count: >100,000 CFU/mL         Assessment and Plan:   1. Septic shock with ALOC, improved  2. UTI  3.  Rectal / presacral space abscess with a percut drain in situ  4. H/o met rectal cancer, new sacral met deposit /pathological fx  5. sandeep hydronephrosis  6. SUZAN  7. ALOC related to sepsis and meds( methadone, dilaudid, neurontin)    Cont IVF hydration   IV antibiotics, on zosyn  Analgesics  Am labs. Will follow on step down bed.       Laquita Roe MD

## 2018-09-23 NOTE — PROGRESS NOTES
polyethylene glycol  17 g Oral Daily    pravastatin  20 mg Oral Daily    senna  3 tablet Oral BID    insulin lispro  0-6 Units Subcutaneous 4x Daily AC & HS    levothyroxine  50 mcg Oral Daily    gabapentin  100 mg Oral TID    piperacillin-tazobactam  3.375 g Intravenous Q8H     Continuous Infusions:   sodium chloride 100 mL/hr at 09/22/18 2352    dextrose         I & O's:  I/O last 3 completed shifts: In: 5202.4 [P.O.:300; I.V.:4902.4]  Out: 3825 [Urine:3825]  No intake/output data recorded. Intake/Output Summary (Last 24 hours) at 09/23/18 1338  Last data filed at 09/23/18 0507   Gross per 24 hour   Intake          5192.43 ml   Output             3725 ml   Net          1467.43 ml       Date 09/23/18 0000 - 09/23/18 2359   Shift 5609-0608 8113-9833 4709-8036 24 Hour Total   I  N  T  A  K  E   P.O.  (mL/kg/hr) 300  (0.6)   300    I.V.  (mL/kg) 2039  (35)   2039  (35)    Shift Total  (mL/kg) 2339  (40.1)   2339  (40.1)   O  U  T  P  U  T   Urine  (mL/kg/hr) 2700  (5.8)   2700    Stool  (mL/kg) 0  (0)   0  (0)    Shift Total  (mL/kg) 2700  (46.3)   2700  (46.3)   Weight (kg) 58.3 58.3 58.3 58.3           CBC:   Recent Labs      09/22/18   0722  09/23/18   0435   WBC  5.3  6.2   HGB  11.3*  10.5*   PLT  119*  102*     BMP:  Recent Labs      09/22/18   0640  09/23/18   0435   NA  137  143   K  4.9  4.9   CL  95*  107   CO2  27  24   BUN  45*  28*   CREATININE  1.8*  1.0   GLUCOSE  135*  150*     Hepatic: Recent Labs      09/22/18   0640   AST  16   ALT  27   BILITOT  0.6   ALKPHOS  55     BNP: No results for input(s): BNP in the last 72 hours. URINALYSIS:  Results for Jelena Garcia (MRN [id number]) as of 9/23/2018 16:32   Ref.  Range 9/22/2018 07:15   Color, UA Latest Ref Range: STRAW-YELL  YELLOW   Glucose, UA Latest Ref Range: NEGATIVE mg/dl NEGATIVE   Bilirubin, Urine Latest Ref Range: NEGATIVE  NEGATIVE   Ketones, Urine Latest Ref Range: NEGATIVE  NEGATIVE   Blood, Urine Latest Ref Range: NEGATIVE MODERATE (A)   pH, UA Latest Ref Range: 5.0 - 9.0  8.5   Protein, UA Latest Ref Range: NEGATIVE  >= 300 (A)   Urobilinogen, Urine Latest Ref Range: 0.0 - 1.0 eu/dl 0.2   Nitrite, Urine Latest Ref Range: NEGATIVE  NEGATIVE   Leukocyte Esterase, Urine Latest Ref Range: NEGATIVE  LARGE (A)   Casts UA Latest Ref Range: NONE SEEN /lpf NONE SEEN   WBC, UA Latest Ref Range: 0-4/hpf /hpf > 200   RBC, UA Latest Ref Range: 0-2/hpf /hpf 3-5   Epi Cells Latest Ref Range: 3-5/hpf /hpf NONE   Renal Epithelial, Urine Latest Ref Range: NONE SEEN  NONE   Bacteria, UA Latest Ref Range: FEW/NONE S /hpf MODERATE   Amorphous, UA Latest Ref Range: NONE SEEN  PHOSPHATES   Yeast, Urine Latest Ref Range: NONE SEEN  NONE SEEN   Crystals Latest Ref Range: NONE SEEN  NONE SEEN   Character, Urine Latest Ref Range: CLEAR-SL C  TURBID (A)   Urine Culture Reflex Unknown [place] count: >10. .. Specific Gravity, Urine Latest Ref Range: 1.002 - 1.03  1.010     MICRO:   Proteus in the urine    IMAGING:   Ct abdomen  Impression   1. There is consolidation within the right lower chest suggesting atelectasis and/or infiltrates. Follow-up imaging is recommended to confirm complete resolution in order to exclude a developing mass within this location. 2. There is mild right adrenal hypertrophy. 3. There is mild left adrenal hypertrophy and a stable 1.4 cm left adrenal nodule. Metastatic disease cannot be excluded. 4. The right kidney is unremarkable. There is resolution of the right hydronephrosis. 5. There is stable moderate left pelvocaliectasis and moderate left hydroureter. There is stable thickening of the wall the left ureter and stable stranding densities within the fat adjacent to the left ureter. There is stable distention of the urinary    bladder with mild thickening of the urinary bladder wall. 6. There is a Cathryn's pouch. There is a colostomy along the anterior pelvic wall on the left.  There is mesenteric fat extending into the

## 2018-09-24 LAB
ACINETOBACTER BAUMANNII FILM ARRAY: NOT DETECTED
ANION GAP SERPL CALCULATED.3IONS-SCNC: 11 MEQ/L (ref 8–16)
BASOPHILS # BLD: 0 %
BASOPHILS ABSOLUTE: 0 THOU/MM3 (ref 0–0.1)
BOTTLE TYPE: ABNORMAL
BUN BLDV-MCNC: 19 MG/DL (ref 7–22)
CALCIUM IONIZED: 1.24 MMOL/L (ref 1.12–1.32)
CALCIUM SERPL-MCNC: 8.8 MG/DL (ref 8.5–10.5)
CANDIDA ALBICANS FILM ARRAY: NOT DETECTED
CANDIDA GLABRATA FILM ARRAY: NOT DETECTED
CANDIDA KRUSEI FILM ARRAY: NOT DETECTED
CANDIDA PARAPSILOSIS FILM ARRAY: NOT DETECTED
CANDIDA TROPICALIS FILM ARRAY: NOT DETECTED
CARBAPENEM RESITANT FILM ARRAY: NOT DETECTED
CHLORIDE BLD-SCNC: 106 MEQ/L (ref 98–111)
CO2: 24 MEQ/L (ref 23–33)
CREAT SERPL-MCNC: 0.6 MG/DL (ref 0.4–1.2)
ENTERBACTER CLOACAE FILM ARRAY: NOT DETECTED
ENTERBACTERIACEAE FILM ARRAY: DETECTED
ENTEROCOCCUS FILM ARRAY: NOT DETECTED
EOSINOPHIL # BLD: 0 %
EOSINOPHILS ABSOLUTE: 0 THOU/MM3 (ref 0–0.4)
ERYTHROCYTE [DISTWIDTH] IN BLOOD BY AUTOMATED COUNT: 13.9 % (ref 11.5–14.5)
ERYTHROCYTE [DISTWIDTH] IN BLOOD BY AUTOMATED COUNT: 51.2 FL (ref 35–45)
ESCHERICHIA COLI FILM ARRAY: NOT DETECTED
GFR SERPL CREATININE-BSD FRML MDRD: > 90 ML/MIN/1.73M2
GLUCOSE BLD-MCNC: 141 MG/DL (ref 70–108)
GLUCOSE BLD-MCNC: 151 MG/DL (ref 70–108)
GLUCOSE BLD-MCNC: 151 MG/DL (ref 70–108)
GLUCOSE BLD-MCNC: 165 MG/DL (ref 70–108)
GLUCOSE BLD-MCNC: 168 MG/DL (ref 70–108)
HAEMOPHILUS INFLUENZA FILM ARRAY: NOT DETECTED
HCT VFR BLD CALC: 30.5 % (ref 37–47)
HEMOGLOBIN: 9.8 GM/DL (ref 12–16)
KLEBSIELLA OXYTOCA FILM ARRAY: NOT DETECTED
KLEBSIELLA PNEUMONIAE FILM ARRAY: NOT DETECTED
LISTERIA MONOCYTOGENES FILM ARRAY: NOT DETECTED
LYMPHOCYTES # BLD: 3 %
LYMPHOCYTES ABSOLUTE: 0.2 THOU/MM3 (ref 1–4.8)
MCH RBC QN AUTO: 32.3 PG (ref 26–33)
MCHC RBC AUTO-ENTMCNC: 32.1 GM/DL (ref 32.2–35.5)
MCV RBC AUTO: 100.7 FL (ref 81–99)
METAMYELOCYTES: 3 %
METHICILLIN RESISTANT FILM ARRAY: ABNORMAL
MONOCYTES # BLD: 6 %
MONOCYTES ABSOLUTE: 0.4 THOU/MM3 (ref 0.4–1.3)
MYELOCYTES: 1 %
NEISSERIA MENIGITIDIS FILM ARRAY: NOT DETECTED
NUCLEATED RED BLOOD CELLS: 0 /100 WBC
PLATELET # BLD: 100 THOU/MM3 (ref 130–400)
PLATELET ESTIMATE: ADEQUATE
PMV BLD AUTO: 9 FL (ref 9.4–12.4)
POTASSIUM SERPL-SCNC: 4.2 MEQ/L (ref 3.5–5.2)
PROCALCITONIN: 3.26 NG/ML (ref 0.01–0.09)
PROTEUS FILM ARRAY: DETECTED
PSEUDOMONAS AERUGINOSA FILM ARRAY: NOT DETECTED
RBC # BLD: 3.03 MILL/MM3 (ref 4.2–5.4)
SEG NEUTROPHILS: 87 %
SEGMENTED NEUTROPHILS ABSOLUTE COUNT: 5.7 THOU/MM3 (ref 1.8–7.7)
SERRATIA MARCESCENS FILM ARRAY: NOT DETECTED
SODIUM BLD-SCNC: 141 MEQ/L (ref 135–145)
SOURCE OF BLOOD CULTURE: ABNORMAL
STAPH AUREUS FILM ARRAY: NOT DETECTED
STAPHYLOCOCCUS FILM ARRAY: NOT DETECTED
STREP AGALACTIAE FILM ARRAY: NOT DETECTED
STREP PNEUMONIAE FILM ARRAY: NOT DETECTED
STREP PYOCGENES FILM ARRAY: NOT DETECTED
STREPTOCOCCUS FILM ARRAY: NOT DETECTED
TOXIC GRANULATION: PRESENT
VANCOMYCIN RESISTANT FILM ARRAY: ABNORMAL
VRE CULTURE: NORMAL
WBC # BLD: 6.6 THOU/MM3 (ref 4.8–10.8)

## 2018-09-24 PROCEDURE — 6370000000 HC RX 637 (ALT 250 FOR IP): Performed by: ANESTHESIOLOGY

## 2018-09-24 PROCEDURE — 84145 PROCALCITONIN (PCT): CPT

## 2018-09-24 PROCEDURE — 6360000002 HC RX W HCPCS: Performed by: ANESTHESIOLOGY

## 2018-09-24 PROCEDURE — 94760 N-INVAS EAR/PLS OXIMETRY 1: CPT

## 2018-09-24 PROCEDURE — 2580000003 HC RX 258: Performed by: INTERNAL MEDICINE

## 2018-09-24 PROCEDURE — G8979 MOBILITY GOAL STATUS: HCPCS

## 2018-09-24 PROCEDURE — 80048 BASIC METABOLIC PNL TOTAL CA: CPT

## 2018-09-24 PROCEDURE — 97166 OT EVAL MOD COMPLEX 45 MIN: CPT

## 2018-09-24 PROCEDURE — 87040 BLOOD CULTURE FOR BACTERIA: CPT

## 2018-09-24 PROCEDURE — 97110 THERAPEUTIC EXERCISES: CPT

## 2018-09-24 PROCEDURE — 90686 IIV4 VACC NO PRSV 0.5 ML IM: CPT | Performed by: INTERNAL MEDICINE

## 2018-09-24 PROCEDURE — G0008 ADMIN INFLUENZA VIRUS VAC: HCPCS | Performed by: INTERNAL MEDICINE

## 2018-09-24 PROCEDURE — 82948 REAGENT STRIP/BLOOD GLUCOSE: CPT

## 2018-09-24 PROCEDURE — 85025 COMPLETE CBC W/AUTO DIFF WBC: CPT

## 2018-09-24 PROCEDURE — 6360000002 HC RX W HCPCS: Performed by: INTERNAL MEDICINE

## 2018-09-24 PROCEDURE — 82330 ASSAY OF CALCIUM: CPT

## 2018-09-24 PROCEDURE — G8978 MOBILITY CURRENT STATUS: HCPCS

## 2018-09-24 PROCEDURE — 6360000002 HC RX W HCPCS: Performed by: NURSE PRACTITIONER

## 2018-09-24 PROCEDURE — 6370000000 HC RX 637 (ALT 250 FOR IP): Performed by: INTERNAL MEDICINE

## 2018-09-24 PROCEDURE — 36415 COLL VENOUS BLD VENIPUNCTURE: CPT

## 2018-09-24 PROCEDURE — 2709999900 HC NON-CHARGEABLE SUPPLY

## 2018-09-24 PROCEDURE — 97530 THERAPEUTIC ACTIVITIES: CPT

## 2018-09-24 PROCEDURE — 36591 DRAW BLOOD OFF VENOUS DEVICE: CPT

## 2018-09-24 PROCEDURE — 2580000003 HC RX 258: Performed by: NURSE PRACTITIONER

## 2018-09-24 PROCEDURE — G8987 SELF CARE CURRENT STATUS: HCPCS

## 2018-09-24 PROCEDURE — 51798 US URINE CAPACITY MEASURE: CPT

## 2018-09-24 PROCEDURE — G8988 SELF CARE GOAL STATUS: HCPCS

## 2018-09-24 PROCEDURE — 97161 PT EVAL LOW COMPLEX 20 MIN: CPT

## 2018-09-24 PROCEDURE — 2060000000 HC ICU INTERMEDIATE R&B

## 2018-09-24 PROCEDURE — 6370000000 HC RX 637 (ALT 250 FOR IP): Performed by: NURSE PRACTITIONER

## 2018-09-24 RX ORDER — CANDESARTAN 16 MG/1
32 TABLET ORAL DAILY
Status: DISCONTINUED | OUTPATIENT
Start: 2018-09-24 | End: 2018-09-28 | Stop reason: HOSPADM

## 2018-09-24 RX ORDER — VALSARTAN 160 MG/1
320 TABLET ORAL DAILY
Status: DISCONTINUED | OUTPATIENT
Start: 2018-09-24 | End: 2018-09-24

## 2018-09-24 RX ORDER — HYDRALAZINE HYDROCHLORIDE 20 MG/ML
10 INJECTION INTRAMUSCULAR; INTRAVENOUS EVERY 4 HOURS PRN
Status: DISCONTINUED | OUTPATIENT
Start: 2018-09-24 | End: 2018-09-28 | Stop reason: HOSPADM

## 2018-09-24 RX ORDER — METHADONE HYDROCHLORIDE 10 MG/1
20 TABLET ORAL EVERY 8 HOURS SCHEDULED
Status: DISCONTINUED | OUTPATIENT
Start: 2018-09-24 | End: 2018-09-28 | Stop reason: HOSPADM

## 2018-09-24 RX ORDER — HYDROMORPHONE HYDROCHLORIDE 2 MG/1
8 TABLET ORAL EVERY 8 HOURS PRN
Status: DISCONTINUED | OUTPATIENT
Start: 2018-09-24 | End: 2018-09-25

## 2018-09-24 RX ORDER — AMLODIPINE BESYLATE 10 MG/1
10 TABLET ORAL DAILY
Status: DISCONTINUED | OUTPATIENT
Start: 2018-09-24 | End: 2018-09-28 | Stop reason: HOSPADM

## 2018-09-24 RX ORDER — AMLODIPINE BESYLATE 5 MG/1
5 TABLET ORAL DAILY
Status: DISCONTINUED | OUTPATIENT
Start: 2018-09-24 | End: 2018-09-24

## 2018-09-24 RX ADMIN — METHADONE HYDROCHLORIDE 20 MG: 10 TABLET ORAL at 14:00

## 2018-09-24 RX ADMIN — METHADONE HYDROCHLORIDE 20 MG: 10 TABLET ORAL at 08:36

## 2018-09-24 RX ADMIN — CYCLOBENZAPRINE 5 MG: 10 TABLET, FILM COATED ORAL at 22:07

## 2018-09-24 RX ADMIN — DEXAMETHASONE 4 MG: 4 TABLET ORAL at 21:03

## 2018-09-24 RX ADMIN — HYDROMORPHONE HYDROCHLORIDE 8 MG: 2 TABLET ORAL at 17:06

## 2018-09-24 RX ADMIN — PIPERACILLIN SODIUM,TAZOBACTAM SODIUM 3.38 G: 3; .375 INJECTION, POWDER, FOR SOLUTION INTRAVENOUS at 08:35

## 2018-09-24 RX ADMIN — SENNOSIDES 25.8 MG: 8.6 TABLET, FILM COATED ORAL at 08:02

## 2018-09-24 RX ADMIN — GABAPENTIN 100 MG: 100 CAPSULE ORAL at 08:01

## 2018-09-24 RX ADMIN — METOPROLOL TARTRATE 25 MG: 25 TABLET ORAL at 08:01

## 2018-09-24 RX ADMIN — CANDESARTAN CILEXETIL 32 MG: 16 TABLET ORAL at 21:03

## 2018-09-24 RX ADMIN — PIPERACILLIN SODIUM,TAZOBACTAM SODIUM 3.38 G: 3; .375 INJECTION, POWDER, FOR SOLUTION INTRAVENOUS at 17:05

## 2018-09-24 RX ADMIN — SODIUM CHLORIDE: 9 INJECTION, SOLUTION INTRAVENOUS at 06:31

## 2018-09-24 RX ADMIN — PIPERACILLIN SODIUM,TAZOBACTAM SODIUM 3.38 G: 3; .375 INJECTION, POWDER, FOR SOLUTION INTRAVENOUS at 00:45

## 2018-09-24 RX ADMIN — POLYETHYLENE GLYCOL 3350 17 G: 17 POWDER, FOR SOLUTION ORAL at 08:02

## 2018-09-24 RX ADMIN — INFLUENZA A VIRUS A/MICHIGAN/45/2015 X-275 (H1N1) ANTIGEN (FORMALDEHYDE INACTIVATED), INFLUENZA A VIRUS A/SINGAPORE/INFIMH-16-0019/2016 IVR-186 (H3N2) ANTIGEN (FORMALDEHYDE INACTIVATED), INFLUENZA B VIRUS B/PHUKET/3073/2013 ANTIGEN (FORMALDEHYDE INACTIVATED), AND INFLUENZA B VIRUS B/MARYLAND/15/2016 BX-69A ANTIGEN (FORMALDEHYDE INACTIVATED) 0.5 ML: 15; 15; 15; 15 INJECTION, SUSPENSION INTRAMUSCULAR at 08:36

## 2018-09-24 RX ADMIN — DEXAMETHASONE 4 MG: 4 TABLET ORAL at 08:01

## 2018-09-24 RX ADMIN — GABAPENTIN 100 MG: 100 CAPSULE ORAL at 14:00

## 2018-09-24 RX ADMIN — GABAPENTIN 100 MG: 100 CAPSULE ORAL at 22:07

## 2018-09-24 RX ADMIN — LEVOTHYROXINE SODIUM 50 MCG: 50 TABLET ORAL at 06:33

## 2018-09-24 RX ADMIN — Medication 1 UNITS: at 08:04

## 2018-09-24 RX ADMIN — PRAVASTATIN SODIUM 20 MG: 20 TABLET ORAL at 08:02

## 2018-09-24 RX ADMIN — Medication 1 UNITS: at 21:04

## 2018-09-24 RX ADMIN — HYDROMORPHONE HYDROCHLORIDE 4 MG: 2 TABLET ORAL at 08:01

## 2018-09-24 RX ADMIN — ENOXAPARIN SODIUM 40 MG: 40 INJECTION SUBCUTANEOUS at 12:19

## 2018-09-24 RX ADMIN — METHADONE HYDROCHLORIDE 20 MG: 10 TABLET ORAL at 22:07

## 2018-09-24 RX ADMIN — Medication 1 UNITS: at 12:20

## 2018-09-24 RX ADMIN — CYCLOBENZAPRINE 5 MG: 10 TABLET, FILM COATED ORAL at 10:58

## 2018-09-24 RX ADMIN — AMLODIPINE BESYLATE 10 MG: 10 TABLET ORAL at 21:03

## 2018-09-24 RX ADMIN — SENNOSIDES 25.8 MG: 8.6 TABLET, FILM COATED ORAL at 21:04

## 2018-09-24 RX ADMIN — METOPROLOL TARTRATE 25 MG: 25 TABLET ORAL at 21:03

## 2018-09-24 RX ADMIN — NALOXEGOL OXALATE 25 MG: 25 TABLET, FILM COATED ORAL at 06:33

## 2018-09-24 RX ADMIN — Medication 1 UNITS: at 17:06

## 2018-09-24 ASSESSMENT — PAIN SCALES - GENERAL
PAINLEVEL_OUTOF10: 6
PAINLEVEL_OUTOF10: 8
PAINLEVEL_OUTOF10: 8
PAINLEVEL_OUTOF10: 10
PAINLEVEL_OUTOF10: 6
PAINLEVEL_OUTOF10: 10
PAINLEVEL_OUTOF10: 8
PAINLEVEL_OUTOF10: 10

## 2018-09-24 ASSESSMENT — PAIN DESCRIPTION - DESCRIPTORS
DESCRIPTORS: CONSTANT
DESCRIPTORS: CONSTANT;ACHING
DESCRIPTORS: CONSTANT

## 2018-09-24 ASSESSMENT — PAIN DESCRIPTION - FREQUENCY
FREQUENCY: CONTINUOUS

## 2018-09-24 ASSESSMENT — PAIN DESCRIPTION - LOCATION
LOCATION: RECTUM

## 2018-09-24 ASSESSMENT — PAIN DESCRIPTION - ORIENTATION
ORIENTATION: INNER

## 2018-09-24 ASSESSMENT — PAIN DESCRIPTION - PROGRESSION
CLINICAL_PROGRESSION: NOT CHANGED
CLINICAL_PROGRESSION: GRADUALLY WORSENING

## 2018-09-24 ASSESSMENT — PAIN DESCRIPTION - PAIN TYPE
TYPE: CHRONIC PAIN

## 2018-09-24 ASSESSMENT — PAIN DESCRIPTION - ONSET
ONSET: ON-GOING

## 2018-09-24 NOTE — PROGRESS NOTES
[facility]    TUBAL LIGATION  1990's    TUNNELED VENOUS PORT PLACEMENT  6/28/2012    purple power port-Dr. Kike Tillman        Restrictions/Precautions:  General Precautions, Fall Risk        Subjective:  Chart Reviewed: Yes  Patient assessed for rehabilitation services?: Yes  Family / Caregiver Present: Yes (Mother)  Subjective: RN approved session, pt lying on L side in bed, c/o'ing severe rectal pain. Pt perseverating on this new thing in her rectum, however, per chart, has had a rectal fistula for quite some time. RN states pt on several pain meds and unable to receive any right now. General:  Follows Commands: Within Functional Limits    Vision: Within Functional Limits    Hearing: Within functional limits       Pain:   .      Pre Treatment Pain Screening  Pain at present: 10  Scale Used: Numeric Score  Intervention List: Patient able to continue with treatment  Comments / Details: Rectum- RN notified and pt on several pain meds and not due for any at this time    Social/Functional History:    Lives With: Son  Type of Home: House  Home Layout: Two level, Bed/Bath upstairs  Home Access: Level entry  Home Equipment: 4 wheeled walker         Ambulation Assistance: Independent  Transfer Assistance: Independent    Active : No     Additional Comments: Pt states recent use of rollator due to increased weakness      Objective:       RLE AROM: WFL       LLE AROM : WFL      B LE's grossly 3+/5         RLE Tone: Normotonic  LLE Tone: Normotonic       Balance  Sitting - Static: Fair (with B UE support)  Standing - Static: Fair, -  Standing - Dynamic: Poor, +  Comments: Pt unable to sit EOB long due to rectal pain    Supine to Sit: Moderate assistance (HOB elevated ~30 degrees, assist for trunk elevation)  Sit to Supine: Minimal assistance (to bring LE's into bed)    Transfers  Sit to Stand: Minimal Assistance (From EOB)  Stand to sit: Contact guard assistance       Ambulation 1  Surface: level tile  Device: Recommendations:  Equipment Needed: No    Safety:  Type of devices: All fall risk precautions in place, Bed alarm in place, Call light within reach, Left in bed, Nurse notified, Patient at risk for falls, Gait belt  Restraints  Initially in place: No    Plan:  Times per week: 5 X GM  Times per day: Daily  Current Treatment Recommendations: Strengthening, Functional Mobility Training, Transfer Training, Balance Training, Endurance Training, Stair training, Gait Training, Neuromuscular Re-education, Equipment Evaluation, Education, & procurement, Safety Education & Training, Patient/Caregiver Education & Training, Home Exercise Program    Goals:  Patient goals : To decrease pain  Short term goals  Time Frame for Short term goals: 2 weeks  Short term goal 1: Pt to transfer supine <--> sit SBA to enable pt to get in/out of bed. Short term goal 2: Pt to transfer sit <--> stand SBA for increased functional mobility. Short term goal 3: Pt to ambulate >50 feet with RW SBA for household ambulation. Short term goal 4: Pt to ascend/descend 12 steps with HR SBA for bedroom/bathroom access. Long term goals  Time Frame for Long term goals : NA due to short length of stay. Evaluation Complexity: Based on the findings of patient history, examination, clinical presentation, and decision making during this evaluation, the evaluation of Veronica Bhatia  is of low complexity. PT G-Codes  Functional Limitation: Mobility: Walking and moving around  Mobility: Walking and Moving Around Current Status (): At least 40 percent but less than 60 percent impaired, limited or restricted  Mobility: Walking and Moving Around Goal Status ():  At least 40 percent but less than 60 percent impaired, limited or restricted       AM-PAC Inpatient Mobility without Stair Climbing Raw Score : 14  AM-PAC Inpatient without Stair Climbing T-Scale Score : 40.85  Mobility Inpatient CMS 0-100% Score: 53.33  Mobility Inpatient without Stair CMS

## 2018-09-24 NOTE — PROGRESS NOTES
normal  Abdomen: soft, non-tender; bowel sounds normal; no masses,  no organomegaly  Extremities: extremities normal, atraumatic, no cyanosis or edema  Neurologic: Mental status: Alert, oriented, thought content appropriate complains of leg pains  Wound/Ulcers: perirectal ulcer stable with drain         Assessment:     1. Severe sepsis with shock. 2.  UTI with hydronephrosis. 3.  Metabolic encephalopathy. 4.  Rule out bacteremia, rule out MediPort infection. 5.  Likely drug overdose of pain medication. 6.  Acute kidney injury. 7.  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 8.  History of colorectal cancer, status post colostomy with perirectal  drain, that is long-term drain, that is present  9.neuropathic pain   10. Blood cultures with gram negative rods -- pcr proteus and enterobacter ? Source urine vs pre sacral      Patient Active Problem List:     Colonic mass     Abdominal wall mass of right flank     Malignant neoplasm of rectum (HCC)     Rectal bleed     BRBPR (bright red blood per rectum)     Chronic rectal pain     Benign hypertension     Diabetes mellitus type 2, controlled (HCC)     Hypothyroidism     Perirectal fistula     Coagulopathy (HCC)     Lower GI bleed     Refractory nausea and vomiting     Ileus (HCC)     Moderate malnutrition (HCC)     SIRS (systemic inflammatory response syndrome) (HCC)     Septic shock (HCC)     SUZAN (acute kidney injury) (HCC)     Elevated troponin     Urinary tract infection with hematuria     Encephalopathy acute     Urinary tract infection due to Proteus      Plan:  Continue current antibiotic therapy with zosyn   Repeat blood cultures  ? Need for urology eval   They are worried about pain. .? Need for pain consult. Defer to primary    Labs, cultures, and radiographs reviewed. Changes made as necessary. D/w Patient's R.N. and specific instructions given and infectious disease issues addressed. Will follow the patient closely with you.  Also please see the orders for updated

## 2018-09-24 NOTE — PROGRESS NOTES
Patient urinating large amounts around catheter. Amy Saxena notified and ordered to remove gillis and get a bladder scan after her next void. Gillis removed at this time.

## 2018-09-24 NOTE — CARE COORDINATION
18, 9:32 AM      Murphy Garcia       Admitted from: ED 2018/ 0607 Hospital day: 2   Location: --A Reason for admit: Septic shock ([facility] [A41.9, R65.21]  Septic shock ([facility] [A41.9, R65.21] Status: IP  Admit order signed?: yes  PMH:  has a past medical history of Blood transfusion; Cancer ([facility] Diabetes mellitus type 2 in nonobese [facility]); Hyperlipidemia; Hypertension; and Hyperthyroidism. Procedure: none  Pertinent abnormal Imagin/22 Left Hydronephrosis, Left Ureteral Stent, Possible Infiltrate  Medications:  Scheduled Meds:   dexamethasone  4 mg Oral 2 times per day    methadone  20 mg Oral Q12H    metoprolol tartrate  25 mg Oral BID    sodium chloride flush  10 mL Intravenous 2 times per day    enoxaparin  40 mg Subcutaneous Daily    lidocaine  1 patch Transdermal Daily    naloxegol  25 mg Oral QAM    polyethylene glycol  17 g Oral Daily    pravastatin  20 mg Oral Daily    senna  3 tablet Oral BID    insulin lispro  0-6 Units Subcutaneous 4x Daily AC & HS    levothyroxine  50 mcg Oral Daily    gabapentin  100 mg Oral TID    piperacillin-tazobactam  3.375 g Intravenous Q8H     Continuous Infusions:   sodium chloride 100 mL/hr at 18 0631    dextrose        Pertinent Info/Orders/Treatment Plan: from ICU prior, AB/IVF continued, (+) UA: Proteus, (+) Blood Film Array: (+) Enterbactiacae; monitor. ID/Palliative Care/Pain Management following  Diet: DIET GENERAL; Carb Control: 4 carb choices (60 gms)/meal   DVT Prophylaxis: Lovenox  Smoking status:  reports that she has never smoked.  She has never used smokeless tobacco.   Influenza Vaccination Screening Completed: n/a  Pneumonia Vaccination Screening Completed: no, updated nsg  PCP: Po Ryan MD  Readmission: none  Readmission Risk Score: 29%    Discharge Planning  Current Residence:  Private Residence  Living Arrangements:  Children   Support Systems:  Parent, Family Members, [employer] [facility]  Current Services PTA:

## 2018-09-25 LAB
ALBUMIN SERPL-MCNC: 3.5 G/DL (ref 3.5–5.1)
ALP BLD-CCNC: 77 U/L (ref 38–126)
ALT SERPL-CCNC: 28 U/L (ref 11–66)
ANION GAP SERPL CALCULATED.3IONS-SCNC: 16 MEQ/L (ref 8–16)
AST SERPL-CCNC: 12 U/L (ref 5–40)
BILIRUB SERPL-MCNC: 0.6 MG/DL (ref 0.3–1.2)
BUN BLDV-MCNC: 20 MG/DL (ref 7–22)
CALCIUM IONIZED: 1.22 MMOL/L (ref 1.12–1.32)
CALCIUM SERPL-MCNC: 9.8 MG/DL (ref 8.5–10.5)
CHLORIDE BLD-SCNC: 100 MEQ/L (ref 98–111)
CO2: 25 MEQ/L (ref 23–33)
CREAT SERPL-MCNC: 0.6 MG/DL (ref 0.4–1.2)
ERYTHROCYTE [DISTWIDTH] IN BLOOD BY AUTOMATED COUNT: 13.6 % (ref 11.5–14.5)
ERYTHROCYTE [DISTWIDTH] IN BLOOD BY AUTOMATED COUNT: 49.8 FL (ref 35–45)
GFR SERPL CREATININE-BSD FRML MDRD: > 90 ML/MIN/1.73M2
GLUCOSE BLD-MCNC: 147 MG/DL (ref 70–108)
GLUCOSE BLD-MCNC: 156 MG/DL (ref 70–108)
GLUCOSE BLD-MCNC: 190 MG/DL (ref 70–108)
GLUCOSE BLD-MCNC: 192 MG/DL (ref 70–108)
GLUCOSE BLD-MCNC: 296 MG/DL (ref 70–108)
HCT VFR BLD CALC: 35 % (ref 37–47)
HEMOGLOBIN: 11.4 GM/DL (ref 12–16)
MCH RBC QN AUTO: 32.1 PG (ref 26–33)
MCHC RBC AUTO-ENTMCNC: 32.6 GM/DL (ref 32.2–35.5)
MCV RBC AUTO: 98.6 FL (ref 81–99)
PLATELET # BLD: 100 THOU/MM3 (ref 130–400)
PMV BLD AUTO: 9.2 FL (ref 9.4–12.4)
POTASSIUM REFLEX MAGNESIUM: 3.7 MEQ/L (ref 3.5–5.2)
PROCALCITONIN: 1.61 NG/ML (ref 0.01–0.09)
RBC # BLD: 3.55 MILL/MM3 (ref 4.2–5.4)
SODIUM BLD-SCNC: 141 MEQ/L (ref 135–145)
TOTAL PROTEIN: 6.9 G/DL (ref 6.1–8)
WBC # BLD: 6.6 THOU/MM3 (ref 4.8–10.8)

## 2018-09-25 PROCEDURE — 82948 REAGENT STRIP/BLOOD GLUCOSE: CPT

## 2018-09-25 PROCEDURE — 2580000003 HC RX 258: Performed by: ANESTHESIOLOGY

## 2018-09-25 PROCEDURE — 6370000000 HC RX 637 (ALT 250 FOR IP): Performed by: INTERNAL MEDICINE

## 2018-09-25 PROCEDURE — 2709999900 HC NON-CHARGEABLE SUPPLY

## 2018-09-25 PROCEDURE — 80053 COMPREHEN METABOLIC PANEL: CPT

## 2018-09-25 PROCEDURE — 6360000002 HC RX W HCPCS: Performed by: ANESTHESIOLOGY

## 2018-09-25 PROCEDURE — 84145 PROCALCITONIN (PCT): CPT

## 2018-09-25 PROCEDURE — 6360000002 HC RX W HCPCS: Performed by: INTERNAL MEDICINE

## 2018-09-25 PROCEDURE — 36591 DRAW BLOOD OFF VENOUS DEVICE: CPT

## 2018-09-25 PROCEDURE — 2580000003 HC RX 258: Performed by: INTERNAL MEDICINE

## 2018-09-25 PROCEDURE — 6370000000 HC RX 637 (ALT 250 FOR IP): Performed by: NURSE PRACTITIONER

## 2018-09-25 PROCEDURE — 99233 SBSQ HOSP IP/OBS HIGH 50: CPT | Performed by: PAIN MEDICINE

## 2018-09-25 PROCEDURE — 85027 COMPLETE CBC AUTOMATED: CPT

## 2018-09-25 PROCEDURE — 97110 THERAPEUTIC EXERCISES: CPT

## 2018-09-25 PROCEDURE — 97530 THERAPEUTIC ACTIVITIES: CPT

## 2018-09-25 PROCEDURE — 97116 GAIT TRAINING THERAPY: CPT

## 2018-09-25 PROCEDURE — 2060000000 HC ICU INTERMEDIATE R&B

## 2018-09-25 PROCEDURE — 6360000002 HC RX W HCPCS: Performed by: NURSE PRACTITIONER

## 2018-09-25 PROCEDURE — 6370000000 HC RX 637 (ALT 250 FOR IP): Performed by: ANESTHESIOLOGY

## 2018-09-25 PROCEDURE — 82330 ASSAY OF CALCIUM: CPT

## 2018-09-25 RX ORDER — GABAPENTIN 300 MG/1
300 CAPSULE ORAL 2 TIMES DAILY
Status: DISCONTINUED | OUTPATIENT
Start: 2018-09-25 | End: 2018-09-28 | Stop reason: HOSPADM

## 2018-09-25 RX ORDER — GABAPENTIN 300 MG/1
300 CAPSULE ORAL 3 TIMES DAILY
Status: DISCONTINUED | OUTPATIENT
Start: 2018-09-25 | End: 2018-09-25

## 2018-09-25 RX ORDER — LIDOCAINE 50 MG/G
3 PATCH TOPICAL DAILY
Status: DISCONTINUED | OUTPATIENT
Start: 2018-09-26 | End: 2018-09-28 | Stop reason: HOSPADM

## 2018-09-25 RX ORDER — HYDROMORPHONE HYDROCHLORIDE 2 MG/1
4 TABLET ORAL EVERY 4 HOURS PRN
Status: DISCONTINUED | OUTPATIENT
Start: 2018-09-25 | End: 2018-09-28 | Stop reason: HOSPADM

## 2018-09-25 RX ADMIN — AMLODIPINE BESYLATE 10 MG: 10 TABLET ORAL at 08:27

## 2018-09-25 RX ADMIN — CYCLOBENZAPRINE 5 MG: 10 TABLET, FILM COATED ORAL at 08:27

## 2018-09-25 RX ADMIN — PIPERACILLIN SODIUM,TAZOBACTAM SODIUM 3.38 G: 3; .375 INJECTION, POWDER, FOR SOLUTION INTRAVENOUS at 23:41

## 2018-09-25 RX ADMIN — HYDROMORPHONE HYDROCHLORIDE 8 MG: 2 TABLET ORAL at 01:03

## 2018-09-25 RX ADMIN — METOPROLOL TARTRATE 25 MG: 25 TABLET ORAL at 23:41

## 2018-09-25 RX ADMIN — POLYETHYLENE GLYCOL 3350 17 G: 17 POWDER, FOR SOLUTION ORAL at 08:26

## 2018-09-25 RX ADMIN — HYDROMORPHONE HYDROCHLORIDE 4 MG: 2 TABLET ORAL at 20:56

## 2018-09-25 RX ADMIN — PIPERACILLIN SODIUM,TAZOBACTAM SODIUM 3.38 G: 3; .375 INJECTION, POWDER, FOR SOLUTION INTRAVENOUS at 01:00

## 2018-09-25 RX ADMIN — NALOXEGOL OXALATE 25 MG: 25 TABLET, FILM COATED ORAL at 06:46

## 2018-09-25 RX ADMIN — HYDROMORPHONE HYDROCHLORIDE 8 MG: 2 TABLET ORAL at 09:21

## 2018-09-25 RX ADMIN — HYDROMORPHONE HYDROCHLORIDE 4 MG: 2 TABLET ORAL at 15:26

## 2018-09-25 RX ADMIN — METHADONE HYDROCHLORIDE 20 MG: 10 TABLET ORAL at 22:20

## 2018-09-25 RX ADMIN — SENNOSIDES 25.8 MG: 8.6 TABLET, FILM COATED ORAL at 20:58

## 2018-09-25 RX ADMIN — Medication 3 UNITS: at 21:00

## 2018-09-25 RX ADMIN — GABAPENTIN 100 MG: 100 CAPSULE ORAL at 08:27

## 2018-09-25 RX ADMIN — DEXAMETHASONE 4 MG: 4 TABLET ORAL at 20:58

## 2018-09-25 RX ADMIN — LEVOTHYROXINE SODIUM 50 MCG: 50 TABLET ORAL at 06:45

## 2018-09-25 RX ADMIN — Medication 1 UNITS: at 12:45

## 2018-09-25 RX ADMIN — Medication 10 ML: at 20:58

## 2018-09-25 RX ADMIN — METOPROLOL TARTRATE 25 MG: 25 TABLET ORAL at 08:27

## 2018-09-25 RX ADMIN — PRAVASTATIN SODIUM 20 MG: 20 TABLET ORAL at 08:27

## 2018-09-25 RX ADMIN — METHADONE HYDROCHLORIDE 20 MG: 10 TABLET ORAL at 13:49

## 2018-09-25 RX ADMIN — CYCLOBENZAPRINE 5 MG: 10 TABLET, FILM COATED ORAL at 22:20

## 2018-09-25 RX ADMIN — METHADONE HYDROCHLORIDE 20 MG: 10 TABLET ORAL at 06:46

## 2018-09-25 RX ADMIN — DEXAMETHASONE 4 MG: 4 TABLET ORAL at 08:27

## 2018-09-25 RX ADMIN — GABAPENTIN 300 MG: 300 CAPSULE ORAL at 20:58

## 2018-09-25 RX ADMIN — Medication 1 UNITS: at 16:26

## 2018-09-25 RX ADMIN — Medication 1 UNITS: at 08:30

## 2018-09-25 RX ADMIN — SENNOSIDES 25.8 MG: 8.6 TABLET, FILM COATED ORAL at 08:27

## 2018-09-25 RX ADMIN — PIPERACILLIN SODIUM,TAZOBACTAM SODIUM 3.38 G: 3; .375 INJECTION, POWDER, FOR SOLUTION INTRAVENOUS at 08:38

## 2018-09-25 RX ADMIN — PIPERACILLIN SODIUM,TAZOBACTAM SODIUM 3.38 G: 3; .375 INJECTION, POWDER, FOR SOLUTION INTRAVENOUS at 16:23

## 2018-09-25 RX ADMIN — ENOXAPARIN SODIUM 40 MG: 40 INJECTION SUBCUTANEOUS at 12:45

## 2018-09-25 RX ADMIN — GABAPENTIN 100 MG: 100 CAPSULE ORAL at 13:49

## 2018-09-25 RX ADMIN — CANDESARTAN CILEXETIL 32 MG: 16 TABLET ORAL at 08:27

## 2018-09-25 ASSESSMENT — PAIN DESCRIPTION - LOCATION
LOCATION: RECTUM

## 2018-09-25 ASSESSMENT — PAIN DESCRIPTION - ONSET
ONSET: ON-GOING

## 2018-09-25 ASSESSMENT — PAIN SCALES - GENERAL
PAINLEVEL_OUTOF10: 6
PAINLEVEL_OUTOF10: 7
PAINLEVEL_OUTOF10: 10
PAINLEVEL_OUTOF10: 8
PAINLEVEL_OUTOF10: 8
PAINLEVEL_OUTOF10: 5
PAINLEVEL_OUTOF10: 7
PAINLEVEL_OUTOF10: 8
PAINLEVEL_OUTOF10: 10
PAINLEVEL_OUTOF10: 5
PAINLEVEL_OUTOF10: 6
PAINLEVEL_OUTOF10: 6
PAINLEVEL_OUTOF10: 7
PAINLEVEL_OUTOF10: 6
PAINLEVEL_OUTOF10: 8

## 2018-09-25 ASSESSMENT — PAIN DESCRIPTION - DESCRIPTORS
DESCRIPTORS: CONSTANT
DESCRIPTORS: ACHING
DESCRIPTORS: CONSTANT
DESCRIPTORS: ACHING
DESCRIPTORS: ACHING;CONSTANT
DESCRIPTORS: CONSTANT

## 2018-09-25 ASSESSMENT — PAIN DESCRIPTION - PAIN TYPE
TYPE: CHRONIC PAIN

## 2018-09-25 ASSESSMENT — PAIN DESCRIPTION - FREQUENCY
FREQUENCY: CONTINUOUS

## 2018-09-25 ASSESSMENT — PAIN DESCRIPTION - ORIENTATION
ORIENTATION: INNER

## 2018-09-25 ASSESSMENT — PAIN DESCRIPTION - PROGRESSION
CLINICAL_PROGRESSION: GRADUALLY IMPROVING
CLINICAL_PROGRESSION: NOT CHANGED
CLINICAL_PROGRESSION: GRADUALLY IMPROVING
CLINICAL_PROGRESSION: NOT CHANGED
CLINICAL_PROGRESSION: GRADUALLY IMPROVING
CLINICAL_PROGRESSION: NOT CHANGED
CLINICAL_PROGRESSION: GRADUALLY IMPROVING

## 2018-09-25 NOTE — PROGRESS NOTES
INTERNAL MEDICINE Progress Note  9/25/2018 11:11 AM  Subjective:   Admit Date: 9/22/2018  PCP: Erum Martinez MD  Interval History:   Alert, c/o pain in the legs, lower back    Objective:   Vitals: BP (!) 147/94   Pulse 108   Temp 98.1 °F (36.7 °C) (Oral)   Resp 16   Ht 4' 11\" (1.499 m)   Wt 117 lb 6.4 oz (53.3 kg)   LMP 05/01/2011   SpO2 94%   BMI 23.71 kg/m²   General appearance: alert, oriented x3  HEENT:atraumatic  Neck:  no JVD  Lungs: CTA sandeep  Heart: regular rate and rhythm, S1, S2  Abdomen: normal findings: bowel sounds normal, no masses palpable and colostomy,  and rectal inspection showed drain through anus-drainage +  Extremities: no edema,   Neurologic: alert, Ox3,  moves all extremities x4      Medications:   Scheduled Meds:   methadone  20 mg Oral 3 times per day    amLODIPine  10 mg Oral Daily    candesartan  32 mg Oral Daily    dexamethasone  4 mg Oral 2 times per day    metoprolol tartrate  25 mg Oral BID    sodium chloride flush  10 mL Intravenous 2 times per day    enoxaparin  40 mg Subcutaneous Daily    lidocaine  1 patch Transdermal Daily    naloxegol  25 mg Oral QAM    polyethylene glycol  17 g Oral Daily    pravastatin  20 mg Oral Daily    senna  3 tablet Oral BID    insulin lispro  0-6 Units Subcutaneous 4x Daily AC & HS    levothyroxine  50 mcg Oral Daily    gabapentin  100 mg Oral TID    piperacillin-tazobactam  3.375 g Intravenous Q8H     Continuous Infusions:   sodium chloride 50 mL/hr at 09/24/18 1838    dextrose         Lab Results:   CBC:   Recent Labs      09/23/18 0435  09/24/18 0339  09/25/18   0455   WBC  6.2  6.6  6.6   HGB  10.5*  9.8*  11.4*   PLT  102*  100*  100*     BMP:    Recent Labs      09/23/18   0435  09/24/18   0339 09/25/18   0455   NA  143  141  141   K  4.9  4.2  3.7   CL  107  106  100   CO2  24  24  25   BUN  28*  19  20   CREATININE  1.0  0.6  0.6   GLUCOSE  150*  168*  192*     Hepatic:   Recent Labs      09/25/18   0455   AST  12

## 2018-09-25 NOTE — PROGRESS NOTES
Patient up to bathroom. Phone call made to medical records and they have faxed request for patient's advance directives but have not received any documents back as of yet. Updated primary RN, Yojana Link. If no records received, would be beneficial to have patient complete new documents here.

## 2018-09-25 NOTE — PROGRESS NOTES
[facility]  INPATIENT PHYSICAL THERAPY  DAILY NOTE  [facility] ICU STEPDOWN TELEMETRY 4K - 4K-18/018-A    Time In: 1092  Time Out: 1432  Timed Code Treatment Minutes: 40 Minutes  Minutes: 40          Date: 2018  Patient Name: Celia Baker,  Gender:  female        MRN: [id number]  : 1965  (47 y.o.)     Referring Practitioner: Gilbert Conner CNP  Diagnosis: Septic shock  Additional Pertinent Hx: 47 y.o. female who presented to [facility] with altered mental status. Please note the HPI is difficult to obtain as patient is lethargic. Information was obtained by Son who is at bedside and chart review. History of rectal cancer with metastatic disease to the colon. Previous resection and colostomy. Known abscess and fistula with drain. Follows with pain management. One several pain medications for control. Patient was in her normal state of health last night when last seen prior to bed. At 0500 son went to give AM medications. Stated she looked confused. She was disoriented. He felt this may be due to a Flexeril overdose so he brought her to the ED. In the ED she was febrile, tachycardic and hypotensive - received fluid bolus without response, requiring levophed. UTI was noted. She was transferred to ICU for progressive management. Past Medical History:   Diagnosis Date    Blood transfusion     Cancer [facility])     colorectal    Diabetes mellitus type 2 in nonobese ([facility] 10/27/2017    Hyperlipidemia     Hypertension     Hyperthyroidism      Past Surgical History:   Procedure Laterality Date    COLON SURGERY  12    Robotic assisted left hemicolectomy-Dr. Alvarez    COLONOSCOPY      HEMORRHOID SURGERY  2013    banding     HYSTERECTOMY  2011    HYSTEROSCOPY  2004    OTHER SURGICAL HISTORY  2013    RIGHT ABD.  WALL MASS EXCISION-Dr. Fidel Caldwell    NM COLON CA SCRN NOT  [address] IND Left 10/27/2017    COLONOSCOPY performed by Rebecca Moser MD at

## 2018-09-25 NOTE — PROGRESS NOTES
Pain Management    Progress Note      9/25/2018 2:14 PM     · SUBJECTIVE:    · Chief Complaint: Rectal pain, cancer pain   · Pain management consult noted,. Well known to pain team and follows in outpatient pain clinic. Admitted with altered LOC,, UTI which was likely d/yt UTI and polypharmacy. · Patient is currently awake, alert, and oriented   · Patient continues to have complaints of increased rectal pain, states elevated since  In the hospital. Discussed leaving patient on decreased opioid pain regimen. · Patient seen in her room resting in bed. · Currently receiving Methadone 20 mg every 8 hours and PO dilaudid 8 mg every 8 hours prn   · Medications effective:   Yes   · Pain rating is 8-9/10 stabbing, sharp, deep, pain radiating down legs    · Constipation: Colostomy with output   · Depressed (etiology including pain and/or medical complications):  Yes     Current medications:    [START ON 9/26/2018] lidocaine  3 patch Transdermal Daily    gabapentin  300 mg Oral TID    methadone  20 mg Oral 3 times per day    amLODIPine  10 mg Oral Daily    candesartan  32 mg Oral Daily    dexamethasone  4 mg Oral 2 times per day    metoprolol tartrate  25 mg Oral BID    sodium chloride flush  10 mL Intravenous 2 times per day    enoxaparin  40 mg Subcutaneous Daily    naloxegol  25 mg Oral QAM    polyethylene glycol  17 g Oral Daily    pravastatin  20 mg Oral Daily    senna  3 tablet Oral BID    insulin lispro  0-6 Units Subcutaneous 4x Daily AC & HS    levothyroxine  50 mcg Oral Daily    piperacillin-tazobactam  3.375 g Intravenous Q8H   ·   ·                                    HYDROmorphone, hydrALAZINE, midodrine, sodium chloride flush, glucose, dextrose, glucagon (rDNA), dextrose, cyclobenzaprine  ·                                    @MEDSINFUSIONS        REVIEW OF SYSTEMS:  Sleeping well: no   Psychological ROS: positive for - depression, anxiety   Ophthalmic ROS: negative  ENT ROS: negative  Allergy

## 2018-09-25 NOTE — PROGRESS NOTES
Patient up to bathroom, urinated 600 ml in hat. Straight cath performed afterwards and got 90 ml. Urine pink tinged, no clots noted.

## 2018-09-25 NOTE — PROGRESS NOTES
INFECTIOUS DISEASES  PROGRESS NOTE    Pt Name: Celia Baker  MRN: [id number]  [id number]  YOB: 1965  Admit Date: 9/22/2018  6:07 AM  Date of evaluation: 9/25/2018  Primary Care Physician: Erum Martinez MD   4K-18/018-A     27-year-old female who is known  to our service because of her problems with urinary tract infection in the  past and also, the patient has had history of rectal cancer and has had  surgery for that. Because of fistula, she has a Penrose drain in the  rectal area that has been present since many months. The patient had this  surgery done in [address] [place] [address] with colostomy after the diagnosis  was established in 2017. Her last admission that I am aware was for  infectious disease reasons was around 03/2018 when she had UTI with  pseudomonas associated with hydronephrosis and she in the past has had  presacral abscess also because of the complication from rectal  adenocarcinoma that appears to be chronic problem.     The patient has been having problems with her legs and increasing pain in  the legs and she has got lot of neuropathic pain and the patient did end up  being brought to the hospital with confusion, lethargy, weakness, and  understanding commands, but not able to respond much with her expression  and also associated, she did have a temperature of 102.9 and hypotension,  so she was admitted to ICU and Infectious Disease evaluation requested  also. Subjective: Interval History: As above. Notes reviewed. More alert and awake now in step down .  Blood culture positive     Active ATBs: zosyn 9/21    Pt/Chart review:  Fever No, DiarrheaNo, Dyspnea No, Nausea:None      Data:   Scheduled Meds:   [START ON 9/26/2018] lidocaine  3 patch Transdermal Daily    gabapentin  300 mg Oral TID    methadone  20 mg Oral 3 times per day    amLODIPine  10 mg Oral Daily    candesartan  32 mg Oral Daily    dexamethasone  4 mg Oral 2 times per day    metoprolol tartrate  25

## 2018-09-26 LAB
BLOOD CULTURE, ROUTINE: ABNORMAL
CALCIUM IONIZED: 1.32 MMOL/L (ref 1.12–1.32)
GLUCOSE BLD-MCNC: 137 MG/DL (ref 70–108)
GLUCOSE BLD-MCNC: 148 MG/DL (ref 70–108)
GLUCOSE BLD-MCNC: 181 MG/DL (ref 70–108)
GLUCOSE BLD-MCNC: 232 MG/DL (ref 70–108)
MRSA SCREEN: NORMAL
ORGANISM: ABNORMAL
PROCALCITONIN: 1.06 NG/ML (ref 0.01–0.09)
URINE CULTURE REFLEX: ABNORMAL
URINE CULTURE REFLEX: ABNORMAL

## 2018-09-26 PROCEDURE — 2580000003 HC RX 258: Performed by: INTERNAL MEDICINE

## 2018-09-26 PROCEDURE — 82948 REAGENT STRIP/BLOOD GLUCOSE: CPT

## 2018-09-26 PROCEDURE — 2709999900 HC NON-CHARGEABLE SUPPLY

## 2018-09-26 PROCEDURE — 6360000002 HC RX W HCPCS: Performed by: INTERNAL MEDICINE

## 2018-09-26 PROCEDURE — 82330 ASSAY OF CALCIUM: CPT

## 2018-09-26 PROCEDURE — 6370000000 HC RX 637 (ALT 250 FOR IP): Performed by: NURSE PRACTITIONER

## 2018-09-26 PROCEDURE — 6370000000 HC RX 637 (ALT 250 FOR IP): Performed by: INTERNAL MEDICINE

## 2018-09-26 PROCEDURE — 6360000002 HC RX W HCPCS: Performed by: ANESTHESIOLOGY

## 2018-09-26 PROCEDURE — 97535 SELF CARE MNGMENT TRAINING: CPT

## 2018-09-26 PROCEDURE — 2580000003 HC RX 258: Performed by: ANESTHESIOLOGY

## 2018-09-26 PROCEDURE — 1200000003 HC TELEMETRY R&B

## 2018-09-26 PROCEDURE — 99232 SBSQ HOSP IP/OBS MODERATE 35: CPT | Performed by: PAIN MEDICINE

## 2018-09-26 PROCEDURE — 6370000000 HC RX 637 (ALT 250 FOR IP): Performed by: ANESTHESIOLOGY

## 2018-09-26 PROCEDURE — 84145 PROCALCITONIN (PCT): CPT

## 2018-09-26 PROCEDURE — 2060000000 HC ICU INTERMEDIATE R&B

## 2018-09-26 PROCEDURE — 6360000002 HC RX W HCPCS: Performed by: NURSE PRACTITIONER

## 2018-09-26 PROCEDURE — 97110 THERAPEUTIC EXERCISES: CPT

## 2018-09-26 RX ORDER — SODIUM CHLORIDE 9 MG/ML
INJECTION, SOLUTION INTRAVENOUS CONTINUOUS
Status: DISCONTINUED | OUTPATIENT
Start: 2018-09-26 | End: 2018-09-28 | Stop reason: HOSPADM

## 2018-09-26 RX ADMIN — METHADONE HYDROCHLORIDE 20 MG: 10 TABLET ORAL at 21:42

## 2018-09-26 RX ADMIN — DEXAMETHASONE 4 MG: 4 TABLET ORAL at 08:09

## 2018-09-26 RX ADMIN — CEFEPIME HYDROCHLORIDE 2 G: 2 INJECTION, POWDER, FOR SOLUTION INTRAVENOUS at 14:05

## 2018-09-26 RX ADMIN — HYDROMORPHONE HYDROCHLORIDE 4 MG: 2 TABLET ORAL at 08:13

## 2018-09-26 RX ADMIN — HYDROMORPHONE HYDROCHLORIDE 4 MG: 2 TABLET ORAL at 17:28

## 2018-09-26 RX ADMIN — Medication 1 UNITS: at 08:16

## 2018-09-26 RX ADMIN — ENOXAPARIN SODIUM 40 MG: 40 INJECTION SUBCUTANEOUS at 12:53

## 2018-09-26 RX ADMIN — AMLODIPINE BESYLATE 10 MG: 10 TABLET ORAL at 08:09

## 2018-09-26 RX ADMIN — GABAPENTIN 300 MG: 300 CAPSULE ORAL at 21:42

## 2018-09-26 RX ADMIN — Medication 10 ML: at 08:10

## 2018-09-26 RX ADMIN — DEXAMETHASONE 4 MG: 4 TABLET ORAL at 21:42

## 2018-09-26 RX ADMIN — HYDROMORPHONE HYDROCHLORIDE 4 MG: 2 TABLET ORAL at 22:31

## 2018-09-26 RX ADMIN — METHADONE HYDROCHLORIDE 20 MG: 10 TABLET ORAL at 14:05

## 2018-09-26 RX ADMIN — CANDESARTAN CILEXETIL 32 MG: 16 TABLET ORAL at 08:09

## 2018-09-26 RX ADMIN — SENNOSIDES 25.8 MG: 8.6 TABLET, FILM COATED ORAL at 21:42

## 2018-09-26 RX ADMIN — GABAPENTIN 300 MG: 300 CAPSULE ORAL at 08:09

## 2018-09-26 RX ADMIN — CYCLOBENZAPRINE 5 MG: 10 TABLET, FILM COATED ORAL at 10:21

## 2018-09-26 RX ADMIN — NALOXEGOL OXALATE 25 MG: 25 TABLET, FILM COATED ORAL at 06:33

## 2018-09-26 RX ADMIN — SENNOSIDES 25.8 MG: 8.6 TABLET, FILM COATED ORAL at 08:09

## 2018-09-26 RX ADMIN — Medication 1 UNITS: at 12:49

## 2018-09-26 RX ADMIN — METOPROLOL TARTRATE 25 MG: 25 TABLET ORAL at 21:42

## 2018-09-26 RX ADMIN — SODIUM CHLORIDE: 9 INJECTION, SOLUTION INTRAVENOUS at 14:29

## 2018-09-26 RX ADMIN — LEVOTHYROXINE SODIUM 50 MCG: 50 TABLET ORAL at 06:33

## 2018-09-26 RX ADMIN — METHADONE HYDROCHLORIDE 20 MG: 10 TABLET ORAL at 06:33

## 2018-09-26 RX ADMIN — POLYETHYLENE GLYCOL 3350 17 G: 17 POWDER, FOR SOLUTION ORAL at 08:09

## 2018-09-26 RX ADMIN — METOPROLOL TARTRATE 25 MG: 25 TABLET ORAL at 08:09

## 2018-09-26 RX ADMIN — PIPERACILLIN SODIUM,TAZOBACTAM SODIUM 3.38 G: 3; .375 INJECTION, POWDER, FOR SOLUTION INTRAVENOUS at 08:22

## 2018-09-26 RX ADMIN — Medication 2 UNITS: at 21:44

## 2018-09-26 RX ADMIN — PRAVASTATIN SODIUM 20 MG: 20 TABLET ORAL at 08:09

## 2018-09-26 ASSESSMENT — PAIN DESCRIPTION - PROGRESSION
CLINICAL_PROGRESSION: GRADUALLY IMPROVING

## 2018-09-26 ASSESSMENT — PAIN DESCRIPTION - LOCATION
LOCATION: RECTUM

## 2018-09-26 ASSESSMENT — PAIN DESCRIPTION - FREQUENCY
FREQUENCY: CONTINUOUS

## 2018-09-26 ASSESSMENT — PAIN DESCRIPTION - ONSET
ONSET: ON-GOING

## 2018-09-26 ASSESSMENT — PAIN SCALES - GENERAL
PAINLEVEL_OUTOF10: 9
PAINLEVEL_OUTOF10: 10
PAINLEVEL_OUTOF10: 10
PAINLEVEL_OUTOF10: 4
PAINLEVEL_OUTOF10: 5
PAINLEVEL_OUTOF10: 6
PAINLEVEL_OUTOF10: 3
PAINLEVEL_OUTOF10: 7
PAINLEVEL_OUTOF10: 7

## 2018-09-26 ASSESSMENT — PAIN DESCRIPTION - ORIENTATION
ORIENTATION: INNER

## 2018-09-26 ASSESSMENT — PAIN DESCRIPTION - DESCRIPTORS
DESCRIPTORS: ACHING

## 2018-09-26 ASSESSMENT — PAIN DESCRIPTION - PAIN TYPE
TYPE: CHRONIC PAIN

## 2018-09-26 NOTE — PROGRESS NOTES
98.6   MCH  32.3  32.1   MCHC  32.1*  32.6   PLT  100*  100*   MPV  9.0*  9.2*     Recent Labs      09/24/18   0339  09/25/18   0455   NA  141  141   K  4.2  3.7   CL  106  100   CO2  24  25   BUN  19  20   CREATININE  0.6  0.6   GLUCOSE  168*  192*   CALCIUM  8.8  9.8     Recent Labs      09/25/18   1951  09/26/18   0632  09/26/18   1123   POCGLU  296*  148*  181*       ASSESSMENT   1. Malignant neoplasm of rectum  2. Cancer associated pain   3. Sacral fracture   4. UTI       PLAN  1. Continue pain management- pain is much better with less pain medications   2. Continue Methadone 20 mg scheduled every 8 hours for maintenance pain relief   3. Continue dilaudid 4 mg every 4 hours as needed for moderate to severe breakthrough pain- has used only 3 doses in the past 24 hours   4. Continue Neurontin to 300 mg scheduled BID, Flexeril 5 mg BID   5. Continue Decadron, Lidoderm patches   6. Bowel program   7. Follow, and will continue to follow outpatient with decreased pain medications. Questioning how patient was taking it before. Watching closely     Spent 27 minutes evaluating and examining patient and completing documentation      JOSE Webb - CNP, 9/26/2018, 3:42 PM     I have evaluated the patient and reviewed the case with the nurse practitioner. I agree with the current plan of care including the workup, evaluation, management, and diagnosis. Care plan has been discussed. I agree with above documentation. States pain controlled.  No new recommendations   Electronically signed by Celina Gutner MD on 9/26/2018 at 5:37 PM

## 2018-09-26 NOTE — PROGRESS NOTES
[facility]    TUBAL LIGATION  1990's    TUNNELED VENOUS PORT PLACEMENT  6/28/2012    purple power port-Dr. Phill Fleischer        Restrictions/Precautions:  General Precautions, Fall Risk            Prior Level of Function:  ADL Assistance:  (Pt has colostomy which she manages herself)  Homemaking Assistance: Needs assistance  Ambulation Assistance: Independent  Transfer Assistance: Independent  Additional Comments: Pt states recent use of rollator due to increased weakness    Subjective   Subjective: cooperative, agreeable to going to bathroom to complete grooming       Pain:  Pain Assessment  Patient Currently in Pain: Yes (rectum; no number given)       Objective  Overall Cognitive Status: Exceptions (decreased insight, decreased safety awareness, decreased problem solving)            ADL  Grooming: Contact guard assistance  UE Bathing: Contact guard assistance  UE Dressing: Minimal assistance (PJ top pulled over head)  LE Dressing: Maximum assistance (A thread BLE into pants & pulling up; dependent for donning socks)  Toileting: Moderate assistance (for adjusting PJ pants & pull up)          Bed mobility  Supine to Sit: Stand by assistance (extra time with HOB elevated)    Transfers  Sit to stand: Contact guard assistance (from recliner x 2 occassions)  Stand to sit: Contact guard assistance     **extra time taken to educate Pt on safety with mobility, t/f technique, positioning in recliner with pillows for pain control. Discussed with Pt's mother more appropriate foot wear (Pt's slippers without back & little  kept falling off). Educated on recommendation of RW at home. Use of gait belt & recommend 24hr S & A at home.      Balance  Sitting Balance: Stand by assistance  Standing Balance: Contact guard assistance           Functional Mobility  Functional - Mobility Device: Rolling Walker  Activity: To/from bathroom (+60ft in hallway)  Assist Level: Contact guard assistance  Functional Mobility Comments: slow increase her endurance for ease of showering or dressing. Short term goal 4: Pt will complete BUE moderate resistance exercises while following any handout needed for 5 sets of 10 reps each to increase her endurance and strength for ease of doing homemaking tasks. Long term goals  Time Frame for Long term goals : None secondary to short estimated length of stay.

## 2018-09-26 NOTE — PROGRESS NOTES
<=1 mcg/mL Preliminary   piperacillin-tazobactam Sensitive <=4 mcg/mL Preliminary   trimethoprim-sulfamethoxazole Sensitive <=20 mcg/mL Preliminary          UTI    Collected: 09/22/18 0715   Resulting lab: [facility]   Value: Escherichia coli (Abnormal)   Comment:    *Additional information available - narrative   9/25/2018  6:53 PM - [facility] [employer] Incoming Lab Results From Soft     Component Results     Component Collected Lab   Organism  (Abnormal) 09/22/2018  7:15 AM [facility]   Proteus mirabilis     Urine Culture Reflex 09/22/2018  7:15 AM [address] Lab   [place] count: >100,000 CFU/mL    Organism  (Abnormal) 09/22/2018  7:15 AM [facility]   Escherichia coli     Urine Culture Reflex 09/22/2018  7:15 AM [facility]   [place] count: 50,000-90,000 CFU/mL    Testing Performed By     Lab - Abbreviation Name Director Address Valid Date Range   366-QG - [address] LAB Nikole Christie MD [address] [place] [address] 08/30/17 1255-Present   Narrative     Source: cath urine       Site:           Current Antibiotics: Piperacillin/Tazobactam   Culture & Susceptibility     ESCHERICHIA COLI     Antibiotic Interpretation MURIEL Unit Status   cefOXitin Sensitive <=4 mcg/mL Preliminary   gentamicin Sensitive <=1 mcg/mL Preliminary   nitrofurantoin Sensitive <=16 mcg/mL Preliminary   piperacillin-tazobactam Sensitive <=4 mcg/mL Preliminary   tetracycline Sensitive <=1 mcg/mL Preliminary   trimethoprim-sulfamethoxazole Resistant >=320 mcg/mL Preliminary         PROTEUS MIRABILIS     Antibiotic Interpretation MURIEL Unit Status   cefOXitin Sensitive <=4 mcg/mL Preliminary   gentamicin Sensitive <=1 mcg/mL Preliminary   piperacillin-tazobactam Sensitive <=4 mcg/mL Preliminary              IMAGING:   Ct abdomen  Impression   1.  There is consolidation within the right lower chest suggesting atelectasis and neoplastic etiologies should be considered. A posttraumatic etiologies should also be considered. There is mild spinal canal stenosis at this    level and mild narrowing of the left neuroforamen. Objective:   Vitals: BP (!) 133/95   Pulse 79   Temp 97.4 °F (36.3 °C) (Oral)   Resp 16   Ht 4' 11\" (1.499 m)   Wt 115 lb 4.8 oz (52.3 kg)   LMP 05/01/2011   SpO2 97%   BMI 23.29 kg/m²   HEENT: Head: Normocephalic, no lesions, without obvious abnormality. Lungs: clear to auscultation bilaterally  Heart: S1, S2 normal  Abdomen: soft, non-tender; bowel sounds normal; no masses,  no organomegaly  Extremities: extremities normal, atraumatic, no cyanosis or edema  Neurologic: Mental status: Alert, oriented, thought content appropriate complains of leg pains  Wound/Ulcers: perirectal ulcer stable with drain         Assessment:     1. Severe sepsis with shock. 2.  UTI with hydronephrosis. 3.  Metabolic encephalopathy. 4.  Bacteremia --proteus and enterobacter   5. Likely drug overdose of pain medication. 6.  Acute kidney injury. 7.  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   8.  History of colorectal cancer, status post colostomy with perirectal  drain, that is long-term drain, that is present  9.neuropathic pain     Patient Active Problem List:     Colonic mass     Abdominal wall mass of right flank     Malignant neoplasm of rectum (HCC)     Rectal bleed     BRBPR (bright red blood per rectum)     Chronic rectal pain     Benign hypertension     Diabetes mellitus type 2, controlled (HCC)     Hypothyroidism     Perirectal fistula     Coagulopathy (HCC)     Lower GI bleed     Refractory nausea and vomiting     Ileus (HCC)     Moderate malnutrition (HCC)     SIRS (systemic inflammatory response syndrome) (HCC)     Septic shock (HCC)     SUZAN (acute kidney injury) ([address]     Elevated troponin     Urinary tract infection with hematuria     Encephalopathy acute     Urinary tract infection due to Proteus      Plan:  Continue current antibiotic therapy with zosyn change to cefepime     They are worried about pain. .? Need for pain consult. Defer to primary  Social service for discharge planning    Labs, cultures, and radiographs reviewed. Changes made as necessary. D/w Patient's R.N. and specific instructions given and infectious disease issues addressed. Will follow the patient closely with you. Also please see the orders for updated patient care orders written by ID team.    Thank you for involving us in this patient's care. I will be discussing this case with the treating physicians. Please don't hesitate to call me if any questions, issues  or concerns      Please see orders for updated patient care orders written today.   Electronically signed by Rhea Quintero on 9/26/2018 at 7:54 AM

## 2018-09-26 NOTE — PROGRESS NOTES
Urine Culture Reflex 09/22/2018  7:15  [facility]   [place] count: 50,000-90,000 CFU/mL      ESCHERICHIA COLI     Antibiotic Interpretation MURIEL Unit Status   ampicillin-sulbactam Intermediate =16 mcg/mL Final   cefOXitin Sensitive <=4 mcg/mL Final   cefuroxime Sensitive   Final   gentamicin Sensitive <=1 mcg/mL Final   nitrofurantoin Sensitive <=16 mcg/mL Final   piperacillin-tazobactam Sensitive <=4 mcg/mL Final   tetracycline Sensitive <=1 mcg/mL Final   trimethoprim-sulfamethoxazole Resistant >=320 mcg/mL Final         PROTEUS MIRABILIS     Antibiotic Interpretation MURIEL Unit Status   cefOXitin Sensitive <=4 mcg/mL Final   cefuroxime Sensitive   Final   gentamicin Sensitive <=1 mcg/mL Final   nitrofurantoin Resistant =128 mcg/mL Final   piperacillin-tazobactam Sensitive <=4 mcg/mL Final   tetracycline Resistant >=16 mcg/mL Final   trimethoprim-sulfamethoxazole Sensitive <=20 mcg/mL Final            Assessment and Plan:   1. Septic shock with ALOC, improved  2. UTI  3. Rectal / presacral space abscess with a percut drain in situ  4. H/o met rectal cancer, new sacral met deposit /pathological fx  5. sandeep hydronephrosis  6.  SUZAN, resolved      Cont IV antibiotics, on zosyn  Analgesics,   PT/OT    Aldo Ojeda MD

## 2018-09-27 LAB
BLOOD CULTURE, ROUTINE: NORMAL
CALCIUM IONIZED: 1.29 MMOL/L (ref 1.12–1.32)
GLUCOSE BLD-MCNC: 165 MG/DL (ref 70–108)
GLUCOSE BLD-MCNC: 166 MG/DL (ref 70–108)
GLUCOSE BLD-MCNC: 228 MG/DL (ref 70–108)
GLUCOSE BLD-MCNC: 234 MG/DL (ref 70–108)

## 2018-09-27 PROCEDURE — 6370000000 HC RX 637 (ALT 250 FOR IP): Performed by: NURSE PRACTITIONER

## 2018-09-27 PROCEDURE — 36591 DRAW BLOOD OFF VENOUS DEVICE: CPT

## 2018-09-27 PROCEDURE — 1200000003 HC TELEMETRY R&B

## 2018-09-27 PROCEDURE — 2060000000 HC ICU INTERMEDIATE R&B

## 2018-09-27 PROCEDURE — 6360000002 HC RX W HCPCS: Performed by: INTERNAL MEDICINE

## 2018-09-27 PROCEDURE — 2580000003 HC RX 258: Performed by: INTERNAL MEDICINE

## 2018-09-27 PROCEDURE — 82330 ASSAY OF CALCIUM: CPT

## 2018-09-27 PROCEDURE — 6360000002 HC RX W HCPCS: Performed by: NURSE PRACTITIONER

## 2018-09-27 PROCEDURE — 6370000000 HC RX 637 (ALT 250 FOR IP): Performed by: INTERNAL MEDICINE

## 2018-09-27 PROCEDURE — 82948 REAGENT STRIP/BLOOD GLUCOSE: CPT

## 2018-09-27 PROCEDURE — 6370000000 HC RX 637 (ALT 250 FOR IP): Performed by: ANESTHESIOLOGY

## 2018-09-27 PROCEDURE — 2709999900 HC NON-CHARGEABLE SUPPLY

## 2018-09-27 PROCEDURE — 6360000002 HC RX W HCPCS: Performed by: ANESTHESIOLOGY

## 2018-09-27 RX ADMIN — Medication 2 UNITS: at 21:18

## 2018-09-27 RX ADMIN — GABAPENTIN 300 MG: 300 CAPSULE ORAL at 21:17

## 2018-09-27 RX ADMIN — DEXAMETHASONE 4 MG: 4 TABLET ORAL at 21:17

## 2018-09-27 RX ADMIN — Medication 1 UNITS: at 11:53

## 2018-09-27 RX ADMIN — METOPROLOL TARTRATE 25 MG: 25 TABLET ORAL at 21:17

## 2018-09-27 RX ADMIN — HYDROMORPHONE HYDROCHLORIDE 4 MG: 2 TABLET ORAL at 11:46

## 2018-09-27 RX ADMIN — POLYETHYLENE GLYCOL 3350 17 G: 17 POWDER, FOR SOLUTION ORAL at 09:20

## 2018-09-27 RX ADMIN — HYDROMORPHONE HYDROCHLORIDE 4 MG: 2 TABLET ORAL at 07:31

## 2018-09-27 RX ADMIN — AMLODIPINE BESYLATE 10 MG: 10 TABLET ORAL at 11:51

## 2018-09-27 RX ADMIN — METHADONE HYDROCHLORIDE 20 MG: 10 TABLET ORAL at 05:42

## 2018-09-27 RX ADMIN — CYCLOBENZAPRINE 5 MG: 10 TABLET, FILM COATED ORAL at 04:14

## 2018-09-27 RX ADMIN — Medication 3 UNITS: at 17:06

## 2018-09-27 RX ADMIN — NALOXEGOL OXALATE 25 MG: 25 TABLET, FILM COATED ORAL at 05:44

## 2018-09-27 RX ADMIN — CEFEPIME HYDROCHLORIDE 2 G: 2 INJECTION, POWDER, FOR SOLUTION INTRAVENOUS at 14:05

## 2018-09-27 RX ADMIN — SENNOSIDES 25.8 MG: 8.6 TABLET, FILM COATED ORAL at 09:18

## 2018-09-27 RX ADMIN — METHADONE HYDROCHLORIDE 20 MG: 10 TABLET ORAL at 14:04

## 2018-09-27 RX ADMIN — HYDROMORPHONE HYDROCHLORIDE 4 MG: 2 TABLET ORAL at 17:00

## 2018-09-27 RX ADMIN — SODIUM CHLORIDE: 9 INJECTION, SOLUTION INTRAVENOUS at 05:42

## 2018-09-27 RX ADMIN — DEXAMETHASONE 4 MG: 4 TABLET ORAL at 09:19

## 2018-09-27 RX ADMIN — PRAVASTATIN SODIUM 20 MG: 20 TABLET ORAL at 09:19

## 2018-09-27 RX ADMIN — ENOXAPARIN SODIUM 40 MG: 40 INJECTION SUBCUTANEOUS at 14:04

## 2018-09-27 RX ADMIN — METOPROLOL TARTRATE 25 MG: 25 TABLET ORAL at 09:19

## 2018-09-27 RX ADMIN — CANDESARTAN CILEXETIL 32 MG: 16 TABLET ORAL at 09:19

## 2018-09-27 RX ADMIN — HYDROMORPHONE HYDROCHLORIDE 4 MG: 2 TABLET ORAL at 21:17

## 2018-09-27 RX ADMIN — GABAPENTIN 300 MG: 300 CAPSULE ORAL at 09:19

## 2018-09-27 RX ADMIN — Medication 1 UNITS: at 09:24

## 2018-09-27 RX ADMIN — SENNOSIDES 25.8 MG: 8.6 TABLET, FILM COATED ORAL at 21:17

## 2018-09-27 RX ADMIN — LEVOTHYROXINE SODIUM 50 MCG: 50 TABLET ORAL at 05:44

## 2018-09-27 RX ADMIN — CEFEPIME HYDROCHLORIDE 2 G: 2 INJECTION, POWDER, FOR SOLUTION INTRAVENOUS at 00:33

## 2018-09-27 RX ADMIN — CYCLOBENZAPRINE 5 MG: 10 TABLET, FILM COATED ORAL at 09:20

## 2018-09-27 ASSESSMENT — PAIN DESCRIPTION - ONSET
ONSET: ON-GOING

## 2018-09-27 ASSESSMENT — PAIN DESCRIPTION - PROGRESSION
CLINICAL_PROGRESSION: GRADUALLY WORSENING
CLINICAL_PROGRESSION: GRADUALLY IMPROVING
CLINICAL_PROGRESSION: GRADUALLY WORSENING
CLINICAL_PROGRESSION: GRADUALLY WORSENING

## 2018-09-27 ASSESSMENT — PAIN DESCRIPTION - PAIN TYPE
TYPE: CHRONIC PAIN

## 2018-09-27 ASSESSMENT — PAIN DESCRIPTION - ORIENTATION
ORIENTATION: INNER

## 2018-09-27 ASSESSMENT — PAIN DESCRIPTION - DESCRIPTORS
DESCRIPTORS: ACHING;SHARP
DESCRIPTORS: ACHING
DESCRIPTORS: ACHING

## 2018-09-27 ASSESSMENT — PAIN SCALES - GENERAL
PAINLEVEL_OUTOF10: 10
PAINLEVEL_OUTOF10: 9
PAINLEVEL_OUTOF10: 10
PAINLEVEL_OUTOF10: 8
PAINLEVEL_OUTOF10: 8

## 2018-09-27 ASSESSMENT — PAIN DESCRIPTION - LOCATION
LOCATION: RECTUM

## 2018-09-27 ASSESSMENT — PAIN DESCRIPTION - FREQUENCY
FREQUENCY: CONTINUOUS

## 2018-09-27 NOTE — PROGRESS NOTES
203 (H)    Procalcitonin Latest Ref Range: 0.01 - 0.09 ng/mL 3.30 (H)          Ref. Range 9/22/2018 07:15   Color, UA Latest Ref Range: STRAW-YELL  YELLOW   Glucose, UA Latest Ref Range: NEGATIVE mg/dl NEGATIVE   Bilirubin, Urine Latest Ref Range: NEGATIVE  NEGATIVE   Ketones, Urine Latest Ref Range: NEGATIVE  NEGATIVE   Blood, Urine Latest Ref Range: NEGATIVE  MODERATE (A)   pH, UA Latest Ref Range: 5.0 - 9.0  8.5   Protein, UA Latest Ref Range: NEGATIVE  >= 300 (A)   Urobilinogen, Urine Latest Ref Range: 0.0 - 1.0 eu/dl 0.2   Nitrite, Urine Latest Ref Range: NEGATIVE  NEGATIVE   Leukocyte Esterase, Urine Latest Ref Range: NEGATIVE  LARGE (A)   Casts UA Latest Ref Range: NONE SEEN /lpf NONE SEEN   WBC, UA Latest Ref Range: 0-4/hpf /hpf > 200   RBC, UA Latest Ref Range: 0-2/hpf /hpf 3-5   Epi Cells Latest Ref Range: 3-5/hpf /hpf NONE   Renal Epithelial, Urine Latest Ref Range: NONE SEEN  NONE   Bacteria, UA Latest Ref Range: FEW/NONE S /hpf MODERATE   Amorphous, UA Latest Ref Range: NONE SEEN  PHOSPHATES   Yeast, Urine Latest Ref Range: NONE SEEN  NONE SEEN   Crystals Latest Ref Range: NONE SEEN  NONE SEEN   Character, Urine Latest Ref Range: CLEAR-SL C  TURBID (A)   Specific Gravity, Urine Latest Ref Range: 1.002 - 1.03  1.010     CXR  Right basilar atelectasis, less likely pneumonia.         CT abd/ pelvis  1. There is consolidation within the right lower chest suggesting atelectasis and/or infiltrates. Follow-up imaging is recommended to confirm complete resolution in order to exclude a developing mass within this location. 2. There is mild right adrenal hypertrophy. 3. There is mild left adrenal hypertrophy and a stable 1.4 cm left adrenal nodule. Metastatic disease cannot be excluded. 4. The right kidney is unremarkable. There is resolution of the right hydronephrosis. 5. There is stable moderate left pelvocaliectasis and moderate left hydroureter.  There is stable thickening of the wall the left ureter and

## 2018-09-27 NOTE — PROGRESS NOTES
tartrate  25 mg Oral BID    sodium chloride flush  10 mL Intravenous 2 times per day    enoxaparin  40 mg Subcutaneous Daily    naloxegol  25 mg Oral QAM    polyethylene glycol  17 g Oral Daily    pravastatin  20 mg Oral Daily    senna  3 tablet Oral BID    insulin lispro  0-6 Units Subcutaneous 4x Daily AC & HS    levothyroxine  50 mcg Oral Daily     Continuous Infusions:   sodium chloride 75 mL/hr at 09/27/18 0542    dextrose         I & O's:  I/O last 3 completed shifts: In: 3755.1 [P.O.:1920; I.V.:1835.1]  Out: 4608 [Urine:2325; Stool:250]  No intake/output data recorded. Intake/Output Summary (Last 24 hours) at 09/27/18 1604  Last data filed at 09/27/18 1436   Gross per 24 hour   Intake          3755.12 ml   Output             2575 ml   Net          1180.12 ml       Date 09/27/18 0000 - 09/27/18 2359   Shift 0544-6506 2791-2808 7804-6843 24 Hour Total   I  N  T  A  K  E   P.O.  (mL/kg/hr) 200  (0.5) 420  (1)  620    I.V.  (mL/kg) 508.7  (9.5) 768.8  (14.4)  1277.5  (23.9)    Shift Total  (mL/kg) 708.7  (13.3) 1188.8  (22.3)  1897.5  (35.6)   O  U  T  P  U  T   Urine  (mL/kg/hr) 925  (2.2) 800  (1.9)  1725    Emesis/NG output  (mL/kg)  0  (0)  0  (0)    Other  (mL/kg)  0  (0)  0  (0)    Stool  (mL/kg) 100  (1.9) 0  (0)  100  (1.9)    Blood  (mL/kg)  0  (0)  0  (0)    Shift Total  (mL/kg) 1025  (19.2) 800  (15)  1825  (34.2)   Weight (kg) 53.3 53.3 53.3 53.3           CBC:   Recent Labs      09/25/18   0455   WBC  6.6   HGB  11.4*   PLT  100*     BMP:    Recent Labs      09/25/18   0455   NA  141   K  3.7   CL  100   CO2  25   BUN  20   CREATININE  0.6   GLUCOSE  192*     Hepatic:   Recent Labs      09/25/18   0455   AST  12   ALT  28   BILITOT  0.6   ALKPHOS  77     BNP: No results for input(s): BNP in the last 72 hours. URINALYSIS:  Results for Susan Carrera (MRN [id number]) as of 9/23/2018 16:32   Ref.  Range 9/22/2018 07:15   Color, UA Latest Ref Range: STRAW-YELL  YELLOW   Glucose, UA Latest Ref Range: NEGATIVE mg/dl NEGATIVE   Bilirubin, Urine Latest Ref Range: NEGATIVE  NEGATIVE   Ketones, Urine Latest Ref Range: NEGATIVE  NEGATIVE   Blood, Urine Latest Ref Range: NEGATIVE  MODERATE (A)   pH, UA Latest Ref Range: 5.0 - 9.0  8.5   Protein, UA Latest Ref Range: NEGATIVE  >= 300 (A)   Urobilinogen, Urine Latest Ref Range: 0.0 - 1.0 eu/dl 0.2   Nitrite, Urine Latest Ref Range: NEGATIVE  NEGATIVE   Leukocyte Esterase, Urine Latest Ref Range: NEGATIVE  LARGE (A)   Casts UA Latest Ref Range: NONE SEEN /lpf NONE SEEN   WBC, UA Latest Ref Range: 0-4/hpf /hpf > 200   RBC, UA Latest Ref Range: 0-2/hpf /hpf 3-5   Epi Cells Latest Ref Range: 3-5/hpf /hpf NONE   Renal Epithelial, Urine Latest Ref Range: NONE SEEN  NONE   Bacteria, UA Latest Ref Range: FEW/NONE S /hpf MODERATE   Amorphous, UA Latest Ref Range: NONE SEEN  PHOSPHATES   Yeast, Urine Latest Ref Range: NONE SEEN  NONE SEEN   Crystals Latest Ref Range: NONE SEEN  NONE SEEN   Character, Urine Latest Ref Range: CLEAR-SL C  TURBID (A)   Urine Culture Reflex Unknown [place] count: >10. .. Specific Gravity, Urine Latest Ref Range: 1.002 - 1.03  1.010     MICRO:   9/25/2018  6:45 PM - Giacomo Vazquez Incoming Lab Results From Soft     Component Results     Component Collected Lab   Blood Culture, Routine  (Abnormal) 09/22/2018  6:40 AM [facility]   No growth-preliminary     Organism  (Abnormal) 09/22/2018  6:40 AM [facility]   Proteus mirabilis     Testing Performed By     Lab - Abbreviation Name Director Address Valid Date Range   117-Z[facility] - [address] LAB Trey Tijerina  [facility] [address] 08/30/17 1255-Present   Narrative     Source: blood-Adult-suboptimal <5.5oz./set volume       Site: Peripheral Vein            Current Antibiotics: Piperacillin/Tazobactam   Culture & Susceptibility     PROTEUS MIRABILIS     Antibiotic Interpretation MURIEL Unit Status   cefOXitin Encephalopathy acute     Urinary tract infection due to Proteus      Plan:  Continue current antibiotic  cefepime till 10/10 /18  Labs ordered for am  [id number] L[address] Brown for discharge planning    They are worried about pain. .? Need for pain consult. Defer to primary  Social service for discharge planning    Labs, cultures, and radiographs reviewed. Changes made as necessary. D/w Patient's R.N. and specific instructions given and infectious disease issues addressed. Will follow the patient closely with you. Also please see the orders for updated patient care orders written by ID team.    Thank you for involving us in this patient's care. I will be discussing this case with the treating physicians. Please don't hesitate to call me if any questions, issues  or concerns      Please see orders for updated patient care orders written today.   Electronically signed by Fadi Mcgrath on 9/27/2018 at 4:04 PM

## 2018-09-28 VITALS
HEART RATE: 112 BPM | TEMPERATURE: 98.3 F | HEIGHT: 59 IN | RESPIRATION RATE: 18 BRPM | DIASTOLIC BLOOD PRESSURE: 85 MMHG | WEIGHT: 117.6 LBS | SYSTOLIC BLOOD PRESSURE: 141 MMHG | OXYGEN SATURATION: 93 % | BODY MASS INDEX: 23.71 KG/M2

## 2018-09-28 LAB
ALBUMIN SERPL-MCNC: 3.5 G/DL (ref 3.5–5.1)
ALP BLD-CCNC: 68 U/L (ref 38–126)
ALT SERPL-CCNC: 23 U/L (ref 11–66)
ANION GAP SERPL CALCULATED.3IONS-SCNC: 13 MEQ/L (ref 8–16)
AST SERPL-CCNC: 6 U/L (ref 5–40)
BILIRUB SERPL-MCNC: 0.4 MG/DL (ref 0.3–1.2)
BILIRUBIN DIRECT: < 0.2 MG/DL (ref 0–0.3)
BUN BLDV-MCNC: 18 MG/DL (ref 7–22)
CALCIUM IONIZED: 1.33 MMOL/L (ref 1.12–1.32)
CALCIUM SERPL-MCNC: 9.5 MG/DL (ref 8.5–10.5)
CHLORIDE BLD-SCNC: 103 MEQ/L (ref 98–111)
CO2: 26 MEQ/L (ref 23–33)
CREAT SERPL-MCNC: 0.6 MG/DL (ref 0.4–1.2)
ERYTHROCYTE [DISTWIDTH] IN BLOOD BY AUTOMATED COUNT: 13.1 % (ref 11.5–14.5)
ERYTHROCYTE [DISTWIDTH] IN BLOOD BY AUTOMATED COUNT: 46.5 FL (ref 35–45)
GFR SERPL CREATININE-BSD FRML MDRD: > 90 ML/MIN/1.73M2
GLUCOSE BLD-MCNC: 145 MG/DL (ref 70–108)
GLUCOSE BLD-MCNC: 172 MG/DL (ref 70–108)
GLUCOSE BLD-MCNC: 188 MG/DL (ref 70–108)
GLUCOSE BLD-MCNC: 202 MG/DL (ref 70–108)
HCT VFR BLD CALC: 36.3 % (ref 37–47)
HEMOGLOBIN: 11.7 GM/DL (ref 12–16)
MCH RBC QN AUTO: 31.6 PG (ref 26–33)
MCHC RBC AUTO-ENTMCNC: 32.2 GM/DL (ref 32.2–35.5)
MCV RBC AUTO: 98.1 FL (ref 81–99)
PLATELET # BLD: 135 THOU/MM3 (ref 130–400)
PMV BLD AUTO: 9.4 FL (ref 9.4–12.4)
POTASSIUM SERPL-SCNC: 4 MEQ/L (ref 3.5–5.2)
RBC # BLD: 3.7 MILL/MM3 (ref 4.2–5.4)
SODIUM BLD-SCNC: 142 MEQ/L (ref 135–145)
TOTAL PROTEIN: 6.3 G/DL (ref 6.1–8)
WBC # BLD: 9 THOU/MM3 (ref 4.8–10.8)

## 2018-09-28 PROCEDURE — 97116 GAIT TRAINING THERAPY: CPT

## 2018-09-28 PROCEDURE — 97535 SELF CARE MNGMENT TRAINING: CPT

## 2018-09-28 PROCEDURE — 6370000000 HC RX 637 (ALT 250 FOR IP): Performed by: NURSE PRACTITIONER

## 2018-09-28 PROCEDURE — 97110 THERAPEUTIC EXERCISES: CPT

## 2018-09-28 PROCEDURE — 6360000002 HC RX W HCPCS: Performed by: NURSE PRACTITIONER

## 2018-09-28 PROCEDURE — 6360000002 HC RX W HCPCS: Performed by: INTERNAL MEDICINE

## 2018-09-28 PROCEDURE — 6370000000 HC RX 637 (ALT 250 FOR IP): Performed by: INTERNAL MEDICINE

## 2018-09-28 PROCEDURE — 2580000003 HC RX 258: Performed by: INTERNAL MEDICINE

## 2018-09-28 PROCEDURE — 6370000000 HC RX 637 (ALT 250 FOR IP): Performed by: ANESTHESIOLOGY

## 2018-09-28 PROCEDURE — 82330 ASSAY OF CALCIUM: CPT

## 2018-09-28 PROCEDURE — 82948 REAGENT STRIP/BLOOD GLUCOSE: CPT

## 2018-09-28 PROCEDURE — 2709999900 HC NON-CHARGEABLE SUPPLY

## 2018-09-28 PROCEDURE — 82248 BILIRUBIN DIRECT: CPT

## 2018-09-28 PROCEDURE — 2580000003 HC RX 258: Performed by: ANESTHESIOLOGY

## 2018-09-28 PROCEDURE — 80053 COMPREHEN METABOLIC PANEL: CPT

## 2018-09-28 PROCEDURE — 85027 COMPLETE CBC AUTOMATED: CPT

## 2018-09-28 RX ORDER — CANDESARTAN 32 MG/1
32 TABLET ORAL DAILY
Qty: 30 TABLET | Refills: 3 | Status: ON HOLD | OUTPATIENT
Start: 2018-09-29 | End: 2018-11-08 | Stop reason: HOSPADM

## 2018-09-28 RX ORDER — METHADONE HYDROCHLORIDE 10 MG/1
20 TABLET ORAL EVERY 8 HOURS SCHEDULED
Qty: 30 TABLET | Refills: 0
Start: 2018-09-28 | End: 2018-10-08 | Stop reason: SDUPTHER

## 2018-09-28 RX ORDER — HYDROMORPHONE HYDROCHLORIDE 4 MG/1
4 TABLET ORAL EVERY 4 HOURS PRN
Qty: 1 TABLET | Refills: 0
Start: 2018-09-28 | End: 2018-10-05

## 2018-09-28 RX ORDER — HEPARIN SODIUM (PORCINE) LOCK FLUSH IV SOLN 100 UNIT/ML 100 UNIT/ML
500 SOLUTION INTRAVENOUS PRN
Status: DISCONTINUED | OUTPATIENT
Start: 2018-09-28 | End: 2018-09-28 | Stop reason: HOSPADM

## 2018-09-28 RX ADMIN — CEFEPIME HYDROCHLORIDE 2 G: 2 INJECTION, POWDER, FOR SOLUTION INTRAVENOUS at 16:55

## 2018-09-28 RX ADMIN — METOPROLOL TARTRATE 25 MG: 25 TABLET ORAL at 08:23

## 2018-09-28 RX ADMIN — HYDROMORPHONE HYDROCHLORIDE 4 MG: 2 TABLET ORAL at 10:38

## 2018-09-28 RX ADMIN — CEFEPIME HYDROCHLORIDE 2 G: 2 INJECTION, POWDER, FOR SOLUTION INTRAVENOUS at 01:25

## 2018-09-28 RX ADMIN — CYCLOBENZAPRINE 5 MG: 10 TABLET, FILM COATED ORAL at 13:21

## 2018-09-28 RX ADMIN — CYCLOBENZAPRINE 5 MG: 10 TABLET, FILM COATED ORAL at 01:24

## 2018-09-28 RX ADMIN — DEXAMETHASONE 4 MG: 4 TABLET ORAL at 08:24

## 2018-09-28 RX ADMIN — Medication 10 ML: at 17:42

## 2018-09-28 RX ADMIN — PRAVASTATIN SODIUM 20 MG: 20 TABLET ORAL at 08:24

## 2018-09-28 RX ADMIN — Medication 1 UNITS: at 08:01

## 2018-09-28 RX ADMIN — HYDROMORPHONE HYDROCHLORIDE 4 MG: 2 TABLET ORAL at 18:12

## 2018-09-28 RX ADMIN — Medication 2 UNITS: at 16:58

## 2018-09-28 RX ADMIN — AMLODIPINE BESYLATE 10 MG: 10 TABLET ORAL at 08:23

## 2018-09-28 RX ADMIN — CANDESARTAN CILEXETIL 32 MG: 16 TABLET ORAL at 08:23

## 2018-09-28 RX ADMIN — GABAPENTIN 300 MG: 300 CAPSULE ORAL at 08:23

## 2018-09-28 RX ADMIN — NALOXEGOL OXALATE 25 MG: 25 TABLET, FILM COATED ORAL at 05:15

## 2018-09-28 RX ADMIN — METHADONE HYDROCHLORIDE 20 MG: 10 TABLET ORAL at 13:23

## 2018-09-28 RX ADMIN — SENNOSIDES 25.8 MG: 8.6 TABLET, FILM COATED ORAL at 08:24

## 2018-09-28 RX ADMIN — POLYETHYLENE GLYCOL 3350 17 G: 17 POWDER, FOR SOLUTION ORAL at 08:23

## 2018-09-28 RX ADMIN — SODIUM CHLORIDE: 9 INJECTION, SOLUTION INTRAVENOUS at 08:10

## 2018-09-28 RX ADMIN — METHADONE HYDROCHLORIDE 20 MG: 10 TABLET ORAL at 06:11

## 2018-09-28 RX ADMIN — LEVOTHYROXINE SODIUM 50 MCG: 50 TABLET ORAL at 05:15

## 2018-09-28 RX ADMIN — HEPARIN 500 UNITS: 100 SYRINGE at 17:42

## 2018-09-28 RX ADMIN — Medication 1 UNITS: at 12:47

## 2018-09-28 RX ADMIN — HYDROMORPHONE HYDROCHLORIDE 4 MG: 2 TABLET ORAL at 05:15

## 2018-09-28 ASSESSMENT — PAIN SCALES - GENERAL
PAINLEVEL_OUTOF10: 10
PAINLEVEL_OUTOF10: 6
PAINLEVEL_OUTOF10: 6
PAINLEVEL_OUTOF10: 9
PAINLEVEL_OUTOF10: 7
PAINLEVEL_OUTOF10: 7
PAINLEVEL_OUTOF10: 8
PAINLEVEL_OUTOF10: 9

## 2018-09-28 ASSESSMENT — PAIN DESCRIPTION - ORIENTATION
ORIENTATION: INNER
ORIENTATION: INNER

## 2018-09-28 ASSESSMENT — PAIN DESCRIPTION - ONSET
ONSET: ON-GOING
ONSET: ON-GOING

## 2018-09-28 ASSESSMENT — PAIN DESCRIPTION - DESCRIPTORS
DESCRIPTORS: ACHING
DESCRIPTORS: ACHING

## 2018-09-28 ASSESSMENT — PAIN DESCRIPTION - FREQUENCY
FREQUENCY: CONTINUOUS
FREQUENCY: CONTINUOUS

## 2018-09-28 ASSESSMENT — PAIN DESCRIPTION - LOCATION
LOCATION: RECTUM
LOCATION: RECTUM

## 2018-09-28 ASSESSMENT — PAIN DESCRIPTION - PROGRESSION
CLINICAL_PROGRESSION: NOT CHANGED
CLINICAL_PROGRESSION: GRADUALLY WORSENING
CLINICAL_PROGRESSION: NOT CHANGED
CLINICAL_PROGRESSION: GRADUALLY WORSENING

## 2018-09-28 ASSESSMENT — PAIN DESCRIPTION - PAIN TYPE
TYPE: CHRONIC PAIN
TYPE: CHRONIC PAIN

## 2018-09-28 NOTE — PROGRESS NOTES
09/22/18 0715   Resulting lab: [facility]   Value: Escherichia coli (Abnormal)   Comment:    *Additional information available - narrative   9/25/2018  6:53 PM - Kenneth Vazquez Incoming Lab Results From Soft     Component Results     Component Collected Lab   Organism  (Abnormal) 09/22/2018  7:15 AM [facility]   Proteus mirabilis     Urine Culture Reflex 09/22/2018  7:15 AM [facility]   [place] count: >100,000 CFU/mL    Organism  (Abnormal) 09/22/2018  7:15 AM [facility]   Escherichia coli     Urine Culture Reflex 09/22/2018  7:15  [facility]   [place] count: 50,000-90,000 CFU/mL    Testing Performed By     Lab - Abbreviation Name Director Address Valid Date Range   779-[facility] - [address] LAB London Plummer MD [address] [place] [id number] 08/30/17 1255-Present   Narrative     Source: cath urine       Site:           Current Antibiotics: Piperacillin/Tazobactam   Culture & Susceptibility     ESCHERICHIA COLI     Antibiotic Interpretation MURIEL Unit Status   cefOXitin Sensitive <=4 mcg/mL Preliminary   gentamicin Sensitive <=1 mcg/mL Preliminary   nitrofurantoin Sensitive <=16 mcg/mL Preliminary   piperacillin-tazobactam Sensitive <=4 mcg/mL Preliminary   tetracycline Sensitive <=1 mcg/mL Preliminary   trimethoprim-sulfamethoxazole Resistant >=320 mcg/mL Preliminary         PROTEUS MIRABILIS     Antibiotic Interpretation MURIEL Unit Status   cefOXitin Sensitive <=4 mcg/mL Preliminary   gentamicin Sensitive <=1 mcg/mL Preliminary   piperacillin-tazobactam Sensitive <=4 mcg/mL Preliminary              IMAGING:   Ct abdomen  Impression   1. There is consolidation within the right lower chest suggesting atelectasis and/or infiltrates. Follow-up imaging is recommended to confirm complete resolution in order to exclude a developing mass within this location.    2. There is mild right adrenal the left neuroforamen. Objective:   Vitals: BP (!) 150/96   Pulse 101   Temp 98.2 °F (36.8 °C) (Oral)   Resp 16   Ht 4' 11\" (1.499 m)   Wt 117 lb 9.6 oz (53.3 kg)   LMP 05/01/2011   SpO2 97%   BMI 23.75 kg/m²   HEENT: Head: Normocephalic, no lesions, without obvious abnormality. Lungs: clear to auscultation bilaterally  Heart: S1, S2 normal  Abdomen: soft, non-tender; bowel sounds normal; no masses,  no organomegaly  Extremities: extremities normal, atraumatic, no cyanosis or edema  Neurologic: Mental status: Alert, oriented, thought content appropriate complains of leg pains  Wound/Ulcers: perirectal ulcer stable with drain         Assessment:     1. Severe sepsis with shock. 2.  UTI with hydronephrosis. 3.  Metabolic encephalopathy. 4.  Bacteremia --proteus and enterobacter   5. Likely drug overdose of pain medication. 6.  Acute kidney injury. 7.  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 8.  History of colorectal cancer, status post colostomy with perirectal  drain, that is long-term drain, that is present  9.neuropathic pain     Patient Active Problem List:     Colonic mass     Abdominal wall mass of right flank     Malignant neoplasm of rectum (HCC)     Rectal bleed     BRBPR (bright red blood per rectum)     Chronic rectal pain     Benign hypertension     Diabetes mellitus type 2, controlled ([address]     Hypothyroidism     Perirectal fistula     Coagulopathy (HCC)     Lower GI bleed     Refractory nausea and vomiting     Ileus (HCC)     Moderate malnutrition (HCC)     SIRS (systemic inflammatory response syndrome) (HCC)     Septic shock (HCC)     SUZAN (acute kidney injury) ([address]     Elevated troponin     Urinary tract infection with hematuria     Encephalopathy acute     Urinary tract infection due to Proteus      Plan:  Continue current antibiotic  cefepime till 10/10 /18  Labs ordered for am  Shikha Kowalski for discharge planning    They are worried about pain. .? Need for pain consult.  Defer to

## 2018-09-28 NOTE — DISCHARGE SUMMARY
Discharge Summary    Shin Fox  :  1965  MRN:  [id number]    Admit date:  2018  Discharge date:      Admitting Physician:  Roseanna Morales MD    Discharge Diagnoses:    1. Septic shock with ALOC, improved  2. UTI  3. Rectal / presacral space abscess with a percut drain in situ  4. H/o met rectal cancer, new sacral met deposit /pathological fx  5. sandeep hydronephrosis  6. SUZAN, resolved    Admission Condition:  critical  Discharged Condition:  good    Discharge Medications:       Ana Rosa Correia   Home Medication Instructions [id number]    Printed on:18 1340   Medication Information                      acetaminophen (TYLENOL) 500 MG tablet  Take 500 mg by mouth every 6 hours as needed for Pain             amLODIPine (NORVASC) 10 MG tablet               candesartan (ATACAND) 32 MG tablet  Take 1 tablet by mouth daily             cefepime (MAXIPIME) infusion  Infuse 2,000 mg intravenously every 12 hours for 13 days Compound per protocol      Stop after last dose on 10/10/18    Maintain mediport per protocol             cyclobenzaprine (FLEXERIL) 5 MG tablet  Take 5 mg by mouth 3 times daily as needed for Muscle spasms             desipramine (NORPRAMIN) 50 MG tablet  Take 1 tablet by mouth nightly             dexamethasone (DECADRON) 4 MG tablet  Take 4 mg by mouth 2 times daily              docusate sodium (COLACE) 100 MG capsule  Take 1 capsule by mouth 2 times daily             gabapentin (NEURONTIN) 400 MG capsule  Take 400 mg by mouth 3 times daily. Beverly Norse ibuprofen (ADVIL;MOTRIN) 600 MG tablet  Take 1 tablet by mouth every 6 hours as needed for Pain             levothyroxine (SYNTHROID) 50 MCG tablet  Take 50 mcg by mouth Daily             lidocaine (LIDODERM) 5 %  Place 1 patch onto the skin daily 12 hours on, 12 hours off.             lidocaine (XYLOCAINE) 5 % ointment  Apply topically as needed.              metFORMIN (GLUCOPHAGE) 500 MG tablet  Take 500 mg by mouth 2 times Nitrite, Urine Latest Ref Range: NEGATIVE  NEGATIVE   Leukocyte Esterase, Urine Latest Ref Range: NEGATIVE  LARGE (A)   Casts UA Latest Ref Range: NONE SEEN /lpf NONE SEEN   WBC, UA Latest Ref Range: 0-4/hpf /hpf > 200   RBC, UA Latest Ref Range: 0-2/hpf /hpf 3-5   Epi Cells Latest Ref Range: 3-5/hpf /hpf NONE   Renal Epithelial, Urine Latest Ref Range: NONE SEEN  NONE   Bacteria, UA Latest Ref Range: FEW/NONE S /hpf MODERATE   Amorphous, UA Latest Ref Range: NONE SEEN  PHOSPHATES   Yeast, Urine Latest Ref Range: NONE SEEN  NONE SEEN   Crystals Latest Ref Range: NONE SEEN  NONE SEEN   Character, Urine Latest Ref Range: CLEAR-SL C  TURBID (A)   Specific Gravity, Urine Latest Ref Range: 1.002 - 1.03  1.010      CXR  Right basilar atelectasis, less likely pneumonia.          CT abd/ pelvis  1. There is consolidation within the right lower chest suggesting atelectasis and/or infiltrates. Follow-up imaging is recommended to confirm complete resolution in order to exclude a developing mass within this location. 2. There is mild right adrenal hypertrophy. 3. There is mild left adrenal hypertrophy and a stable 1.4 cm left adrenal nodule. Metastatic disease cannot be excluded. 4. The right kidney is unremarkable. There is resolution of the right hydronephrosis. 5. There is stable moderate left pelvocaliectasis and moderate left hydroureter. There is stable thickening of the wall the left ureter and stable stranding densities within the fat adjacent to the left ureter. There is stable distention of the urinary   bladder with mild thickening of the urinary bladder wall. 6. There is a Cathryn's pouch. There is a colostomy along the anterior pelvic wall on the left. There is mesenteric fat extending into the subcutaneous tissues through this.  There are also mild stranding densities within the subcutaneous fat of the   anterior pelvic wall and a few nodules within the subcutaneous fat which may be related to subcutaneous

## 2018-09-28 NOTE — PROGRESS NOTES
Activity Tolerance:  Activity Tolerance: Patient Tolerated treatment well    Assessment:     Performance deficits / Impairments: Decreased functional mobility , Decreased strength, Decreased ADL status, Decreased endurance  Prognosis: Good    Discharge Recommendations:  Discharge Recommendations: Home with Home health OT, 24 hour supervision or assist    Patient Education:  Patient Education: OT POC; pt's goal; endurance training; effect of her posture on her tolerance while doing standing activities    Equipment Recommendations: Other: Recommend RW    Safety:  Safety Devices in place: Yes  Type of devices:  All fall risk precautions in place, Call light within reach, Bed alarm in place, Gait belt    Plan:  Times per week: 3-5x  Current Treatment Recommendations: Strengthening, Functional Mobility Training, Gait Training, Endurance Training, Self-Care / ADL, Patient/Caregiver Education & Training  Plan Comment: Pt would benefit from continued OT services when medically stable and discharged from Acute. Specific instructions for Next Treatment: Functional mobility; ADLs and standing tolerance; upper body strengthening exercises    Goals:  Patient goals : \"I want to get my strength back. \" pt states. Short term goals  Time Frame for Short term goals: 2 weeks  Short term goal 1: Pt will demonstrate functional mobility walking to/from the bathroom or in the hallway with a rolling walker with Supervision to prepare for doing self care and simple housekeeping tasks. Short term goal 2: Pt will complete lower body dressing with SBA while using any adaptations needed for pain management to increase her independence with self care. Short term goal 3: Pt will complete simple standing tasks for over 3 minute duration with SBA and cues for posture if needed to increase her endurance for ease of showering or dressing. Short term goal 4: Pt will complete BUE moderate resistance exercises while following any handout needed for 5 sets of 10 reps each to increase her endurance and strength for ease of doing homemaking tasks. Long term goals  Time Frame for Long term goals : None secondary to short estimated length of stay.

## 2018-09-28 NOTE — PROGRESS NOTES
CLINICAL PHARMACY: DISCHARGE MED RECONCILIATION/REVIEW    [facility] ([facility]) Select Patient?: No  Total # of Interventions Recommended: 2 -   - Updated Order #: 2     Total # Interventions Accepted: 2  Intervention Severity:   - Level 1 Intervention Present?: No   - Level 2 #: 0   - Level 3 #: 2  Time Spent (min): 30    Additional Documentation:    Discharge medication reconciliation reviewed and complete.     Maryellen Donnelly, PharmD, [address] [place] [address]  9/28/2018  2:46 PM

## 2018-09-28 NOTE — PLAN OF CARE
Problem: DISCHARGE BARRIERS  Goal: Patient's continuum of care needs are met  Outcome: Ongoing  See SW note, home with family assisting and new HH
Problem: Discharge Planning:  Goal: Discharged to appropriate level of care  Discharged to appropriate level of care   Outcome: Ongoing  From home with son. No discharge plans at this time. Problem: Serum Glucose Level - Abnormal:  Goal: Ability to maintain appropriate glucose levels will improve  Ability to maintain appropriate glucose levels will improve   Outcome: Ongoing  Chem ac/hs. Insulin being given per sliding scale. Problem: Cardiovascular  Goal: No DVT, peripheral vascular complications  Outcome: Ongoing  No signs of DVT noted. On Lovenox. Goal: Hemodynamic stability  Outcome: Ongoing  BP better today. Problem: Respiratory  Goal: No pulmonary complications  Outcome: Completed Date Met: 09/25/18    Goal: O2 Sat > 90%  Outcome: Completed Date Met: 09/25/18      Problem: GI  Goal: No bowel complications  Outcome: Ongoing  Colostomy in place. Stool green soft. Stool softners being given. Problem: Nutrition  Goal: Optimal nutrition therapy  Outcome: Ongoing  Tolerating diet well. Problem: Musculor/Skeletal Functional Status  Goal: Absence of falls  Outcome: Ongoing  No falls this shift. Up with standby assist.  Unsteady/weak. Being ambulated with walker and gait belt. PT/OT working with patient. Comments: Care plan reviewed with patient. Patient verbalizes understanding of the plan of care and contribute to goal setting.
Problem: Discharge Planning:  Goal: Discharged to appropriate level of care  Discharged to appropriate level of care   Outcome: Ongoing  No discharge plans currently. Patient from home with fiance and plans to return there when medically stable. Problem: Serum Glucose Level - Abnormal:  Goal: Ability to maintain appropriate glucose levels will improve  Ability to maintain appropriate glucose levels will improve   Outcome: Ongoing  Checking patient's blood sugars AC & HS. Problem: Risk for Impaired Skin Integrity  Goal: Tissue integrity - skin and mucous membranes  Structural intactness and normal physiological function of skin and  mucous membranes. Outcome: Ongoing  No new areas of skin breakdown noted tonight. Skin is warm and dry. Patient has scattered abrasions. Patient able to turn and reposition herself independently. Bilateral heels elevated on pillows. Will continue to monitor. Patient refuses to turn as recommended. Education provided regarding the benefits of repositioning and the risk to skin integrity associated with not repositioning as recommended. Problem: Pain Control  Goal: Maintain pain level at or below patient's acceptable level (or 5 if patient is unable to determine acceptable level)  Outcome: Ongoing  Pain Assessment: 0-10  Pain Level: 0   Pain goal:  5  Is pain goal met at this time? Yes  Pain Intervention(s): Medication (see eMar) (PRN Dilaudid)  Additional interventions to be implemented: medications Methadone & Dilaudid, position change and rest      Problem: Cardiovascular  Goal: No DVT, peripheral vascular complications  Outcome: Ongoing  No signs or symptoms of DVT. Patient getting daily Lovenox injections for DVT prophylaxis. Goal: Hemodynamic stability  Outcome: Met This Shift  Vital signs have remained stable and within normal limits. Vitals being monitored every 4 hours and as needed. Continuous cardiac monitor in place. Heart rhythm is NSR.      Problem:
Problem: Discharge Planning:  Goal: Discharged to appropriate level of care  Discharged to appropriate level of care   Outcome: Ongoing  Plans home with [facility]. Problem: Serum Glucose Level - Abnormal:  Goal: Ability to maintain appropriate glucose levels will improve  Ability to maintain appropriate glucose levels will improve   Outcome: Ongoing  On carb control diet and sliding scale insulin    Problem: Risk for Impaired Skin Integrity  Goal: Tissue integrity - skin and mucous membranes  Structural intactness and normal physiological function of skin and  mucous membranes. Outcome: Ongoing  No new skin breakdown. Patient encouraged to reposition every two hours. Problem: Pain Control  Goal: Maintain pain level at or below patient's acceptable level (or 5 if patient is unable to determine acceptable level)  Outcome: Ongoing  Pain Assessment: 0-10  Pain Level: 10   Pain goal:  5  Is pain goal met at this time? No  Pain Intervention(s): Medication (see eMar)  Additional interventions to be implemented: cold, heat, medications Dilaudid and rest      Problem: Cardiovascular  Goal: No DVT, peripheral vascular complications  Outcome: Ongoing  On SQ Lovenox  Goal: Hemodynamic stability  Outcome: Ongoing  Vitals assessed per unit protocol    Problem: GI  Goal: No bowel complications  Outcome: Ongoing  Patient passing stool through colostomy    Problem: Nutrition  Goal: Optimal nutrition therapy  Outcome: Ongoing  Patient doing well on carb control diet    Problem: Musculor/Skeletal Functional Status  Goal: Absence of falls  Outcome: Ongoing  No falls this shift. Hourly rounding in effect. Bed alarm on. Problem: DISCHARGE BARRIERS  Goal: Patient's continuum of care needs are met  Outcome: Completed Date Met: 09/28/18      Comments: Care plan reviewed with patient and family. Patient and family verbalize understanding of the plan of care and contribute to goal setting.
Electronically signed by Renaldo Lopez RN on 9/22/2018 at 8:08 PM
of care needs are met  Outcome: Ongoing  From home with son. To go home Friday with Home health     Comments: Care plan reviewed with patient. Patient  verbalize understanding of the plan of care and contribute to goal setting.

## 2018-09-28 NOTE — PROGRESS NOTES
INTERNAL MEDICINE Progress Note  9/28/2018 12:30 PM  Subjective:   Admit Date: 9/22/2018  PCP: Sukumar Landaverde MD  Interval History:   Alert, no new c/o    Objective:   Vitals: /82   Pulse 105   Temp 98.2 °F (36.8 °C) (Oral)   Resp 18   Ht 4' 11\" (1.499 m)   Wt 117 lb 9.6 oz (53.3 kg)   LMP 05/01/2011   SpO2 97%   BMI 23.75 kg/m²   General appearance: alert, oriented x3  HEENT:atraumatic  Neck:  no JVD  Lungs: CTA sandeep  Heart: regular rate and rhythm, S1, S2  Abdomen: normal findings: bowel sounds normal, no masses palpable and colostomy,  and rectal inspection showed drain through anus-drainage +  Extremities: no edema,   Neurologic: alert, Ox3,  moves all extremities x4      Medications:   Scheduled Meds:   cefepime  2 g Intravenous Q12H    lidocaine  3 patch Transdermal Daily    gabapentin  300 mg Oral BID    methadone  20 mg Oral 3 times per day    amLODIPine  10 mg Oral Daily    candesartan  32 mg Oral Daily    dexamethasone  4 mg Oral 2 times per day    metoprolol tartrate  25 mg Oral BID    sodium chloride flush  10 mL Intravenous 2 times per day    enoxaparin  40 mg Subcutaneous Daily    naloxegol  25 mg Oral QAM    polyethylene glycol  17 g Oral Daily    pravastatin  20 mg Oral Daily    senna  3 tablet Oral BID    insulin lispro  0-6 Units Subcutaneous 4x Daily AC & HS    levothyroxine  50 mcg Oral Daily     Continuous Infusions:   sodium chloride 75 mL/hr at 09/28/18 0810    dextrose         Lab Results:   CBC:   Recent Labs      09/28/18   0600   WBC  9.0   HGB  11.7*   PLT  135     BMP:    Recent Labs      09/28/18   0600   NA  142   K  4.0   CL  103   CO2  26   BUN  18   CREATININE  0.6   GLUCOSE  172*     Hepatic:   Recent Labs      09/28/18   0600   AST  6   ALT  23   BILITOT  0.4   ALKPHOS  68        Ref.  Range 9/22/2018 11:18 9/22/2018 12:16 9/22/2018 12:20   Lactic Acid, Sepsis Latest Ref Range: 0.5 - 1.9 mmol/L   1.4   POC Glucose Latest Ref Range: 70 - 108 mg/dl  203 (H)    Procalcitonin Latest Ref Range: 0.01 - 0.09 ng/mL 3.30 (H)          Ref. Range 9/22/2018 07:15   Color, UA Latest Ref Range: STRAW-YELL  YELLOW   Glucose, UA Latest Ref Range: NEGATIVE mg/dl NEGATIVE   Bilirubin, Urine Latest Ref Range: NEGATIVE  NEGATIVE   Ketones, Urine Latest Ref Range: NEGATIVE  NEGATIVE   Blood, Urine Latest Ref Range: NEGATIVE  MODERATE (A)   pH, UA Latest Ref Range: 5.0 - 9.0  8.5   Protein, UA Latest Ref Range: NEGATIVE  >= 300 (A)   Urobilinogen, Urine Latest Ref Range: 0.0 - 1.0 eu/dl 0.2   Nitrite, Urine Latest Ref Range: NEGATIVE  NEGATIVE   Leukocyte Esterase, Urine Latest Ref Range: NEGATIVE  LARGE (A)   Casts UA Latest Ref Range: NONE SEEN /lpf NONE SEEN   WBC, UA Latest Ref Range: 0-4/hpf /hpf > 200   RBC, UA Latest Ref Range: 0-2/hpf /hpf 3-5   Epi Cells Latest Ref Range: 3-5/hpf /hpf NONE   Renal Epithelial, Urine Latest Ref Range: NONE SEEN  NONE   Bacteria, UA Latest Ref Range: FEW/NONE S /hpf MODERATE   Amorphous, UA Latest Ref Range: NONE SEEN  PHOSPHATES   Yeast, Urine Latest Ref Range: NONE SEEN  NONE SEEN   Crystals Latest Ref Range: NONE SEEN  NONE SEEN   Character, Urine Latest Ref Range: CLEAR-SL C  TURBID (A)   Specific Gravity, Urine Latest Ref Range: 1.002 - 1.03  1.010     CXR  Right basilar atelectasis, less likely pneumonia.         CT abd/ pelvis  1. There is consolidation within the right lower chest suggesting atelectasis and/or infiltrates. Follow-up imaging is recommended to confirm complete resolution in order to exclude a developing mass within this location. 2. There is mild right adrenal hypertrophy. 3. There is mild left adrenal hypertrophy and a stable 1.4 cm left adrenal nodule. Metastatic disease cannot be excluded. 4. The right kidney is unremarkable. There is resolution of the right hydronephrosis. 5. There is stable moderate left pelvocaliectasis and moderate left hydroureter.  There is stable thickening of the wall the left ureter and

## 2018-09-29 LAB
BLOOD CULTURE, ROUTINE: NORMAL
BLOOD CULTURE, ROUTINE: NORMAL

## 2018-10-01 ENCOUNTER — HOSPITAL ENCOUNTER (OUTPATIENT)
Age: 53
Setting detail: SPECIMEN
Discharge: HOME | End: 2018-10-01
Payer: COMMERCIAL

## 2018-10-01 LAB
ALBUMIN SERPL-MCNC: 3.9 G/DL (ref 3.5–5.1)
ALP BLD-CCNC: 77 U/L (ref 38–126)
ALT SERPL-CCNC: 28 U/L (ref 11–66)
ANION GAP SERPL CALCULATED.3IONS-SCNC: 20 MEQ/L (ref 8–16)
AST SERPL-CCNC: 14 U/L (ref 5–40)
BASOPHILS # BLD: 0 %
BASOPHILS ABSOLUTE: 0 THOU/MM3 (ref 0–0.1)
BILIRUB SERPL-MCNC: 0.4 MG/DL (ref 0.3–1.2)
BILIRUBIN DIRECT: < 0.2 MG/DL (ref 0–0.3)
BUN BLDV-MCNC: 17 MG/DL (ref 7–22)
CALCIUM SERPL-MCNC: 10 MG/DL (ref 8.5–10.5)
CHLORIDE BLD-SCNC: 96 MEQ/L (ref 98–111)
CO2: 23 MEQ/L (ref 23–33)
CREAT SERPL-MCNC: 0.6 MG/DL (ref 0.4–1.2)
EOSINOPHIL # BLD: 0 %
EOSINOPHILS ABSOLUTE: 0 THOU/MM3 (ref 0–0.4)
ERYTHROCYTE [DISTWIDTH] IN BLOOD BY AUTOMATED COUNT: 13.7 % (ref 11.5–14.5)
ERYTHROCYTE [DISTWIDTH] IN BLOOD BY AUTOMATED COUNT: 13.7 % (ref 11.5–14.5)
ERYTHROCYTE [DISTWIDTH] IN BLOOD BY AUTOMATED COUNT: 50.6 FL (ref 35–45)
ERYTHROCYTE [DISTWIDTH] IN BLOOD BY AUTOMATED COUNT: 50.6 FL (ref 35–45)
GFR SERPL CREATININE-BSD FRML MDRD: > 90 ML/MIN/1.73M2
GLUCOSE BLD-MCNC: 241 MG/DL (ref 70–108)
HCT VFR BLD CALC: 40.8 % (ref 37–47)
HCT VFR BLD CALC: 40.8 % (ref 37–47)
HEMOGLOBIN: 12.5 GM/DL (ref 12–16)
HEMOGLOBIN: 12.5 GM/DL (ref 12–16)
LYMPHOCYTES # BLD: 11 %
LYMPHOCYTES ABSOLUTE: 1.6 THOU/MM3 (ref 1–4.8)
MCH RBC QN AUTO: 31.6 PG (ref 26–33)
MCH RBC QN AUTO: 31.6 PG (ref 26–33)
MCHC RBC AUTO-ENTMCNC: 30.6 GM/DL (ref 32.2–35.5)
MCHC RBC AUTO-ENTMCNC: 30.6 GM/DL (ref 32.2–35.5)
MCV RBC AUTO: 103 FL (ref 81–99)
MCV RBC AUTO: 103 FL (ref 81–99)
METAMYELOCYTES: 3 %
MONOCYTES # BLD: 3 %
MONOCYTES ABSOLUTE: 0.4 THOU/MM3 (ref 0.4–1.3)
NUCLEATED RED BLOOD CELLS: 1 /100 WBC
PLATELET # BLD: 164 THOU/MM3 (ref 130–400)
PLATELET # BLD: 164 THOU/MM3 (ref 130–400)
PLATELET ESTIMATE: ADEQUATE
PMV BLD AUTO: 10 FL (ref 9.4–12.4)
PMV BLD AUTO: 10 FL (ref 9.4–12.4)
POTASSIUM SERPL-SCNC: 4.3 MEQ/L (ref 3.5–5.2)
RBC # BLD: 3.96 MILL/MM3 (ref 4.2–5.4)
RBC # BLD: 3.96 MILL/MM3 (ref 4.2–5.4)
SEG NEUTROPHILS: 83 %
SEGMENTED NEUTROPHILS ABSOLUTE COUNT: 11.9 THOU/MM3 (ref 1.8–7.7)
SODIUM BLD-SCNC: 139 MEQ/L (ref 135–145)
TOTAL PROTEIN: 6.6 G/DL (ref 6.1–8)
TOXIC GRANULATION: PRESENT
WBC # BLD: 14.3 THOU/MM3 (ref 4.8–10.8)
WBC # BLD: 14.3 THOU/MM3 (ref 4.8–10.8)

## 2018-10-01 PROCEDURE — 82248 BILIRUBIN DIRECT: CPT

## 2018-10-01 PROCEDURE — 85027 COMPLETE CBC AUTOMATED: CPT

## 2018-10-01 PROCEDURE — 36415 COLL VENOUS BLD VENIPUNCTURE: CPT

## 2018-10-01 PROCEDURE — 80053 COMPREHEN METABOLIC PANEL: CPT

## 2018-10-01 RX ORDER — GABAPENTIN 300 MG/1
300 CAPSULE ORAL 2 TIMES DAILY
Qty: 60 CAPSULE | Refills: 0 | Status: SHIPPED | OUTPATIENT
Start: 2018-10-01 | End: 2018-10-24 | Stop reason: SDUPTHER

## 2018-10-04 ENCOUNTER — HOSPITAL ENCOUNTER (OUTPATIENT)
Age: 53
Setting detail: SPECIMEN
Discharge: HOME | End: 2018-10-04
Payer: COMMERCIAL

## 2018-10-04 LAB
ALBUMIN SERPL-MCNC: 3.9 G/DL (ref 3.5–5.1)
ALP BLD-CCNC: 67 U/L (ref 38–126)
ALT SERPL-CCNC: 26 U/L (ref 11–66)
ANION GAP SERPL CALCULATED.3IONS-SCNC: 16 MEQ/L (ref 8–16)
AST SERPL-CCNC: 12 U/L (ref 5–40)
BILIRUB SERPL-MCNC: 0.3 MG/DL (ref 0.3–1.2)
BILIRUBIN DIRECT: < 0.2 MG/DL (ref 0–0.3)
BUN BLDV-MCNC: 23 MG/DL (ref 7–22)
CALCIUM SERPL-MCNC: 9.4 MG/DL (ref 8.5–10.5)
CHLORIDE BLD-SCNC: 98 MEQ/L (ref 98–111)
CO2: 25 MEQ/L (ref 23–33)
CREAT SERPL-MCNC: 0.5 MG/DL (ref 0.4–1.2)
ERYTHROCYTE [DISTWIDTH] IN BLOOD BY AUTOMATED COUNT: 13.8 % (ref 11.5–14.5)
ERYTHROCYTE [DISTWIDTH] IN BLOOD BY AUTOMATED COUNT: 51.2 FL (ref 35–45)
GFR SERPL CREATININE-BSD FRML MDRD: > 90 ML/MIN/1.73M2
GLUCOSE BLD-MCNC: 230 MG/DL (ref 70–108)
HCT VFR BLD CALC: 38.6 % (ref 37–47)
HEMOGLOBIN: 12 GM/DL (ref 12–16)
MCH RBC QN AUTO: 32 PG (ref 26–33)
MCHC RBC AUTO-ENTMCNC: 31.1 GM/DL (ref 32.2–35.5)
MCV RBC AUTO: 102.9 FL (ref 81–99)
PLATELET # BLD: 161 THOU/MM3 (ref 130–400)
PMV BLD AUTO: 10.3 FL (ref 9.4–12.4)
POTASSIUM SERPL-SCNC: 4.2 MEQ/L (ref 3.5–5.2)
RBC # BLD: 3.75 MILL/MM3 (ref 4.2–5.4)
SODIUM BLD-SCNC: 139 MEQ/L (ref 135–145)
TOTAL PROTEIN: 6.2 G/DL (ref 6.1–8)
WBC # BLD: 9.2 THOU/MM3 (ref 4.8–10.8)

## 2018-10-04 PROCEDURE — 80053 COMPREHEN METABOLIC PANEL: CPT

## 2018-10-04 PROCEDURE — 85027 COMPLETE CBC AUTOMATED: CPT

## 2018-10-04 PROCEDURE — 82248 BILIRUBIN DIRECT: CPT

## 2018-10-04 PROCEDURE — 36415 COLL VENOUS BLD VENIPUNCTURE: CPT

## 2018-10-08 ENCOUNTER — HOSPITAL ENCOUNTER (OUTPATIENT)
Age: 53
Setting detail: SPECIMEN
Discharge: HOME | End: 2018-10-08
Payer: COMMERCIAL

## 2018-10-08 DIAGNOSIS — C20 MALIGNANT NEOPLASM OF RECTUM (HCC): ICD-10-CM

## 2018-10-08 LAB
ALBUMIN SERPL-MCNC: 4.3 G/DL (ref 3.5–5.1)
ALP BLD-CCNC: 71 U/L (ref 38–126)
ALT SERPL-CCNC: 23 U/L (ref 11–66)
ANION GAP SERPL CALCULATED.3IONS-SCNC: 21 MEQ/L (ref 8–16)
AST SERPL-CCNC: 10 U/L (ref 5–40)
BASOPHILS # BLD: 0.2 %
BASOPHILS ABSOLUTE: 0 THOU/MM3 (ref 0–0.1)
BILIRUB SERPL-MCNC: 0.2 MG/DL (ref 0.3–1.2)
BILIRUBIN DIRECT: < 0.2 MG/DL (ref 0–0.3)
BUN BLDV-MCNC: 17 MG/DL (ref 7–22)
CALCIUM SERPL-MCNC: 10 MG/DL (ref 8.5–10.5)
CHLORIDE BLD-SCNC: 94 MEQ/L (ref 98–111)
CO2: 24 MEQ/L (ref 23–33)
CREAT SERPL-MCNC: 0.5 MG/DL (ref 0.4–1.2)
EOSINOPHIL # BLD: 0.1 %
EOSINOPHILS ABSOLUTE: 0 THOU/MM3 (ref 0–0.4)
ERYTHROCYTE [DISTWIDTH] IN BLOOD BY AUTOMATED COUNT: 14 % (ref 11.5–14.5)
ERYTHROCYTE [DISTWIDTH] IN BLOOD BY AUTOMATED COUNT: 51.7 FL (ref 35–45)
GFR SERPL CREATININE-BSD FRML MDRD: > 90 ML/MIN/1.73M2
GLUCOSE BLD-MCNC: 246 MG/DL (ref 70–108)
HCT VFR BLD CALC: 37.8 % (ref 37–47)
HEMOGLOBIN: 11.7 GM/DL (ref 12–16)
IMMATURE GRANS (ABS): 0.8 THOU/MM3 (ref 0–0.07)
IMMATURE GRANULOCYTES: 8 %
LYMPHOCYTES # BLD: 6.1 %
LYMPHOCYTES ABSOLUTE: 0.6 THOU/MM3 (ref 1–4.8)
MCH RBC QN AUTO: 31.5 PG (ref 26–33)
MCHC RBC AUTO-ENTMCNC: 31 GM/DL (ref 32.2–35.5)
MCV RBC AUTO: 101.9 FL (ref 81–99)
MONOCYTES # BLD: 5.7 %
MONOCYTES ABSOLUTE: 0.6 THOU/MM3 (ref 0.4–1.3)
NUCLEATED RED BLOOD CELLS: 0 /100 WBC
PLATELET # BLD: 237 THOU/MM3 (ref 130–400)
PLATELET ESTIMATE: ADEQUATE
PMV BLD AUTO: 9.8 FL (ref 9.4–12.4)
POTASSIUM SERPL-SCNC: 4 MEQ/L (ref 3.5–5.2)
RBC # BLD: 3.71 MILL/MM3 (ref 4.2–5.4)
SCAN OF BLOOD SMEAR: NORMAL
SEG NEUTROPHILS: 79.9 %
SEGMENTED NEUTROPHILS ABSOLUTE COUNT: 8.1 THOU/MM3 (ref 1.8–7.7)
SODIUM BLD-SCNC: 139 MEQ/L (ref 135–145)
TOTAL PROTEIN: 6.9 G/DL (ref 6.1–8)
WBC # BLD: 10.1 THOU/MM3 (ref 4.8–10.8)

## 2018-10-08 PROCEDURE — 85025 COMPLETE CBC W/AUTO DIFF WBC: CPT

## 2018-10-08 PROCEDURE — 80053 COMPREHEN METABOLIC PANEL: CPT

## 2018-10-08 PROCEDURE — 82248 BILIRUBIN DIRECT: CPT

## 2018-10-08 RX ORDER — METHADONE HYDROCHLORIDE 10 MG/1
20 TABLET ORAL EVERY 8 HOURS SCHEDULED
Qty: 30 TABLET | Refills: 0 | Status: SHIPPED | OUTPATIENT
Start: 2018-10-08 | End: 2018-10-08 | Stop reason: SDUPTHER

## 2018-10-08 RX ORDER — METHADONE HYDROCHLORIDE 10 MG/1
20 TABLET ORAL EVERY 8 HOURS SCHEDULED
Qty: 180 TABLET | Refills: 0 | Status: ON HOLD | OUTPATIENT
Start: 2018-10-08 | End: 2018-11-08 | Stop reason: HOSPADM

## 2018-10-18 ENCOUNTER — TELEPHONE (OUTPATIENT)
Dept: PHYSICAL MEDICINE AND REHAB | Age: 53
End: 2018-10-18

## 2018-10-24 RX ORDER — GABAPENTIN 300 MG/1
300 CAPSULE ORAL 2 TIMES DAILY
Qty: 60 CAPSULE | Refills: 0 | Status: ON HOLD | OUTPATIENT
Start: 2018-10-24 | End: 2018-11-08 | Stop reason: HOSPADM

## 2018-10-25 ENCOUNTER — HOSPITAL ENCOUNTER (OUTPATIENT)
Dept: INTERVENTIONAL RADIOLOGY/VASCULAR | Age: 53
Discharge: HOME | End: 2018-10-25
Payer: COMMERCIAL

## 2018-10-25 DIAGNOSIS — M79.89 PAIN AND SWELLING OF LEFT LOWER LEG: ICD-10-CM

## 2018-10-25 DIAGNOSIS — M79.662 PAIN AND SWELLING OF LEFT LOWER LEG: ICD-10-CM

## 2018-10-25 PROCEDURE — 93971 EXTREMITY STUDY: CPT

## 2018-10-26 ENCOUNTER — OFFICE VISIT (OUTPATIENT)
Dept: UROLOGY | Age: 53
End: 2018-10-26
Payer: COMMERCIAL

## 2018-10-26 VITALS
HEIGHT: 59 IN | BODY MASS INDEX: 24.19 KG/M2 | DIASTOLIC BLOOD PRESSURE: 62 MMHG | WEIGHT: 120 LBS | SYSTOLIC BLOOD PRESSURE: 122 MMHG

## 2018-10-26 DIAGNOSIS — N39.0 URINARY TRACT INFECTION WITHOUT HEMATURIA, SITE UNSPECIFIED: Primary | ICD-10-CM

## 2018-10-26 DIAGNOSIS — R31.0 GROSS HEMATURIA: ICD-10-CM

## 2018-10-26 LAB
BILIRUBIN URINE: ABNORMAL
BLOOD URINE, POC: ABNORMAL
CHARACTER, URINE: CLEAR
COLOR, URINE: ABNORMAL
GLUCOSE URINE: 100 MG/DL
KETONES, URINE: ABNORMAL
LEUKOCYTE CLUMPS, URINE: NEGATIVE
NITRITE, URINE: NEGATIVE
PH, URINE: 6
POST VOID RESIDUAL (PVR): 736 ML
PROTEIN, URINE: >= 300 MG/DL
SPECIFIC GRAVITY, URINE: 1.02 (ref 1–1.03)
UROBILINOGEN, URINE: 1 EU/DL

## 2018-10-26 PROCEDURE — 81003 URINALYSIS AUTO W/O SCOPE: CPT | Performed by: NURSE PRACTITIONER

## 2018-10-26 PROCEDURE — 1036F TOBACCO NON-USER: CPT | Performed by: NURSE PRACTITIONER

## 2018-10-26 PROCEDURE — G8482 FLU IMMUNIZE ORDER/ADMIN: HCPCS | Performed by: NURSE PRACTITIONER

## 2018-10-26 PROCEDURE — 99204 OFFICE O/P NEW MOD 45 MIN: CPT | Performed by: NURSE PRACTITIONER

## 2018-10-26 PROCEDURE — G8420 CALC BMI NORM PARAMETERS: HCPCS | Performed by: NURSE PRACTITIONER

## 2018-10-26 PROCEDURE — 51798 US URINE CAPACITY MEASURE: CPT | Performed by: NURSE PRACTITIONER

## 2018-10-26 PROCEDURE — G8427 DOCREV CUR MEDS BY ELIG CLIN: HCPCS | Performed by: NURSE PRACTITIONER

## 2018-10-26 PROCEDURE — 3017F COLORECTAL CA SCREEN DOC REV: CPT | Performed by: NURSE PRACTITIONER

## 2018-10-26 PROCEDURE — 1111F DSCHRG MED/CURRENT MED MERGE: CPT | Performed by: NURSE PRACTITIONER

## 2018-10-26 RX ORDER — ONDANSETRON 4 MG/1
4 TABLET, FILM COATED ORAL EVERY 8 HOURS PRN
COMMUNITY

## 2018-10-26 RX ORDER — DESIPRAMINE HYDROCHLORIDE 10 MG/1
10 TABLET ORAL NIGHTLY
Status: ON HOLD | COMMUNITY
End: 2018-11-05

## 2018-10-26 RX ORDER — HYDROMORPHONE HYDROCHLORIDE 4 MG/1
4 TABLET ORAL
COMMUNITY
End: 2018-12-05 | Stop reason: SDUPTHER

## 2018-10-26 RX ORDER — HYDROCHLOROTHIAZIDE 25 MG/1
25 TABLET ORAL DAILY
Status: ON HOLD | COMMUNITY
End: 2018-11-05

## 2018-10-26 ASSESSMENT — ENCOUNTER SYMPTOMS
RESPIRATORY NEGATIVE: 1
ABDOMINAL PAIN: 0
RECTAL PAIN: 1
EYES NEGATIVE: 1

## 2018-10-29 ENCOUNTER — HOSPITAL ENCOUNTER (INPATIENT)
Age: 53
LOS: 10 days | Discharge: NURSING, REHAB OR LONG-TERM CARE | DRG: 853 | End: 2018-11-08
Attending: EMERGENCY MEDICINE | Admitting: INTERNAL MEDICINE
Payer: COMMERCIAL

## 2018-10-29 ENCOUNTER — APPOINTMENT (OUTPATIENT)
Dept: GENERAL RADIOLOGY | Age: 53
DRG: 853 | End: 2018-10-29
Payer: COMMERCIAL

## 2018-10-29 ENCOUNTER — APPOINTMENT (OUTPATIENT)
Dept: MRI IMAGING | Age: 53
DRG: 853 | End: 2018-10-29
Payer: COMMERCIAL

## 2018-10-29 DIAGNOSIS — K63.89 COLONIC MASS: ICD-10-CM

## 2018-10-29 DIAGNOSIS — A41.9 SEVERE SEPSIS (HCC): Primary | ICD-10-CM

## 2018-10-29 DIAGNOSIS — D70.9 NEUTROPENIC FEVER (HCC): ICD-10-CM

## 2018-10-29 DIAGNOSIS — C20 MALIGNANT NEOPLASM OF RECTUM (HCC): ICD-10-CM

## 2018-10-29 DIAGNOSIS — R50.81 NEUTROPENIC FEVER (HCC): ICD-10-CM

## 2018-10-29 DIAGNOSIS — R65.20 SEVERE SEPSIS (HCC): Primary | ICD-10-CM

## 2018-10-29 LAB
ACINETOBACTER BAUMANNII FILM ARRAY: NOT DETECTED
ALBUMIN SERPL-MCNC: 2.2 G/DL (ref 3.5–5.1)
ALLEN TEST: POSITIVE
ALP BLD-CCNC: 117 U/L (ref 38–126)
ALT SERPL-CCNC: 32 U/L (ref 11–66)
ANION GAP SERPL CALCULATED.3IONS-SCNC: 18 MEQ/L (ref 8–16)
ANION GAP SERPL CALCULATED.3IONS-SCNC: 20 MEQ/L (ref 8–16)
ANISOCYTOSIS: PRESENT
AST SERPL-CCNC: 21 U/L (ref 5–40)
BACTERIA: ABNORMAL /HPF
BASE EXCESS (CALCULATED): -2.5 MMOL/L (ref -2.5–2.5)
BASOPHILS # BLD: 0 %
BASOPHILS ABSOLUTE: 0 THOU/MM3 (ref 0–0.1)
BILIRUB SERPL-MCNC: 1.1 MG/DL (ref 0.3–1.2)
BILIRUBIN DIRECT: 0.9 MG/DL (ref 0–0.3)
BILIRUBIN URINE: NEGATIVE
BLOOD, URINE: ABNORMAL
BOTTLE TYPE: ABNORMAL
BUN BLDV-MCNC: 23 MG/DL (ref 7–22)
BUN BLDV-MCNC: 30 MG/DL (ref 7–22)
CALCIUM IONIZED: 0.98 MMOL/L (ref 1.12–1.32)
CALCIUM SERPL-MCNC: 6.5 MG/DL (ref 8.5–10.5)
CALCIUM SERPL-MCNC: 8 MG/DL (ref 8.5–10.5)
CANDIDA ALBICANS FILM ARRAY: NOT DETECTED
CANDIDA GLABRATA FILM ARRAY: NOT DETECTED
CANDIDA KRUSEI FILM ARRAY: NOT DETECTED
CANDIDA PARAPSILOSIS FILM ARRAY: NOT DETECTED
CANDIDA TROPICALIS FILM ARRAY: DETECTED
CARBAPENEM RESITANT FILM ARRAY: NOT DETECTED
CASTS 2: ABNORMAL /LPF
CASTS UA: ABNORMAL /LPF
CHARACTER, URINE: ABNORMAL
CHLORIDE BLD-SCNC: 102 MEQ/L (ref 98–111)
CHLORIDE BLD-SCNC: 95 MEQ/L (ref 98–111)
CO2: 13 MEQ/L (ref 23–33)
CO2: 19 MEQ/L (ref 23–33)
COLLECTED BY:: ABNORMAL
COLOR: YELLOW
CREAT SERPL-MCNC: 0.9 MG/DL (ref 0.4–1.2)
CREAT SERPL-MCNC: 1.2 MG/DL (ref 0.4–1.2)
CRYSTALS, UA: ABNORMAL
DEVICE: ABNORMAL
DIFFERENTIAL TYPE: ABNORMAL
EKG ATRIAL RATE: 127 BPM
EKG P AXIS: 72 DEGREES
EKG P-R INTERVAL: 134 MS
EKG Q-T INTERVAL: 308 MS
EKG QRS DURATION: 70 MS
EKG QTC CALCULATION (BAZETT): 447 MS
EKG R AXIS: 27 DEGREES
EKG T AXIS: 70 DEGREES
EKG VENTRICULAR RATE: 127 BPM
ENTERBACTER CLOACAE FILM ARRAY: NOT DETECTED
ENTERBACTERIACEAE FILM ARRAY: NOT DETECTED
ENTEROCOCCUS FILM ARRAY: NOT DETECTED
EOSINOPHIL # BLD: 0 %
EOSINOPHILS ABSOLUTE: 0 THOU/MM3 (ref 0–0.4)
EPITHELIAL CELLS, UA: ABNORMAL /HPF
ERYTHROCYTE [DISTWIDTH] IN BLOOD BY AUTOMATED COUNT: 13.7 % (ref 11.5–14.5)
ERYTHROCYTE [DISTWIDTH] IN BLOOD BY AUTOMATED COUNT: 47.6 FL (ref 35–45)
ESCHERICHIA COLI FILM ARRAY: DETECTED
GFR SERPL CREATININE-BSD FRML MDRD: 57 ML/MIN/1.73M2
GFR SERPL CREATININE-BSD FRML MDRD: 79 ML/MIN/1.73M2
GLUCOSE BLD-MCNC: 255 MG/DL (ref 70–108)
GLUCOSE BLD-MCNC: 269 MG/DL (ref 70–108)
GLUCOSE BLD-MCNC: 336 MG/DL (ref 70–108)
GLUCOSE URINE: NEGATIVE MG/DL
GLUCOSE, WHOLE BLOOD: 287 MG/DL (ref 70–108)
GLUCOSE, WHOLE BLOOD: 320 MG/DL (ref 70–108)
GLUCOSE, WHOLE BLOOD: 331 MG/DL (ref 70–108)
HAEMOPHILUS INFLUENZA FILM ARRAY: NOT DETECTED
HCO3: 21 MMOL/L (ref 23–28)
HCT VFR BLD CALC: 29.9 % (ref 37–47)
HEMOGLOBIN: 10.1 GM/DL (ref 12–16)
IFIO2: 6
IMMATURE GRANS (ABS): 0.21 THOU/MM3 (ref 0–0.07)
IMMATURE GRANULOCYTES: 38.9 %
KETONES, URINE: NEGATIVE
KLEBSIELLA OXYTOCA FILM ARRAY: NOT DETECTED
KLEBSIELLA PNEUMONIAE FILM ARRAY: NOT DETECTED
LACTIC ACID, SEPSIS: 6.9 MMOL/L (ref 0.5–1.9)
LACTIC ACID: 5.8 MMOL/L (ref 0.5–2.2)
LACTIC ACID: 6.7 MMOL/L (ref 0.5–2.2)
LEUKOCYTE ESTERASE, URINE: ABNORMAL
LIPASE: 22.9 U/L (ref 5.6–51.3)
LISTERIA MONOCYTOGENES FILM ARRAY: NOT DETECTED
LYMPHOCYTES # BLD: 20.4 %
LYMPHOCYTES ABSOLUTE: 0.1 THOU/MM3 (ref 1–4.8)
MCH RBC QN AUTO: 32.1 PG (ref 26–33)
MCHC RBC AUTO-ENTMCNC: 33.8 GM/DL (ref 32.2–35.5)
MCV RBC AUTO: 94.9 FL (ref 81–99)
METHICILLIN RESISTANT FILM ARRAY: ABNORMAL
MISCELLANEOUS 2: ABNORMAL
MONOCYTES # BLD: 1.9 %
MONOCYTES ABSOLUTE: 0 THOU/MM3 (ref 0.4–1.3)
MRSA SCREEN RT-PCR: NEGATIVE
NEISSERIA MENIGITIDIS FILM ARRAY: NOT DETECTED
NITRITE, URINE: NEGATIVE
NUCLEATED RED BLOOD CELLS: 0 /100 WBC
O2 SATURATION: 95 %
OSMOLALITY CALCULATION: 280.2 MOSMOL/KG (ref 275–300)
PATHOLOGIST REVIEW: ABNORMAL
PCO2: 33 MMHG (ref 35–45)
PH BLOOD GAS: 7.42 (ref 7.35–7.45)
PH UA: 6
PLATELET # BLD: 63 THOU/MM3 (ref 130–400)
PLATELET ESTIMATE: ABNORMAL
PMV BLD AUTO: 9.1 FL (ref 9.4–12.4)
PO2: 73 MMHG (ref 71–104)
POIKILOCYTES: ABNORMAL
POTASSIUM SERPL-SCNC: 4 MEQ/L (ref 3.5–5.2)
POTASSIUM SERPL-SCNC: 4.4 MEQ/L (ref 3.5–5.2)
PROCALCITONIN: 53.27 NG/ML (ref 0.01–0.09)
PROTEIN UA: 30
PROTEUS FILM ARRAY: NOT DETECTED
PSEUDOMONAS AERUGINOSA FILM ARRAY: NOT DETECTED
RBC # BLD: 3.15 MILL/MM3 (ref 4.2–5.4)
RBC URINE: ABNORMAL /HPF
RENAL EPITHELIAL, UA: ABNORMAL
SCAN OF BLOOD SMEAR: NORMAL
SEG NEUTROPHILS: 38.8 %
SEGMENTED NEUTROPHILS ABSOLUTE COUNT: 0.2 THOU/MM3 (ref 1.8–7.7)
SERRATIA MARCESCENS FILM ARRAY: NOT DETECTED
SODIUM BLD-SCNC: 132 MEQ/L (ref 135–145)
SODIUM BLD-SCNC: 135 MEQ/L (ref 135–145)
SOURCE OF BLOOD CULTURE: ABNORMAL
SOURCE, BLOOD GAS: ABNORMAL
SPECIFIC GRAVITY, URINE: 1.01 (ref 1–1.03)
STAPH AUREUS FILM ARRAY: NOT DETECTED
STAPHYLOCOCCUS FILM ARRAY: NOT DETECTED
STREP AGALACTIAE FILM ARRAY: NOT DETECTED
STREP PNEUMONIAE FILM ARRAY: NOT DETECTED
STREP PYOCGENES FILM ARRAY: NOT DETECTED
STREPTOCOCCUS FILM ARRAY: NOT DETECTED
TOTAL PROTEIN: 3.9 G/DL (ref 6.1–8)
TSH SERPL DL<=0.05 MIU/L-ACNC: 1.12 UIU/ML (ref 0.4–4.2)
UROBILINOGEN, URINE: 0.2 EU/DL
VANCOMYCIN RESISTANT FILM ARRAY: ABNORMAL
WBC # BLD: 0.5 THOU/MM3 (ref 4.8–10.8)
WBC UA: > 200 /HPF
YEAST: ABNORMAL

## 2018-10-29 PROCEDURE — 6370000000 HC RX 637 (ALT 250 FOR IP): Performed by: INTERNAL MEDICINE

## 2018-10-29 PROCEDURE — 3609027000 HC BRONCHOSCOPY: Performed by: INTERNAL MEDICINE

## 2018-10-29 PROCEDURE — 82248 BILIRUBIN DIRECT: CPT

## 2018-10-29 PROCEDURE — 2500000003 HC RX 250 WO HCPCS: Performed by: INTERNAL MEDICINE

## 2018-10-29 PROCEDURE — 87086 URINE CULTURE/COLONY COUNT: CPT

## 2018-10-29 PROCEDURE — 36592 COLLECT BLOOD FROM PICC: CPT

## 2018-10-29 PROCEDURE — 84145 PROCALCITONIN (PCT): CPT

## 2018-10-29 PROCEDURE — 82803 BLOOD GASES ANY COMBINATION: CPT

## 2018-10-29 PROCEDURE — 6360000002 HC RX W HCPCS: Performed by: EMERGENCY MEDICINE

## 2018-10-29 PROCEDURE — 94002 VENT MGMT INPAT INIT DAY: CPT

## 2018-10-29 PROCEDURE — 2700000000 HC OXYGEN THERAPY PER DAY

## 2018-10-29 PROCEDURE — 95819 EEG AWAKE AND ASLEEP: CPT

## 2018-10-29 PROCEDURE — 2500000003 HC RX 250 WO HCPCS

## 2018-10-29 PROCEDURE — 70553 MRI BRAIN STEM W/O & W/DYE: CPT

## 2018-10-29 PROCEDURE — 31622 DX BRONCHOSCOPE/WASH: CPT | Performed by: INTERNAL MEDICINE

## 2018-10-29 PROCEDURE — 2580000003 HC RX 258: Performed by: INTERNAL MEDICINE

## 2018-10-29 PROCEDURE — 99292 CRITICAL CARE ADDL 30 MIN: CPT | Performed by: INTERNAL MEDICINE

## 2018-10-29 PROCEDURE — 87801 DETECT AGNT MULT DNA AMPLI: CPT

## 2018-10-29 PROCEDURE — 99152 MOD SED SAME PHYS/QHP 5/>YRS: CPT | Performed by: INTERNAL MEDICINE

## 2018-10-29 PROCEDURE — 71045 X-RAY EXAM CHEST 1 VIEW: CPT

## 2018-10-29 PROCEDURE — 80048 BASIC METABOLIC PNL TOTAL CA: CPT

## 2018-10-29 PROCEDURE — 6360000002 HC RX W HCPCS: Performed by: INTERNAL MEDICINE

## 2018-10-29 PROCEDURE — 87081 CULTURE SCREEN ONLY: CPT

## 2018-10-29 PROCEDURE — 2709999900 HC NON-CHARGEABLE SUPPLY: Performed by: INTERNAL MEDICINE

## 2018-10-29 PROCEDURE — 87040 BLOOD CULTURE FOR BACTERIA: CPT

## 2018-10-29 PROCEDURE — 87186 SC STD MICRODIL/AGAR DIL: CPT

## 2018-10-29 PROCEDURE — 93010 ELECTROCARDIOGRAM REPORT: CPT | Performed by: INTERNAL MEDICINE

## 2018-10-29 PROCEDURE — 87184 SC STD DISK METHOD PER PLATE: CPT

## 2018-10-29 PROCEDURE — 6360000004 HC RX CONTRAST MEDICATION: Performed by: INTERNAL MEDICINE

## 2018-10-29 PROCEDURE — 02HV33Z INSERTION OF INFUSION DEVICE INTO SUPERIOR VENA CAVA, PERCUTANEOUS APPROACH: ICD-10-PCS | Performed by: INTERNAL MEDICINE

## 2018-10-29 PROCEDURE — 87641 MR-STAPH DNA AMP PROBE: CPT

## 2018-10-29 PROCEDURE — 36415 COLL VENOUS BLD VENIPUNCTURE: CPT

## 2018-10-29 PROCEDURE — S0028 INJECTION, FAMOTIDINE, 20 MG: HCPCS | Performed by: INTERNAL MEDICINE

## 2018-10-29 PROCEDURE — 82948 REAGENT STRIP/BLOOD GLUCOSE: CPT

## 2018-10-29 PROCEDURE — 93005 ELECTROCARDIOGRAM TRACING: CPT | Performed by: EMERGENCY MEDICINE

## 2018-10-29 PROCEDURE — 2709999900 HC NON-CHARGEABLE SUPPLY

## 2018-10-29 PROCEDURE — 2000000000 HC ICU R&B

## 2018-10-29 PROCEDURE — 36600 WITHDRAWAL OF ARTERIAL BLOOD: CPT

## 2018-10-29 PROCEDURE — 2580000003 HC RX 258: Performed by: EMERGENCY MEDICINE

## 2018-10-29 PROCEDURE — 83605 ASSAY OF LACTIC ACID: CPT

## 2018-10-29 PROCEDURE — A9579 GAD-BASE MR CONTRAST NOS,1ML: HCPCS | Performed by: INTERNAL MEDICINE

## 2018-10-29 PROCEDURE — 99285 EMERGENCY DEPT VISIT HI MDM: CPT

## 2018-10-29 PROCEDURE — 82947 ASSAY GLUCOSE BLOOD QUANT: CPT

## 2018-10-29 PROCEDURE — 83690 ASSAY OF LIPASE: CPT

## 2018-10-29 PROCEDURE — 99291 CRITICAL CARE FIRST HOUR: CPT | Performed by: INTERNAL MEDICINE

## 2018-10-29 PROCEDURE — 80053 COMPREHEN METABOLIC PANEL: CPT

## 2018-10-29 PROCEDURE — 85025 COMPLETE CBC W/AUTO DIFF WBC: CPT

## 2018-10-29 PROCEDURE — 84443 ASSAY THYROID STIM HORMONE: CPT

## 2018-10-29 PROCEDURE — 51701 INSERT BLADDER CATHETER: CPT

## 2018-10-29 PROCEDURE — 6360000002 HC RX W HCPCS

## 2018-10-29 PROCEDURE — 0B9M8ZX DRAINAGE OF BILATERAL LUNGS, VIA NATURAL OR ARTIFICIAL OPENING ENDOSCOPIC, DIAGNOSTIC: ICD-10-PCS | Performed by: INTERNAL MEDICINE

## 2018-10-29 PROCEDURE — 31500 INSERT EMERGENCY AIRWAY: CPT | Performed by: INTERNAL MEDICINE

## 2018-10-29 PROCEDURE — 5A1945Z RESPIRATORY VENTILATION, 24-96 CONSECUTIVE HOURS: ICD-10-PCS | Performed by: INTERNAL MEDICINE

## 2018-10-29 PROCEDURE — 82330 ASSAY OF CALCIUM: CPT

## 2018-10-29 PROCEDURE — 87070 CULTURE OTHR SPECIMN AEROBIC: CPT

## 2018-10-29 PROCEDURE — 87205 SMEAR GRAM STAIN: CPT

## 2018-10-29 PROCEDURE — 2500000003 HC RX 250 WO HCPCS: Performed by: EMERGENCY MEDICINE

## 2018-10-29 PROCEDURE — 87077 CULTURE AEROBIC IDENTIFY: CPT

## 2018-10-29 PROCEDURE — 81001 URINALYSIS AUTO W/SCOPE: CPT

## 2018-10-29 PROCEDURE — 0BH18EZ INSERTION OF ENDOTRACHEAL AIRWAY INTO TRACHEA, VIA NATURAL OR ARTIFICIAL OPENING ENDOSCOPIC: ICD-10-PCS | Performed by: INTERNAL MEDICINE

## 2018-10-29 RX ORDER — PRAVASTATIN SODIUM 20 MG
20 TABLET ORAL DAILY
Status: DISCONTINUED | OUTPATIENT
Start: 2018-10-29 | End: 2018-11-08 | Stop reason: HOSPADM

## 2018-10-29 RX ORDER — KETAMINE HYDROCHLORIDE 50 MG/ML
50 INJECTION, SOLUTION, CONCENTRATE INTRAMUSCULAR; INTRAVENOUS ONCE
Status: DISCONTINUED | OUTPATIENT
Start: 2018-10-29 | End: 2018-10-29 | Stop reason: SDUPTHER

## 2018-10-29 RX ORDER — SODIUM CHLORIDE 9 MG/ML
INJECTION, SOLUTION INTRAVENOUS CONTINUOUS
Status: ACTIVE | OUTPATIENT
Start: 2018-10-29 | End: 2018-10-29

## 2018-10-29 RX ORDER — SODIUM CHLORIDE 9 MG/ML
INJECTION, SOLUTION INTRAVENOUS CONTINUOUS
Status: DISCONTINUED | OUTPATIENT
Start: 2018-10-29 | End: 2018-10-30

## 2018-10-29 RX ORDER — KETAMINE HCL IN NACL, ISO-OSM 100MG/10ML
50 SYRINGE (ML) INJECTION ONCE
Status: COMPLETED | OUTPATIENT
Start: 2018-10-29 | End: 2018-10-29

## 2018-10-29 RX ORDER — 0.9 % SODIUM CHLORIDE 0.9 %
30 INTRAVENOUS SOLUTION INTRAVENOUS ONCE
Status: COMPLETED | OUTPATIENT
Start: 2018-10-29 | End: 2018-10-29

## 2018-10-29 RX ORDER — THIAMINE HYDROCHLORIDE 100 MG/ML
200 INJECTION, SOLUTION INTRAMUSCULAR; INTRAVENOUS EVERY 12 HOURS
Status: DISCONTINUED | OUTPATIENT
Start: 2018-10-29 | End: 2018-10-29

## 2018-10-29 RX ORDER — DEXAMETHASONE 4 MG/1
4 TABLET ORAL 2 TIMES DAILY
Status: DISCONTINUED | OUTPATIENT
Start: 2018-10-29 | End: 2018-10-29

## 2018-10-29 RX ORDER — FLUCONAZOLE 2 MG/ML
400 INJECTION, SOLUTION INTRAVENOUS ONCE
Status: COMPLETED | OUTPATIENT
Start: 2018-10-29 | End: 2018-10-29

## 2018-10-29 RX ORDER — KETAMINE HCL IN NACL, ISO-OSM 100MG/10ML
SYRINGE (ML) INJECTION
Status: COMPLETED
Start: 2018-10-29 | End: 2018-10-29

## 2018-10-29 RX ORDER — NICOTINE POLACRILEX 4 MG
15 LOZENGE BUCCAL PRN
Status: DISCONTINUED | OUTPATIENT
Start: 2018-10-29 | End: 2018-11-03 | Stop reason: SDUPTHER

## 2018-10-29 RX ORDER — DEXAMETHASONE SODIUM PHOSPHATE 4 MG/ML
2 INJECTION, SOLUTION INTRA-ARTICULAR; INTRALESIONAL; INTRAMUSCULAR; INTRAVENOUS; SOFT TISSUE EVERY 12 HOURS
Status: COMPLETED | OUTPATIENT
Start: 2018-10-29 | End: 2018-11-01

## 2018-10-29 RX ORDER — SODIUM CHLORIDE 0.9 % (FLUSH) 0.9 %
10 SYRINGE (ML) INJECTION PRN
Status: DISCONTINUED | OUTPATIENT
Start: 2018-10-29 | End: 2018-11-08 | Stop reason: HOSPADM

## 2018-10-29 RX ORDER — LORAZEPAM 2 MG/ML
2 INJECTION INTRAMUSCULAR ONCE
Status: COMPLETED | OUTPATIENT
Start: 2018-10-29 | End: 2018-10-29

## 2018-10-29 RX ORDER — ATROPINE SULFATE 0.1 MG/ML
INJECTION INTRAVENOUS
Status: DISPENSED
Start: 2018-10-29 | End: 2018-10-29

## 2018-10-29 RX ORDER — FOSPHENYTOIN SODIUM 50 MG/ML
100 INJECTION, SOLUTION INTRAMUSCULAR; INTRAVENOUS EVERY 8 HOURS
Status: DISCONTINUED | OUTPATIENT
Start: 2018-10-29 | End: 2018-10-30

## 2018-10-29 RX ORDER — LORAZEPAM 2 MG/ML
INJECTION INTRAMUSCULAR
Status: COMPLETED
Start: 2018-10-29 | End: 2018-10-29

## 2018-10-29 RX ORDER — HYDROMORPHONE HYDROCHLORIDE 2 MG/1
4 TABLET ORAL
Status: DISCONTINUED | OUTPATIENT
Start: 2018-10-29 | End: 2018-11-08 | Stop reason: HOSPADM

## 2018-10-29 RX ORDER — DIAZEPAM 5 MG/1
5 TABLET ORAL 2 TIMES DAILY
Status: DISCONTINUED | OUTPATIENT
Start: 2018-10-29 | End: 2018-10-30

## 2018-10-29 RX ORDER — FLUCONAZOLE 2 MG/ML
400 INJECTION, SOLUTION INTRAVENOUS EVERY 24 HOURS
Status: DISCONTINUED | OUTPATIENT
Start: 2018-10-29 | End: 2018-10-30

## 2018-10-29 RX ORDER — DEXTROSE MONOHYDRATE 50 MG/ML
100 INJECTION, SOLUTION INTRAVENOUS PRN
Status: DISCONTINUED | OUTPATIENT
Start: 2018-10-29 | End: 2018-11-03 | Stop reason: SDUPTHER

## 2018-10-29 RX ORDER — ACETAMINOPHEN 500 MG
500 TABLET ORAL EVERY 6 HOURS PRN
Status: DISCONTINUED | OUTPATIENT
Start: 2018-10-29 | End: 2018-11-08 | Stop reason: HOSPADM

## 2018-10-29 RX ORDER — LEVOTHYROXINE SODIUM 0.05 MG/1
50 TABLET ORAL DAILY
Status: DISCONTINUED | OUTPATIENT
Start: 2018-10-29 | End: 2018-11-08 | Stop reason: HOSPADM

## 2018-10-29 RX ORDER — SODIUM CHLORIDE 0.9 % (FLUSH) 0.9 %
10 SYRINGE (ML) INJECTION EVERY 12 HOURS SCHEDULED
Status: DISCONTINUED | OUTPATIENT
Start: 2018-10-29 | End: 2018-11-08 | Stop reason: HOSPADM

## 2018-10-29 RX ORDER — METHADONE HYDROCHLORIDE 10 MG/1
20 TABLET ORAL EVERY 8 HOURS SCHEDULED
Status: DISCONTINUED | OUTPATIENT
Start: 2018-10-29 | End: 2018-11-08 | Stop reason: HOSPADM

## 2018-10-29 RX ORDER — DEXTROSE MONOHYDRATE 25 G/50ML
12.5 INJECTION, SOLUTION INTRAVENOUS PRN
Status: DISCONTINUED | OUTPATIENT
Start: 2018-10-29 | End: 2018-11-03 | Stop reason: SDUPTHER

## 2018-10-29 RX ORDER — SODIUM CHLORIDE 9 MG/ML
INJECTION, SOLUTION INTRAVENOUS CONTINUOUS
Status: DISCONTINUED | OUTPATIENT
Start: 2018-10-29 | End: 2018-10-29

## 2018-10-29 RX ADMIN — INSULIN HUMAN 10 UNITS: 100 INJECTION, SUSPENSION SUBCUTANEOUS at 21:40

## 2018-10-29 RX ADMIN — Medication 10 ML: at 11:34

## 2018-10-29 RX ADMIN — DIAZEPAM 5 MG: 5 TABLET ORAL at 11:32

## 2018-10-29 RX ADMIN — DEXTROSE MONOHYDRATE 1000 MG PE: 50 INJECTION, SOLUTION INTRAVENOUS at 11:29

## 2018-10-29 RX ADMIN — FAMOTIDINE 10 MG: 10 INJECTION, SOLUTION INTRAVENOUS at 11:31

## 2018-10-29 RX ADMIN — HYDROCORTISONE SODIUM SUCCINATE 50 MG: 100 INJECTION, POWDER, FOR SOLUTION INTRAMUSCULAR; INTRAVENOUS at 11:33

## 2018-10-29 RX ADMIN — DEXAMETHASONE SODIUM PHOSPHATE 2 MG: 4 INJECTION, SOLUTION INTRAMUSCULAR; INTRAVENOUS at 12:04

## 2018-10-29 RX ADMIN — FOSPHENYTOIN SODIUM 100 MG PE: 50 INJECTION, SOLUTION INTRAMUSCULAR; INTRAVENOUS at 18:00

## 2018-10-29 RX ADMIN — SODIUM BICARBONATE: 84 INJECTION, SOLUTION INTRAVENOUS at 23:56

## 2018-10-29 RX ADMIN — Medication 50 MG: at 07:53

## 2018-10-29 RX ADMIN — SODIUM CHLORIDE 1632 ML: 9 INJECTION, SOLUTION INTRAVENOUS at 04:59

## 2018-10-29 RX ADMIN — NALOXEGOL OXALATE 25 MG: 25 TABLET, FILM COATED ORAL at 12:32

## 2018-10-29 RX ADMIN — GADOTERIDOL 15 ML: 279.3 INJECTION, SOLUTION INTRAVENOUS at 14:00

## 2018-10-29 RX ADMIN — INSULIN HUMAN 10 UNITS: 100 INJECTION, SUSPENSION SUBCUTANEOUS at 12:04

## 2018-10-29 RX ADMIN — Medication 9 UNITS: at 21:39

## 2018-10-29 RX ADMIN — LORAZEPAM 2 MG: 2 INJECTION INTRAMUSCULAR; INTRAVENOUS at 10:08

## 2018-10-29 RX ADMIN — ASCORBIC ACID 1500 MG: 500 INJECTION, SOLUTION INTRAMUSCULAR; INTRAVENOUS; SUBCUTANEOUS at 23:44

## 2018-10-29 RX ADMIN — THIAMINE HYDROCHLORIDE 200 MG: 100 INJECTION, SOLUTION INTRAMUSCULAR; INTRAVENOUS at 14:11

## 2018-10-29 RX ADMIN — PIPERACILLIN SODIUM,TAZOBACTAM SODIUM 3.38 G: 3; .375 INJECTION, POWDER, FOR SOLUTION INTRAVENOUS at 21:24

## 2018-10-29 RX ADMIN — SODIUM CHLORIDE: 9 INJECTION, SOLUTION INTRAVENOUS at 06:07

## 2018-10-29 RX ADMIN — THIAMINE HYDROCHLORIDE 200 MG: 100 INJECTION, SOLUTION INTRAMUSCULAR; INTRAVENOUS at 21:24

## 2018-10-29 RX ADMIN — SODIUM CHLORIDE 1632 ML: 9 INJECTION, SOLUTION INTRAVENOUS at 04:05

## 2018-10-29 RX ADMIN — Medication 12 UNITS: at 16:57

## 2018-10-29 RX ADMIN — Medication 10 ML: at 21:38

## 2018-10-29 RX ADMIN — PRAVASTATIN SODIUM 20 MG: 20 TABLET ORAL at 12:32

## 2018-10-29 RX ADMIN — ASCORBIC ACID 1500 MG: 500 INJECTION, SOLUTION INTRAMUSCULAR; INTRAVENOUS; SUBCUTANEOUS at 18:00

## 2018-10-29 RX ADMIN — Medication 10 MCG/MIN: at 05:00

## 2018-10-29 RX ADMIN — DIAZEPAM 5 MG: 5 TABLET ORAL at 21:25

## 2018-10-29 RX ADMIN — VASOPRESSIN 0.04 UNITS/MIN: 20 INJECTION INTRAVENOUS at 08:07

## 2018-10-29 RX ADMIN — VANCOMYCIN HYDROCHLORIDE 750 MG: 1 INJECTION, POWDER, LYOPHILIZED, FOR SOLUTION INTRAVENOUS at 06:24

## 2018-10-29 RX ADMIN — DEXMEDETOMIDINE 0.2 MCG/KG/HR: 100 INJECTION, SOLUTION, CONCENTRATE INTRAVENOUS at 23:51

## 2018-10-29 RX ADMIN — LEVOTHYROXINE SODIUM 50 MCG: 50 TABLET ORAL at 12:32

## 2018-10-29 RX ADMIN — FAMOTIDINE 10 MG: 10 INJECTION, SOLUTION INTRAVENOUS at 21:24

## 2018-10-29 RX ADMIN — PIPERACILLIN SODIUM,TAZOBACTAM SODIUM 4.5 G: 4; .5 INJECTION, POWDER, FOR SOLUTION INTRAVENOUS at 05:17

## 2018-10-29 RX ADMIN — SODIUM CHLORIDE 1719 ML: 9 INJECTION, SOLUTION INTRAVENOUS at 09:11

## 2018-10-29 RX ADMIN — DEXMEDETOMIDINE 0.6 MCG/KG/HR: 100 INJECTION, SOLUTION, CONCENTRATE INTRAVENOUS at 08:30

## 2018-10-29 RX ADMIN — ASCORBIC ACID 1500 MG: 500 INJECTION, SOLUTION INTRAMUSCULAR; INTRAVENOUS; SUBCUTANEOUS at 11:44

## 2018-10-29 RX ADMIN — HYDROMORPHONE HYDROCHLORIDE 0.5 MG: 1 INJECTION, SOLUTION INTRAMUSCULAR; INTRAVENOUS; SUBCUTANEOUS at 05:03

## 2018-10-29 RX ADMIN — LORAZEPAM 2 MG: 2 INJECTION INTRAMUSCULAR at 10:08

## 2018-10-29 RX ADMIN — FLUCONAZOLE 400 MG: 2 INJECTION, SOLUTION INTRAVENOUS at 21:24

## 2018-10-29 RX ADMIN — FLUCONAZOLE 400 MG: 400 INJECTION, SOLUTION INTRAVENOUS at 10:59

## 2018-10-29 RX ADMIN — PIPERACILLIN SODIUM,TAZOBACTAM SODIUM 3.38 G: 3; .375 INJECTION, POWDER, FOR SOLUTION INTRAVENOUS at 14:11

## 2018-10-29 RX ADMIN — SODIUM CHLORIDE: 9 INJECTION, SOLUTION INTRAVENOUS at 15:41

## 2018-10-29 RX ADMIN — Medication 18 MCG/MIN: at 17:09

## 2018-10-29 RX ADMIN — ENOXAPARIN SODIUM 60 MG: 60 INJECTION SUBCUTANEOUS at 21:24

## 2018-10-29 ASSESSMENT — PAIN DESCRIPTION - ORIENTATION: ORIENTATION: LEFT

## 2018-10-29 ASSESSMENT — PULMONARY FUNCTION TESTS
PIF_VALUE: 24
PIF_VALUE: 31
PIF_VALUE: 21
PIF_VALUE: 17

## 2018-10-29 ASSESSMENT — PAIN DESCRIPTION - LOCATION: LOCATION: BACK;LEG

## 2018-10-29 ASSESSMENT — ENCOUNTER SYMPTOMS
DIARRHEA: 0
VOMITING: 0

## 2018-10-29 ASSESSMENT — PAIN SCALES - GENERAL
PAINLEVEL_OUTOF10: 0
PAINLEVEL_OUTOF10: 8
PAINLEVEL_OUTOF10: 7

## 2018-10-29 ASSESSMENT — PAIN DESCRIPTION - PAIN TYPE: TYPE: ACUTE PAIN

## 2018-10-29 NOTE — H&P
- CNP   metoprolol succinate (TOPROL XL) 50 MG extended release tablet Take 1 tablet by mouth daily  Patient taking differently: Take 100 mg by mouth daily  3/23/18   Avni Owusu MD   levothyroxine (SYNTHROID) 50 MCG tablet Take 50 mcg by mouth Daily    Historical Provider, MD   promethazine (PHENERGAN) 25 MG tablet Take 25 mg by mouth every 6 hours as needed for Nausea    Historical Provider, MD   pravastatin (PRAVACHOL) 20 MG tablet Take 20 mg by mouth daily    Historical Provider, MD   docusate sodium (COLACE) 100 MG capsule Take 1 capsule by mouth 2 times daily 2/1/18   JOSE Neil CNP   lidocaine (XYLOCAINE) 5 % ointment Apply topically as needed. 2/1/18   JOSE Neil CNP   sodium chloride 1 g tablet Take 1 g by mouth daily    Historical Provider, MD   ibuprofen (ADVIL;MOTRIN) 600 MG tablet Take 1 tablet by mouth every 6 hours as needed for Pain 10/4/17   JOSE Ang CNP   metFORMIN (GLUCOPHAGE) 500 MG tablet Take 500 mg by mouth 2 times daily (with meals)    Historical Provider, MD   prochlorperazine (COMPAZINE) 10 MG tablet Take 10 mg by mouth every 6 hours as needed. Historical Provider, MD       Allergies:  Patient has no known allergies. Social History:   TOBACCO:   reports that she has never smoked. She has never used smokeless tobacco.  ETOH:   reports that she does not drink alcohol.       Family History:   Family History   Problem Relation Age of Onset    Diabetes Mother     High Blood Pressure Mother     High Blood Pressure Father     Cancer Father         small intestine       REVIEW OF SYSTEMS:  Review of systems not obtained due to patient factors - intubation and mental status    Physical Exam:    Vitals: BP 92/63   Pulse 117   Temp 98.4 °F (36.9 °C) (Core)   Resp 16   Ht 5' (1.524 m)   Wt 126 lb 5.2 oz (57.3 kg)   LMP 05/01/2011   SpO2 100%   BMI 24.67 kg/m²   CONSTITUTIONAL:  Ill looking, sedated on vent  EYES:  pupils equal, round and

## 2018-10-29 NOTE — PROCEDURES
BRONCHOSCOPY    Indication: Septic shock with hypoxemia    Sedation: Sedated on mechanical ventilator      EBL:  None. Findings:   Pitting airways disease bilaterally.  Scant mucus within the airway.  No mucus plugging or obvious pneumonia.  No endobronchial lesion. Samples:   Bronchial washings    Complications:  None    Procedure:  Procedure emergent. Patient received sedation as detailed above. Utilizing the endotracheal tube, the bronchoscope was advanced to the level of the trachea and subsequently the rex. The rex was noted to be crisp. Scope was taken to the left and right lung fields. Samples taken as noted above. Bronchoscope was withdrawn. Electronically signed by Trevor Murphy M.D.

## 2018-10-29 NOTE — CONSULTS
Assessment and Plan:        1. Acute respiratory failure: Patient intubated 10/29/18 for airway protection. She has intermittent seizure activity with loss of consciousness and upward and rightward gaze. No tonic or clonic seizure activity. Lung protection strategies. 2. Recurrent seizure activity: Proceed with MRI brain with and without contrast.  With neutropenia, and recurrent infections, must be concerned regarding brain abscess. With history of metastatic rectal carcinoma, must be concerned regarding possible metastatic disease. Initiate fosphenytoin. Initiate Valium. Request EEG. Patient anticoagulated for DVT. Intracranial bleed not excluded, but clinically less likely. 3. Septic shock: Requiring pressors. Patient was under volume resuscitated. Undergoing 2 L saline bolus over 2 hours. Clinically responding. Previously on norepinephrine and vasopressin. Etiology is urinary tract infection. Suspect there will be a fungal component therefore Diflucan was added. Initiate Marik protocol. Already on stress doses of steroids. Unable to check for adrenal insufficiency as patient has already received hydrocortisone. Transition to Decadron at low doses for 3 days. 4. Urinary tract infection: May represent fungal infection. On Diflucan. On Zosyn. Discontinue vancomycin as urinary tract infection unlikely to represent MRSA. 5. Rectal abscess: Previously on Cipro for pseudomonas aeruginosa. Has been present since before January 2018. Awaiting repeat CT scan abdomen and pelvis. 6. Left lower extremity DVT: Clearly has asymmetry to the lower extremities. Awaiting results of MRI before initiating anticoagulation. 7. Pancytopenia: Severe neutropenia. Cover for fungal infection with Diflucan for now. May need to more aggressively treat with lipophilic amphotericin. Awaiting results of urine culture. Trending. Anemia will be monitored. Thrombocytopenia also monitored.   Suspect secondary to chemotherapy. 8. Rectal carcinoma: Metastatic adenocarcinoma of the rectum. Very detailed history as documented below. Awaiting results of MRI to determine if brain metastases have occurred. 9. Lactic acidosis: Undergoing volume resuscitation. 10. Diabetes mellitus: Sliding scale insulin. Subcutaneous insulin. Worsened by systemic steroids. 11. Hyperlipidemia: Statin therapy. 12. Hypothyroidism: Obtain TSH.  13. Hypotension: Secondary to septic shock. Undergoing volume resuscitation. On stress dose steroids. Decrease stress dose steroids. Transition to echo drawn from hydrocortisone. On norepinephrine and vasopressin. 14. Hyponatremia: Mild. Trending. 15. Hypertension: Historical.  Currently hypotensive. CC:  Sepsis with neutropenia  HPI: Patient is a 78-year-old black female lifetime nonsmoker. She has a history of type 2 diabetes mellitus, hyperlipidemia, hypothyroidism, and hypertension. Patient has a history of adenocarcinoma of the rectum that was diagnosed as stage III disease in 2012. She underwent surgical intervention with adjuvant chemoradiation therapy in June 2012. She developed local recurrence around the anastomosis in 2014 and underwent low anterior resection and intraoperative radiation 10/21/14. Surgical margins were positive and she received chemotherapy with 6 cycles Folfiri. Subsequent colonoscopy completed in June 2017 showed ongoing tumor involvement in the rectum. CT scan completed June 2017 showed sacral soft tissue mass contiguous with previous surgical site. PET scan showed increased metabolic activity. She was no longer a surgical candidate and underwent treatment with Xelox/Avastin in August 2017. Treatment concluded October 2017. She developed an abscess and was taken to the operating room on 10/30/17. She has required multiple debridements since that time including January 2018 and February 2018.   She has multiple bouts of Pseudomonas infections including urinary tract infection. She appears to have received ciprofloxacin for chronic suppressive therapy. Given persistent abscess formation, patient has persistent ileostomy. .  ROS: Sedated. PMH:  Per HPI  SHX:  Lifetime nonsmoker. FHX: Hypertension and diabetes. Allergies: No known drug allergies  Medications:     sodium chloride 150 mL/hr at 10/29/18 0843    norepinephrine 17 mcg/min (10/29/18 0930)    vasopressin infusion Stopped (10/29/18 0840)    dexmedetomidine 0.7 mcg/kg/hr (10/29/18 0900)    sodium chloride        levothyroxine  50 mcg Oral Daily    methadone  20 mg Oral 3 times per day    naloxegol  25 mg Oral QAM    pravastatin  20 mg Oral Daily    sodium chloride flush  10 mL Intravenous 2 times per day    hydrocortisone sodium succinate PF  50 mg Intravenous Q8H    piperacillin-tazobactam  3.375 g Intravenous Q8H    ketamine        ketamine  50 mg Intravenous Once    fluconazole  400 mg Intravenous Q24H       HYDROmorphone 0.5 mg Q3H PRN   acetaminophen 500 mg Q6H PRN   HYDROmorphone 4 mg Q3H PRN   sodium chloride flush 10 mL PRN     Vital Signs: T: 99 P: 132 RR: 16 B/P: 101/73: FiO2: 80%: O2 Sat: 99%: I/O: Pending. General:   Acutely ill-appearing black female. HEENT:  normocephalic and atraumatic. No scleral icterus. Neck: supple. No JVD. Lungs: clear to auscultation. No retractions  Cardiac: Sinus tachycardia. Abdomen: soft. Nontender. Extremities:  No clubbing, cyanosis x 4. Left lower extremity edema 1+. No right lower extremity edema. Vasculature: capillary refill < 3 seconds. Skin:  warm and dry. Psych: At the time of acute intervention, the patient was unresponsive. Her eyes were open with a gaze rightward and upward. Post intubation, patient is awake and cooperative on mechanical ventilator. Lymph:  No supraclavicular adenopathy. Neurologic:  No focal deficit. .    Data: (All radiographs, tracings, PFTs, and imaging are personally viewed and interpreted unless otherwise noted).  Chest x-rays post bronchoscopy. Right middle lobe atelectasis. Left hilar infiltrate/fullness.  Telemetry shows sinus tachycardia.  MRI brain pending.  Sodium 132, potassium 4, chloride 95, bicarb 19, BUN 30, creatinine 1.2, glucose 269. Lactic acid 5.8. Anion gap 18. Lipase 22.9. White blood cell count 0.5, hemoglobin 10.1, platelets 63. Urinalysis shows greater than 200 white blood cells. Moderate amount of yeast within the urine. CC time 80 minutes. Time was discontiguous and does not include procedures. Electronically signed by Shawn Liu M.D.

## 2018-10-29 NOTE — PLAN OF CARE
Problem: Bowel/Gastric:  Goal: Complications related to the disease process, condition or treatment will be avoided or minimized  Outcome: Ongoing  Alerted through Iodine, that the patient had a colostomy. In room to assess colostomy pouching system. Patient currently intubated. Patient noted to be wearing a one piece isaiah pouching system. Patients pouching system, is noted to be full of brown stool. Removed the pouching sytem. Cleansed and dried the stoma. The stoma appears to be a double barrel stoma. The stoma is beefy red, moist and protrudes. The salvador stomal skin is noted to be intact. The stoma measures approximately 2 1/2 oval. Applied 2 piece isaiah flat pouching system with paste. Will continue to follow. Left extra supplies in room. Patients BP noted 68/50. Updated RN on floor. RN in room to assess patient.

## 2018-10-29 NOTE — ED PROVIDER NOTES
axes: 72, 27, 70  Sinus tachycardia   Right atrial enlargement   Marked St abnormality, possible inferior subendocardial   No STEMI. RADIOLOGY: non-plain film images(s) such as CT, Ultrasound and MRI are read by the radiologist.    XR CHEST PORTABLE   Final Result   Decreasing lung volumes with bibasilar atelectasis. Mild infiltrate cannot excluded. **This report has been created using voice recognition software. It may contain minor errors which are inherent in voice recognition technology. **      Final report electronically signed by Dr. Walter Sandoval on 10/29/2018 4:57 AM          [x] Visualized and interpreted by me   [x] Radiologist's Wet Read Report Reviewed   [] Discussed with Radiologist.    Amanda Guzmán:   Results for orders placed or performed during the hospital encounter of 10/29/18   CBC auto differential   Result Value Ref Range    WBC 0.5 (LL) 4.8 - 10.8 thou/mm3    RBC 3.15 (L) 4.20 - 5.40 mill/mm3    Hemoglobin 10.1 (L) 12.0 - 16.0 gm/dl    Hematocrit 29.9 (L) 37.0 - 47.0 %    MCV 94.9 81.0 - 99.0 fL    MCH 32.1 26.0 - 33.0 pg    MCHC 33.8 32.2 - 35.5 gm/dl    RDW-CV 13.7 11.5 - 14.5 %    RDW-SD 47.6 (H) 35.0 - 45.0 fL    Platelets 63 (L) 780 - 400 thou/mm3    Differential Type see below     Platelet Estimate DECREASED Adequate   Basic Metabolic Panel   Result Value Ref Range    Sodium 132 (L) 135 - 145 meq/L    Potassium 4.0 3.5 - 5.2 meq/L    Chloride 95 (L) 98 - 111 meq/L    CO2 19 (L) 23 - 33 meq/L    Glucose 269 (H) 70 - 108 mg/dL    BUN 30 (H) 7 - 22 mg/dL    CREATININE 1.2 0.4 - 1.2 mg/dL    Calcium 8.0 (L) 8.5 - 10.5 mg/dL   Lactic acid, plasma   Result Value Ref Range    Lactic Acid 5.8 (H) 0.5 - 2.2 mmol/L   Lipase   Result Value Ref Range    Lipase 22.9 5.6 - 51.3 U/L   Blood Gas, Arterial   Result Value Ref Range    pH, Blood Gas 7.42 7.35 - 7.45    PCO2 33 (L) 35 - 45 mmhg    PO2 73 71 - 104 mmhg    HCO3 21 (L) 23 - 28 mmol/l    Base Excess (Calculated) -2.5 -2.5 - 2.5 mmol/l Temp: 99.6 °F (37.6 °C) 99.2 °F (37.3 °C)     TempSrc: Core Core     SpO2: 100% 98%  98%   Weight:           Orders Placed This Encounter   Medications    0.9 % sodium chloride bolus    0.9 % sodium chloride infusion    vancomycin (VANCOCIN) 750 mg in dextrose 5 % 250 mL IVPB    piperacillin-tazobactam (ZOSYN) 4.5 g in dextrose 5 % 100 mL IVPB (mini-bag)    HYDROmorphone (DILAUDID) injection 0.5 mg    0.9 % sodium chloride IV bolus 1,632 mL    norepinephrine (LEVOPHED) 16 mg in dextrose 5% 250 mL infusion    HYDROmorphone (DILAUDID) injection 0.5 mg       Patient was seen and evaluated by me at bedside for an altered mental status after being unresponsive with her family today. The patient has stage four colon cancer and has recently been through chemotherapy. She also has a history of sepsis and neutropenia. History and physical exam were obtained which resulted in a sickly and ill appearing patient. She has a mediport noted in her right anterior chest and she had a noted GCS of 13. Patient also arrived here with abnormal vitals. Differential diagnosis is severe sepsis secondary to urinary tract infection versus pneumonia with altered mental status and hypotension. Rule out lactic acidosis. Patient possible neutropenic sepsis. I ordered an ECG which showed some sinus tachycardia and a CXR resulted in decreasing lung volumes with bibasilar atelectasis. Mild infiltrate cannot excluded. A urine catheter was able to be placed and her urinalysis showed blood, protein, and leukocytes present. Her WBC count was abnormally low at 0.5 but this was determined to be from her recent chemotherapy treatment. The rest of her CBC's were mildly low. She had a high lactic acid of 5.8 mmol/L as well. The patient was started on IV fluids and zosyn because of the history of neutropenic sepsis.  I then gave the patient vancomycin, more IV fluids, levophed, and dilaudid which seemed to improve her blood pressure to

## 2018-10-29 NOTE — ED NOTES
Pts BP is now hypotensive. Pt remains tachy, 130s. Provider notified. Fluids open.       Nolan Steinberg RN  10/29/18 [id number]

## 2018-10-30 ENCOUNTER — TELEPHONE (OUTPATIENT)
Dept: UROLOGY | Age: 53
End: 2018-10-30

## 2018-10-30 ENCOUNTER — APPOINTMENT (OUTPATIENT)
Dept: INTERVENTIONAL RADIOLOGY/VASCULAR | Age: 53
DRG: 853 | End: 2018-10-30
Payer: COMMERCIAL

## 2018-10-30 ENCOUNTER — APPOINTMENT (OUTPATIENT)
Dept: CT IMAGING | Age: 53
DRG: 853 | End: 2018-10-30
Payer: COMMERCIAL

## 2018-10-30 LAB
ANION GAP SERPL CALCULATED.3IONS-SCNC: 16 MEQ/L (ref 8–16)
BASOPHILS # BLD: 0.7 %
BASOPHILS ABSOLUTE: 0 THOU/MM3 (ref 0–0.1)
BUN BLDV-MCNC: 20 MG/DL (ref 7–22)
CALCIUM IONIZED: 1.05 MMOL/L (ref 1.12–1.32)
CALCIUM SERPL-MCNC: 7.2 MG/DL (ref 8.5–10.5)
CHLORIDE BLD-SCNC: 106 MEQ/L (ref 98–111)
CO2: 16 MEQ/L (ref 23–33)
CREAT SERPL-MCNC: 0.9 MG/DL (ref 0.4–1.2)
DOHLE BODIES: PRESENT
EOSINOPHIL # BLD: 0 %
EOSINOPHILS ABSOLUTE: 0 THOU/MM3 (ref 0–0.4)
ERYTHROCYTE [DISTWIDTH] IN BLOOD BY AUTOMATED COUNT: 14.6 % (ref 11.5–14.5)
ERYTHROCYTE [DISTWIDTH] IN BLOOD BY AUTOMATED COUNT: 50.9 FL (ref 35–45)
FDP: NORMAL MCG/ML (ref 2.1–10)
FIBRINOGEN: 584 MG/100ML (ref 155–475)
GFR SERPL CREATININE-BSD FRML MDRD: 79 ML/MIN/1.73M2
GLUCOSE BLD-MCNC: 243 MG/DL (ref 70–108)
GLUCOSE, WHOLE BLOOD: 209 MG/DL (ref 70–108)
GLUCOSE, WHOLE BLOOD: 221 MG/DL (ref 70–108)
GLUCOSE, WHOLE BLOOD: 222 MG/DL (ref 70–108)
GLUCOSE, WHOLE BLOOD: 227 MG/DL (ref 70–108)
GLUCOSE, WHOLE BLOOD: 232 MG/DL (ref 70–108)
GLUCOSE, WHOLE BLOOD: 233 MG/DL (ref 70–108)
HCT VFR BLD CALC: 29.7 % (ref 37–47)
HEMOGLOBIN: 10 GM/DL (ref 12–16)
IMMATURE GRANS (ABS): 0.16 THOU/MM3 (ref 0–0.07)
IMMATURE GRANULOCYTES: 5.3 %
LACTIC ACID: 5.9 MMOL/L (ref 0.5–2.2)
LACTIC ACID: 6 MMOL/L (ref 0.5–2.2)
LYMPHOCYTES # BLD: 13.2 %
LYMPHOCYTES ABSOLUTE: 0.4 THOU/MM3 (ref 1–4.8)
MCH RBC QN AUTO: 32.1 PG (ref 26–33)
MCHC RBC AUTO-ENTMCNC: 33.7 GM/DL (ref 32.2–35.5)
MCV RBC AUTO: 95.2 FL (ref 81–99)
MONOCYTES # BLD: 6.6 %
MONOCYTES ABSOLUTE: 0.2 THOU/MM3 (ref 0.4–1.3)
NUCLEATED RED BLOOD CELLS: 0 /100 WBC
PHENYTOIN LEVEL: 13.4 UG/ML (ref 6–18)
PLATELET # BLD: 23 THOU/MM3 (ref 130–400)
PLATELET ESTIMATE: ABNORMAL
PMV BLD AUTO: 9.2 FL (ref 9.4–12.4)
POTASSIUM REFLEX MAGNESIUM: 4 MEQ/L (ref 3.5–5.2)
RBC # BLD: 3.12 MILL/MM3 (ref 4.2–5.4)
SCAN OF BLOOD SMEAR: NORMAL
SEG NEUTROPHILS: 74.2 %
SEGMENTED NEUTROPHILS ABSOLUTE COUNT: 2.2 THOU/MM3 (ref 1.8–7.7)
SODIUM BLD-SCNC: 138 MEQ/L (ref 135–145)
TOXIC GRANULATION: PRESENT
WBC # BLD: 3 THOU/MM3 (ref 4.8–10.8)

## 2018-10-30 PROCEDURE — 82947 ASSAY GLUCOSE BLOOD QUANT: CPT

## 2018-10-30 PROCEDURE — 2700000000 HC OXYGEN THERAPY PER DAY

## 2018-10-30 PROCEDURE — 86850 RBC ANTIBODY SCREEN: CPT

## 2018-10-30 PROCEDURE — 93971 EXTREMITY STUDY: CPT

## 2018-10-30 PROCEDURE — 85025 COMPLETE CBC W/AUTO DIFF WBC: CPT

## 2018-10-30 PROCEDURE — G8987 SELF CARE CURRENT STATUS: HCPCS

## 2018-10-30 PROCEDURE — 36592 COLLECT BLOOD FROM PICC: CPT

## 2018-10-30 PROCEDURE — G8988 SELF CARE GOAL STATUS: HCPCS

## 2018-10-30 PROCEDURE — P9040 RBC LEUKOREDUCED IRRADIATED: HCPCS

## 2018-10-30 PROCEDURE — 86901 BLOOD TYPING SEROLOGIC RH(D): CPT

## 2018-10-30 PROCEDURE — 6360000002 HC RX W HCPCS: Performed by: INTERNAL MEDICINE

## 2018-10-30 PROCEDURE — 2580000003 HC RX 258: Performed by: INTERNAL MEDICINE

## 2018-10-30 PROCEDURE — 97163 PT EVAL HIGH COMPLEX 45 MIN: CPT

## 2018-10-30 PROCEDURE — 82330 ASSAY OF CALCIUM: CPT

## 2018-10-30 PROCEDURE — 85362 FIBRIN DEGRADATION PRODUCTS: CPT

## 2018-10-30 PROCEDURE — 94003 VENT MGMT INPAT SUBQ DAY: CPT

## 2018-10-30 PROCEDURE — 36415 COLL VENOUS BLD VENIPUNCTURE: CPT

## 2018-10-30 PROCEDURE — G8978 MOBILITY CURRENT STATUS: HCPCS

## 2018-10-30 PROCEDURE — 94799 UNLISTED PULMONARY SVC/PX: CPT

## 2018-10-30 PROCEDURE — 86022 PLATELET ANTIBODIES: CPT

## 2018-10-30 PROCEDURE — C9113 INJ PANTOPRAZOLE SODIUM, VIA: HCPCS | Performed by: INTERNAL MEDICINE

## 2018-10-30 PROCEDURE — 2709999900 HC NON-CHARGEABLE SUPPLY

## 2018-10-30 PROCEDURE — 83605 ASSAY OF LACTIC ACID: CPT

## 2018-10-30 PROCEDURE — 99292 CRITICAL CARE ADDL 30 MIN: CPT | Performed by: INTERNAL MEDICINE

## 2018-10-30 PROCEDURE — 86923 COMPATIBILITY TEST ELECTRIC: CPT

## 2018-10-30 PROCEDURE — 74176 CT ABD & PELVIS W/O CONTRAST: CPT

## 2018-10-30 PROCEDURE — 2500000003 HC RX 250 WO HCPCS: Performed by: INTERNAL MEDICINE

## 2018-10-30 PROCEDURE — 6370000000 HC RX 637 (ALT 250 FOR IP): Performed by: INTERNAL MEDICINE

## 2018-10-30 PROCEDURE — 86900 BLOOD TYPING SEROLOGIC ABO: CPT

## 2018-10-30 PROCEDURE — 85385 FIBRINOGEN ANTIGEN: CPT

## 2018-10-30 PROCEDURE — 80185 ASSAY OF PHENYTOIN TOTAL: CPT

## 2018-10-30 PROCEDURE — 97167 OT EVAL HIGH COMPLEX 60 MIN: CPT

## 2018-10-30 PROCEDURE — S0028 INJECTION, FAMOTIDINE, 20 MG: HCPCS | Performed by: INTERNAL MEDICINE

## 2018-10-30 PROCEDURE — 99291 CRITICAL CARE FIRST HOUR: CPT | Performed by: INTERNAL MEDICINE

## 2018-10-30 PROCEDURE — 2000000000 HC ICU R&B

## 2018-10-30 PROCEDURE — 80048 BASIC METABOLIC PNL TOTAL CA: CPT

## 2018-10-30 PROCEDURE — G8979 MOBILITY GOAL STATUS: HCPCS

## 2018-10-30 RX ORDER — FOSPHENYTOIN SODIUM 50 MG/ML
100 INJECTION, SOLUTION INTRAMUSCULAR; INTRAVENOUS EVERY 8 HOURS
Status: DISCONTINUED | OUTPATIENT
Start: 2018-10-31 | End: 2018-10-31

## 2018-10-30 RX ORDER — FONDAPARINUX SODIUM 2.5 MG/.5ML
5 INJECTION SUBCUTANEOUS DAILY
Status: DISCONTINUED | OUTPATIENT
Start: 2018-10-30 | End: 2018-10-30

## 2018-10-30 RX ORDER — PANTOPRAZOLE SODIUM 40 MG/10ML
40 INJECTION, POWDER, LYOPHILIZED, FOR SOLUTION INTRAVENOUS DAILY
Status: DISCONTINUED | OUTPATIENT
Start: 2018-10-30 | End: 2018-11-04

## 2018-10-30 RX ORDER — ACETAMINOPHEN 160 MG/5ML
500 SUSPENSION, ORAL (FINAL DOSE FORM) ORAL EVERY 6 HOURS PRN
Status: DISCONTINUED | OUTPATIENT
Start: 2018-10-30 | End: 2018-10-30 | Stop reason: CLARIF

## 2018-10-30 RX ORDER — FONDAPARINUX SODIUM 7.5 MG/.6ML
7.5 INJECTION SUBCUTANEOUS EVERY 24 HOURS
Status: DISCONTINUED | OUTPATIENT
Start: 2018-10-30 | End: 2018-11-01

## 2018-10-30 RX ORDER — DEXMEDETOMIDINE HYDROCHLORIDE 4 UG/ML
0.2 INJECTION, SOLUTION INTRAVENOUS CONTINUOUS
Status: DISCONTINUED | OUTPATIENT
Start: 2018-10-30 | End: 2018-11-01 | Stop reason: CLARIF

## 2018-10-30 RX ORDER — ACETAMINOPHEN 160 MG/5ML
500 SOLUTION ORAL EVERY 6 HOURS PRN
Status: DISCONTINUED | OUTPATIENT
Start: 2018-10-30 | End: 2018-11-02

## 2018-10-30 RX ADMIN — FOSPHENYTOIN SODIUM 100 MG PE: 50 INJECTION, SOLUTION INTRAMUSCULAR; INTRAVENOUS at 01:36

## 2018-10-30 RX ADMIN — Medication 6 UNITS: at 00:16

## 2018-10-30 RX ADMIN — Medication 5 MCG/MIN: at 15:33

## 2018-10-30 RX ADMIN — CALCIUM GLUCONATE 1 G: 98 INJECTION, SOLUTION INTRAVENOUS at 01:32

## 2018-10-30 RX ADMIN — DARBEPOETIN ALFA 60 MCG: 60 INJECTION, SOLUTION INTRAVENOUS; SUBCUTANEOUS at 22:44

## 2018-10-30 RX ADMIN — PIPERACILLIN SODIUM,TAZOBACTAM SODIUM 3.38 G: 3; .375 INJECTION, POWDER, FOR SOLUTION INTRAVENOUS at 05:09

## 2018-10-30 RX ADMIN — ASCORBIC ACID 1500 MG: 500 INJECTION, SOLUTION INTRAMUSCULAR; INTRAVENOUS; SUBCUTANEOUS at 05:10

## 2018-10-30 RX ADMIN — FOSPHENYTOIN SODIUM 100 MG PE: 50 INJECTION, SOLUTION INTRAMUSCULAR; INTRAVENOUS at 11:36

## 2018-10-30 RX ADMIN — INSULIN HUMAN 10 UNITS: 100 INJECTION, SUSPENSION SUBCUTANEOUS at 09:05

## 2018-10-30 RX ADMIN — Medication 6 UNITS: at 05:10

## 2018-10-30 RX ADMIN — HYDROMORPHONE HYDROCHLORIDE 0.5 MG: 1 INJECTION, SOLUTION INTRAMUSCULAR; INTRAVENOUS; SUBCUTANEOUS at 17:06

## 2018-10-30 RX ADMIN — DEXAMETHASONE SODIUM PHOSPHATE 2 MG: 4 INJECTION, SOLUTION INTRAMUSCULAR; INTRAVENOUS at 00:07

## 2018-10-30 RX ADMIN — SODIUM BICARBONATE: 84 INJECTION, SOLUTION INTRAVENOUS at 08:50

## 2018-10-30 RX ADMIN — THIAMINE HYDROCHLORIDE 200 MG: 100 INJECTION, SOLUTION INTRAMUSCULAR; INTRAVENOUS at 21:18

## 2018-10-30 RX ADMIN — Medication 6 UNITS: at 14:11

## 2018-10-30 RX ADMIN — FAMOTIDINE 10 MG: 10 INJECTION, SOLUTION INTRAVENOUS at 09:03

## 2018-10-30 RX ADMIN — ASCORBIC ACID 1500 MG: 500 INJECTION, SOLUTION INTRAMUSCULAR; INTRAVENOUS; SUBCUTANEOUS at 11:48

## 2018-10-30 RX ADMIN — Medication 10 ML: at 09:04

## 2018-10-30 RX ADMIN — ACETAMINOPHEN 500 MG: 500 TABLET, FILM COATED ORAL at 08:56

## 2018-10-30 RX ADMIN — MICAFUNGIN SODIUM 100 MG: 20 INJECTION, POWDER, LYOPHILIZED, FOR SOLUTION INTRAVENOUS at 12:30

## 2018-10-30 RX ADMIN — FONDAPARINUX SODIUM 7.5 MG: 7.5 INJECTION SUBCUTANEOUS at 17:48

## 2018-10-30 RX ADMIN — Medication 8 UNITS: at 16:58

## 2018-10-30 RX ADMIN — PIPERACILLIN SODIUM,TAZOBACTAM SODIUM 3.38 G: 3; .375 INJECTION, POWDER, FOR SOLUTION INTRAVENOUS at 15:33

## 2018-10-30 RX ADMIN — SODIUM BICARBONATE: 84 INJECTION, SOLUTION INTRAVENOUS at 16:38

## 2018-10-30 RX ADMIN — MEROPENEM 1 G: 1 INJECTION, POWDER, FOR SOLUTION INTRAVENOUS at 17:45

## 2018-10-30 RX ADMIN — Medication 6 UNITS: at 09:06

## 2018-10-30 RX ADMIN — Medication 10 ML: at 21:22

## 2018-10-30 RX ADMIN — FLUCONAZOLE 400 MG: 400 INJECTION, SOLUTION INTRAVENOUS at 09:31

## 2018-10-30 RX ADMIN — PANTOPRAZOLE SODIUM 40 MG: 40 INJECTION, POWDER, FOR SOLUTION INTRAVENOUS at 17:48

## 2018-10-30 RX ADMIN — IRON SUCROSE 100 MG: 20 INJECTION, SOLUTION INTRAVENOUS at 21:18

## 2018-10-30 RX ADMIN — ASCORBIC ACID 1500 MG: 500 INJECTION, SOLUTION INTRAMUSCULAR; INTRAVENOUS; SUBCUTANEOUS at 16:35

## 2018-10-30 RX ADMIN — THIAMINE HYDROCHLORIDE 200 MG: 100 INJECTION, SOLUTION INTRAMUSCULAR; INTRAVENOUS at 08:50

## 2018-10-30 RX ADMIN — PRAVASTATIN SODIUM 20 MG: 20 TABLET ORAL at 09:27

## 2018-10-30 RX ADMIN — DEXAMETHASONE SODIUM PHOSPHATE 2 MG: 4 INJECTION, SOLUTION INTRAMUSCULAR; INTRAVENOUS at 11:39

## 2018-10-30 RX ADMIN — LEVOTHYROXINE SODIUM 50 MCG: 50 TABLET ORAL at 05:18

## 2018-10-30 RX ADMIN — DIAZEPAM 5 MG: 5 TABLET ORAL at 09:03

## 2018-10-30 RX ADMIN — Medication 8 UNITS: at 21:19

## 2018-10-30 ASSESSMENT — PAIN SCALES - GENERAL
PAINLEVEL_OUTOF10: 0
PAINLEVEL_OUTOF10: 0
PAINLEVEL_OUTOF10: 9
PAINLEVEL_OUTOF10: 0
PAINLEVEL_OUTOF10: 0

## 2018-10-30 ASSESSMENT — PULMONARY FUNCTION TESTS
PIF_VALUE: 19
PIF_VALUE: 22
PIF_VALUE: 17

## 2018-10-30 NOTE — PROGRESS NOTES
Critical Care Progress Note    Patient:  Abeba Lang      Unit/Bed:4D-06/006-A    YOB: 1965    MRN: [id number]       Acct: [de-identified]     PCP: Nikia Batres MD    Date of Admission: 10/29/2018    Chief Complaint: Hypotension     Hospital Course: The patient is a 47 y.o. female who presents with altered awareness since 10/29. She reportedly was found moaning and poorly responsive, transported to ER. ER evaluation showed her to have altered awareness and was septic, ER initiated copious IV hydration and IV antibiotics. , admitted to ICU bed. In ICU this 10/29, she desaturated, thus intubated emergently, she was bronched. 10/30 She continues on levophed, IVF and IV antibiotics. MRI on 10/29 was negative, anticoagulation was started for DVT. 10/30 CT abd/pelvis pending     Ms Miguelito Ariraga has a h/o colorectal cancer s/p AP resection with a colostomy. She has metastasis to the lower L/S spine. She commenced another round of chemotherapy last week with Dr Cherelle Johnson.       Subjective:  No pain, alert now that sedation is off.        Medications:  Reviewed    Infusion Medications    norepinephrine 5 mcg/min (10/30/18 1413)    vasopressin infusion Stopped (10/29/18 1229)    dexmedetomidine Stopped (10/30/18 0846)    dextrose      sodium bicarbonate infusion 125 mL/hr at 10/30/18 0850     Scheduled Medications    insulin NPH  14 Units Subcutaneous BID    micafungin  100 mg Intravenous Daily    [START ON 10/31/2018] fosphenytoin  100 mg PE Intravenous Q8H    levothyroxine  50 mcg Oral Daily    methadone  20 mg Oral 3 times per day    naloxegol  25 mg Oral QAM    pravastatin  20 mg Oral Daily    sodium chloride flush  10 mL Intravenous 2 times per day    piperacillin-tazobactam  3.375 g Intravenous Q8H    diazepam  5 mg Oral BID    famotidine (PEPCID) injection  10 mg Intravenous BID    ascorbic acid  1,500 mg Intravenous Q6H    thiamine (VITAMIN B1) IVPB  200 mg Intravenous BID    dexamethasone  2 95*  102  106   CO2  19*  13*  16*   BUN  30*  23*  20   CREATININE  1.2  0.9  0.9   CALCIUM  8.0*  6.5*  7.2*     Recent Labs      10/29/18   1750   AST  21   ALT  32   BILIDIR  0.9*   BILITOT  1.1   ALKPHOS  117     No results for input(s): INR in the last 72 hours. No results for input(s): Hugo Ojeda in the last 72 hours. Urinalysis:    Lab Results   Component Value Date    NITRU NEGATIVE 10/29/2018    WBCUA > 200 10/29/2018    BACTERIA MODERATE 10/29/2018    RBCUA 25-50 10/29/2018    BLOODU LARGE 10/29/2018    SPECGRAV 1.020 08/06/2016    GLUCOSEU NEGATIVE 10/29/2018       Radiology:  VL DUP LOWER EXTREMITY VENOUS LEFT   Final Result   Extensive relatively acute deep venous thrombosis extending from the left calf to the left groin. There appears be slightly greater amount of thrombus in the left common femoral vein at this time compared to prior study. **This report has been created using voice recognition software. It may contain minor errors which are inherent in voice recognition technology. **      Final report electronically signed by Dr. Humberto Saul on 10/30/2018 12:36 PM      MRI BRAIN W WO CONTRAST   Final Result   No mass or abnormal enhancement. No gross seizure focus. **This report has been created using voice recognition software. It may contain minor errors which are inherent in voice recognition technology. **         Final report electronically signed by Dr. Syd Arzola on 10/29/2018 2:41 PM      XR CHEST PORTABLE   Final Result   1. Bilateral perihilar interstitial infiltrates are demonstrated, slightly worse on the left. This may represent mild pulmonary edema versus pneumonia. **This report has been created using voice recognition software. It may contain minor errors which are inherent in voice recognition technology. **      Final report electronically signed by Dr. Jatin No on 10/29/2018 9:34 AM      XR CHEST PORTABLE   Final Result

## 2018-10-30 NOTE — PROGRESS NOTES
Assisted with early mobility . Pt sat on side of bed for 4 mins tolerated well.  No adverse reactions noted post treatment

## 2018-10-30 NOTE — PROGRESS NOTES
Intake Less Than Needs    Nutrition Risk Level: High    Nutrition Interventions:   Start Tube Feeding  Continued Inpatient Monitoring, Education Not Indicated, Coordination of Care    Nutrition Evaluation:   · Evaluation: Goals set   · Goals: TF to provide % on nutrient need while pt is intubated. · Monitoring: TF Intake, TF Tolerance, Skin Integrity, Wound Healing, Ascites/Edema, Weight, Pertinent Labs    See Adult Nutrition Doc Flowsheet for more detail.      Electronically signed by Connie Dove RD, RICHARD on 10/30/18 at 2:14 PM    Contact Number: [phone]

## 2018-10-30 NOTE — PROGRESS NOTES
encephalopathy and poor responsiveness at home. She was transported to the emergency room where she was found to have sepsis. She was started on Levothroid and IV fluids. Cultures subsequently grew E. coli in the blood in addition to Candida tropicalis in the blood. She initially was treated with Zosyn and Diflucan and subsequently converted to meropenem and micafungin on 10/30/18 with the Christian of positive cultures. She developed seizure activity and was intubated 10/29/18. She had an associated lactic acidosis despite aggressive hydration. MRI brain with and without contrast showed no acute abnormality and no evidence of brain metastasis. Patient was started on fosphenytoin and Valium for seizure activity. For further details, please review the assessment and plan. ROS:  awake and following commands on mechanical ventilator. No chest pain. No shortness of breath. No fever. PMH:  Per HPI  SHX:  Lifetime nonsmoker. FHX: Hypertension and diabetes.   Allergies: No known drug allergies  Medications:     norepinephrine 5 mcg/min (10/30/18 1533)    vasopressin infusion Stopped (10/29/18 1229)    dexmedetomidine Stopped (10/30/18 0846)    dextrose      sodium bicarbonate infusion 150 mL/hr at 10/30/18 1638      micafungin  100 mg Intravenous Daily    [START ON 10/31/2018] fosphenytoin  100 mg PE Intravenous Q8H    insulin NPH  16 Units Subcutaneous BID    fondaparinux  5 mg Subcutaneous Daily    meropenem  1 g Intravenous Q8H    pantoprazole  40 mg Intravenous Daily    levothyroxine  50 mcg Oral Daily    methadone  20 mg Oral 3 times per day    naloxegol  25 mg Oral QAM    pravastatin  20 mg Oral Daily    sodium chloride flush  10 mL Intravenous 2 times per day    diazepam  5 mg Oral BID    ascorbic acid  1,500 mg Intravenous Q6H    thiamine (VITAMIN B1) IVPB  200 mg Intravenous BID    dexamethasone  2 mg Intravenous Q12H    insulin lispro  0-18 Units Subcutaneous Q4H    calcium replacement protocol   Other RX Placeholder       acetaminophen 500 mg Q6H PRN   HYDROmorphone 0.5 mg Q3H PRN   acetaminophen 500 mg Q6H PRN   HYDROmorphone 4 mg Q3H PRN   sodium chloride flush 10 mL PRN   glucose 15 g PRN   dextrose 12.5 g PRN   glucagon (rDNA) 1 mg PRN   dextrose 100 mL/hr PRN     Vital Signs: T: 98.6: P: 117: RR: 17: B/P: 122/91: FiO2: 30%: O2 Sat: 100%: I/O: 4510/9838. General:   Acutely ill-appearing black female. HEENT:  normocephalic and atraumatic. No scleral icterus. Neck: supple. No JVD. Lungs: clear to auscultation. No retractions  Cardiac: Sinus tachycardia. Abdomen: soft. Nontender. Extremities:  No clubbing, cyanosis x 4. Left lower extremity edema 1+. No right lower extremity edema. Vasculature: capillary refill < 3 seconds. Skin:  Moist.  Pale. Psych:  Arousable on mechanical ventilator. Following commands. Lymph:  No supraclavicular adenopathy. Neurologic:  No focal deficit. No seizure activity.     Data: (All radiographs, tracings, PFTs, and imaging are personally viewed and interpreted unless otherwise noted). · 2/2 blood cultures positive for E.coli, and Candida tropicalis. · Sodium 138, potassium 4, chloride 106, bicarb 16, BUN 20, creatinine 0.9, glucose 221. Lactic acid 5.9. Albumin 2.2. Dilantin 13.4. White blood cell count 3, hemoglobin 10, platelets 23. · CT scan abdomen and pelvis shows basilar consolidation versus atelectasis bilaterally. Gallbladder wall thickening. Presacral fluid collection with decreased gas production area pathologic fractures to bilateral anterior sacral alar.     CC time 80 minutes. Time was discontiguous and does not include procedures. Electronically signed by Roque Alcocer M.D.

## 2018-10-30 NOTE — PROGRESS NOTES
Clinical Pharmacy Note      Phenytoin Consult    Albumin: 2.2 (10/29/2018)  Phenytoin level: 13.4  Correct Phenytoin level: 24.81     Current fosphenytoin Dose: 100 PE IV q8hr  Indication for phenytoin: seizures    Assessment/Plan:  Will hold tonight's dose of fosphenytoin and continue 100 PE IV q8hr tomorrow. Patient was on diclucan x1 dose 10/29/18 which may have interacted with fosphenytoin. Will plan on rechecking level 11/1/2018. No level ordered at this time. Will continue to monitor.      Leah Love, PharmD, [facility]  10/30/2018 12:30 PM

## 2018-10-31 ENCOUNTER — APPOINTMENT (OUTPATIENT)
Dept: GENERAL RADIOLOGY | Age: 53
DRG: 853 | End: 2018-10-31
Payer: COMMERCIAL

## 2018-10-31 ENCOUNTER — APPOINTMENT (OUTPATIENT)
Dept: ULTRASOUND IMAGING | Age: 53
DRG: 853 | End: 2018-10-31
Payer: COMMERCIAL

## 2018-10-31 LAB
ABO: NORMAL
ANION GAP SERPL CALCULATED.3IONS-SCNC: 13 MEQ/L (ref 8–16)
ANION GAP SERPL CALCULATED.3IONS-SCNC: 16 MEQ/L (ref 8–16)
ANTIBODY SCREEN: NORMAL
APTT: 37 SECONDS (ref 22–38)
BASOPHILS # BLD: 0 %
BASOPHILS # BLD: 0 %
BASOPHILS ABSOLUTE: 0 THOU/MM3 (ref 0–0.1)
BASOPHILS ABSOLUTE: 0 THOU/MM3 (ref 0–0.1)
BLOOD CULTURE, ROUTINE: ABNORMAL
BUN BLDV-MCNC: 11 MG/DL (ref 7–22)
BUN BLDV-MCNC: 17 MG/DL (ref 7–22)
CALCIUM IONIZED: 0.94 MMOL/L (ref 1.12–1.32)
CALCIUM IONIZED: 0.97 MMOL/L (ref 1.12–1.32)
CALCIUM SERPL-MCNC: 6.6 MG/DL (ref 8.5–10.5)
CALCIUM SERPL-MCNC: 6.7 MG/DL (ref 8.5–10.5)
CHLORIDE BLD-SCNC: 102 MEQ/L (ref 98–111)
CHLORIDE BLD-SCNC: 103 MEQ/L (ref 98–111)
CO2: 19 MEQ/L (ref 23–33)
CO2: 24 MEQ/L (ref 23–33)
CREAT SERPL-MCNC: 0.5 MG/DL (ref 0.4–1.2)
CREAT SERPL-MCNC: < 0.2 MG/DL (ref 0.4–1.2)
DOHLE BODIES: PRESENT
EOSINOPHIL # BLD: 0 %
EOSINOPHIL # BLD: 0 %
EOSINOPHILS ABSOLUTE: 0 THOU/MM3 (ref 0–0.4)
EOSINOPHILS ABSOLUTE: 0 THOU/MM3 (ref 0–0.4)
ERYTHROCYTE [DISTWIDTH] IN BLOOD BY AUTOMATED COUNT: 14.6 % (ref 11.5–14.5)
ERYTHROCYTE [DISTWIDTH] IN BLOOD BY AUTOMATED COUNT: 14.8 % (ref 11.5–14.5)
ERYTHROCYTE [DISTWIDTH] IN BLOOD BY AUTOMATED COUNT: 15.7 % (ref 11.5–14.5)
ERYTHROCYTE [DISTWIDTH] IN BLOOD BY AUTOMATED COUNT: 48.8 FL (ref 35–45)
ERYTHROCYTE [DISTWIDTH] IN BLOOD BY AUTOMATED COUNT: 50.2 FL (ref 35–45)
ERYTHROCYTE [DISTWIDTH] IN BLOOD BY AUTOMATED COUNT: 52.3 FL (ref 35–45)
GAMMA GLUTAMYL TRANSFERASE: 200 U/L (ref 8–69)
GFR SERPL CREATININE-BSD FRML MDRD: > 90 ML/MIN/1.73M2
GFR SERPL CREATININE-BSD FRML MDRD: > 90 ML/MIN/1.73M2
GLUCOSE BLD-MCNC: 105 MG/DL (ref 70–108)
GLUCOSE BLD-MCNC: 143 MG/DL (ref 70–108)
GLUCOSE BLD-MCNC: 158 MG/DL (ref 70–108)
GLUCOSE BLD-MCNC: 159 MG/DL (ref 70–108)
GLUCOSE BLD-MCNC: 159 MG/DL (ref 70–108)
GLUCOSE BLD-MCNC: 191 MG/DL (ref 70–108)
GLUCOSE BLD-MCNC: 78 MG/DL (ref 70–108)
GLUCOSE, WHOLE BLOOD: 198 MG/DL (ref 70–108)
GRAM STAIN RESULT: NORMAL
HCT VFR BLD CALC: 19 % (ref 37–47)
HCT VFR BLD CALC: 26.5 % (ref 37–47)
HCT VFR BLD CALC: 39.5 % (ref 37–47)
HEMOGLOBIN: 13.8 GM/DL (ref 12–16)
HEMOGLOBIN: 6.5 GM/DL (ref 12–16)
HEMOGLOBIN: 8.7 GM/DL (ref 12–16)
IMMATURE GRANS (ABS): 0.02 THOU/MM3 (ref 0–0.07)
IMMATURE GRANULOCYTES: 1.3 %
INR BLD: 1.78 (ref 0.85–1.13)
LACTIC ACID: 3.5 MMOL/L (ref 0.5–2.2)
LYMPHOCYTES # BLD: 14 %
LYMPHOCYTES # BLD: 24.2 %
LYMPHOCYTES ABSOLUTE: 0.4 THOU/MM3 (ref 1–4.8)
LYMPHOCYTES ABSOLUTE: 0.4 THOU/MM3 (ref 1–4.8)
MAGNESIUM: 1 MG/DL (ref 1.6–2.4)
MAGNESIUM: 1.9 MG/DL (ref 1.6–2.4)
MCH RBC QN AUTO: 30.8 PG (ref 26–33)
MCH RBC QN AUTO: 31.9 PG (ref 26–33)
MCH RBC QN AUTO: 31.9 PG (ref 26–33)
MCHC RBC AUTO-ENTMCNC: 32.8 GM/DL (ref 32.2–35.5)
MCHC RBC AUTO-ENTMCNC: 34.2 GM/DL (ref 32.2–35.5)
MCHC RBC AUTO-ENTMCNC: 34.9 GM/DL (ref 32.2–35.5)
MCV RBC AUTO: 88.2 FL (ref 81–99)
MCV RBC AUTO: 93.1 FL (ref 81–99)
MCV RBC AUTO: 97.1 FL (ref 81–99)
MONOCYTES # BLD: 3.3 %
MONOCYTES # BLD: 7 %
MONOCYTES ABSOLUTE: 0 THOU/MM3 (ref 0.4–1.3)
MONOCYTES ABSOLUTE: 0.2 THOU/MM3 (ref 0.4–1.3)
MRSA SCREEN: NORMAL
NUCLEATED RED BLOOD CELLS: 0 /100 WBC
NUCLEATED RED BLOOD CELLS: 0 /100 WBC
ORGANISM: ABNORMAL
ORGANISM: ABNORMAL
PATHOLOGIST REVIEW: ABNORMAL
PHENYTOIN LEVEL: 13.6 UG/ML (ref 6–18)
PLATELET # BLD: 10 THOU/MM3 (ref 130–400)
PLATELET # BLD: 14 THOU/MM3 (ref 130–400)
PLATELET # BLD: 15 THOU/MM3 (ref 130–400)
PLATELET ESTIMATE: ABNORMAL
PMV BLD AUTO: 11.3 FL (ref 9.4–12.4)
PMV BLD AUTO: 9.4 FL (ref 9.4–12.4)
PMV BLD AUTO: 9.9 FL (ref 9.4–12.4)
POTASSIUM REFLEX MAGNESIUM: 2.6 MEQ/L (ref 3.5–5.2)
POTASSIUM SERPL-SCNC: 4.3 MEQ/L (ref 3.5–5.2)
RBC # BLD: 2.04 MILL/MM3 (ref 4.2–5.4)
RBC # BLD: 2.73 MILL/MM3 (ref 4.2–5.4)
RBC # BLD: 4.48 MILL/MM3 (ref 4.2–5.4)
RESPIRATORY CULTURE: NORMAL
RH FACTOR: NORMAL
SEG NEUTROPHILS: 71.2 %
SEG NEUTROPHILS: 79 %
SEGMENTED NEUTROPHILS ABSOLUTE COUNT: 1.1 THOU/MM3 (ref 1.8–7.7)
SEGMENTED NEUTROPHILS ABSOLUTE COUNT: 2.1 THOU/MM3 (ref 1.8–7.7)
SODIUM BLD-SCNC: 138 MEQ/L (ref 135–145)
SODIUM BLD-SCNC: 139 MEQ/L (ref 135–145)
TOXIC GRANULATION: PRESENT
WBC # BLD: 1.5 THOU/MM3 (ref 4.8–10.8)
WBC # BLD: 2 THOU/MM3 (ref 4.8–10.8)
WBC # BLD: 2.6 THOU/MM3 (ref 4.8–10.8)

## 2018-10-31 PROCEDURE — 94003 VENT MGMT INPAT SUBQ DAY: CPT

## 2018-10-31 PROCEDURE — 2709999900 HC NON-CHARGEABLE SUPPLY

## 2018-10-31 PROCEDURE — 80185 ASSAY OF PHENYTOIN TOTAL: CPT

## 2018-10-31 PROCEDURE — 82330 ASSAY OF CALCIUM: CPT

## 2018-10-31 PROCEDURE — 36430 TRANSFUSION BLD/BLD COMPNT: CPT

## 2018-10-31 PROCEDURE — 85027 COMPLETE CBC AUTOMATED: CPT

## 2018-10-31 PROCEDURE — C1751 CATH, INF, PER/CENT/MIDLINE: HCPCS

## 2018-10-31 PROCEDURE — 36592 COLLECT BLOOD FROM PICC: CPT

## 2018-10-31 PROCEDURE — C9113 INJ PANTOPRAZOLE SODIUM, VIA: HCPCS | Performed by: INTERNAL MEDICINE

## 2018-10-31 PROCEDURE — 82947 ASSAY GLUCOSE BLOOD QUANT: CPT

## 2018-10-31 PROCEDURE — 80048 BASIC METABOLIC PNL TOTAL CA: CPT

## 2018-10-31 PROCEDURE — 83605 ASSAY OF LACTIC ACID: CPT

## 2018-10-31 PROCEDURE — 85730 THROMBOPLASTIN TIME PARTIAL: CPT

## 2018-10-31 PROCEDURE — 36556 INSERT NON-TUNNEL CV CATH: CPT | Performed by: INTERNAL MEDICINE

## 2018-10-31 PROCEDURE — 71045 X-RAY EXAM CHEST 1 VIEW: CPT

## 2018-10-31 PROCEDURE — 2580000003 HC RX 258: Performed by: INTERNAL MEDICINE

## 2018-10-31 PROCEDURE — 6360000002 HC RX W HCPCS: Performed by: INTERNAL MEDICINE

## 2018-10-31 PROCEDURE — 2700000000 HC OXYGEN THERAPY PER DAY

## 2018-10-31 PROCEDURE — 6370000000 HC RX 637 (ALT 250 FOR IP): Performed by: INTERNAL MEDICINE

## 2018-10-31 PROCEDURE — 83735 ASSAY OF MAGNESIUM: CPT

## 2018-10-31 PROCEDURE — 76705 ECHO EXAM OF ABDOMEN: CPT

## 2018-10-31 PROCEDURE — 82977 ASSAY OF GGT: CPT

## 2018-10-31 PROCEDURE — 99291 CRITICAL CARE FIRST HOUR: CPT | Performed by: INTERNAL MEDICINE

## 2018-10-31 PROCEDURE — 2000000000 HC ICU R&B

## 2018-10-31 PROCEDURE — 2500000003 HC RX 250 WO HCPCS: Performed by: INTERNAL MEDICINE

## 2018-10-31 PROCEDURE — 99253 IP/OBS CNSLTJ NEW/EST LOW 45: CPT | Performed by: PHYSICIAN ASSISTANT

## 2018-10-31 PROCEDURE — 85025 COMPLETE CBC W/AUTO DIFF WBC: CPT

## 2018-10-31 PROCEDURE — 85610 PROTHROMBIN TIME: CPT

## 2018-10-31 PROCEDURE — 82948 REAGENT STRIP/BLOOD GLUCOSE: CPT

## 2018-10-31 PROCEDURE — 36415 COLL VENOUS BLD VENIPUNCTURE: CPT

## 2018-10-31 RX ORDER — LEVETIRACETAM 500 MG/1
500 TABLET ORAL 2 TIMES DAILY
Status: DISCONTINUED | OUTPATIENT
Start: 2018-10-31 | End: 2018-10-31

## 2018-10-31 RX ORDER — POTASSIUM CHLORIDE 29.8 MG/ML
40 INJECTION INTRAVENOUS ONCE
Status: DISCONTINUED | OUTPATIENT
Start: 2018-10-31 | End: 2018-10-31 | Stop reason: SDUPTHER

## 2018-10-31 RX ORDER — LEVETIRACETAM 100 MG/ML
500 SOLUTION ORAL 2 TIMES DAILY
Status: DISCONTINUED | OUTPATIENT
Start: 2018-10-31 | End: 2018-11-01 | Stop reason: ALTCHOICE

## 2018-10-31 RX ORDER — MAGNESIUM SULFATE IN WATER 40 MG/ML
2 INJECTION, SOLUTION INTRAVENOUS ONCE
Status: COMPLETED | OUTPATIENT
Start: 2018-10-31 | End: 2018-10-31

## 2018-10-31 RX ORDER — POTASSIUM CHLORIDE 29.8 MG/ML
20 INJECTION INTRAVENOUS
Status: COMPLETED | OUTPATIENT
Start: 2018-10-31 | End: 2018-10-31

## 2018-10-31 RX ORDER — POTASSIUM CHLORIDE 20MEQ/15ML
20 LIQUID (ML) ORAL DAILY
Status: DISCONTINUED | OUTPATIENT
Start: 2018-10-31 | End: 2018-10-31 | Stop reason: CLARIF

## 2018-10-31 RX ORDER — SODIUM CHLORIDE 9 MG/ML
INJECTION, SOLUTION INTRAVENOUS CONTINUOUS
Status: DISCONTINUED | OUTPATIENT
Start: 2018-10-31 | End: 2018-11-01

## 2018-10-31 RX ADMIN — ASCORBIC ACID 1500 MG: 500 INJECTION, SOLUTION INTRAMUSCULAR; INTRAVENOUS; SUBCUTANEOUS at 18:08

## 2018-10-31 RX ADMIN — POTASSIUM & SODIUM PHOSPHATES POWDER PACK 280-160-250 MG 250 MG: 280-160-250 PACK at 09:37

## 2018-10-31 RX ADMIN — CALCIUM GLUCONATE 1 G: 98 INJECTION, SOLUTION INTRAVENOUS at 09:21

## 2018-10-31 RX ADMIN — MEROPENEM 1 G: 1 INJECTION, POWDER, FOR SOLUTION INTRAVENOUS at 18:13

## 2018-10-31 RX ADMIN — POTASSIUM CHLORIDE 20 MEQ: 29.8 INJECTION, SOLUTION INTRAVENOUS at 10:09

## 2018-10-31 RX ADMIN — POTASSIUM & SODIUM PHOSPHATES POWDER PACK 280-160-250 MG 250 MG: 280-160-250 PACK at 13:30

## 2018-10-31 RX ADMIN — THIAMINE HYDROCHLORIDE 200 MG: 100 INJECTION, SOLUTION INTRAMUSCULAR; INTRAVENOUS at 21:18

## 2018-10-31 RX ADMIN — DEXAMETHASONE SODIUM PHOSPHATE 2 MG: 4 INJECTION, SOLUTION INTRAMUSCULAR; INTRAVENOUS at 00:22

## 2018-10-31 RX ADMIN — FONDAPARINUX SODIUM 7.5 MG: 7.5 INJECTION SUBCUTANEOUS at 21:35

## 2018-10-31 RX ADMIN — PANTOPRAZOLE SODIUM 40 MG: 40 INJECTION, POWDER, FOR SOLUTION INTRAVENOUS at 09:38

## 2018-10-31 RX ADMIN — Medication 4 UNITS: at 04:41

## 2018-10-31 RX ADMIN — Medication 10 ML: at 09:38

## 2018-10-31 RX ADMIN — Medication 4 UNITS: at 00:30

## 2018-10-31 RX ADMIN — DEXAMETHASONE SODIUM PHOSPHATE 2 MG: 4 INJECTION, SOLUTION INTRAMUSCULAR; INTRAVENOUS at 12:12

## 2018-10-31 RX ADMIN — ASCORBIC ACID 1500 MG: 500 INJECTION, SOLUTION INTRAMUSCULAR; INTRAVENOUS; SUBCUTANEOUS at 06:18

## 2018-10-31 RX ADMIN — LEVETIRACETAM 500 MG: 500 TABLET, FILM COATED ORAL at 09:34

## 2018-10-31 RX ADMIN — MAGNESIUM SULFATE HEPTAHYDRATE 2 G: 40 INJECTION, SOLUTION INTRAVENOUS at 10:12

## 2018-10-31 RX ADMIN — Medication 4 UNITS: at 18:44

## 2018-10-31 RX ADMIN — SODIUM BICARBONATE: 84 INJECTION, SOLUTION INTRAVENOUS at 00:28

## 2018-10-31 RX ADMIN — POTASSIUM & SODIUM PHOSPHATES POWDER PACK 280-160-250 MG 250 MG: 280-160-250 PACK at 21:18

## 2018-10-31 RX ADMIN — DEXMEDETOMIDINE HYDROCHLORIDE 0.7 MCG/KG/HR: 4 INJECTION, SOLUTION INTRAVENOUS at 21:37

## 2018-10-31 RX ADMIN — SODIUM BICARBONATE: 84 INJECTION, SOLUTION INTRAVENOUS at 08:49

## 2018-10-31 RX ADMIN — SODIUM CHLORIDE: 9 INJECTION, SOLUTION INTRAVENOUS at 09:19

## 2018-10-31 RX ADMIN — HYDROMORPHONE HYDROCHLORIDE 0.5 MG: 1 INJECTION, SOLUTION INTRAMUSCULAR; INTRAVENOUS; SUBCUTANEOUS at 14:53

## 2018-10-31 RX ADMIN — ASCORBIC ACID 1500 MG: 500 INJECTION, SOLUTION INTRAMUSCULAR; INTRAVENOUS; SUBCUTANEOUS at 00:21

## 2018-10-31 RX ADMIN — ACETAMINOPHEN 500 MG: 500 TABLET, FILM COATED ORAL at 10:02

## 2018-10-31 RX ADMIN — Medication 10 ML: at 21:19

## 2018-10-31 RX ADMIN — THIAMINE HYDROCHLORIDE 200 MG: 100 INJECTION, SOLUTION INTRAMUSCULAR; INTRAVENOUS at 11:47

## 2018-10-31 RX ADMIN — METHADONE HYDROCHLORIDE 20 MG: 10 TABLET ORAL at 21:45

## 2018-10-31 RX ADMIN — PRAVASTATIN SODIUM 20 MG: 20 TABLET ORAL at 09:48

## 2018-10-31 RX ADMIN — LEVOTHYROXINE SODIUM 50 MCG: 50 TABLET ORAL at 09:34

## 2018-10-31 RX ADMIN — IRON SUCROSE 100 MG: 20 INJECTION, SOLUTION INTRAVENOUS at 21:18

## 2018-10-31 RX ADMIN — NALOXEGOL OXALATE 25 MG: 25 TABLET, FILM COATED ORAL at 09:37

## 2018-10-31 RX ADMIN — MEROPENEM 1 G: 1 INJECTION, POWDER, FOR SOLUTION INTRAVENOUS at 02:05

## 2018-10-31 RX ADMIN — POTASSIUM CHLORIDE 20 MEQ: 29.8 INJECTION, SOLUTION INTRAVENOUS at 09:21

## 2018-10-31 RX ADMIN — MEROPENEM 1 G: 1 INJECTION, POWDER, FOR SOLUTION INTRAVENOUS at 11:00

## 2018-10-31 RX ADMIN — POTASSIUM & SODIUM PHOSPHATES POWDER PACK 280-160-250 MG 250 MG: 280-160-250 PACK at 18:47

## 2018-10-31 RX ADMIN — MICAFUNGIN SODIUM 100 MG: 20 INJECTION, POWDER, LYOPHILIZED, FOR SOLUTION INTRAVENOUS at 18:24

## 2018-10-31 RX ADMIN — FOSPHENYTOIN SODIUM 100 MG PE: 50 INJECTION, SOLUTION INTRAMUSCULAR; INTRAVENOUS at 02:05

## 2018-10-31 RX ADMIN — DEXMEDETOMIDINE HYDROCHLORIDE 0.4 MCG/KG/HR: 4 INJECTION, SOLUTION INTRAVENOUS at 13:16

## 2018-10-31 RX ADMIN — LEVETIRACETAM 500 MG: 500 SOLUTION ORAL at 22:27

## 2018-10-31 RX ADMIN — POTASSIUM BICARBONATE 20 MEQ: 782 TABLET, EFFERVESCENT ORAL at 10:02

## 2018-10-31 RX ADMIN — Medication 4 UNITS: at 20:47

## 2018-10-31 RX ADMIN — METHADONE HYDROCHLORIDE 20 MG: 10 TABLET ORAL at 13:06

## 2018-10-31 ASSESSMENT — PULMONARY FUNCTION TESTS
PIF_VALUE: 23
PIF_VALUE: 20
PIF_VALUE: 22
PIF_VALUE: 20

## 2018-10-31 ASSESSMENT — PAIN SCALES - GENERAL
PAINLEVEL_OUTOF10: 6
PAINLEVEL_OUTOF10: 0
PAINLEVEL_OUTOF10: 5
PAINLEVEL_OUTOF10: 8

## 2018-10-31 ASSESSMENT — PAIN DESCRIPTION - LOCATION: LOCATION: OTHER (COMMENT)

## 2018-10-31 ASSESSMENT — PAIN DESCRIPTION - PAIN TYPE
TYPE: OTHER (COMMENT)
TYPE: OTHER (COMMENT)

## 2018-10-31 ASSESSMENT — PAIN DESCRIPTION - DESCRIPTORS
DESCRIPTORS: OTHER (COMMENT)
DESCRIPTORS: OTHER (COMMENT)

## 2018-10-31 NOTE — PROGRESS NOTES
Assessment and Plan:        1. Acute respiratory failure: Patient intubated 10/29/18 for airway protection.  Lung protection strategies. Patient did well with spontaneous breathing trial on 10/30/18. Extubation was delayed as she has airway edema identified by no air leak with deflation of cuff. 2. Recurrent seizure activity:  MRI brain has excluded metastatic disease to the brain. No brain pathology to cause seizure activity. No brain abscess. Initially treated with fosphenytoin and Valium. Transitioned to [address]. EEG was negative after treatment. After 6 weeks and she recovers clinically, consider monitoring off anti-seizure medications. 3. Acalculous cholecystitis: Likely source of septicemia and lactic acidosis. Currently not a surgical candidate. Continue with conservative management including antibiotic therapy. Once the patient recovers, and is more appropriate surgical candidate, then consult general surgery. 4. Septic shock: Requiring pressors.  Patient S/P volume resuscitation. Pressors as necessary. Secondary to bacteremia with E. coli and as predicted fungemia. Patient initially treated with Diflucan until identification of candidate tropicalis. Patient converted to micafungin on 10/30/18. Zosyn converted to meropenem on 10/30/18 given concern for possible ESBL production by the E. coli. Day #3/6 Marik protocol. Systemic steroids completed 10/31/18. 5. Urinary tract infection:  initially treated with Zosyn. Converted to meropenem on 10/30/18 given positive E. coli in the blood and possible ESBL gene activity. 6. Rectal abscess: Previously on Cipro for pseudomonas aeruginosa.  Has been present since before January 2018.   CT scan abdomen and pelvis shows ongoing phlegmon to the presacral area. 7. Left lower extremity DVT: Clearly has asymmetry to the lower extremities. Ultrasound shows progression of DVT. MRI brain showed no evidence of brain metastasis.   As a result, Lovenox was

## 2018-10-31 NOTE — PROGRESS NOTES
INTERNAL MEDICINE Progress Note  10/31/2018 9:57 AM  Subjective:   Admit Date: 10/29/2018  PCP: Lali Chavis MD  Interval History:   Events noted, off levophed  remains on vent/ETT    Objective:   Vitals: BP 85/67   Pulse 87   Temp 97.9 °F (36.6 °C) (Core)   Resp 12   Ht 5' (1.524 m)   Wt 116 lb 2.9 oz (52.7 kg)   LMP 05/01/2011   SpO2 100%   BMI 22.69 kg/m²   General appearance: calm on vent, sedated  HEENT: Normal,   Neck: no adenopathy, no carotid bruit and no JVD  Lungs: symmetric AE sandeep  Heart: S1, S2 normal  Abdomen: normal findings: bowel sounds normal and no masses palpable and colostomy, pink stoma  Extremities: edema left leg  Neurologic: Mental status: sedated on vent      Medications:   Scheduled Meds:   magnesium sulfate  2 g Intravenous Once    potassium & sodium phosphates  1 packet Oral 4x Daily    levETIRAcetam  500 mg Oral BID    potassium bicarb-citric acid  20 mEq Per NG tube Daily    potassium chloride  20 mEq Intravenous Q1H    calcium gluconate IVPB  1 g Intravenous Once    micafungin  100 mg Intravenous Daily    insulin NPH  16 Units Subcutaneous BID    meropenem  1 g Intravenous Q8H    pantoprazole  40 mg Intravenous Daily    fondaparinux  7.5 mg Subcutaneous Q24H    iron sucrose  100 mg Intravenous Q24H    darbepoetin juan-polysorbate  60 mcg Subcutaneous Weekly - Monday    levothyroxine  50 mcg Oral Daily    methadone  20 mg Oral 3 times per day    naloxegol  25 mg Oral QAM    pravastatin  20 mg Oral Daily    sodium chloride flush  10 mL Intravenous 2 times per day    ascorbic acid  1,500 mg Intravenous Q6H    thiamine (VITAMIN B1) IVPB  200 mg Intravenous BID    dexamethasone  2 mg Intravenous Q12H    insulin lispro  0-18 Units Subcutaneous Q4H    calcium replacement protocol   Other RX Placeholder     Continuous Infusions:   sodium chloride 100 mL/hr at 10/31/18 0919    dexmedetomidine 0.4 mcg/kg/hr (10/31/18 0745)    norepinephrine Stopped (10/31/18 further characterization can be obtained with gallbladder   ultrasound. 5. Nonspecific diffuse circumferential bladder wall thickening along with mild left hydroureter and left pelviectasis. 6. There is asymmetric edema involving the visualized left lower extremity. This is nonspecific. 7. Bibasilar consolidative atelectasis or pneumonia along with a small left-sided pleural effusion is noted    US GALLBLADDER RUQ  Findings very suspicious for acute acalculous cholecystitis. Correlate clinically. No biliary dilatation. Pancreatic head and proximal body grossly normal. Remainder of the pancreas obscured by bowel gas. Minimal ascites adjacent to the lateral segment left lobe of the liver. Assessment and Plan:   1. Septic shock   2. Urosepsis  3. prob cholecystitis per GB USS  4. Acute resp failure on vent  5. Pancytopenia prob related to chemotherapy / exacerbated by sepsis  6. Colorectal cancer with mets to L/S spine  7. DVT legs, was on NOAC  8. DM 2     Cont IV antibiotics + diflucan  IVF NS  steroid  OGT / TF  Accuchecks. Replace lytes  Consult Onc. Dr Smith Mt covers my service henceforth till 11/6/18.     Mary Palencia MD

## 2018-10-31 NOTE — PROCEDURES
[address] [place] [address]                          ELECTROENCEPHALOGRAM REPORT    PATIENT NAME: Joanne Osman                     :        1965  MED REC NO:   [id number]                           ROOM:       0006  ACCOUNT NO:   [de-identified]                           ADMIT DATE: 10/29/2018  PROVIDER:     Gerardo Moody. Neo Ramirez MD    DATE OF EEG:  10/29/2018    REFERRING PROVIDER:  Dr. Myron Rodriguez:  The patient is a 78-year-old female with episodes of  eyes rolled back, blood pressure dropping, on the ventilator, altered  mental status. This patient has colorectal cancer with mets to the  spine. She is on the ventilator. MEDICATIONS:  Listed are levothyroxine, methadone, pravastatin,  piperacillin and tazobactam, fluconazole, fosphenytoin, atropine,  diazepam, famotidine, Decadron, insulin, Lovenox, norepinephrine,  vasopressin, dexmedetomidine. CLINICAL INTERPRETATION:  This is a 17-channel EEG performed without  sleep deprivation. Hyperventilation was not performed as the patient is  on the ventilator. Photic stimulation was performed. The patient is  described as lethargic. The background rhythm activity is noted to be slowed, poorly organized. Hyperventilation was not performed. The patient is noted to asleep  during parts of recording. Photic stimulation was performed without  abnormality. There was no evidence of epileptiform activity  appreciated. There was no satisfactory background rhythm reached  throughout this recording. IMPRESSION:  This recording reveals poorly organized background, with the patient being asleep throughout the study. There was no evidence of epileptiform activity appreciated.         Yennifer Camacho MD    D: 10/31/2018 4:23:27       T: 10/31/2018 4:56:48     NEIL/ADDISON_IN  Job#: [id number]     Doc#: [id number]    CC:  <>

## 2018-10-31 NOTE — FLOWSHEET NOTE
10/31/18 1581   Provider Notification   Reason for Communication Evaluate;Critical Value (comment)  (hgb 6.5, hct 19.0, K+ 2.6, Mg 1.0 lactic 3.5)   Provider Name Dr. Mae Champagne   Provider Notification Physician   Method of Communication Secure Message   Response Waiting for response   Notification Time 22 [id number] - Notified Dr. Mae Champagne of Hgb 6.5, hct 19.0, platelet 14, K+ 2.6, Mg 1.0, Lactic acid 3.5. Awaiting response.      6763 - Orders placed per Dr. Mae Champagne

## 2018-10-31 NOTE — PROCEDURES
Central Line Placement:  After informed consent was obtained, patient was placed in the attention position. Patient was placed in deep Trendelenburg position. Patient was prepped using Chlorhexidene prep. Patient was draped utilizing sterile gloves, hair net, mask, sterile gown and sterile OR towels. Maximum barrier precautions were utilized. Utilizing the left subclavian approach, patient received 5 cc of 1% lidocaine. Utilizing an introducer needle, it was placed subcutaneously advanced under the clavicle and leveled flat. It was subsequently advanced toward the thoracic inlet, until venous return was obtained. A guide wire was placed through the introducer needle. The introducer needle was subsequently withdrawn leaving the guide wire in place. Utilizing a scalpel, a small incision was made in the skin. A punch dilator was placed over the guide wire and subsequently withdrawn leaving the guide wire in place. A triple-lumen catheter was subsequently placed over the guide wire. The guide wire was subsequently withdrawn leaving this catheter in place. All ports had good venous return and were subsequently flushed with saline. The catheter was secured using 2.0 silk. Secondary cleansing with chlorhexidine prep was subsequently placed. Tegaderm with bio- patch dressing was utilized for secondary securing and protection. There were no complications. EBL:   Less than 5 mL      Indication:  Septic shock    Electronically signed by Ghazal Foster.  Mae Champagne MD

## 2018-10-31 NOTE — CONSULTS
interstitial infiltrates are demonstrated, slightly worse on the left. This may represent mild pulmonary edema versus pneumonia. **This report has been created using voice recognition software. It may contain minor errors which are inherent in voice recognition technology. ** Final report electronically signed by Dr. Geeta Graves on 10/29/2018 9:34 AM    Xr Chest Portable    Result Date: 10/29/2018  PROCEDURE: XR CHEST PORTABLE CLINICAL INFORMATION: Altered mental status, . COMPARISON: September 22, 2018 TECHNIQUE: AP upright view of the chest. FINDINGS: Lung volumes are diminished with bibasilar atelectasis and or developing mild infiltrate. Stable position of right-sided Port-A-Cath. The skeleton is unchanged     Decreasing lung volumes with bibasilar atelectasis. Mild infiltrate cannot excluded. **This report has been created using voice recognition software. It may contain minor errors which are inherent in voice recognition technology. ** Final report electronically signed by Dr. Carline Noriega on 10/29/2018 4:57 AM    Vl Dup Lower Extremity Venous Left    Result Date: 10/30/2018  PROCEDURE: VL DUP LOWER EXTREMITY VENOUS LEFT CLINICAL INFORMATION: Known deep venous thrombosis. Follow-up. COMPARISON: 10/25/2018 TECHNIQUE: Multiple grayscale and color flow images of the major veins of the left lower extremity were obtained from the level of the groin to the level of the ankle. Multiple compression and augmentation maneuvers were performed. A few images of the contralateral common femoral vein were also obtained. FINDINGS:   There is no flow and noncompressibility involving the left common femoral vein, entire left superficial femoral vein, left popliteal vein, as well as the gastrocnemius veins and peroneal veins, which are filled with hypoechoic acute appearing thrombus. . All of the other  deep veins , as well as the greater and lesser saphenous veins, are widely patent with normal flow and normal compressibility. .   The contralateral common femoral vein is unremarkable. Extensive relatively acute deep venous thrombosis extending from the left calf to the left groin. There appears be slightly greater amount of thrombus in the left common femoral vein at this time compared to prior study. **This report has been created using voice recognition software. It may contain minor errors which are inherent in voice recognition technology. ** Final report electronically signed by Dr. Zhanna Burch on 10/30/2018 12:36 PM    Vl Dup Lower Extremity Venous Left    Result Date: 10/25/2018  PROCEDURE: VL DUP LOWER EXTREMITY VENOUS LEFT CLINICAL INFORMATION: Pain and swelling of left lower leg, Pain and swelling of left lower leg. COMPARISON: Comparison is made to prior ultrasound dated 7/25/2018. TECHNIQUE: Venous doppler ultrasound was performed of the left lower extremity using gray scale, color flow and spectral doppler imaging. FINDINGS:  There is a lack of compressibility and absence of flow seen throughout the left common femoral vein, superficial femoral vein, popliteal vein, and peroneal vein. There is normal color flow and compressibility in the posterior tibial vein. There is normal color flow, spectral analysis and compressibility in the right common femoral vein. Extensive deep vein thrombosis throughout the left lower extremity extending from the calf veins up to the common femoral vein. Findings discussed by Dr. Jimmy Lopes with Dr. Rozina Lindo via telephone at 11:27 AM 10/25/2018. **This report has been created using voice recognition software. It may contain minor errors which are inherent in voice recognition technology. ** Final report electronically signed by Dr Joana Ervin on 10/25/2018 11:28 AM    Mri Brain W Wo Contrast    Result Date: 10/29/2018  PROCEDURE: MRI BRAIN W WO CONTRAST CLINICAL INFORMATIONnew on set seizure with possible brain mets.  Colon cancer on chemotherapy COMPARISON: CT 9/20/2018

## 2018-10-31 NOTE — PLAN OF CARE
Problem: Nutrition  Goal: Optimal nutrition therapy  Outcome: Ongoing  Nutrition Problem: Inadequate oral intake  Intervention: Food and/or Nutrient Delivery: Continue current Tube Feeding  Nutritional Goals: TF to provide % on nutrient need while pt is intubated.

## 2018-11-01 ENCOUNTER — APPOINTMENT (OUTPATIENT)
Dept: GENERAL RADIOLOGY | Age: 53
DRG: 853 | End: 2018-11-01
Payer: COMMERCIAL

## 2018-11-01 LAB
ANION GAP SERPL CALCULATED.3IONS-SCNC: 12 MEQ/L (ref 8–16)
ANISOCYTOSIS: PRESENT
BASOPHILS # BLD: 0 %
BASOPHILS ABSOLUTE: 0 THOU/MM3 (ref 0–0.1)
BUN BLDV-MCNC: 19 MG/DL (ref 7–22)
CALCIUM IONIZED: 0.97 MMOL/L (ref 1.12–1.32)
CALCIUM SERPL-MCNC: 6.8 MG/DL (ref 8.5–10.5)
CHLORIDE BLD-SCNC: 106 MEQ/L (ref 98–111)
CO2: 19 MEQ/L (ref 23–33)
CREAT SERPL-MCNC: 0.2 MG/DL (ref 0.4–1.2)
DIFFERENTIAL TYPE: ABNORMAL
EOSINOPHIL # BLD: 0 %
EOSINOPHILS ABSOLUTE: 0 THOU/MM3 (ref 0–0.4)
ERYTHROCYTE [DISTWIDTH] IN BLOOD BY AUTOMATED COUNT: 17.2 % (ref 11.5–14.5)
ERYTHROCYTE [DISTWIDTH] IN BLOOD BY AUTOMATED COUNT: 53.7 FL (ref 35–45)
FIBRINOGEN: 719 MG/100ML (ref 155–475)
GFR SERPL CREATININE-BSD FRML MDRD: > 90 ML/MIN/1.73M2
GLUCOSE BLD-MCNC: 103 MG/DL (ref 70–108)
GLUCOSE BLD-MCNC: 108 MG/DL (ref 70–108)
GLUCOSE BLD-MCNC: 135 MG/DL (ref 70–108)
GLUCOSE BLD-MCNC: 140 MG/DL (ref 70–108)
GLUCOSE BLD-MCNC: 155 MG/DL (ref 70–108)
GLUCOSE BLD-MCNC: 195 MG/DL (ref 70–108)
GLUCOSE BLD-MCNC: 62 MG/DL (ref 70–108)
GLUCOSE BLD-MCNC: 65 MG/DL (ref 70–108)
GLUCOSE BLD-MCNC: 90 MG/DL (ref 70–108)
HCT VFR BLD CALC: 38.9 % (ref 37–47)
HEMOGLOBIN: 13.7 GM/DL (ref 12–16)
LACTIC ACID: 2.7 MMOL/L (ref 0.5–2.2)
LYMPHOCYTES # BLD: 40 %
LYMPHOCYTES ABSOLUTE: 0.7 THOU/MM3 (ref 1–4.8)
MAGNESIUM: 1.8 MG/DL (ref 1.6–2.4)
MCH RBC QN AUTO: 31 PG (ref 26–33)
MCHC RBC AUTO-ENTMCNC: 35.2 GM/DL (ref 32.2–35.5)
MCV RBC AUTO: 88 FL (ref 81–99)
METAMYELOCYTES: 4 %
MONOCYTES # BLD: 10 %
MONOCYTES ABSOLUTE: 0.2 THOU/MM3 (ref 0.4–1.3)
NUCLEATED RED BLOOD CELLS: 8 /100 WBC
ORGANISM: ABNORMAL
ORGANISM: ABNORMAL
PLATELET # BLD: 8 THOU/MM3 (ref 130–400)
PLATELET # BLD: 9 THOU/MM3 (ref 130–400)
PLATELET ESTIMATE: ABNORMAL
PMV BLD AUTO: 11.2 FL (ref 9.4–12.4)
POTASSIUM REFLEX MAGNESIUM: 4.4 MEQ/L (ref 3.5–5.2)
RBC # BLD: 4.42 MILL/MM3 (ref 4.2–5.4)
SEG NEUTROPHILS: 46 %
SEGMENTED NEUTROPHILS ABSOLUTE COUNT: 0.8 THOU/MM3 (ref 1.8–7.7)
SODIUM BLD-SCNC: 137 MEQ/L (ref 135–145)
URINE CULTURE REFLEX: ABNORMAL
URINE CULTURE REFLEX: ABNORMAL
WBC # BLD: 1.8 THOU/MM3 (ref 4.8–10.8)

## 2018-11-01 PROCEDURE — 99291 CRITICAL CARE FIRST HOUR: CPT | Performed by: INTERNAL MEDICINE

## 2018-11-01 PROCEDURE — 2500000003 HC RX 250 WO HCPCS: Performed by: INTERNAL MEDICINE

## 2018-11-01 PROCEDURE — 2580000003 HC RX 258: Performed by: INTERNAL MEDICINE

## 2018-11-01 PROCEDURE — 2700000000 HC OXYGEN THERAPY PER DAY

## 2018-11-01 PROCEDURE — 36592 COLLECT BLOOD FROM PICC: CPT

## 2018-11-01 PROCEDURE — 97530 THERAPEUTIC ACTIVITIES: CPT

## 2018-11-01 PROCEDURE — 82330 ASSAY OF CALCIUM: CPT

## 2018-11-01 PROCEDURE — 6360000002 HC RX W HCPCS: Performed by: INTERNAL MEDICINE

## 2018-11-01 PROCEDURE — 82948 REAGENT STRIP/BLOOD GLUCOSE: CPT

## 2018-11-01 PROCEDURE — C9113 INJ PANTOPRAZOLE SODIUM, VIA: HCPCS | Performed by: INTERNAL MEDICINE

## 2018-11-01 PROCEDURE — 99233 SBSQ HOSP IP/OBS HIGH 50: CPT | Performed by: PHYSICIAN ASSISTANT

## 2018-11-01 PROCEDURE — 6370000000 HC RX 637 (ALT 250 FOR IP): Performed by: INTERNAL MEDICINE

## 2018-11-01 PROCEDURE — 71045 X-RAY EXAM CHEST 1 VIEW: CPT

## 2018-11-01 PROCEDURE — 94640 AIRWAY INHALATION TREATMENT: CPT

## 2018-11-01 PROCEDURE — 87040 BLOOD CULTURE FOR BACTERIA: CPT

## 2018-11-01 PROCEDURE — 80048 BASIC METABOLIC PNL TOTAL CA: CPT

## 2018-11-01 PROCEDURE — 83605 ASSAY OF LACTIC ACID: CPT

## 2018-11-01 PROCEDURE — 74018 RADEX ABDOMEN 1 VIEW: CPT

## 2018-11-01 PROCEDURE — 97110 THERAPEUTIC EXERCISES: CPT

## 2018-11-01 PROCEDURE — 36415 COLL VENOUS BLD VENIPUNCTURE: CPT

## 2018-11-01 PROCEDURE — 94003 VENT MGMT INPAT SUBQ DAY: CPT

## 2018-11-01 PROCEDURE — 85385 FIBRINOGEN ANTIGEN: CPT

## 2018-11-01 PROCEDURE — 2000000000 HC ICU R&B

## 2018-11-01 PROCEDURE — 2709999900 HC NON-CHARGEABLE SUPPLY

## 2018-11-01 PROCEDURE — 94770 HC ETCO2 MONITOR DAILY: CPT

## 2018-11-01 PROCEDURE — 85025 COMPLETE CBC W/AUTO DIFF WBC: CPT

## 2018-11-01 PROCEDURE — 99292 CRITICAL CARE ADDL 30 MIN: CPT | Performed by: INTERNAL MEDICINE

## 2018-11-01 PROCEDURE — 99254 IP/OBS CNSLTJ NEW/EST MOD 60: CPT | Performed by: SURGERY

## 2018-11-01 PROCEDURE — 83735 ASSAY OF MAGNESIUM: CPT

## 2018-11-01 PROCEDURE — 85049 AUTOMATED PLATELET COUNT: CPT

## 2018-11-01 RX ORDER — ONDANSETRON 2 MG/ML
4 INJECTION INTRAMUSCULAR; INTRAVENOUS EVERY 6 HOURS PRN
Status: DISCONTINUED | OUTPATIENT
Start: 2018-11-01 | End: 2018-11-08 | Stop reason: HOSPADM

## 2018-11-01 RX ORDER — LOSARTAN POTASSIUM 25 MG/1
25 TABLET ORAL DAILY
Status: DISCONTINUED | OUTPATIENT
Start: 2018-11-01 | End: 2018-11-08 | Stop reason: HOSPADM

## 2018-11-01 RX ORDER — ONDANSETRON 2 MG/ML
INJECTION INTRAMUSCULAR; INTRAVENOUS
Status: DISPENSED
Start: 2018-11-01 | End: 2018-11-02

## 2018-11-01 RX ORDER — DOCUSATE SODIUM 100 MG/1
100 CAPSULE, LIQUID FILLED ORAL 2 TIMES DAILY
Status: DISCONTINUED | OUTPATIENT
Start: 2018-11-01 | End: 2018-11-01

## 2018-11-01 RX ORDER — HYDRALAZINE HYDROCHLORIDE 20 MG/ML
5 INJECTION INTRAMUSCULAR; INTRAVENOUS EVERY 6 HOURS PRN
Status: DISCONTINUED | OUTPATIENT
Start: 2018-11-01 | End: 2018-11-08 | Stop reason: HOSPADM

## 2018-11-01 RX ORDER — AMLODIPINE BESYLATE 10 MG/1
10 TABLET ORAL DAILY
Status: DISCONTINUED | OUTPATIENT
Start: 2018-11-01 | End: 2018-11-08 | Stop reason: HOSPADM

## 2018-11-01 RX ORDER — SODIUM CHLORIDE 9 MG/ML
INJECTION, SOLUTION INTRAVENOUS CONTINUOUS
Status: DISCONTINUED | OUTPATIENT
Start: 2018-11-01 | End: 2018-11-02 | Stop reason: ALTCHOICE

## 2018-11-01 RX ADMIN — POTASSIUM & SODIUM PHOSPHATES POWDER PACK 280-160-250 MG 250 MG: 280-160-250 PACK at 17:12

## 2018-11-01 RX ADMIN — NALOXEGOL OXALATE 25 MG: 25 TABLET, FILM COATED ORAL at 09:39

## 2018-11-01 RX ADMIN — Medication 10 ML: at 10:37

## 2018-11-01 RX ADMIN — DEXAMETHASONE SODIUM PHOSPHATE 2 MG: 4 INJECTION, SOLUTION INTRAMUSCULAR; INTRAVENOUS at 00:58

## 2018-11-01 RX ADMIN — HYDROMORPHONE HYDROCHLORIDE 0.5 MG: 1 INJECTION, SOLUTION INTRAMUSCULAR; INTRAVENOUS; SUBCUTANEOUS at 12:24

## 2018-11-01 RX ADMIN — DEXTROSE MONOHYDRATE 12.5 G: 500 INJECTION PARENTERAL at 17:29

## 2018-11-01 RX ADMIN — ACETAMINOPHEN 500 MG: 160 SOLUTION ORAL at 14:07

## 2018-11-01 RX ADMIN — THIAMINE HYDROCHLORIDE 200 MG: 100 INJECTION, SOLUTION INTRAMUSCULAR; INTRAVENOUS at 09:39

## 2018-11-01 RX ADMIN — HYDROMORPHONE HYDROCHLORIDE 4 MG: 2 TABLET ORAL at 15:28

## 2018-11-01 RX ADMIN — LEVOTHYROXINE SODIUM 50 MCG: 50 TABLET ORAL at 08:01

## 2018-11-01 RX ADMIN — ASCORBIC ACID 1500 MG: 500 INJECTION, SOLUTION INTRAMUSCULAR; INTRAVENOUS; SUBCUTANEOUS at 00:47

## 2018-11-01 RX ADMIN — DEXMEDETOMIDINE HYDROCHLORIDE 0.8 MCG/KG/HR: 4 INJECTION, SOLUTION INTRAVENOUS at 06:47

## 2018-11-01 RX ADMIN — DEXMEDETOMIDINE 1.1 MCG/KG/HR: 100 INJECTION, SOLUTION, CONCENTRATE INTRAVENOUS at 23:11

## 2018-11-01 RX ADMIN — ASCORBIC ACID 1500 MG: 500 INJECTION, SOLUTION INTRAMUSCULAR; INTRAVENOUS; SUBCUTANEOUS at 12:19

## 2018-11-01 RX ADMIN — MICAFUNGIN SODIUM 100 MG: 20 INJECTION, POWDER, LYOPHILIZED, FOR SOLUTION INTRAVENOUS at 09:40

## 2018-11-01 RX ADMIN — THIAMINE HYDROCHLORIDE 200 MG: 100 INJECTION, SOLUTION INTRAMUSCULAR; INTRAVENOUS at 22:33

## 2018-11-01 RX ADMIN — MEROPENEM 1 G: 1 INJECTION, POWDER, FOR SOLUTION INTRAVENOUS at 22:11

## 2018-11-01 RX ADMIN — Medication 10 ML: at 00:59

## 2018-11-01 RX ADMIN — AMLODIPINE BESYLATE 10 MG: 10 TABLET ORAL at 15:05

## 2018-11-01 RX ADMIN — ASCORBIC ACID 1500 MG: 500 INJECTION, SOLUTION INTRAMUSCULAR; INTRAVENOUS; SUBCUTANEOUS at 06:09

## 2018-11-01 RX ADMIN — METHADONE HYDROCHLORIDE 20 MG: 10 TABLET ORAL at 14:07

## 2018-11-01 RX ADMIN — LOSARTAN POTASSIUM 25 MG: 25 TABLET, FILM COATED ORAL at 10:37

## 2018-11-01 RX ADMIN — HYDROMORPHONE HYDROCHLORIDE 0.5 MG: 1 INJECTION, SOLUTION INTRAMUSCULAR; INTRAVENOUS; SUBCUTANEOUS at 19:02

## 2018-11-01 RX ADMIN — PANTOPRAZOLE SODIUM 40 MG: 40 INJECTION, POWDER, FOR SOLUTION INTRAVENOUS at 09:39

## 2018-11-01 RX ADMIN — POTASSIUM & SODIUM PHOSPHATES POWDER PACK 280-160-250 MG 250 MG: 280-160-250 PACK at 09:39

## 2018-11-01 RX ADMIN — ONDANSETRON HYDROCHLORIDE 4 MG: 2 SOLUTION INTRAMUSCULAR; INTRAVENOUS at 19:57

## 2018-11-01 RX ADMIN — MEROPENEM 1 G: 1 INJECTION, POWDER, FOR SOLUTION INTRAVENOUS at 02:16

## 2018-11-01 RX ADMIN — METHADONE HYDROCHLORIDE 20 MG: 10 TABLET ORAL at 22:33

## 2018-11-01 RX ADMIN — DEXMEDETOMIDINE 0.8 MCG/KG/HR: 100 INJECTION, SOLUTION, CONCENTRATE INTRAVENOUS at 14:14

## 2018-11-01 RX ADMIN — METHADONE HYDROCHLORIDE 20 MG: 10 TABLET ORAL at 06:40

## 2018-11-01 RX ADMIN — DEXTROSE MONOHYDRATE 12.5 G: 500 INJECTION PARENTERAL at 22:46

## 2018-11-01 RX ADMIN — LEVETIRACETAM 500 MG: 500 SOLUTION ORAL at 09:41

## 2018-11-01 RX ADMIN — CALCIUM GLUCONATE 1 G: 98 INJECTION, SOLUTION INTRAVENOUS at 00:47

## 2018-11-01 RX ADMIN — SODIUM CHLORIDE: 9 INJECTION, SOLUTION INTRAVENOUS at 01:03

## 2018-11-01 RX ADMIN — DOCUSATE SODIUM 100 MG: 50 LIQUID ORAL at 10:37

## 2018-11-01 RX ADMIN — Medication 10 ML: at 22:42

## 2018-11-01 RX ADMIN — POTASSIUM & SODIUM PHOSPHATES POWDER PACK 280-160-250 MG 250 MG: 280-160-250 PACK at 12:34

## 2018-11-01 RX ADMIN — Medication 4 UNITS: at 01:08

## 2018-11-01 RX ADMIN — LEVETIRACETAM 500 MG: 100 INJECTION, SOLUTION INTRAVENOUS at 23:12

## 2018-11-01 RX ADMIN — HYDROMORPHONE HYDROCHLORIDE 0.5 MG: 1 INJECTION, SOLUTION INTRAMUSCULAR; INTRAVENOUS; SUBCUTANEOUS at 02:04

## 2018-11-01 RX ADMIN — ASCORBIC ACID 1500 MG: 500 INJECTION, SOLUTION INTRAMUSCULAR; INTRAVENOUS; SUBCUTANEOUS at 17:52

## 2018-11-01 RX ADMIN — MEROPENEM 1 G: 1 INJECTION, POWDER, FOR SOLUTION INTRAVENOUS at 12:18

## 2018-11-01 ASSESSMENT — PAIN SCALES - GENERAL: PAINLEVEL_OUTOF10: 3

## 2018-11-01 ASSESSMENT — PAIN DESCRIPTION - PROGRESSION: CLINICAL_PROGRESSION: GRADUALLY WORSENING

## 2018-11-01 ASSESSMENT — PAIN DESCRIPTION - LOCATION: LOCATION: BUTTOCKS

## 2018-11-01 ASSESSMENT — PULMONARY FUNCTION TESTS
PIF_VALUE: 27
PIF_VALUE: 24
PIF_VALUE: 27
PIF_VALUE: 24
PIF_VALUE: 23
PIF_VALUE: 22

## 2018-11-01 NOTE — PROGRESS NOTES
per report. Per review of care everywhere the patient has been following with Dr. Bryan Vasques and at [email or web address]. She was recently restarted on chemotherapy with FOLFIRI and Avastin after she was found to have disease progression in September 2018 - found to have pathologic fracture of sacral bones August 2018 at [email or web address]. Per Care Everywhere, surgical intervention was deferred. . The patient's mother at the bedside states most recent dose of chemotherapy was given on 10/22/18. Oncology consult was requested to follow up on pancytopenia and metastatic rectal cancer. Paper copy of Dr. Yecenia Teresa most recent office note from Dr. Vanessa Holt in patient's chart. Over the last 24 hours:   Patient remains sedated and intubated. She opens eyes on commands. Per RN staff no abnormal bleeding has been noted. Other ROS unable to be obtained.      Meds    Current Medications    losartan  25 mg Oral Daily    docusate  100 mg Oral Daily    amLODIPine  10 mg Oral Daily    potassium & sodium phosphates  1 packet Oral 4x Daily    levETIRAcetam  500 mg Per NG tube BID    micafungin  100 mg Intravenous Daily    insulin NPH  16 Units Subcutaneous BID    meropenem  1 g Intravenous Q8H    pantoprazole  40 mg Intravenous Daily    levothyroxine  50 mcg Oral Daily    methadone  20 mg Oral 3 times per day    naloxegol  25 mg Oral QAM    pravastatin  20 mg Oral Daily    sodium chloride flush  10 mL Intravenous 2 times per day    ascorbic acid  1,500 mg Intravenous Q6H    thiamine (VITAMIN B1) IVPB  200 mg Intravenous BID    insulin lispro  0-18 Units Subcutaneous Q4H     acetaminophen, HYDROmorphone, acetaminophen, HYDROmorphone, sodium chloride flush, glucose, dextrose, glucagon (rDNA), dextrose  IV Drips/Infusions   dexmedetomidine 0.9 mcg/kg/hr (11/01/18 1524)    dextrose       Past Medical History         Diagnosis Date    Blood transfusion     Cancer ([facility] [address]     colorectal    Diabetes mellitus type 2 in nonobese ([facility] [address] 10/27/2017    Hyperlipidemia     Hypertension     Hyperthyroidism       Past Surgical History           Procedure Laterality Date    COLON SURGERY  6-5-12    Robotic assisted left hemicolectomy-Dr. Alvarez    COLONOSCOPY      HEMORRHOID SURGERY  8/2013    banding     HYSTERECTOMY  5/2011    HYSTEROSCOPY  2004    OTHER SURGICAL HISTORY  7/30/2013    RIGHT ABD. WALL MASS EXCISION-Dr. Emperatriz Zambrano    [address] INCL FLUOR GDNCE DX W/CELL WASHG SPX N/A 10/29/2018    BRONCHOSCOPY performed by Prasanna Rodriguez MD at [facility] [place] SCRN NOT  [address] IND Left 10/27/2017    COLONOSCOPY performed by Mark Villalba MD at [address]  1990's    TUNNELED VENOUS PORT PLACEMENT  6/28/2012    purple power port-Dr. Emperatriz Zambrano      Diet    DIET TUBE FEED CONTINUOUS/CYCLIC NPO; Acute Lung Injury (ALI)/ARDS; Orogastric; Continuous; 20; 50  Allergies    Patient has no known allergies. Social History     Social History     Social History    Marital status:      Spouse name: N/A    Number of children: 3    Years of education: 12     Occupational History    [place]      Social History Main Topics    Smoking status: Never Smoker    Smokeless tobacco: Never Used    Alcohol use No    Drug use: No    Sexual activity: Not on file     Other Topics Concern    Not on file     Social History Narrative    No narrative on file     Family History      Family History   Problem Relation Age of Onset    Diabetes Mother     High Blood Pressure Mother     High Blood Pressure Father     Cancer Father         small intestine     ROS     Review of Systems   Pertinent review of systems noted in HPI, all other ROS negative. Vitals     height is 5' (1.524 m) and weight is 133 lb 13.1 oz (60.7 kg). Her core temperature is 99.9 °F (37.7 °C). Her blood pressure is 124/109 (abnormal) and her pulse is 113. Her respiration is 18 and oxygen saturation is 95%.      O2 Flow Rate (L/min): 4 L/min    Exam   Physical Exam   General appearance: ill appearing, sedated and intubated. HEENT: Conjunctivae/corneas clear. No nasal bleeding noted. Respiratory: intubated, diminished breath sounds. Cardiovascular: Regular rate and rhythm with normal S1/S2   Abdomen: Soft, non-distended. There is ostomy to LLQ with edema noted. Small amount of red/brown liquid in bag. Musculoskeletal: left lower extremity edema. Skin: Skin color, texture, turgor normal.   Neurologic: opens eyes during examination, limited exam due to sedated state. Labs   CBC  Recent Labs      10/31/18   0435  10/31/18   2050  11/01/18   0608  11/01/18   1720   WBC  1.5*  2.0*  1.8*   --    RBC  2.04*  4.48  4.42   --    HGB  6.5*  13.8  13.7   --    HCT  19.0*  39.5  38.9   --    MCV  93.1  88.2  88.0   --    MCH  31.9  30.8  31.0   --    MCHC  34.2  34.9  35.2   --    PLT  14*  10*  8*  9*   MPV  9.4  9.9  11.2   --       BMP  Recent Labs      10/31/18   0435  10/31/18   2050  11/01/18   0608   NA  139  138  137   K  2.6*  4.3  4.4   CL  102  103  106   CO2  24  19*  19*   BUN  11  17  19   CREATININE  0.5  < 0.2*  0.2*   GLUCOSE  143*  159*  140*   MG  1.0*  1.9  1.8   CALCIUM  6.7*  6.6*  6.8*     LFT  No results for input(s): AST, ALT, ALB, BILITOT, ALKPHOS, LIPASE in the last 72 hours. Invalid input(s): AMYLASE  INR  Recent Labs      10/31/18   0435   INR  1.78*     PTT  Recent Labs      10/31/18   0435   APTT  37.0       Radiology      Ct Abdomen Pelvis Wo Contrast Additional Contrast? None     Result Date: 10/30/2018  PROCEDURE: CT ABDOMEN PELVIS WO CONTRAST CLINICAL INFORMATION: Assess presacral abscess COMPARISON: 9/22/2018 TECHNIQUE:  Axial CT images were obtained through the abdomen and pelvis without contrast. Coronal and sagittal reformatted images were rendered.  All CT scans at this facility use dose modulation, iterative reconstruction, and/or weight-based dosing when  appropriate to reduce radiation dose to as

## 2018-11-01 NOTE — PLAN OF CARE
Problem: Falls - Risk of:  Goal: Will remain free from falls  Will remain free from falls   Outcome: Met This Shift    Goal: Absence of physical injury  Absence of physical injury   Outcome: Met This Shift      Problem: Pain:  Goal: Pain level will decrease  Pain level will decrease   Outcome: Ongoing  Controlled with medications ordered    Goal: Control of acute pain  Control of acute pain   Outcome: Ongoing    Goal: Control of chronic pain  Control of chronic pain   Outcome: Ongoing      Problem:  Bowel/Gastric:  Goal: Complications related to the disease process, condition or treatment will be avoided or minimized  Outcome: Ongoing  Swelling    Goal: Will be independent with ostomy care  Outcome: Not Met This Shift  Sedated on ventilator       Problem: Restraint Use - Nonviolent/Non-Self-Destructive Behavior:  Goal: Absence of restraint indications  Absence of restraint indications   Outcome: Not Met This Shift  Grabs for tubes when unrestrained     Goal: Absence of restraint-related injury  Absence of restraint-related injury   Outcome: Met This Shift      Problem: Gas Exchange - Impaired:  Goal: Levels of oxygenation will improve  Levels of oxygenation will improve   Outcome: Met This Shift      Problem: Infection, Septic Shock:  Goal: Will show no infection signs and symptoms  Will show no infection signs and symptoms   Outcome: Ongoing  Continued ATB's     Problem: Serum Glucose Level - Abnormal:  Goal: Ability to maintain appropriate glucose levels will improve  Ability to maintain appropriate glucose levels will improve   Outcome: Ongoing  Improving with current medications ordered      Problem: Venous Thromboembolism:  Goal: Will show no signs or symptoms of venous thromboembolism  Will show no signs or symptoms of venous thromboembolism   Outcome: Not Met This Shift  LLE & RUE with significant swelling      Problem: Cardiac Output - Decreased:  Goal: Hemodynamic stability will improve  Hemodynamic

## 2018-11-01 NOTE — PROGRESS NOTES
Endurance Training, Safety Education & Training, Patient/Caregiver Education & Training, Equipment Evaluation, Education, & procurement, Self-Care / ADL    Goals:       Short term goals  Time Frame for Short term goals: 2 weeks  Short term goal 1: Pt will tolerate sitting at EOB X10 minutes with consistent minimal assistance or less in prep for ADL tasks. Short term goal 2: Pt will complete simple grooming tasks with moderate assistance to increase ability to complete self care tasks. Short term goal 3: Pt will complete BUE AAROM/AROM X5-10 reps to increase overall strength/endurance needed for ADLs and eventual transfers. Short term goal 4: Pt will tolerate further assessment of transfers/mobility as appropriate.    Long term goals  Time Frame for Long term goals : not set due to ELOS

## 2018-11-01 NOTE — PLAN OF CARE
improve  Levels of oxygenation will improve   Outcome: Ongoing  Lung sounds, pulse ox, and breathing monitored throughout shift. Discussed correct technique and importance of deep breathing & coughing exercises with patient. Remains intubated. Plan for bronchoscopy tomorrow at 0900. Continuing to closely monitor and assess. Problem: Infection, Septic Shock:  Goal: Will show no infection signs and symptoms  Will show no infection signs and symptoms   Outcome: Ongoing  Results for Heron Alexis (MRN [id number]) as of 11/1/2018 18:17   Ref. Range 10/29/2018 03:51 10/29/2018 04:00 10/29/2018 04:00 10/29/2018 04:05 10/29/2018 04:05 10/29/2018 07:00 10/29/2018 08:32 11/1/2018 10:05 11/1/2018 10:14   BODY FLUID CULTURE Unknown       Rpt     CULTURE BLOOD #1 Unknown Rpt (A)        Rpt   CULTURE BLOOD #2 Unknown  Rpt (A)      Rpt    MRSA SCREENING CULTURE ONLY Unknown      Rpt      RESPIRATORY CULTURE Unknown       Rpt     URINE CULTURE, REFLEXED Unknown    Rpt (A)        URINE WITH REFLEXED MICRO Unknown    Rpt (A)        VRE CULTURE Unknown      Rpt      MRSA SCREEN Unknown      No MRSA isolated      Respiratory Culture Unknown       Normal osvaldo- pre. .. Gram Stain Result Unknown       No segmented neut. .. Organism Unknown Escherichia coli (A) Escherichia coli (A)  Proteus mirabilis (A) Escherichia coli (A)       Blood Culture, Routine Unknown No growth-prelimi... (A) sensitivity done-...  No growth-prelimi... (A)         ENTEROCOCCUS FILM ARRAY Unknown Not Detected           LISTERIA MONOCYTOGENES FILM ARRAY Unknown Not Detected           STAPHYLOCOCCUS FILM ARRAY Unknown Not Detected           STAPH AUREUS FILM ARRAY Unknown Not Detected           STREPTOCOCCUS FILM ARRAY Unknown Not Detected           STREP AGALACTIAE FILM ARRAY Unknown Not Detected           STREP PYOCGENES FILM ARRAY Unknown Not Detected           STREP PNEUMONIAE FILM ARRAY Unknown Not Detected           ACINETOBACTER BAUMANNII FILM assess. Problem: Serum Glucose Level - Abnormal:  Goal: Ability to maintain appropriate glucose levels will improve  Ability to maintain appropriate glucose levels will improve   Outcome: Ongoing  Results for Lissette Cordova (MRN [id number]) as of 11/1/2018 18:17   Ref. Range 10/31/2018 04:31 10/31/2018 09:37 10/31/2018 13:13 10/31/2018 18:43 10/31/2018 20:46 11/1/2018 01:07 11/1/2018 06:08 11/1/2018 10:05 11/1/2018 12:32 11/1/2018 17:50   POC Glucose Latest Ref Range: 70 - 108 mg/dl 158 (H) 105 78 191 (H) 159 (H) 195 (H) 135 (H) 108 103 155 (H)   Administered 16 units NPH. Had to administer 12.5 mg of dextrose d/t CHEM 62. Problem: Venous Thromboembolism:  Goal: Will show no signs or symptoms of venous thromboembolism  Will show no signs or symptoms of venous thromboembolism   Outcome: Ongoing  + DVT in LLE. Stopped anti-coagulant d/t risk for spontaneous bleeding. Continuing to closely monitor and assess. Problem: Cardiac Output - Decreased:  Goal: Hemodynamic stability will improve  Hemodynamic stability will improve   Outcome: Ongoing  I/O last 3 completed shifts: In: 5468.7 [I.V.:3434; Blood:856.7; NG/GT:1178]  Out: 1470 [Urine:1425; Stool:45]  No intake/output data recorded. Ongoing assessment & interventions monitored throughout shift. Intake & output measured. Patient has adequate urine output so far this shift. Vitals:    11/01/18 1832   BP: (!) 126/109   Pulse: 111   Resp: 15   Temp:    SpO2: 97%       Problem: Risk for Impaired Skin Integrity  Goal: Tissue integrity - skin and mucous membranes  Structural intactness and normal physiological function of skin and  mucous membranes. Outcome: Ongoing  Ongoing assessment & interventions provided throughout shift. Skin assessments provided. Encouraging/assisting patient to turn as needed. Continuing to closely monitor and assess. On neutropenic precautions.     Problem: Nutrition  Goal: Optimal nutrition therapy  Outcome: Ongoing  On oxepa,

## 2018-11-01 NOTE — PLAN OF CARE
Ongoing  Oxygen saturation remains above 92% on 25% FIO2 via the ventilator. Minimal secretions noted orally and down ETT. Problem: Infection, Septic Shock:  Goal: Will show no infection signs and symptoms  Will show no infection signs and symptoms   Outcome: Ongoing  WBC remains low. Lactic remains elevated but is trending down. Continue to monitor labs. Problem: Serum Glucose Level - Abnormal:  Goal: Ability to maintain appropriate glucose levels will improve  Ability to maintain appropriate glucose levels will improve   Outcome: Ongoing  Glucose levels remains elevated but are improving. Insulin continues per order. Problem: Venous Thromboembolism:  Goal: Will show no signs or symptoms of venous thromboembolism  Will show no signs or symptoms of venous thromboembolism   Outcome: Not Met This Shift  Swelling L > R remains in lower extremities. Problem: Cardiac Output - Decreased:  Goal: Hemodynamic stability will improve  Hemodynamic stability will improve   Outcome: Met This Shift  Patient's pressures increased throughout shift. Levophed not needed overnight. Problem: Risk for Impaired Skin Integrity  Goal: Tissue integrity - skin and mucous membranes  Structural intactness and normal physiological function of skin and  mucous membranes. Outcome: Ongoing  No evidence of new skin breakdown noted. Patient turned Q2H and as needed. Preventative sacral dressing in place. Problem: Nutrition  Goal: Optimal nutrition therapy  Outcome: Ongoing  Residuals remained high with tube feeding at goal. Tube feeding rate reduced at this time.

## 2018-11-01 NOTE — PROGRESS NOTES
Nutrition Assessment    Type and Reason for Visit: Reassess (TF monitor)    Nutrition Recommendations: Continue advancing TF as tolerated to goal of Oxepa 50ml/hour with free H20 per MD    Malnutrition Assessment:  · Malnutrition Status: At risk for malnutrition    Nutrition Diagnosis:   · Problem: Inadequate oral intake  · Etiology: related to Impaired respiratory function-inability to consume food     Signs and symptoms:  as evidenced by NPO status due to medical condition, Intubation, Nutrition support - EN    Nutrition Assessment:  · Subjective Assessment: Pt. had residuals of 230 and 130ml on nights and TF held - RN resumed this am and plans to continue advancing to goal as able; surgery consulted re: swollen colostomy site; plan bronch tomorrow; received 53% Rx TF volume past 24h; colostomy output 45ml past 24h; meds include movantik,thiamine,K+ and Na phosphate, vitamin C,humalog,NPH,keppra,ATB,colace; glucose 140; intubated;  Hx XRT with recent chemo for colorectal CA   · Wound Type:  (reported abscess in bowel)  · Current Nutrition Therapies:  · Oral Diet Orders: NPO   · Tube Feeding (TF) Orders:   · Feeding Route: Orogastric  · Formula: Acute Lung Injury (ALI/ARDS) (Oxepa ( also lowest CHO TF))  · Rate (ml/hr):20 ml/hr with goal of 50 ml/hr    · Volume (ml/day): 1200 ml  · Duration: Continuous 24hrs  · Water Flushes:  (Per Dr - 200 ml  free H20 tid)  · Goal TF & Flush Orders Provides: 1800 kcals, 75 grams protein, 126 grams cho, 942ml free H20 (1542ml with flush per MD)/24h  · Anthropometric Measures:  · Ht: 5' (152.4 cm)   · Current Body Wt: 133 lb (60.3 kg) (11/1 with +1-2 edema)  · Admission Body Wt: 126 lb 5.2 oz (57.3 kg) (10/29  + 2 edema)  · Usual Body Wt: 116 lb 10 oz (52.9 kg) (3/21/18 from EMR)  · Ideal Body Wt: 100 lb (45.4 kg),   · BMI Classification: BMI 25.0 - 29.9 Overweight (26.2)  · Comparative Standards (Estimated Nutrition Needs):  · Estimated Daily Total Kcal: ~1617 kcals ( 30 kcals/kg on actual weight of 55.7 kg)  · Estimated Daily Protein (g): ~78 grams ( 1.4 grams protein/kg on IBW of 45.4 kg)    Estimated Intake vs Estimated Needs: Intake Improving    Nutrition Risk Level: High    Nutrition Interventions:   Continue NPO, Continue current Tube Feeding  Continued Inpatient Monitoring, Education Not Indicated, Coordination of Care    Nutrition Evaluation:   · Evaluation: Progressing toward goals   · Goals: TF to provide % on nutrient need while pt is intubated. · Monitoring: TF Intake, TF Tolerance, Skin Integrity, Wound Healing, Ascites/Edema, Weight, Pertinent Labs    See Adult Nutrition Doc Flowsheet for more detail.      Electronically signed by Anastasio Cushing, RD, RICHARD on 11/1/18 at 11:25 AM    Contact Number: [phone]

## 2018-11-01 NOTE — CONSULTS
Lungs/pleura:  Lung volumes remain low. There are worsening alveolar interstitial infiltrates predominantly in the middle and lower lungs bilaterally likely representing a combination of pulmonary edema and atelectasis. Cannot exclude pneumonia. No pleural effusion. No pneumothorax. Heart: Heart size is normal.   Mediastinum/majo: No obvious mass or adenopathy. Skeleton: No significant bone or joint abnormality. Lines/tubes/devices: There is no significant change in the satisfactory positions of the life support lines and tubes.               Impression       Worsening alveolar interstitial infiltrates likely a combination of pulmonary edema and atelectasis. Cannot exclude pneumonia.         Narrative   PROCEDURE: CT ABDOMEN PELVIS WO CONTRAST       CLINICAL INFORMATION: Assess presacral abscess        COMPARISON: 9/22/2018       TECHNIQUE:  Axial CT images were obtained through the abdomen and pelvis without contrast. Coronal and sagittal reformatted images were rendered. All CT scans at this facility use dose modulation, iterative reconstruction, and/or weight-based dosing when    appropriate to reduce radiation dose to as low as reasonably achievable.       There are few scattered small retroperitoneal periaortic lymph nodes demonstrated. However, these do not appear pathologically enlarged.       FINDINGS:        Bibasilar consolidation is demonstrated and is incompletely visualized. This is more pronounced on the right. There is also a small sided pleural effusion. There is an enteric tube present with the tip located within the gastric body.       The gallbladder is collapsed. There is prominence of the gallbladder wall thickness which may be related to incomplete distention. However, cannot exclude gallbladder wall edema.  Noncontrast evaluation of the liver, pancreas and adrenal glands appear    normal. There is mild ascites present within the left upper quadrant in the perisplenic region.     obscured by bowel gas. Minimal ascites adjacent to the lateral segment left lobe of the liver.         Narrative   PROCEDURE: VL DUP LOWER EXTREMITY VENOUS LEFT       CLINICAL INFORMATION: Known deep venous thrombosis. Follow-up.       COMPARISON: 10/25/2018       TECHNIQUE: Multiple grayscale and color flow images of the major veins of the left lower extremity were obtained from the level of the groin to the level of the ankle. Multiple compression and augmentation maneuvers were performed. A few images of the    contralateral common femoral vein were also obtained.       FINDINGS:      There is no flow and noncompressibility involving the left common femoral vein, entire left superficial femoral vein, left popliteal vein, as well as the gastrocnemius veins and peroneal veins, which are filled with hypoechoic acute appearing thrombus. .    All of the other  deep veins , as well as the greater and lesser saphenous veins, are widely patent with normal flow and normal compressibility. .   The contralateral common femoral vein is unremarkable.               Impression   Extensive relatively acute deep venous thrombosis extending from the left calf to the left groin. There appears be slightly greater amount of thrombus in the left common femoral vein at this time compared to prior study.         Thank you for the interesting evaluation. Further recommendations to follow.     Electronically signed by Jaron Lombardo MD on 11/1/2018 at 2:35 PM

## 2018-11-01 NOTE — PROGRESS NOTES
[facility] Surgery - Dr. Tato Fox  Daily Progress Note    Pt Name: Lola Chun  Medical Record Number: [id number]  Date of Birth 1965   Today's Date: 11/2/2018    Hospital day # 4     ASSESSMENT   1. Acute respiratory failure  2. Recurrent seizures  3. Acalculous cholecystitis  4. Edematous colostomy  5. Septic shock  6. Urinary tract infection  7. Chronic rectal wound  8. Left lower extremity DVT  9. Pancytopenia  10. Colorectal adenocarcinoma recurrence with metastases  11. Lactic acidosis  12. Diabetes mellitus  13. Hypothyroidism   has a past medical history of Blood transfusion; Cancer ([facility] [address] Diabetes mellitus type 2 in nonobese [facility]); Hyperlipidemia; Hypertension; and Hyperthyroidism. PLAN   1. Continue serial abdominal exams - abdomen pretty benign. Monitor edematous ostomy at this time. Wound/ostomy nurse consultation. 2. Off vasopressors. Weaning sedation. 3. Neutropenic precautions   4. Extubated this morning. Tolerating nasal cannula well this morning. No distress noted. Pulmonary care per intensivist.   5. Continue IV antibiotics and antifungals  6. Critical care waiting to give platelet transfusion until HIT panel comes back  7. Cholecystostomy tube placement ordered but unable to place until platelets are transfused   8. Oncology following along. Chemotherapy on hold right now. 9. Pain & nausea control as needed   10. Labs monitoring. Replace electrolytes as needed. 11. Palliative care  12. ICU supportive care   13. Patient recently extubated and able to communicate. Denies any abdominal pain at this time. Will follow along but no acute surgery for this patient. Recommendation for IR drain placement once platelets are better. SUBJECTIVE   Chief complaint: Drowsy     Patient stable in the ICU. Extubated this morning and doing well on NC so far. She is drowsy but arouses to name and stimuli. Oriented to place, but unsure why she is here. Chart reviewed.

## 2018-11-02 LAB
ANION GAP SERPL CALCULATED.3IONS-SCNC: 14 MEQ/L (ref 8–16)
ANISOCYTOSIS: PRESENT
BASOPHILS # BLD: 0 %
BASOPHILS ABSOLUTE: 0 THOU/MM3 (ref 0–0.1)
BUN BLDV-MCNC: 13 MG/DL (ref 7–22)
CALCIUM IONIZED: 1.02 MMOL/L (ref 1.12–1.32)
CALCIUM SERPL-MCNC: 7.4 MG/DL (ref 8.5–10.5)
CHLORIDE BLD-SCNC: 99 MEQ/L (ref 98–111)
CO2: 23 MEQ/L (ref 23–33)
CREAT SERPL-MCNC: 0.3 MG/DL (ref 0.4–1.2)
DOHLE BODIES: PRESENT
EOSINOPHIL # BLD: 0 %
EOSINOPHILS ABSOLUTE: 0 THOU/MM3 (ref 0–0.4)
ERYTHROCYTE [DISTWIDTH] IN BLOOD BY AUTOMATED COUNT: 17.2 % (ref 11.5–14.5)
ERYTHROCYTE [DISTWIDTH] IN BLOOD BY AUTOMATED COUNT: 53.4 FL (ref 35–45)
GFR SERPL CREATININE-BSD FRML MDRD: > 90 ML/MIN/1.73M2
GLUCOSE BLD-MCNC: 109 MG/DL (ref 70–108)
GLUCOSE BLD-MCNC: 112 MG/DL (ref 70–108)
GLUCOSE BLD-MCNC: 138 MG/DL (ref 70–108)
GLUCOSE BLD-MCNC: 150 MG/DL (ref 70–108)
GLUCOSE BLD-MCNC: 66 MG/DL (ref 70–108)
GLUCOSE BLD-MCNC: 69 MG/DL (ref 70–108)
GLUCOSE BLD-MCNC: 80 MG/DL (ref 70–108)
GLUCOSE BLD-MCNC: 84 MG/DL (ref 70–108)
HCT VFR BLD CALC: 39.1 % (ref 37–47)
HEMOGLOBIN: 13.9 GM/DL (ref 12–16)
IMMATURE GRANS (ABS): 0.32 THOU/MM3 (ref 0–0.07)
IMMATURE GRANULOCYTES: 18.4 %
LACTIC ACID: 1.9 MMOL/L (ref 0.5–2.2)
LYMPHOCYTES # BLD: 55 %
LYMPHOCYTES ABSOLUTE: 0.9 THOU/MM3 (ref 1–4.8)
MAGNESIUM: 1.3 MG/DL (ref 1.6–2.4)
MCH RBC QN AUTO: 30.8 PG (ref 26–33)
MCHC RBC AUTO-ENTMCNC: 35.5 GM/DL (ref 32.2–35.5)
MCV RBC AUTO: 86.5 FL (ref 81–99)
METAMYELOCYTES: 1 %
MONOCYTES # BLD: 9 %
MONOCYTES ABSOLUTE: 0.2 THOU/MM3 (ref 0.4–1.3)
NUCLEATED RED BLOOD CELLS: 8 /100 WBC
ORGANISM: ABNORMAL
OSMOLALITY URINE: 165 MOSMOL/KG (ref 250–750)
PLATELET # BLD: 9 THOU/MM3 (ref 130–400)
PLATELET ESTIMATE: ABNORMAL
POIKILOCYTES: ABNORMAL
POTASSIUM REFLEX MAGNESIUM: 2.8 MEQ/L (ref 3.5–5.2)
RBC # BLD: 4.52 MILL/MM3 (ref 4.2–5.4)
SEG NEUTROPHILS: 35 %
SEGMENTED NEUTROPHILS ABSOLUTE COUNT: 0.6 THOU/MM3 (ref 1.8–7.7)
SODIUM BLD-SCNC: 136 MEQ/L (ref 135–145)
SODIUM URINE: 62 MEQ/L
VRE CULTURE: ABNORMAL
WBC # BLD: 1.7 THOU/MM3 (ref 4.8–10.8)

## 2018-11-02 PROCEDURE — 6370000000 HC RX 637 (ALT 250 FOR IP): Performed by: INTERNAL MEDICINE

## 2018-11-02 PROCEDURE — 92610 EVALUATE SWALLOWING FUNCTION: CPT

## 2018-11-02 PROCEDURE — 2580000003 HC RX 258: Performed by: INTERNAL MEDICINE

## 2018-11-02 PROCEDURE — 97530 THERAPEUTIC ACTIVITIES: CPT

## 2018-11-02 PROCEDURE — 6360000002 HC RX W HCPCS: Performed by: INTERNAL MEDICINE

## 2018-11-02 PROCEDURE — 82948 REAGENT STRIP/BLOOD GLUCOSE: CPT

## 2018-11-02 PROCEDURE — 94003 VENT MGMT INPAT SUBQ DAY: CPT

## 2018-11-02 PROCEDURE — 2500000003 HC RX 250 WO HCPCS

## 2018-11-02 PROCEDURE — 99232 SBSQ HOSP IP/OBS MODERATE 35: CPT | Performed by: PHYSICIAN ASSISTANT

## 2018-11-02 PROCEDURE — 2700000000 HC OXYGEN THERAPY PER DAY

## 2018-11-02 PROCEDURE — 83605 ASSAY OF LACTIC ACID: CPT

## 2018-11-02 PROCEDURE — 83735 ASSAY OF MAGNESIUM: CPT

## 2018-11-02 PROCEDURE — 80048 BASIC METABOLIC PNL TOTAL CA: CPT

## 2018-11-02 PROCEDURE — 85025 COMPLETE CBC W/AUTO DIFF WBC: CPT

## 2018-11-02 PROCEDURE — APPSS30 APP SPLIT SHARED TIME 16-30 MINUTES: Performed by: NURSE PRACTITIONER

## 2018-11-02 PROCEDURE — C9113 INJ PANTOPRAZOLE SODIUM, VIA: HCPCS | Performed by: INTERNAL MEDICINE

## 2018-11-02 PROCEDURE — 2500000003 HC RX 250 WO HCPCS: Performed by: INTERNAL MEDICINE

## 2018-11-02 PROCEDURE — 31622 DX BRONCHOSCOPE/WASH: CPT | Performed by: INTERNAL MEDICINE

## 2018-11-02 PROCEDURE — 2709999900 HC NON-CHARGEABLE SUPPLY: Performed by: INTERNAL MEDICINE

## 2018-11-02 PROCEDURE — 0BJ08ZZ INSPECTION OF TRACHEOBRONCHIAL TREE, VIA NATURAL OR ARTIFICIAL OPENING ENDOSCOPIC: ICD-10-PCS | Performed by: INTERNAL MEDICINE

## 2018-11-02 PROCEDURE — 36415 COLL VENOUS BLD VENIPUNCTURE: CPT

## 2018-11-02 PROCEDURE — 3609027000 HC BRONCHOSCOPY

## 2018-11-02 PROCEDURE — 82330 ASSAY OF CALCIUM: CPT

## 2018-11-02 PROCEDURE — 84300 ASSAY OF URINE SODIUM: CPT

## 2018-11-02 PROCEDURE — 94770 HC ETCO2 MONITOR DAILY: CPT

## 2018-11-02 PROCEDURE — 99291 CRITICAL CARE FIRST HOUR: CPT | Performed by: INTERNAL MEDICINE

## 2018-11-02 PROCEDURE — 3609027000 HC BRONCHOSCOPY: Performed by: INTERNAL MEDICINE

## 2018-11-02 PROCEDURE — 83935 ASSAY OF URINE OSMOLALITY: CPT

## 2018-11-02 PROCEDURE — 2000000000 HC ICU R&B

## 2018-11-02 PROCEDURE — 2709999900 HC NON-CHARGEABLE SUPPLY

## 2018-11-02 PROCEDURE — 94799 UNLISTED PULMONARY SVC/PX: CPT

## 2018-11-02 PROCEDURE — 99232 SBSQ HOSP IP/OBS MODERATE 35: CPT | Performed by: SURGERY

## 2018-11-02 RX ORDER — DEXTROSE MONOHYDRATE 50 MG/ML
INJECTION, SOLUTION INTRAVENOUS CONTINUOUS
Status: DISCONTINUED | OUTPATIENT
Start: 2018-11-02 | End: 2018-11-08 | Stop reason: HOSPADM

## 2018-11-02 RX ORDER — FENTANYL CITRATE 50 UG/ML
INJECTION, SOLUTION INTRAMUSCULAR; INTRAVENOUS
Status: DISCONTINUED
Start: 2018-11-02 | End: 2018-11-02

## 2018-11-02 RX ORDER — KETAMINE HCL IN NACL, ISO-OSM 100MG/10ML
50 SYRINGE (ML) INJECTION ONCE
Status: COMPLETED | OUTPATIENT
Start: 2018-11-02 | End: 2018-11-02

## 2018-11-02 RX ORDER — KETAMINE HCL IN NACL, ISO-OSM 100MG/10ML
SYRINGE (ML) INJECTION
Status: COMPLETED
Start: 2018-11-02 | End: 2018-11-02

## 2018-11-02 RX ORDER — KETAMINE HYDROCHLORIDE 50 MG/ML
50 INJECTION, SOLUTION, CONCENTRATE INTRAMUSCULAR; INTRAVENOUS ONCE
Status: DISCONTINUED | OUTPATIENT
Start: 2018-11-02 | End: 2018-11-02 | Stop reason: SDUPTHER

## 2018-11-02 RX ORDER — PROMETHAZINE HYDROCHLORIDE 25 MG/ML
12.5 INJECTION, SOLUTION INTRAMUSCULAR; INTRAVENOUS EVERY 6 HOURS PRN
Status: DISCONTINUED | OUTPATIENT
Start: 2018-11-02 | End: 2018-11-08 | Stop reason: HOSPADM

## 2018-11-02 RX ORDER — POTASSIUM CHLORIDE 29.8 MG/ML
20 INJECTION INTRAVENOUS
Status: COMPLETED | OUTPATIENT
Start: 2018-11-02 | End: 2018-11-02

## 2018-11-02 RX ADMIN — POTASSIUM CHLORIDE 20 MEQ: 29.8 INJECTION, SOLUTION INTRAVENOUS at 15:03

## 2018-11-02 RX ADMIN — HYDROMORPHONE HYDROCHLORIDE 4 MG: 2 TABLET ORAL at 21:20

## 2018-11-02 RX ADMIN — DEXTROSE MONOHYDRATE: 50 INJECTION, SOLUTION INTRAVENOUS at 17:10

## 2018-11-02 RX ADMIN — PANTOPRAZOLE SODIUM 40 MG: 40 INJECTION, POWDER, FOR SOLUTION INTRAVENOUS at 09:33

## 2018-11-02 RX ADMIN — NALOXEGOL OXALATE 25 MG: 25 TABLET, FILM COATED ORAL at 12:44

## 2018-11-02 RX ADMIN — DEXTROSE MONOHYDRATE 12.5 G: 500 INJECTION PARENTERAL at 01:49

## 2018-11-02 RX ADMIN — HYDROMORPHONE HYDROCHLORIDE 0.5 MG: 1 INJECTION, SOLUTION INTRAMUSCULAR; INTRAVENOUS; SUBCUTANEOUS at 12:23

## 2018-11-02 RX ADMIN — Medication 10 ML: at 09:33

## 2018-11-02 RX ADMIN — PROMETHAZINE HYDROCHLORIDE 12.5 MG: 25 INJECTION INTRAMUSCULAR; INTRAVENOUS at 16:50

## 2018-11-02 RX ADMIN — THIAMINE HYDROCHLORIDE 200 MG: 100 INJECTION, SOLUTION INTRAMUSCULAR; INTRAVENOUS at 09:26

## 2018-11-02 RX ADMIN — LEVOTHYROXINE SODIUM 50 MCG: 50 TABLET ORAL at 15:00

## 2018-11-02 RX ADMIN — LOSARTAN POTASSIUM 25 MG: 25 TABLET, FILM COATED ORAL at 15:01

## 2018-11-02 RX ADMIN — POTASSIUM CHLORIDE 20 MEQ: 29.8 INJECTION, SOLUTION INTRAVENOUS at 16:51

## 2018-11-02 RX ADMIN — ASCORBIC ACID 1500 MG: 500 INJECTION, SOLUTION INTRAMUSCULAR; INTRAVENOUS; SUBCUTANEOUS at 17:08

## 2018-11-02 RX ADMIN — Medication 10 ML: at 21:01

## 2018-11-02 RX ADMIN — ASCORBIC ACID 1500 MG: 500 INJECTION, SOLUTION INTRAMUSCULAR; INTRAVENOUS; SUBCUTANEOUS at 12:02

## 2018-11-02 RX ADMIN — PRAVASTATIN SODIUM 20 MG: 20 TABLET ORAL at 21:20

## 2018-11-02 RX ADMIN — HYDROMORPHONE HYDROCHLORIDE 2 MG: 2 TABLET ORAL at 18:21

## 2018-11-02 RX ADMIN — DEXMEDETOMIDINE 1.2 MCG/KG/HR: 100 INJECTION, SOLUTION, CONCENTRATE INTRAVENOUS at 06:01

## 2018-11-02 RX ADMIN — THIAMINE HYDROCHLORIDE 200 MG: 100 INJECTION, SOLUTION INTRAMUSCULAR; INTRAVENOUS at 21:01

## 2018-11-02 RX ADMIN — METHADONE HYDROCHLORIDE 20 MG: 10 TABLET ORAL at 22:21

## 2018-11-02 RX ADMIN — METHADONE HYDROCHLORIDE 20 MG: 10 TABLET ORAL at 06:17

## 2018-11-02 RX ADMIN — DEXTROSE MONOHYDRATE 100 ML/HR: 50 INJECTION, SOLUTION INTRAVENOUS at 00:07

## 2018-11-02 RX ADMIN — DOCUSATE SODIUM 100 MG: 50 LIQUID ORAL at 12:44

## 2018-11-02 RX ADMIN — MAGNESIUM SULFATE HEPTAHYDRATE 3 G: 500 INJECTION, SOLUTION INTRAMUSCULAR; INTRAVENOUS at 12:57

## 2018-11-02 RX ADMIN — MEROPENEM 1 G: 1 INJECTION, POWDER, FOR SOLUTION INTRAVENOUS at 21:00

## 2018-11-02 RX ADMIN — LEVETIRACETAM 500 MG: 100 INJECTION, SOLUTION INTRAVENOUS at 12:02

## 2018-11-02 RX ADMIN — HYDROMORPHONE HYDROCHLORIDE 0.5 MG: 1 INJECTION, SOLUTION INTRAMUSCULAR; INTRAVENOUS; SUBCUTANEOUS at 04:43

## 2018-11-02 RX ADMIN — ASCORBIC ACID 1500 MG: 500 INJECTION, SOLUTION INTRAMUSCULAR; INTRAVENOUS; SUBCUTANEOUS at 06:17

## 2018-11-02 RX ADMIN — ONDANSETRON HYDROCHLORIDE 4 MG: 2 SOLUTION INTRAMUSCULAR; INTRAVENOUS at 12:52

## 2018-11-02 RX ADMIN — POTASSIUM & SODIUM PHOSPHATES POWDER PACK 280-160-250 MG 250 MG: 280-160-250 PACK at 17:10

## 2018-11-02 RX ADMIN — POTASSIUM & SODIUM PHOSPHATES POWDER PACK 280-160-250 MG 250 MG: 280-160-250 PACK at 12:44

## 2018-11-02 RX ADMIN — DEXTROSE MONOHYDRATE: 50 INJECTION, SOLUTION INTRAVENOUS at 09:39

## 2018-11-02 RX ADMIN — MAGNESIUM GLUCONATE 500 MG ORAL TABLET 400 MG: 500 TABLET ORAL at 12:46

## 2018-11-02 RX ADMIN — MICAFUNGIN SODIUM 100 MG: 20 INJECTION, POWDER, LYOPHILIZED, FOR SOLUTION INTRAVENOUS at 09:26

## 2018-11-02 RX ADMIN — METHADONE HYDROCHLORIDE 20 MG: 10 TABLET ORAL at 15:08

## 2018-11-02 RX ADMIN — POTASSIUM & SODIUM PHOSPHATES POWDER PACK 280-160-250 MG 250 MG: 280-160-250 PACK at 21:26

## 2018-11-02 RX ADMIN — AMLODIPINE BESYLATE 10 MG: 10 TABLET ORAL at 15:00

## 2018-11-02 RX ADMIN — ACETAMINOPHEN 500 MG: 500 TABLET, FILM COATED ORAL at 15:08

## 2018-11-02 RX ADMIN — MEROPENEM 1 G: 1 INJECTION, POWDER, FOR SOLUTION INTRAVENOUS at 04:12

## 2018-11-02 RX ADMIN — ONDANSETRON HYDROCHLORIDE 4 MG: 2 SOLUTION INTRAMUSCULAR; INTRAVENOUS at 19:10

## 2018-11-02 RX ADMIN — Medication: at 08:50

## 2018-11-02 RX ADMIN — ASCORBIC ACID 1500 MG: 500 INJECTION, SOLUTION INTRAMUSCULAR; INTRAVENOUS; SUBCUTANEOUS at 00:27

## 2018-11-02 RX ADMIN — ONDANSETRON HYDROCHLORIDE 4 MG: 2 SOLUTION INTRAMUSCULAR; INTRAVENOUS at 04:37

## 2018-11-02 RX ADMIN — LEVETIRACETAM 500 MG: 100 INJECTION, SOLUTION INTRAVENOUS at 23:00

## 2018-11-02 RX ADMIN — HYDROMORPHONE HYDROCHLORIDE 0.5 MG: 1 INJECTION, SOLUTION INTRAMUSCULAR; INTRAVENOUS; SUBCUTANEOUS at 16:51

## 2018-11-02 RX ADMIN — MEROPENEM 1 G: 1 INJECTION, POWDER, FOR SOLUTION INTRAVENOUS at 12:47

## 2018-11-02 ASSESSMENT — PAIN DESCRIPTION - FREQUENCY
FREQUENCY: CONTINUOUS

## 2018-11-02 ASSESSMENT — PAIN DESCRIPTION - LOCATION
LOCATION: BUTTOCKS;RECTUM
LOCATION: SACRUM
LOCATION: BUTTOCKS

## 2018-11-02 ASSESSMENT — PAIN SCALES - GENERAL
PAINLEVEL_OUTOF10: 4
PAINLEVEL_OUTOF10: 6
PAINLEVEL_OUTOF10: 8
PAINLEVEL_OUTOF10: 8
PAINLEVEL_OUTOF10: 7
PAINLEVEL_OUTOF10: 8
PAINLEVEL_OUTOF10: 4
PAINLEVEL_OUTOF10: 10
PAINLEVEL_OUTOF10: 5
PAINLEVEL_OUTOF10: 3

## 2018-11-02 ASSESSMENT — PULMONARY FUNCTION TESTS
PIF_VALUE: 19
PIF_VALUE: 25
PIF_VALUE: 24

## 2018-11-02 ASSESSMENT — PAIN DESCRIPTION - PAIN TYPE
TYPE: CHRONIC PAIN

## 2018-11-02 ASSESSMENT — PAIN DESCRIPTION - ONSET
ONSET: ON-GOING
ONSET: ON-GOING

## 2018-11-02 ASSESSMENT — PAIN DESCRIPTION - DESCRIPTORS
DESCRIPTORS: ACHING

## 2018-11-02 ASSESSMENT — PAIN DESCRIPTION - PROGRESSION
CLINICAL_PROGRESSION: NOT CHANGED
CLINICAL_PROGRESSION: NOT CHANGED

## 2018-11-02 NOTE — PROGRESS NOTES
OTHER:      EDUCATION:   Learner: [x]Patient [] Significant other [] Son/Daughter [x] Parent     [] Other:   Education: [x] Reviewed results and recommendations of this evaluation     [x] Reviewed diet and strategies     [x] Reviewed signs, symptoms and risk of aspiration     [] Demonstrated how to thick liquid appropriately. [] Reviewed goals and Plan of Care     [] OTHER:   Method: [x] Discussion [] Demonstration [] Hand-out     [] OTHER:   Evaluation of Education:     [x] Verbalizes understanding [x] Demonstrates with assistance     [] Demonstrates without assistance []Needs further instruction     [] No evidence of learning  [] Family not present    PATIENT GOALS: [] Pt did not state. Will further assess during treatment. [x] Return to the least restricted diet possible     [] Return to previous level of function     [] OTHER:    PLAN / TREATMENT RECOMMENDATIONS:  [x] Skilled SLP intervention on acute care 3-5 x per week or until goals met and/or pt plateaus in function. Specific interventions for next session may include: dysphagia goals. SHORT TERM GOALS:  Short-term Goals  Timeframe for Short-term Goals: 2 weeks  Goal 1: Pt will tolerate dysphagia III with thin liquids with no s/s of aspiration to ensure safe and adequate nutrition  Goal 2: Pt will tolerate advanced trials of solids with no s/s of aspiration to ensure safe and adequate nutrition      LONG TERM GOALS:  No LTG due to short ELOS.         Cindy Flores, Speech Intern   Maulik HUSSEIN-NALDO/[id number]

## 2018-11-02 NOTE — PROGRESS NOTES
[address] ICU 4D - 4D-06/006-A    Time In: 6540  Time Out: 1411  Timed Code Treatment Minutes: 15 Minutes  Minutes: 30          Date: 2018  Patient Name: Mel Galvan,  Gender:  female        MRN: [id number]  : 1965  (47 y.o.)     Referring Practitioner: Yoandy Butcher MD  Diagnosis: Severe sepsis  Additional Pertinent Hx: Patient is a 56-year-old black female lifetime nonsmoker. She has a history of type 2 diabetes mellitus, hyperlipidemia, hypothyroidism, and hypertension. 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 She underwent surgical intervention with adjuvant chemoradiation therapy in 2012. She developed local recurrence around the anastomosis in  and underwent low anterior resection and intraoperative radiation 10/21/14. Surgical margins were positive and she received chemotherapy with 6 cycles Folfiri. Subsequent colonoscopy completed in 2017 showed ongoing tumor involvement in the rectum. CT scan completed 2017 showed sacral soft tissue mass contiguous with previous surgical site. PET scan showed increased metabolic activity. She was no longer a surgical candidate and underwent treatment with Xelox/Avastin in 2017. Treatment concluded 2017. She developed an abscess and was taken to the operating room on 10/30/17. She has required multiple debridements since that time including 2018 and 2018. She has multiple bouts of Pseudomonas infections including urinary tract infection. She appears to have received ciprofloxacin for chronic suppressive therapy. Given persistent abscess formation, patient has persistent ileostomy. Pt intubated 10/29.      Past Medical History:   Diagnosis Date    Blood transfusion     Cancer [facility])     colorectal    Diabetes mellitus type 2 in nonobese ([facility] [address] 10/27/2017    Hyperlipidemia     Hypertension     Hyperthyroidism      Past Surgical History:   Procedure Laterality Date    COLON SURGERY  6-5-12    Robotic assisted left hemicolectomy-Dr. Alvarez    COLONOSCOPY      HEMORRHOID SURGERY  8/2013    banding     HYSTERECTOMY  5/2011    HYSTEROSCOPY  2004    OTHER SURGICAL HISTORY  7/30/2013    RIGHT ABD.  WALL MASS EXCISION-Dr. Charo Rios    IN [address] INCL FLUOR GDNCE DX W/CELL WASHG [facility] N/A 10/29/2018    BRONCHOSCOPY performed by Cooper Teran MD at [facility] [place] SCRN NOT  [address] IND Left 10/27/2017    COLONOSCOPY performed by Zakiya Stevenson MD at [address]  1990's    TUNNELED VENOUS PORT PLACEMENT  6/28/2012    purple power port-Dr. Charo Rios        Restrictions/Precautions:  General Precautions, Fall Risk, Isolation (neutropenic precautions)           Prior Level of Function:  ADL Assistance: Independent  Homemaking Assistance: Needs assistance  Ambulation Assistance: Independent  Transfer Assistance: Independent  Additional Comments: PLOF obtained from previous recent admission    Subjective:     Subjective: pt was extubated earlier today and ok'd to sit edge of bed nursing recommended no out of bed due to platelet count, did c/o tx with OT due to multi complexity, pt was awake entire session and soft spoken she fatigued very easy     Pain:   .  Pain Assessment  Pain Level:  (rectal area no number but was given pain meds prior to session )       Social/Functional:  Lives With: Son (and mother)  Type of Home: House  Home Layout: Two level, Bed/Bath upstairs  Home Access: Level entry  Home Equipment: 4 wheeled walker     Objective:  Rolling to Right: Maximum assistance (x2 to position pillow )  Supine to Sit: Maximum assistance (x2 at trunk adn LEs )  Sit to Supine: Maximum assistance (at trunk and LEs)  Scooting: Maximal assistance (to scoot to edge of bed )            Balance  Comments: pt sat edge of bed for 11 min able.    Long term goals  Time Frame for Long term goals : NA due to short length of stay.

## 2018-11-02 NOTE — PROGRESS NOTES
Order was in the chart for bronchoscopy procedure. Verified that consent was signed. Time-out was done. ENDO airway mobile scope  was used. Assisted Dr. Luis Carlos Ochoa with bronchoscopy procedure. Bronchial washings were not obtained. Patient tolerated the procedure well. Patient has been successfully weaned from Mechanical Ventilation. RSBI before extubation was 42 with EtCO2 of 27 and SpO2 of 100 on 30% FiO2. Patient extubated and placed on 40% O2 via Bipap. Post extubation SpO2 is 98% with HR  136 bpm and RR 26 breaths/min. Patient had strong cough that was non-productive. Extubation Well tolerated by patient. Janina Martínez

## 2018-11-02 NOTE — FLOWSHEET NOTE
was in ICU and entered room with patient's nurse, Marisol Shaw. Patient is intubated and conscious. Patient was frustrated at her inability to communicate her needs to nurse and  despite use of picture board and alphabet chart drawn on clipboard. Patient repeatedly spelled \"DOG\" but nothing else. 11/02/18 0006   Encounter Summary   Services provided to: Patient   Referral/Consult From: Rounding   Continue Visiting Yes  (11/1)   Complexity of Encounter High   Length of Encounter 1 hour   Spiritual/Yarsani   Type Spiritual support   Assessment Approachable; Unable to respond

## 2018-11-02 NOTE — PROGRESS NOTES
aeruginosa. Colton Santoyota been present since before January 2018.   CT scan abdomen and pelvis shows ongoing phlegmon to the presacral area. 8. Left lower extremity DVT: Clearly has asymmetry to the lower extremities.  Ultrasound shows progression of DVT.  MRI brain showed no evidence of brain metastasis.  As a result, Lovenox was initiated 10/29/18.  Lovenox was converted to fondaparinux on 10/30/18 as patient had greater than 50% drop in platelets after starting Lovenox, and there was concern for HIT. She likely has prior heparin exposure with known DVT and prior treatment.  Suspect patient has bilateral atelectasis at the bases secondary to pulmonary emboli from the DVT. Patient was transitioned to fondaparinux given concerns for hit. However, when platelet count returned at 10, fondaparinux was discontinued. Await recovery of platelet count to restart anticoagulation. 9. Pancytopenia: Severe neutropenia.   White blood cell count trended upward.  Hemoglobin trending downward.  Initiated Aranesp and iron therapy.   However, hemoglobin continued to trend downward below 7.  Patient transfuse 3 units of irradiated packed red blood cells 10/31/18. Aranesp and iron discontinued 11/1/18. Thrombocytopenia has become very disturbingly and dangerously low.  Suspect heparin-induced thrombocytopenia with a rapid drop of platelets in 24 hours by more than 50%.  Continue to monitor for signs of bleeding.  Would not transfuse platelets unless there is evidence of active bleeding.  If patient has heparin-induced thrombocytopenia, transfusion would be counterproductive.  She is hypercoagulable and was therefore on fondaparinux despite the thrombocytopenia. However, platelet count dropped to 10 on 11/1/18 and fondaparinux was discontinued. Awaiting results of hit panel.   10. Rectal carcinoma: Metastatic adenocarcinoma of the rectum.   MRI brain shows no brain metastases.  Patient with known bone metastasis and pathologic fractures nonsmoker. FHX: Hypertension and diabetes. Allergies: No known drug allergies  Medications:       dextrose 100 mL/hr at 11/02/18 0939    dexmedetomidine 0.3 mcg/kg/hr (11/02/18 0940)    dextrose 100 mL/hr (11/02/18 0007)      fentaNYL        losartan  25 mg Oral Daily    docusate  100 mg Oral Daily    amLODIPine  10 mg Oral Daily    levetiracetam  500 mg Intravenous Q12H    potassium & sodium phosphates  1 packet Oral 4x Daily    micafungin  100 mg Intravenous Daily    insulin NPH  16 Units Subcutaneous BID    meropenem  1 g Intravenous Q8H    pantoprazole  40 mg Intravenous Daily    levothyroxine  50 mcg Oral Daily    methadone  20 mg Oral 3 times per day    naloxegol  25 mg Oral QAM    pravastatin  20 mg Oral Daily    sodium chloride flush  10 mL Intravenous 2 times per day    ascorbic acid  1,500 mg Intravenous Q6H    thiamine (VITAMIN B1) IVPB  200 mg Intravenous BID    insulin lispro  0-18 Units Subcutaneous Q4H       ondansetron 4 mg Q6H PRN   hydrALAZINE 5 mg Q6H PRN   acetaminophen 500 mg Q6H PRN   HYDROmorphone 0.5 mg Q3H PRN   acetaminophen 500 mg Q6H PRN   HYDROmorphone 4 mg Q3H PRN   sodium chloride flush 10 mL PRN   glucose 15 g PRN   dextrose 12.5 g PRN   glucagon (rDNA) 1 mg PRN   dextrose 100 mL/hr PRN      Vital Signs: T: 98.6: P: 112: RR: 21: B/P: 131/102: FiO2: 40%: O2 Sat: 97%: I/O: 3513/4530. General:   Acutely ill-appearing black female. HEENT:  normocephalic and atraumatic.  No scleral icterus. Neck: supple.  No JVD. Lungs: clear to auscultation.  No retractions  Cardiac: Sinus tachycardia. Abdomen: soft.  Nontender. Colostomy swollen. Extremities:  No clubbing, cyanosis x 4.  Left lower extremity edema 1+.  No right lower extremity edema. Vasculature: capillary refill < 3 seconds. Skin:  Moist.  Psych:  Awake on mechanical ventilator.  Following commands. Lymph:  No supraclavicular adenopathy.   Neurologic:  No focal deficit.  No seizure activity.     Data: (All radiographs, tracings, PFTs, and imaging are personally viewed and interpreted unless otherwise noted). · 2/2 blood cultures positive for E.coliWith resistance to Unasyn and Bactrim with intermediate resistance to cefuroxime, and Candida tropicalis. · Urine culture positive for Proteus mirabilis with pain and sensitivity and E. Coli with resistance to tetracycline and Bactrim. · CT scan abdomen and pelvis shows basilar consolidation versus atelectasis bilaterally. Gallbladder wall thickening.  Presacral fluid collection with decreased gas production area pathologic fractures to bilateral anterior sacral alar. · Peripheral blood smear without reported schistocytes. · Hit panel pending. · Telemetry shows sinus rhythm  · sodium 136, potassium 2.8, chloride 99, bicarb 23, BUN 13, creatinine 0.3, glucose 112. Magnesium 1.3. Lactic acid 1.9. Calcium 7.4. White blood cell count 1.7, hemoglobin 13.9, platelets 9. .     CC time 30 minutes.  Time was discontinuous and does not include procedures  Electronically signed by Mario Le.  Ofelia Bentley M.D.

## 2018-11-02 NOTE — PROGRESS NOTES
[facility] ICU 4D  DAILY NOTE    Time:  Time In: 3635  Time Out: 1411  Timed Code Treatment Minutes: 15 Minutes  Minutes: 30          Date: 2018  Patient Name: Ravi Solano,   Gender: female      Room: 4D-006-A  MRN: [id number]  : 1965  (47 y.o.)  Referring Practitioner: Valentín Peterson MD  Diagnosis: severe sepsis  Additional Pertinent Hx:  Patient is a 55-year-old black female lifetime nonsmoker. She has a history of type 2 diabetes mellitus, hyperlipidemia, hypothyroidism, and hypertension. 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 She underwent surgical intervention with adjuvant chemoradiation therapy in 2012. She developed local recurrence around the anastomosis in  and underwent low anterior resection and intraoperative radiation 10/21/14. Surgical margins were positive and she received chemotherapy with 6 cycles Folfiri. Subsequent colonoscopy completed in 2017 showed ongoing tumor involvement in the rectum. CT scan completed 2017 showed sacral soft tissue mass contiguous with previous surgical site. PET scan showed increased metabolic activity. She was no longer a surgical candidate and underwent treatment with Xelox/Avastin in 2017. Treatment concluded 2017. She developed an abscess and was taken to the operating room on 10/30/17. She has required multiple debridements since that time including 2018 and 2018. She has multiple bouts of Pseudomonas infections including urinary tract infection. She appears to have received ciprofloxacin for chronic suppressive therapy. Given persistent abscess formation, patient has persistent ileostomy. Pt intubated 10/29. Pt extubated 18.      Past Medical History:   Diagnosis Date    Blood transfusion     Cancer ([facility] [address]     colorectal    Diabetes mellitus type 2 in nonobese Activity Tolerance:  Activity Tolerance: Patient limited by fatigue  Activity Tolerance: All vitals remained stable throughout with pt not requiring any supplemental O2 this date. Pt tolerated sitting at EOB X11 minutes with flucutating assists from min-max assistance (increased assistance required when BUE utilized for feeding task and as fatigued). Pt demoed decreased ability to correct posture as demoed significant posterior lean requiring mod-max assist to correct. Pt demoed increased alertness, following more commands, and communicating with therapists throughout compared to previous session. Assessment:  Assessment: Pt has demonstrated increased progressed towards goals with improved activity tolerance and no longer intubated.  Pt will continue to benefit from OT services as requires significant assist X2 for bed mobility and unable to sit at EOB without assistance to complete simple ADL

## 2018-11-02 NOTE — PROGRESS NOTES
per report. Per review of care everywhere the patient has been following with Dr. Aleksandra Butt and at [facility]. She was recently restarted on chemotherapy with FOLFIRI and Avastin after she was found to have disease progression in September 2018 - found to have pathologic fracture of sacral bones August 2018 at [facility]. Per Care Everywhere, surgical intervention was deferred. . The patient's mother at the bedside states most recent dose of chemotherapy was given on 10/22/18. Oncology consult was requested to follow up on pancytopenia and metastatic rectal cancer. Paper copy of Dr. Cortes Aljeo most recent office note from Dr. Rehan Banegas in patient's chart. Over the last 24 hours:   Patient has been extubated this morning, on room air. She reports rectal pain. Denies fever, chills, chest pain, shortness of breath, cough, abdominal pain, nausea, vomiting. Holder is in place.      Meds    Current Medications    magnesium oxide  400 mg Oral Daily    magnesium sulfate  3 g Intravenous Once    potassium chloride  20 mEq Intravenous Q1H    losartan  25 mg Oral Daily    docusate  100 mg Oral Daily    amLODIPine  10 mg Oral Daily    levetiracetam  500 mg Intravenous Q12H    potassium & sodium phosphates  1 packet Oral 4x Daily    micafungin  100 mg Intravenous Daily    insulin NPH  16 Units Subcutaneous BID    meropenem  1 g Intravenous Q8H    pantoprazole  40 mg Intravenous Daily    levothyroxine  50 mcg Oral Daily    methadone  20 mg Oral 3 times per day    naloxegol  25 mg Oral QAM    pravastatin  20 mg Oral Daily    sodium chloride flush  10 mL Intravenous 2 times per day    ascorbic acid  1,500 mg Intravenous Q6H    thiamine (VITAMIN B1) IVPB  200 mg Intravenous BID    insulin lispro  0-18 Units Subcutaneous Q4H     ondansetron, hydrALAZINE, HYDROmorphone, acetaminophen, HYDROmorphone, sodium chloride flush, glucose, dextrose, glucagon (rDNA), dextrose  IV Drips/Infusions   dextrose 100 mL/hr at 11/02/18 0994    dextrose 100 mL/hr (11/02/18 0007)     Past Medical History         Diagnosis Date    Blood transfusion     Cancer [facility])     colorectal    Diabetes mellitus type 2 in nonobese ([address] 10/27/2017    Hyperlipidemia     Hypertension     Hyperthyroidism       Past Surgical History           Procedure Laterality Date    COLON SURGERY  6-5-12    Robotic assisted left hemicolectomy-Dr. Alvarez    COLONOSCOPY      HEMORRHOID SURGERY  8/2013    banding     HYSTERECTOMY  5/2011    HYSTEROSCOPY  2004    OTHER SURGICAL HISTORY  7/30/2013    RIGHT ABD. WALL MASS EXCISION-Dr. Rom Jarrett    [place] [address] INCL FLUOR GDNCE DX W/CELL WASHG SPX N/A 10/29/2018    BRONCHOSCOPY performed by Sreedhar Rodriguez MD at [address] [place] [address] [place] SCRN NOT  [address] IND Left 10/27/2017    COLONOSCOPY performed by Johnna Dow MD at [facility]   [address]   1990's    TUNNELED VENOUS PORT PLACEMENT  6/28/2012    purple power port-Dr. Rom Jarrett      Diet    DIET DYSPHAGIA III ADVANCED; No Drinking Straw  Dietary Nutrition Supplements: Frozen Oral Supplement  Allergies    Patient has no known allergies. Social History     Social History     Social History    Marital status:      Spouse name: N/A    Number of children: 3    Years of education: 12     Occupational History    [employer]      Social History Main Topics    Smoking status: Never Smoker    Smokeless tobacco: Never Used    Alcohol use No    Drug use: No    Sexual activity: Not on file     Other Topics Concern    Not on file     Social History Narrative    No narrative on file     Family History      Family History   Problem Relation Age of Onset    Diabetes Mother     High Blood Pressure Mother     High Blood Pressure Father     Cancer Father         small intestine     ROS     Review of Systems   Pertinent review of systems noted in HPI, all other ROS negative.    Vitals     height is 5' (1.524 m) and weight is 132 lb 4.4 oz voice recognition software. It may contain minor errors which are inherent in voice recognition technology. ** Final report electronically signed by Dr Alfonso Chavez on 10/25/2018 11:28 AM     Mri Brain W Wo Contrast     Result Date: 10/29/2018  PROCEDURE: MRI BRAIN W WO CONTRAST CLINICAL INFORMATIONnew on set seizure with possible brain mets. Colon cancer on chemotherapy COMPARISON: CT 9/20/2018 TECHNIQUE: Multiplanar and multiple spin echo T1 and T2-weighted images were obtained through the brain before and after the administration of intravenous contrast. FINDINGS: Ventricles and extra-axial spaces: Normal for age. Brain: There is no diffusion restriction to suggest ischemia. There are a few tiny areas of high T2 signal bilaterally. They involve each periventricular white matter region as well as the left temporal lobe. They do not have associated contrast enhancement favoring chronic microangiopathic foci. No mass or abnormal enhancement. No hemorrhage on gradient echo. Conventional brain images of the hippocampal regions show no asymmetry or sclerosis. Brainstem: Normal Cerebellum: Normal Other:  The vascular flow voids are intact. Mild ethmoid mucosal thickening. Minimal maxillary and sphenoid thickening. Subtle mastoid inflammation, left greater than right. The sella is unremarkable. The calvarium is unremarkable.      No mass or abnormal enhancement. No gross seizure focus. **This report has been created using voice recognition software. It may contain minor errors which are inherent in voice recognition technology. ** Final report electronically signed by Dr. Elizabeth Lemons on 10/29/2018 2:41 PM    Assessment/Recommendations    1. Metastatic Rectal Cancer - followed by Dr. Wero Alcantar and at [facility]. Dr. Haily Galeas is covering inpatient stay. Care Everywhere reviewed. Patient received FOLFIRI and Avastin on 10/22/18. Paper copy of Dr. Zaira Moe office note (covering for Dr. Wero Alcantar in office) is in chart.

## 2018-11-03 ENCOUNTER — APPOINTMENT (OUTPATIENT)
Dept: GENERAL RADIOLOGY | Age: 53
DRG: 853 | End: 2018-11-03
Payer: COMMERCIAL

## 2018-11-03 LAB
ANION GAP SERPL CALCULATED.3IONS-SCNC: 13 MEQ/L (ref 8–16)
ANISOCYTOSIS: PRESENT
BASOPHILIA: ABNORMAL
BASOPHILS # BLD: 0.9 %
BASOPHILS ABSOLUTE: 0 THOU/MM3 (ref 0–0.1)
BUN BLDV-MCNC: 7 MG/DL (ref 7–22)
CALCIUM SERPL-MCNC: 7.9 MG/DL (ref 8.5–10.5)
CHLORIDE BLD-SCNC: 96 MEQ/L (ref 98–111)
CO2: 26 MEQ/L (ref 23–33)
CREAT SERPL-MCNC: 0.3 MG/DL (ref 0.4–1.2)
DOHLE BODIES: PRESENT
EOSINOPHIL # BLD: 1.2 %
EOSINOPHILS ABSOLUTE: 0 THOU/MM3 (ref 0–0.4)
ERYTHROCYTE [DISTWIDTH] IN BLOOD BY AUTOMATED COUNT: 17.1 % (ref 11.5–14.5)
ERYTHROCYTE [DISTWIDTH] IN BLOOD BY AUTOMATED COUNT: 53.4 FL (ref 35–45)
GFR SERPL CREATININE-BSD FRML MDRD: > 90 ML/MIN/1.73M2
GLUCOSE BLD-MCNC: 110 MG/DL (ref 70–108)
GLUCOSE BLD-MCNC: 132 MG/DL (ref 70–108)
GLUCOSE BLD-MCNC: 132 MG/DL (ref 70–108)
GLUCOSE BLD-MCNC: 143 MG/DL (ref 70–108)
GLUCOSE BLD-MCNC: 152 MG/DL (ref 70–108)
GLUCOSE BLD-MCNC: 161 MG/DL (ref 70–108)
GLUCOSE BLD-MCNC: 164 MG/DL (ref 70–108)
HCT VFR BLD CALC: 40.8 % (ref 37–47)
HEMOGLOBIN: 14.3 GM/DL (ref 12–16)
IMMATURE GRANS (ABS): 0.26 THOU/MM3 (ref 0–0.07)
IMMATURE GRANULOCYTES: 7.9 %
LYMPHOCYTES # BLD: 18.2 %
LYMPHOCYTES ABSOLUTE: 0.6 THOU/MM3 (ref 1–4.8)
MAGNESIUM: 1.4 MG/DL (ref 1.6–2.4)
MCH RBC QN AUTO: 30.8 PG (ref 26–33)
MCHC RBC AUTO-ENTMCNC: 35 GM/DL (ref 32.2–35.5)
MCV RBC AUTO: 87.7 FL (ref 81–99)
MONOCYTES # BLD: 4.9 %
MONOCYTES ABSOLUTE: 0.2 THOU/MM3 (ref 0.4–1.3)
NUCLEATED RED BLOOD CELLS: 6 /100 WBC
PLATELET # BLD: 15 THOU/MM3 (ref 130–400)
PLATELET ESTIMATE: ABNORMAL
PMV BLD AUTO: 12.2 FL (ref 9.4–12.4)
POIKILOCYTES: ABNORMAL
POTASSIUM REFLEX MAGNESIUM: 3 MEQ/L (ref 3.5–5.2)
RBC # BLD: 4.65 MILL/MM3 (ref 4.2–5.4)
SCAN OF BLOOD SMEAR: NORMAL
SEG NEUTROPHILS: 66.9 %
SEGMENTED NEUTROPHILS ABSOLUTE COUNT: 2.2 THOU/MM3 (ref 1.8–7.7)
SODIUM BLD-SCNC: 135 MEQ/L (ref 135–145)
TOXIC GRANULATION: PRESENT
WBC # BLD: 3.3 THOU/MM3 (ref 4.8–10.8)

## 2018-11-03 PROCEDURE — 6370000000 HC RX 637 (ALT 250 FOR IP): Performed by: INTERNAL MEDICINE

## 2018-11-03 PROCEDURE — 99222 1ST HOSP IP/OBS MODERATE 55: CPT | Performed by: PSYCHIATRY & NEUROLOGY

## 2018-11-03 PROCEDURE — 2580000003 HC RX 258: Performed by: INTERNAL MEDICINE

## 2018-11-03 PROCEDURE — 2060000000 HC ICU INTERMEDIATE R&B

## 2018-11-03 PROCEDURE — 83735 ASSAY OF MAGNESIUM: CPT

## 2018-11-03 PROCEDURE — 6360000002 HC RX W HCPCS: Performed by: INTERNAL MEDICINE

## 2018-11-03 PROCEDURE — 2580000003 HC RX 258: Performed by: PSYCHIATRY & NEUROLOGY

## 2018-11-03 PROCEDURE — 85025 COMPLETE CBC W/AUTO DIFF WBC: CPT

## 2018-11-03 PROCEDURE — 2709999900 HC NON-CHARGEABLE SUPPLY

## 2018-11-03 PROCEDURE — 82948 REAGENT STRIP/BLOOD GLUCOSE: CPT

## 2018-11-03 PROCEDURE — 36415 COLL VENOUS BLD VENIPUNCTURE: CPT

## 2018-11-03 PROCEDURE — 6360000002 HC RX W HCPCS: Performed by: PSYCHIATRY & NEUROLOGY

## 2018-11-03 PROCEDURE — 99232 SBSQ HOSP IP/OBS MODERATE 35: CPT | Performed by: SURGERY

## 2018-11-03 PROCEDURE — 6360000002 HC RX W HCPCS

## 2018-11-03 PROCEDURE — C9113 INJ PANTOPRAZOLE SODIUM, VIA: HCPCS | Performed by: INTERNAL MEDICINE

## 2018-11-03 PROCEDURE — 99291 CRITICAL CARE FIRST HOUR: CPT | Performed by: INTERNAL MEDICINE

## 2018-11-03 PROCEDURE — 71045 X-RAY EXAM CHEST 1 VIEW: CPT

## 2018-11-03 PROCEDURE — 80048 BASIC METABOLIC PNL TOTAL CA: CPT

## 2018-11-03 PROCEDURE — 2500000003 HC RX 250 WO HCPCS: Performed by: INTERNAL MEDICINE

## 2018-11-03 RX ORDER — DEXTROSE MONOHYDRATE 25 G/50ML
12.5 INJECTION, SOLUTION INTRAVENOUS PRN
Status: DISCONTINUED | OUTPATIENT
Start: 2018-11-03 | End: 2018-11-08 | Stop reason: HOSPADM

## 2018-11-03 RX ORDER — NICOTINE POLACRILEX 4 MG
15 LOZENGE BUCCAL PRN
Status: DISCONTINUED | OUTPATIENT
Start: 2018-11-03 | End: 2018-11-08 | Stop reason: HOSPADM

## 2018-11-03 RX ORDER — MAGNESIUM SULFATE IN WATER 40 MG/ML
2 INJECTION, SOLUTION INTRAVENOUS ONCE
Status: COMPLETED | OUTPATIENT
Start: 2018-11-03 | End: 2018-11-03

## 2018-11-03 RX ORDER — POTASSIUM CHLORIDE 29.8 MG/ML
20 INJECTION INTRAVENOUS
Status: DISPENSED | OUTPATIENT
Start: 2018-11-03 | End: 2018-11-03

## 2018-11-03 RX ORDER — METOPROLOL SUCCINATE 50 MG/1
50 TABLET, EXTENDED RELEASE ORAL DAILY
Status: DISCONTINUED | OUTPATIENT
Start: 2018-11-03 | End: 2018-11-03

## 2018-11-03 RX ORDER — DEXTROSE MONOHYDRATE 50 MG/ML
100 INJECTION, SOLUTION INTRAVENOUS PRN
Status: DISCONTINUED | OUTPATIENT
Start: 2018-11-03 | End: 2018-11-08 | Stop reason: HOSPADM

## 2018-11-03 RX ADMIN — Medication 10 ML: at 20:35

## 2018-11-03 RX ADMIN — METHADONE HYDROCHLORIDE 20 MG: 10 TABLET ORAL at 22:35

## 2018-11-03 RX ADMIN — PRAVASTATIN SODIUM 20 MG: 20 TABLET ORAL at 09:15

## 2018-11-03 RX ADMIN — Medication 2 UNITS: at 12:37

## 2018-11-03 RX ADMIN — LEVOTHYROXINE SODIUM 50 MCG: 50 TABLET ORAL at 05:47

## 2018-11-03 RX ADMIN — MAGNESIUM GLUCONATE 500 MG ORAL TABLET 400 MG: 500 TABLET ORAL at 09:15

## 2018-11-03 RX ADMIN — AMLODIPINE BESYLATE 10 MG: 10 TABLET ORAL at 09:15

## 2018-11-03 RX ADMIN — POTASSIUM & SODIUM PHOSPHATES POWDER PACK 280-160-250 MG 250 MG: 280-160-250 PACK at 20:18

## 2018-11-03 RX ADMIN — INSULIN LISPRO 1 UNITS: 100 INJECTION, SOLUTION INTRAVENOUS; SUBCUTANEOUS at 20:19

## 2018-11-03 RX ADMIN — MICAFUNGIN SODIUM 100 MG: 20 INJECTION, POWDER, LYOPHILIZED, FOR SOLUTION INTRAVENOUS at 09:35

## 2018-11-03 RX ADMIN — DEXTROSE MONOHYDRATE: 50 INJECTION, SOLUTION INTRAVENOUS at 05:54

## 2018-11-03 RX ADMIN — HYDROMORPHONE HYDROCHLORIDE 0.5 MG: 1 INJECTION, SOLUTION INTRAMUSCULAR; INTRAVENOUS; SUBCUTANEOUS at 09:15

## 2018-11-03 RX ADMIN — PANTOPRAZOLE SODIUM 40 MG: 40 INJECTION, POWDER, FOR SOLUTION INTRAVENOUS at 09:15

## 2018-11-03 RX ADMIN — POTASSIUM & SODIUM PHOSPHATES POWDER PACK 280-160-250 MG 250 MG: 280-160-250 PACK at 09:15

## 2018-11-03 RX ADMIN — HYDROMORPHONE HYDROCHLORIDE 4 MG: 2 TABLET ORAL at 12:33

## 2018-11-03 RX ADMIN — ASCORBIC ACID 1500 MG: 500 INJECTION, SOLUTION INTRAMUSCULAR; INTRAVENOUS; SUBCUTANEOUS at 05:47

## 2018-11-03 RX ADMIN — LEVETIRACETAM 500 MG: 100 INJECTION, SOLUTION INTRAVENOUS at 10:27

## 2018-11-03 RX ADMIN — DEXTROSE MONOHYDRATE: 50 INJECTION, SOLUTION INTRAVENOUS at 18:07

## 2018-11-03 RX ADMIN — MAGNESIUM SULFATE HEPTAHYDRATE 2 G: 40 INJECTION, SOLUTION INTRAVENOUS at 10:55

## 2018-11-03 RX ADMIN — METOPROLOL TARTRATE 25 MG: 25 TABLET ORAL at 11:06

## 2018-11-03 RX ADMIN — POTASSIUM CHLORIDE 20 MEQ: 29.8 INJECTION, SOLUTION INTRAVENOUS at 12:18

## 2018-11-03 RX ADMIN — METOPROLOL TARTRATE 25 MG: 25 TABLET ORAL at 20:18

## 2018-11-03 RX ADMIN — MEROPENEM 1 G: 1 INJECTION, POWDER, FOR SOLUTION INTRAVENOUS at 03:31

## 2018-11-03 RX ADMIN — METHADONE HYDROCHLORIDE 20 MG: 10 TABLET ORAL at 14:55

## 2018-11-03 RX ADMIN — MEROPENEM 1 G: 1 INJECTION, POWDER, FOR SOLUTION INTRAVENOUS at 20:19

## 2018-11-03 RX ADMIN — POTASSIUM & SODIUM PHOSPHATES POWDER PACK 280-160-250 MG 250 MG: 280-160-250 PACK at 14:58

## 2018-11-03 RX ADMIN — ASCORBIC ACID 1500 MG: 500 INJECTION, SOLUTION INTRAMUSCULAR; INTRAVENOUS; SUBCUTANEOUS at 00:26

## 2018-11-03 RX ADMIN — HYDROMORPHONE HYDROCHLORIDE 0.5 MG: 1 INJECTION, SOLUTION INTRAMUSCULAR; INTRAVENOUS; SUBCUTANEOUS at 02:22

## 2018-11-03 RX ADMIN — Medication 2 UNITS: at 18:14

## 2018-11-03 RX ADMIN — HYDROMORPHONE HYDROCHLORIDE 0.5 MG: 1 INJECTION, SOLUTION INTRAMUSCULAR; INTRAVENOUS; SUBCUTANEOUS at 18:07

## 2018-11-03 RX ADMIN — MEROPENEM 1 G: 1 INJECTION, POWDER, FOR SOLUTION INTRAVENOUS at 12:22

## 2018-11-03 RX ADMIN — DOCUSATE SODIUM 100 MG: 50 LIQUID ORAL at 09:16

## 2018-11-03 RX ADMIN — METHADONE HYDROCHLORIDE 20 MG: 10 TABLET ORAL at 05:47

## 2018-11-03 RX ADMIN — HYDRALAZINE HYDROCHLORIDE 5 MG: 20 INJECTION INTRAMUSCULAR; INTRAVENOUS at 11:38

## 2018-11-03 RX ADMIN — HYDROMORPHONE HYDROCHLORIDE 0.5 MG: 1 INJECTION, SOLUTION INTRAMUSCULAR; INTRAVENOUS; SUBCUTANEOUS at 21:07

## 2018-11-03 RX ADMIN — POTASSIUM CHLORIDE 20 MEQ: 29.8 INJECTION, SOLUTION INTRAVENOUS at 11:06

## 2018-11-03 RX ADMIN — LOSARTAN POTASSIUM 25 MG: 25 TABLET, FILM COATED ORAL at 09:15

## 2018-11-03 RX ADMIN — LEVETIRACETAM 250 MG: 100 INJECTION, SOLUTION INTRAVENOUS at 21:07

## 2018-11-03 RX ADMIN — Medication 10 ML: at 09:16

## 2018-11-03 RX ADMIN — NALOXEGOL OXALATE 25 MG: 25 TABLET, FILM COATED ORAL at 09:15

## 2018-11-03 ASSESSMENT — PAIN SCALES - GENERAL
PAINLEVEL_OUTOF10: 0
PAINLEVEL_OUTOF10: 6
PAINLEVEL_OUTOF10: 10
PAINLEVEL_OUTOF10: 4
PAINLEVEL_OUTOF10: 7
PAINLEVEL_OUTOF10: 10
PAINLEVEL_OUTOF10: 6
PAINLEVEL_OUTOF10: 0
PAINLEVEL_OUTOF10: 6
PAINLEVEL_OUTOF10: 10
PAINLEVEL_OUTOF10: 9
PAINLEVEL_OUTOF10: 9

## 2018-11-03 ASSESSMENT — PAIN DESCRIPTION - PAIN TYPE
TYPE: ACUTE PAIN

## 2018-11-03 ASSESSMENT — PAIN DESCRIPTION - LOCATION
LOCATION: OTHER (COMMENT)
LOCATION: LEG
LOCATION: RECTUM
LOCATION: LEG
LOCATION: LEG
LOCATION: RECTUM
LOCATION: LEG

## 2018-11-03 ASSESSMENT — PAIN DESCRIPTION - DESCRIPTORS
DESCRIPTORS: ACHING
DESCRIPTORS: SHARP

## 2018-11-03 ASSESSMENT — PAIN DESCRIPTION - FREQUENCY
FREQUENCY: CONTINUOUS
FREQUENCY: CONTINUOUS

## 2018-11-03 ASSESSMENT — PAIN DESCRIPTION - ORIENTATION
ORIENTATION: LEFT

## 2018-11-03 ASSESSMENT — PAIN DESCRIPTION - ONSET: ONSET: ON-GOING

## 2018-11-03 NOTE — CONSULTS
atrophy. There is no muscle fasciculation . Drift No, Orbiting No    Sensory is intact forlight touch and cortical sensation . Coordination: finger to nose intact  Gait and station not tested  Abnormal movement none. Proprioception normal   Skin - no rashes or lesions  Superficial temporal artery pulses are normal.   Musculoskeletal: Has no hand arthritis, no limitation of ROM in any of the four extremities. no joint tenderness, deformity or swelling. There is no leg edema. The Heart was regular in rate and rhythm. No heart murmur     Labs:    CBC: Recent Labs      11/01/18   0608  11/01/18   1720  11/02/18   0600  11/03/18   0530   WBC  1.8*   --   1.7*  3.3*   HGB  13.7   --   13.9  14.3   PLT  8*  9*  9*  15*   MCV  88.0   --   86.5  87.7   MCH  31.0   --   30.8  30.8   MCHC  35.2   --   35.5  35.0     CMP:  Recent Labs      11/01/18   0608  11/02/18   0600  11/03/18   0530   NA  137  136  135   K  4.4  2.8*  3.0*   CL  106  99  96*   CO2  19*  23  26   BUN  19  13  7   CREATININE  0.2*  0.3*  0.3*   LABGLOM  >90  >90  >90   GLUCOSE  140*  66*  132*   CALCIUM  6.8*  7.4*  7.9*      I reviewed the MRI brain and agree with interpretation. Results for orders placed during the hospital encounter of 10/29/18   MRI BRAIN W WO CONTRAST    Narrative PROCEDURE: MRI BRAIN W [address] INFORMATIONnew on set seizure with possible brain mets. Colon cancer on chemotherapy    COMPARISON: CT 9/20/2018    TECHNIQUE: Multiplanar and multiple spin echo T1 and T2-weighted images were obtained through the brain before and after the administration of intravenous contrast.    FINDINGS:    Ventricles and extra-axial spaces: Normal for age. Brain: There is no diffusion restriction to suggest ischemia. There are a few tiny areas of high T2 signal bilaterally. They involve each periventricular white matter region as well as the left temporal lobe.  They do not have associated contrast enhancement favoring chronic

## 2018-11-03 NOTE — PROGRESS NOTES
Intensive Care Unit  Intensivist Progress Note    Patient: Dalton Gomez  : 1965  MRN#: [id number]  11/3/2018 8:59 AM  ADMISSION DAY:  10/29/2018  3:43 AM     Subjective:    Feels much better today. No fever or chills. No nausea or vomiting. She had breakfast that she tolerated very well. HPI:  Patient is a 50-year-old black female lifetime nonsmoker. Esteban Long has a history of type 2 diabetes mellitus, hyperlipidemia, hypothyroidism, and hypertension. 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  She underwent surgical intervention with adjuvant chemoradiation therapy in 2012.  She developed local recurrence around the anastomosis in  and underwent low anterior resection and intraoperative radiation 10/21/14. Surgical margins were positive and she received chemotherapy with 6 cycles Folfiri.  Subsequent colonoscopy completed in 2017 showed ongoing tumor involvement in the rectum.  CT scan completed 2017 showed sacral soft tissue mass contiguous with previous surgical site.  PET scan showed increased metabolic activity.  She was no longer a surgical candidate and underwent treatment with Xelox/Avastin in 2017.  Treatment concluded 2017.  She developed an abscess and was taken to the operating room on 10/30/17. Esteban Long has required multiple debridements since that time including 2018 and 2018. Esteban Long has multiple bouts of Pseudomonas infections including urinary tract infection.  She appears to have received ciprofloxacin for chronic suppressive therapy.  Given persistent abscess formation, patient has persistent ileostomy. Patient was admitted to the hospital on 10/29/18 after developing encephalopathy and poor responsiveness at home. Esteban Long was transported to the emergency room where she was found to have sepsis.  She was started on norepinephrine and IV fluids.  Cultures subsequently grew E. coli in the blood in addition to Candida tropicalis in the blood.  She initially was treated with Zosyn and Diflucan and subsequently converted to meropenem and micafungin on 10/30/18 with the Protestant of positive cultures.  Urine cultures also grew Proteus mirabilis and E. coli.  Source of septicemia was felt a calculus cholecystitis with probable gangrenous gallbladder.  This was also felt the etiology of persistent lactic acidosis despite aggressive hydration.          She developed ? seizure activity and was intubated 10/29/18.  She had an associated lactic acidosis despite aggressive hydration.  MRI brain with and without contrast showed no acute abnormality and no evidence of brain metastasis.  Patient was started on fosphenytoin and Valium for seizure activity.  She was [facility] to [facility] for anti-seizure maintenance. Patient Vitals for the past 8 hrs:   BP Temp Temp src Pulse Resp SpO2 Weight   11/03/18 0700 (!) 135/100 - - 121 25 95 % -   11/03/18 0600 (!) 131/97 - - 122 24 94 % 123 lb 7.3 oz (56 kg)   11/03/18 0500 (!) 139/102 - - 135 24 95 % -   11/03/18 0400 (!) 124/97 - - 128 23 95 % -   11/03/18 0300 (!) 134/101 98.8 °F (37.1 °C) CORE 133 21 95 % -   11/03/18 0200 (!) 134/97 - - 129 22 95 % -   11/03/18 0100 (!) 134/101 - - 118 26 93 % -       EXAM:  General: No distress. Eyes: PERRL. No sclera icterus. No conjunctival injection. ENT: No discharge. Pharynx clear. Neck: Trachea midline. Normal thyroid. Resp: No accessory muscle use. No crackles. No wheezing. No rhonchi. No dullness on percussion. CV: Tachycardic. Regular rhythm. No mumur or rub. No edema. Abd: Non-tender. Non-distended. No masses. No organmegaly. Normal bowel sounds. No hernia. Skin: Warm and dry. No nodule on exposed extremities. No rash on exposed extremities. Lymph: No cervical LAD. No supraclavicular LAD. M/S: No cyanosis. No joint deformity. No clubbing. Neuro: Awake. Follows commands. Positive pupils/gag/corneals.  Normal 9. 9  11.2   --    --   12.2      BMP/CMP:   Recent Labs      11/01/18   0608  11/02/18   0600  11/03/18   0530   NA  137  136  135   K  4.4  2.8*  3.0*   CL  106  99  96*   CO2  19*  23  26   ANIONGAP  12.0  14.0  13.0   BUN  19  13  7   CREATININE  0.2*  0.3*  0.3*   GLUCOSE  140*  66*  132*   CALCIUM  6.8*  7.4*  7.9*      Other Electrolytes:   Recent Labs      10/31/18   2050  11/01/18   0608  11/01/18   0800  11/02/18   0600  11/03/18   0530   CAION  0.94*   --   0.97*  1.02*   --    MG  1.9  1.8   --   1.3*  1.4*     Serum Osmolality  No results for input(s): OSMOMEASER in the last 72 hours. Procalcitonin:  No results for input(s): PROCAL in the last 72 hours. Cardiac: No results for input(s): TROPONINT in the last 72 hours. Lipids: No results for input(s): CHOL, HDL in the last 72 hours. Invalid input(s): LDLCALCU  Coagulation: No results for input(s): INR in the last 72 hours. Lactic Acid:   Recent Labs      11/01/18   0608  11/02/18   0600   LACTA  2.7*  1.9      ABGs:   Lab Results   Component Value Date    PH 7.42 10/29/2018    PH 5.0 04/29/2012    PCO2 33 10/29/2018    PO2 73 10/29/2018    HCO3 21 10/29/2018    O2SAT 95 10/29/2018     Lab Results   Component Value Date    IFIO2 6 10/29/2018       Radiology/Imaging:     XR CHEST PORTABLE [[id number]] Resulted: 11/03/18 0437      Order Status: Completed Updated: 11/03/18 0439     Narrative:       PROCEDURE: XR CHEST PORTABLE    CLINICAL INFORMATION: hypoxia, . COMPARISON: Chest x-ray dated 11/1/2018    TECHNIQUE: AP Portable upright chest xray    FINDINGS:  Lungs/pleura:  Lung volumes remain low. There is focally worsened or new opacity at the lateral left lung base either representing developing pneumonia or atelectasis. There is persistent atelectasis in the peripheral mid left lung and there is stable   retrocardiac left lower lobe infiltrate or atelectasis. There is stable right basilar atelectasis. No pleural effusion.  No left hydroureter and left pelviectasis. 6. There is asymmetric edema involving the visualized left lower extremity. This is nonspecific. 7. Bibasilar consolidative atelectasis or pneumonia along with a small left-sided pleural effusion is noted. **This report has been created using voice recognition software.  It may contain minor errors which are inherent in voice recognition technology. **    Final report electronically signed by Dr. Kenia Brown on 10/30/2018 4:05 PM     VL DUP LOWER EXTREMITY VENOUS LEFT [[id number]] Resulted: 10/30/18 1236     Order Status: Completed Updated: 10/30/18 1238     Narrative:       PROCEDURE: VL DUP LOWER EXTREMITY VENOUS LEFT    CLINICAL INFORMATION: Known deep venous thrombosis. Follow-up. COMPARISON: 10/25/2018    TECHNIQUE: Multiple grayscale and color flow images of the major veins of the left lower extremity were obtained from the level of the groin to the level of the ankle. Multiple compression and augmentation maneuvers were performed. A few images of the   contralateral common femoral vein were also obtained. FINDINGS:     There is no flow and noncompressibility involving the left common femoral vein, entire left superficial femoral vein, left popliteal vein, as well as the gastrocnemius veins and peroneal veins, which are filled with hypoechoic acute appearing thrombus. .   All of the other  deep veins , as well as the greater and lesser saphenous veins, are widely patent with normal flow and normal compressibility. .   The contralateral common femoral vein is unremarkable.       Impression:       Extensive relatively acute deep venous thrombosis extending from the left calf to the left groin. There appears be slightly greater amount of thrombus in the left common femoral vein at this time compared to prior study.         **This report has been created using voice recognition software.  It may contain minor errors which are inherent in voice recognition

## 2018-11-03 NOTE — PLAN OF CARE
Detected (A)           KLEBSIELLA OXYTOCA FILM ARRAY Unknown Not Detected           KLEBSIELLA PNEUMONIAE FILM ARRAY Unknown Not Detected           PROTEUS FILM ARRAY Unknown Not Detected           SERRATIA MARCESCENS FILM ARRAY Unknown Not Detected           CANDIDA ALBICANS FILM ARRAY Unknown Not Detected           SLOANE GLABRATA FILM ARRAY Unknown Not Detected           CANDIDA KRUSEI FILM ARRAY Unknown Not Detected           CANDIDA PARAPSILOSIS FILM ARRAY Unknown Not Detected           CANDIDA TROPICALIS FILM ARRAY Unknown Detected (A)           METHICILLIN RESISTANT FILM ARRAY Unknown NA           VANCOMYCIN RESISTANT FILM ARRAY Unknown NA           CARBAPENEM RESITANT FILM ARRAY Unknown Not Detected           MRSA SCREEN RT-PCR Unknown      NEGATIVE      Results for Preston Hudson (MRN [id number]) as of 11/2/2018 20:31   Ref. Range 10/29/2018 04:17 10/30/2018 05:05 10/30/2018 16:45 10/31/2018 04:35 10/31/2018 20:50 11/1/2018 06:08 11/2/2018 06:00   WBC Latest Ref Range: 4.8 - 10.8 thou/mm3 0.5 (LL) 3.0 (L) 2.6 (L) 1.5 (L) 2.0 (L) 1.8 (L) 1.7 (L)   Results for Preston Hudson (MRN [id number]) as of 11/2/2018 20:31   Ref. Range 10/29/2018 04:17   Procalcitonin Latest Ref Range: 0.01 - 0.09 ng/mL 53.27 (H)   Results for Preston Hudson (MRN [id number]) as of 11/2/2018 20:31   Ref. Range 10/29/2018 04:17 10/29/2018 11:55 10/29/2018 23:35 10/30/2018 05:05 10/31/2018 04:35 11/1/2018 06:08 11/2/2018 06:00   Lactic Acid Latest Ref Range: 0.5 - 2.2 mmol/L 5.8 (H) 6.7 (H) 6.0 (H) 5.9 (H) 3.5 (H) 2.7 (H) 1.9   On merrem and micafungin. Continuing to closely monitor and assess. Also on neutropenic precautions. Problem: Serum Glucose Level - Abnormal:  Goal: Ability to maintain appropriate glucose levels will improve  Ability to maintain appropriate glucose levels will improve   Outcome: Ongoing  Results for Preston Hudson (MRN [id number]) as of 11/2/2018 20:34   Ref.  Range 10/29/2018 03:46 10/31/2018 04:31 10/31/2018

## 2018-11-03 NOTE — PLAN OF CARE
Problem: Restraint Use - Nonviolent/Non-Self-Destructive Behavior:  Goal: Absence of restraint indications  Absence of restraint indications   Outcome: Completed Date Met: 11/02/18  At this time, Ms. Jairo Mcgee extubated and no longer requiring use of restraints. Continuing to closely monitor and assess. Goal: Absence of restraint-related injury  Absence of restraint-related injury   Outcome: Completed Date Met: 11/02/18  Ms. Jairo Mcgee extubated. ROM performed. Oral nutrition provided and oral hydration provided. Colostomy and gillis in place. Comments: Care plan reviewed with patient. Patient verbalizes understanding of the care plan and contributed to goal setting.

## 2018-11-04 LAB
ANION GAP SERPL CALCULATED.3IONS-SCNC: 14 MEQ/L (ref 8–16)
ANISOCYTOSIS: PRESENT
BASOPHILIA: ABNORMAL
BASOPHILS # BLD: 0.1 %
BASOPHILS ABSOLUTE: 0 THOU/MM3 (ref 0–0.1)
BUN BLDV-MCNC: 5 MG/DL (ref 7–22)
CALCIUM SERPL-MCNC: 8.3 MG/DL (ref 8.5–10.5)
CHLORIDE BLD-SCNC: 93 MEQ/L (ref 98–111)
CO2: 25 MEQ/L (ref 23–33)
CREAT SERPL-MCNC: 0.3 MG/DL (ref 0.4–1.2)
EOSINOPHIL # BLD: 0.6 %
EOSINOPHILS ABSOLUTE: 0 THOU/MM3 (ref 0–0.4)
ERYTHROCYTE [DISTWIDTH] IN BLOOD BY AUTOMATED COUNT: 16.6 % (ref 11.5–14.5)
ERYTHROCYTE [DISTWIDTH] IN BLOOD BY AUTOMATED COUNT: 51.3 FL (ref 35–45)
GFR SERPL CREATININE-BSD FRML MDRD: > 90 ML/MIN/1.73M2
GLUCOSE BLD-MCNC: 123 MG/DL (ref 70–108)
GLUCOSE BLD-MCNC: 140 MG/DL (ref 70–108)
GLUCOSE BLD-MCNC: 141 MG/DL (ref 70–108)
GLUCOSE BLD-MCNC: 141 MG/DL (ref 70–108)
HCT VFR BLD CALC: 42.5 % (ref 37–47)
HEMOGLOBIN: 14.9 GM/DL (ref 12–16)
IMMATURE GRANS (ABS): 0.97 THOU/MM3 (ref 0–0.07)
IMMATURE GRANULOCYTES: 14.1 %
LYMPHOCYTES # BLD: 8.3 %
LYMPHOCYTES ABSOLUTE: 0.6 THOU/MM3 (ref 1–4.8)
MAGNESIUM: 1.5 MG/DL (ref 1.6–2.4)
MCH RBC QN AUTO: 30.7 PG (ref 26–33)
MCHC RBC AUTO-ENTMCNC: 35.1 GM/DL (ref 32.2–35.5)
MCV RBC AUTO: 87.4 FL (ref 81–99)
MONOCYTES # BLD: 3.9 %
MONOCYTES ABSOLUTE: 0.3 THOU/MM3 (ref 0.4–1.3)
NUCLEATED RED BLOOD CELLS: 2 /100 WBC
PLATELET # BLD: 20 THOU/MM3 (ref 130–400)
PLATELET ESTIMATE: ABNORMAL
POIKILOCYTES: ABNORMAL
POTASSIUM REFLEX MAGNESIUM: 2.7 MEQ/L (ref 3.5–5.2)
POTASSIUM SERPL-SCNC: 3.4 MEQ/L (ref 3.5–5.2)
RBC # BLD: 4.86 MILL/MM3 (ref 4.2–5.4)
SCAN OF BLOOD SMEAR: NORMAL
SEG NEUTROPHILS: 73 %
SEGMENTED NEUTROPHILS ABSOLUTE COUNT: 5 THOU/MM3 (ref 1.8–7.7)
SODIUM BLD-SCNC: 132 MEQ/L (ref 135–145)
SPHEROCYTES: ABNORMAL
WBC # BLD: 6.9 THOU/MM3 (ref 4.8–10.8)

## 2018-11-04 PROCEDURE — 6360000002 HC RX W HCPCS: Performed by: INTERNAL MEDICINE

## 2018-11-04 PROCEDURE — 2580000003 HC RX 258: Performed by: PSYCHIATRY & NEUROLOGY

## 2018-11-04 PROCEDURE — 83735 ASSAY OF MAGNESIUM: CPT

## 2018-11-04 PROCEDURE — 6370000000 HC RX 637 (ALT 250 FOR IP): Performed by: INTERNAL MEDICINE

## 2018-11-04 PROCEDURE — 85025 COMPLETE CBC W/AUTO DIFF WBC: CPT

## 2018-11-04 PROCEDURE — 36415 COLL VENOUS BLD VENIPUNCTURE: CPT

## 2018-11-04 PROCEDURE — 36592 COLLECT BLOOD FROM PICC: CPT

## 2018-11-04 PROCEDURE — 2060000000 HC ICU INTERMEDIATE R&B

## 2018-11-04 PROCEDURE — C9113 INJ PANTOPRAZOLE SODIUM, VIA: HCPCS | Performed by: INTERNAL MEDICINE

## 2018-11-04 PROCEDURE — 6360000002 HC RX W HCPCS: Performed by: PSYCHIATRY & NEUROLOGY

## 2018-11-04 PROCEDURE — 80048 BASIC METABOLIC PNL TOTAL CA: CPT

## 2018-11-04 PROCEDURE — 2580000003 HC RX 258: Performed by: INTERNAL MEDICINE

## 2018-11-04 PROCEDURE — 2500000003 HC RX 250 WO HCPCS: Performed by: INTERNAL MEDICINE

## 2018-11-04 PROCEDURE — 84132 ASSAY OF SERUM POTASSIUM: CPT

## 2018-11-04 PROCEDURE — 82948 REAGENT STRIP/BLOOD GLUCOSE: CPT

## 2018-11-04 RX ORDER — POTASSIUM CHLORIDE 29.8 MG/ML
20 INJECTION INTRAVENOUS
Status: COMPLETED | OUTPATIENT
Start: 2018-11-04 | End: 2018-11-04

## 2018-11-04 RX ORDER — PANTOPRAZOLE SODIUM 40 MG/1
40 TABLET, DELAYED RELEASE ORAL
Status: DISCONTINUED | OUTPATIENT
Start: 2018-11-05 | End: 2018-11-08 | Stop reason: HOSPADM

## 2018-11-04 RX ORDER — METOPROLOL TARTRATE 5 MG/5ML
5 INJECTION INTRAVENOUS EVERY 6 HOURS PRN
Status: DISCONTINUED | OUTPATIENT
Start: 2018-11-04 | End: 2018-11-08 | Stop reason: HOSPADM

## 2018-11-04 RX ADMIN — HYDROMORPHONE HYDROCHLORIDE 0.5 MG: 1 INJECTION, SOLUTION INTRAMUSCULAR; INTRAVENOUS; SUBCUTANEOUS at 00:52

## 2018-11-04 RX ADMIN — Medication 10 ML: at 20:39

## 2018-11-04 RX ADMIN — POTASSIUM & SODIUM PHOSPHATES POWDER PACK 280-160-250 MG 250 MG: 280-160-250 PACK at 16:07

## 2018-11-04 RX ADMIN — HYDRALAZINE HYDROCHLORIDE 5 MG: 20 INJECTION INTRAMUSCULAR; INTRAVENOUS at 16:03

## 2018-11-04 RX ADMIN — METOPROLOL TARTRATE 5 MG: 5 INJECTION, SOLUTION INTRAVENOUS at 16:02

## 2018-11-04 RX ADMIN — ACETAMINOPHEN 500 MG: 500 TABLET, FILM COATED ORAL at 00:52

## 2018-11-04 RX ADMIN — ONDANSETRON HYDROCHLORIDE 4 MG: 2 SOLUTION INTRAMUSCULAR; INTRAVENOUS at 16:37

## 2018-11-04 RX ADMIN — Medication 10 ML: at 16:07

## 2018-11-04 RX ADMIN — MEROPENEM 1 G: 1 INJECTION, POWDER, FOR SOLUTION INTRAVENOUS at 12:30

## 2018-11-04 RX ADMIN — LEVETIRACETAM 250 MG: 100 INJECTION, SOLUTION INTRAVENOUS at 22:00

## 2018-11-04 RX ADMIN — METOPROLOL TARTRATE 5 MG: 5 INJECTION, SOLUTION INTRAVENOUS at 11:11

## 2018-11-04 RX ADMIN — POTASSIUM CHLORIDE 20 MEQ: 29.8 INJECTION, SOLUTION INTRAVENOUS at 08:37

## 2018-11-04 RX ADMIN — PANTOPRAZOLE SODIUM 40 MG: 40 INJECTION, POWDER, FOR SOLUTION INTRAVENOUS at 09:52

## 2018-11-04 RX ADMIN — MEROPENEM 1 G: 1 INJECTION, POWDER, FOR SOLUTION INTRAVENOUS at 20:38

## 2018-11-04 RX ADMIN — DEXTROSE MONOHYDRATE: 50 INJECTION, SOLUTION INTRAVENOUS at 14:26

## 2018-11-04 RX ADMIN — POTASSIUM CHLORIDE 20 MEQ: 29.8 INJECTION, SOLUTION INTRAVENOUS at 07:58

## 2018-11-04 RX ADMIN — POTASSIUM CHLORIDE 20 MEQ: 29.8 INJECTION, SOLUTION INTRAVENOUS at 18:23

## 2018-11-04 RX ADMIN — HYDROMORPHONE HYDROCHLORIDE 0.5 MG: 1 INJECTION, SOLUTION INTRAMUSCULAR; INTRAVENOUS; SUBCUTANEOUS at 09:52

## 2018-11-04 RX ADMIN — MEROPENEM 1 G: 1 INJECTION, POWDER, FOR SOLUTION INTRAVENOUS at 04:47

## 2018-11-04 RX ADMIN — Medication 10 ML: at 16:38

## 2018-11-04 RX ADMIN — Medication 10 ML: at 16:06

## 2018-11-04 RX ADMIN — Medication 10 ML: at 09:56

## 2018-11-04 RX ADMIN — MICAFUNGIN SODIUM 100 MG: 20 INJECTION, POWDER, LYOPHILIZED, FOR SOLUTION INTRAVENOUS at 09:49

## 2018-11-04 RX ADMIN — HYDRALAZINE HYDROCHLORIDE 5 MG: 20 INJECTION INTRAMUSCULAR; INTRAVENOUS at 00:51

## 2018-11-04 RX ADMIN — METHADONE HYDROCHLORIDE 20 MG: 10 TABLET ORAL at 14:19

## 2018-11-04 RX ADMIN — DEXTROSE MONOHYDRATE: 50 INJECTION, SOLUTION INTRAVENOUS at 03:20

## 2018-11-04 RX ADMIN — POTASSIUM CHLORIDE 20 MEQ: 29.8 INJECTION, SOLUTION INTRAVENOUS at 19:26

## 2018-11-04 RX ADMIN — POTASSIUM CHLORIDE 20 MEQ: 29.8 INJECTION, SOLUTION INTRAVENOUS at 09:37

## 2018-11-04 RX ADMIN — LEVETIRACETAM 250 MG: 100 INJECTION, SOLUTION INTRAVENOUS at 10:44

## 2018-11-04 RX ADMIN — Medication 10 ML: at 16:05

## 2018-11-04 RX ADMIN — HYDROMORPHONE HYDROCHLORIDE 0.5 MG: 1 INJECTION, SOLUTION INTRAMUSCULAR; INTRAVENOUS; SUBCUTANEOUS at 04:40

## 2018-11-04 ASSESSMENT — PAIN SCALES - GENERAL
PAINLEVEL_OUTOF10: 0
PAINLEVEL_OUTOF10: 0
PAINLEVEL_OUTOF10: 10
PAINLEVEL_OUTOF10: 0

## 2018-11-04 ASSESSMENT — PAIN DESCRIPTION - LOCATION
LOCATION: RECTUM
LOCATION: RECTUM

## 2018-11-04 ASSESSMENT — PAIN DESCRIPTION - DESCRIPTORS
DESCRIPTORS: ACHING
DESCRIPTORS: ACHING

## 2018-11-04 ASSESSMENT — PAIN DESCRIPTION - PAIN TYPE
TYPE: ACUTE PAIN
TYPE: ACUTE PAIN

## 2018-11-04 NOTE — PROGRESS NOTES
Attempted to give patient morning and afternoon medications. Patient refusing medications. Family attempting to assist to give medication and patient not taking for them. Attempted to recheck blood pressure and patient not allowing to do this afternoon. Patient whispering to family during morning assessment and medications commenting to family member Marline Garcia are trying to drug me. \" Family told patient that Marline Radha are trying to just get you better to be able to go home. \"

## 2018-11-04 NOTE — PROGRESS NOTES
Progress note:covering for Dr Vimal Blood  Patient - Shin Fox,  Age - 47 y.o.    - 1965      Room Number - 7C-68/379-V   MRN -  [id number]   Acct # - [de-identified]  Date of Admission -  10/29/2018  3:43 AM    SUBJECTIVE:   She is refusing to take her medications    OBJECTIVE   VITALS    height is 5' (1.524 m) and weight is 128 lb 8.5 oz (58.3 kg). Her oral temperature is 98.9 °F (37.2 °C). Her blood pressure is 152/98 (abnormal) and her pulse is 125. Her respiration is 16 and oxygen saturation is 96%.        Wt Readings from Last 3 Encounters:   18 128 lb 8.5 oz (58.3 kg)   10/26/18 120 lb (54.4 kg)   18 117 lb 9.6 oz (53.3 kg)       I/O (24 Hours)    Intake/Output Summary (Last 24 hours) at 18 1039  Last data filed at 18 0427   Gross per 24 hour   Intake          3138.36 ml   Output             5760 ml   Net         -2621.64 ml       General Appearance : Awake,chronically sick looking  HEENT - normocephalic, atraumatic, pale  conjunctiva,  anicteric sclera  Neck - Supple, no mass  Lungs -  Bilateral   air entry,mediport and left subclavian vein cathteter noted  Cardiovascular - tachycardic  Abdomen - soft, edematous colostomy with prolapse, pink stoma, no right upper quadrant tenderness  Neurologic - awake and oriented  Skin - No bruising or bleeding  Extremities - left lower leg is less swollen,   Holder inplace  MEDICATIONS:      insulin lispro  0-12 Units Subcutaneous TID WC    insulin lispro  0-6 Units Subcutaneous Nightly    metoprolol tartrate  25 mg Oral BID    potassium replacement protocol   Other RX Placeholder    magnesium replacement protocol   Other RX Placeholder    levetiracetam  250 mg Intravenous Q12H    magnesium oxide  400 mg Oral Daily    losartan  25 mg Oral Daily    docusate  100 mg Oral Daily    amLODIPine  10 mg Oral Daily    potassium & sodium phosphates  1 packet Oral 4x Daily    micafungin  100 mg Intravenous Daily    meropenem  1 g Intravenous Q8H    pantoprazole  40 mg Intravenous Daily    levothyroxine  50 mcg Oral Daily    methadone  20 mg Oral 3 times per day    naloxegol  25 mg Oral QAM    pravastatin  20 mg Oral Daily    sodium chloride flush  10 mL Intravenous 2 times per day      dextrose      dextrose 100 mL/hr at 11/04/18 0320     glucose, dextrose, glucagon (rDNA), dextrose, promethazine, ondansetron, hydrALAZINE, HYDROmorphone, acetaminophen, HYDROmorphone, sodium chloride flush      LABS:     CBC:   Recent Labs      11/02/18   0600  11/03/18   0530  11/04/18   0435   WBC  1.7*  3.3*  6.9   HGB  13.9  14.3  14.9   PLT  9*  15*  20*     BMP:    Recent Labs      11/02/18   0600  11/03/18   0530  11/04/18   0435   NA  136  135  132*   K  2.8*  3.0*  2.7*   CL  99  96*  93*   CO2  23  26  25   BUN  13  7  5*   CREATININE  0.3*  0.3*  0.3*   GLUCOSE  66*  132*  141*     Calcium:  Recent Labs      11/04/18   0435   CALCIUM  8.3*     Ionized Calcium:No results for input(s): IONCA in the last 72 hours. Magnesium:  Recent Labs      11/04/18   0435   MG  1.5*     Phosphorus:No results for input(s): PHOS in the last 72 hours. BNP:No results for input(s): BNP in the last 72 hours. Glucose:  Recent Labs      11/03/18   1618  11/03/18   1958  11/04/18   0710   POCGLU  161*  164*  140*     HgbA1C: No results for input(s): LABA1C in the last 72 hours. INR:   No results for input(s): INR in the last 72 hours. CULTURES:   UA: No results for input(s): SPECGRAV, PHUR, COLORU, CLARITYU, MUCUS, PROTEINU, BLOODU, RBCUA, WBCUA, BACTERIA, NITRU, GLUCOSEU, BILIRUBINUR, UROBILINOGEN, KETUA, LABCAST, LABCASTTY, AMORPHOS in the last 72 hours.     Invalid input(s): CRYSTALS  Micro:   Lab Results   Component Value Date    BC No growth-preliminary 11/01/2018         IMAGING:         Problem list of patient:     Patient Active Problem List   Diagnosis Code    Colonic mass

## 2018-11-05 LAB
ANION GAP SERPL CALCULATED.3IONS-SCNC: 15 MEQ/L (ref 8–16)
ANISOCYTOSIS: PRESENT
BASOPHILS # BLD: 0.1 %
BASOPHILS ABSOLUTE: 0 THOU/MM3 (ref 0–0.1)
BUN BLDV-MCNC: 6 MG/DL (ref 7–22)
CALCIUM SERPL-MCNC: 8.4 MG/DL (ref 8.5–10.5)
CHLORIDE BLD-SCNC: 96 MEQ/L (ref 98–111)
CO2: 21 MEQ/L (ref 23–33)
CREAT SERPL-MCNC: 0.3 MG/DL (ref 0.4–1.2)
DOHLE BODIES: PRESENT
EOSINOPHIL # BLD: 0.2 %
EOSINOPHILS ABSOLUTE: 0 THOU/MM3 (ref 0–0.4)
ERYTHROCYTE [DISTWIDTH] IN BLOOD BY AUTOMATED COUNT: 16.4 % (ref 11.5–14.5)
ERYTHROCYTE [DISTWIDTH] IN BLOOD BY AUTOMATED COUNT: 51.9 FL (ref 35–45)
GFR SERPL CREATININE-BSD FRML MDRD: > 90 ML/MIN/1.73M2
GLUCOSE BLD-MCNC: 104 MG/DL (ref 70–108)
GLUCOSE BLD-MCNC: 107 MG/DL (ref 70–108)
GLUCOSE BLD-MCNC: 109 MG/DL (ref 70–108)
GLUCOSE BLD-MCNC: 111 MG/DL (ref 70–108)
HCT VFR BLD CALC: 42.9 % (ref 37–47)
HEMOGLOBIN: 14.5 GM/DL (ref 12–16)
IMMATURE GRANS (ABS): 1.39 THOU/MM3 (ref 0–0.07)
IMMATURE GRANULOCYTES: 13.2 %
LYMPHOCYTES # BLD: 7.6 %
LYMPHOCYTES ABSOLUTE: 0.8 THOU/MM3 (ref 1–4.8)
MAGNESIUM: 1.4 MG/DL (ref 1.6–2.4)
MCH RBC QN AUTO: 30.4 PG (ref 26–33)
MCHC RBC AUTO-ENTMCNC: 33.8 GM/DL (ref 32.2–35.5)
MCV RBC AUTO: 89.9 FL (ref 81–99)
MONOCYTES # BLD: 4.3 %
MONOCYTES ABSOLUTE: 0.5 THOU/MM3 (ref 0.4–1.3)
NUCLEATED RED BLOOD CELLS: 1 /100 WBC
PLATELET # BLD: 33 THOU/MM3 (ref 130–400)
PLATELET # BLD: 44 THOU/MM3 (ref 130–400)
PLATELET ESTIMATE: ABNORMAL
PMV BLD AUTO: 11.3 FL (ref 9.4–12.4)
POIKILOCYTES: ABNORMAL
POTASSIUM REFLEX MAGNESIUM: 3.5 MEQ/L (ref 3.5–5.2)
RBC # BLD: 4.77 MILL/MM3 (ref 4.2–5.4)
SCAN OF BLOOD SMEAR: NORMAL
SEG NEUTROPHILS: 74.6 %
SEGMENTED NEUTROPHILS ABSOLUTE COUNT: 7.9 THOU/MM3 (ref 1.8–7.7)
SODIUM BLD-SCNC: 132 MEQ/L (ref 135–145)
TOXIC GRANULATION: PRESENT
WBC # BLD: 10.6 THOU/MM3 (ref 4.8–10.8)

## 2018-11-05 PROCEDURE — 6370000000 HC RX 637 (ALT 250 FOR IP): Performed by: INTERNAL MEDICINE

## 2018-11-05 PROCEDURE — 2580000003 HC RX 258: Performed by: PSYCHIATRY & NEUROLOGY

## 2018-11-05 PROCEDURE — 99232 SBSQ HOSP IP/OBS MODERATE 35: CPT | Performed by: PHYSICIAN ASSISTANT

## 2018-11-05 PROCEDURE — 6360000002 HC RX W HCPCS: Performed by: INTERNAL MEDICINE

## 2018-11-05 PROCEDURE — 2709999900 HC NON-CHARGEABLE SUPPLY

## 2018-11-05 PROCEDURE — 83735 ASSAY OF MAGNESIUM: CPT

## 2018-11-05 PROCEDURE — 80048 BASIC METABOLIC PNL TOTAL CA: CPT

## 2018-11-05 PROCEDURE — 2580000003 HC RX 258: Performed by: INTERNAL MEDICINE

## 2018-11-05 PROCEDURE — 85049 AUTOMATED PLATELET COUNT: CPT

## 2018-11-05 PROCEDURE — 97530 THERAPEUTIC ACTIVITIES: CPT

## 2018-11-05 PROCEDURE — 85025 COMPLETE CBC W/AUTO DIFF WBC: CPT

## 2018-11-05 PROCEDURE — 36592 COLLECT BLOOD FROM PICC: CPT

## 2018-11-05 PROCEDURE — 99232 SBSQ HOSP IP/OBS MODERATE 35: CPT | Performed by: SURGERY

## 2018-11-05 PROCEDURE — 36415 COLL VENOUS BLD VENIPUNCTURE: CPT

## 2018-11-05 PROCEDURE — 97110 THERAPEUTIC EXERCISES: CPT

## 2018-11-05 PROCEDURE — 82948 REAGENT STRIP/BLOOD GLUCOSE: CPT

## 2018-11-05 PROCEDURE — 92526 ORAL FUNCTION THERAPY: CPT

## 2018-11-05 PROCEDURE — APPSS30 APP SPLIT SHARED TIME 16-30 MINUTES: Performed by: NURSE PRACTITIONER

## 2018-11-05 PROCEDURE — 2500000003 HC RX 250 WO HCPCS: Performed by: INTERNAL MEDICINE

## 2018-11-05 PROCEDURE — 6360000002 HC RX W HCPCS: Performed by: PSYCHIATRY & NEUROLOGY

## 2018-11-05 PROCEDURE — 2060000000 HC ICU INTERMEDIATE R&B

## 2018-11-05 RX ORDER — MAGNESIUM SULFATE IN WATER 40 MG/ML
2 INJECTION, SOLUTION INTRAVENOUS ONCE
Status: COMPLETED | OUTPATIENT
Start: 2018-11-05 | End: 2018-11-05

## 2018-11-05 RX ORDER — METOPROLOL SUCCINATE 100 MG/1
100 TABLET, EXTENDED RELEASE ORAL DAILY
Status: ON HOLD | COMMUNITY
End: 2018-11-08 | Stop reason: HOSPADM

## 2018-11-05 RX ORDER — DESIPRAMINE HYDROCHLORIDE 50 MG/1
50 TABLET ORAL NIGHTLY
Status: ON HOLD | COMMUNITY
End: 2018-11-08 | Stop reason: HOSPADM

## 2018-11-05 RX ORDER — POTASSIUM CHLORIDE 29.8 MG/ML
20 INJECTION INTRAVENOUS
Status: COMPLETED | OUTPATIENT
Start: 2018-11-05 | End: 2018-11-05

## 2018-11-05 RX ADMIN — AMLODIPINE BESYLATE 10 MG: 10 TABLET ORAL at 16:00

## 2018-11-05 RX ADMIN — LOSARTAN POTASSIUM 25 MG: 25 TABLET, FILM COATED ORAL at 16:00

## 2018-11-05 RX ADMIN — MAGNESIUM SULFATE HEPTAHYDRATE 2 G: 40 INJECTION, SOLUTION INTRAVENOUS at 06:24

## 2018-11-05 RX ADMIN — HYDROMORPHONE HYDROCHLORIDE 0.5 MG: 1 INJECTION, SOLUTION INTRAMUSCULAR; INTRAVENOUS; SUBCUTANEOUS at 11:45

## 2018-11-05 RX ADMIN — LEVETIRACETAM 250 MG: 100 INJECTION, SOLUTION INTRAVENOUS at 11:08

## 2018-11-05 RX ADMIN — METHADONE HYDROCHLORIDE 20 MG: 10 TABLET ORAL at 18:14

## 2018-11-05 RX ADMIN — MICAFUNGIN SODIUM 100 MG: 20 INJECTION, POWDER, LYOPHILIZED, FOR SOLUTION INTRAVENOUS at 11:08

## 2018-11-05 RX ADMIN — POTASSIUM CHLORIDE 20 MEQ: 29.8 INJECTION, SOLUTION INTRAVENOUS at 07:21

## 2018-11-05 RX ADMIN — HYDRALAZINE HYDROCHLORIDE 5 MG: 20 INJECTION INTRAMUSCULAR; INTRAVENOUS at 03:19

## 2018-11-05 RX ADMIN — POTASSIUM CHLORIDE 20 MEQ: 29.8 INJECTION, SOLUTION INTRAVENOUS at 06:19

## 2018-11-05 RX ADMIN — HYDROMORPHONE HYDROCHLORIDE 0.5 MG: 1 INJECTION, SOLUTION INTRAMUSCULAR; INTRAVENOUS; SUBCUTANEOUS at 09:39

## 2018-11-05 RX ADMIN — HYDROMORPHONE HYDROCHLORIDE 0.5 MG: 1 INJECTION, SOLUTION INTRAMUSCULAR; INTRAVENOUS; SUBCUTANEOUS at 21:49

## 2018-11-05 RX ADMIN — POTASSIUM & SODIUM PHOSPHATES POWDER PACK 280-160-250 MG 250 MG: 280-160-250 PACK at 19:36

## 2018-11-05 RX ADMIN — MEROPENEM 1 G: 1 INJECTION, POWDER, FOR SOLUTION INTRAVENOUS at 11:48

## 2018-11-05 RX ADMIN — METOPROLOL TARTRATE 5 MG: 5 INJECTION, SOLUTION INTRAVENOUS at 11:08

## 2018-11-05 RX ADMIN — METOPROLOL TARTRATE 5 MG: 5 INJECTION, SOLUTION INTRAVENOUS at 18:58

## 2018-11-05 RX ADMIN — LEVETIRACETAM 250 MG: 100 INJECTION, SOLUTION INTRAVENOUS at 21:49

## 2018-11-05 RX ADMIN — MEROPENEM 1 G: 1 INJECTION, POWDER, FOR SOLUTION INTRAVENOUS at 03:30

## 2018-11-05 RX ADMIN — MEROPENEM 1 G: 1 INJECTION, POWDER, FOR SOLUTION INTRAVENOUS at 20:59

## 2018-11-05 RX ADMIN — METOPROLOL TARTRATE 25 MG: 25 TABLET ORAL at 16:00

## 2018-11-05 RX ADMIN — HYDROMORPHONE HYDROCHLORIDE 0.5 MG: 1 INJECTION, SOLUTION INTRAMUSCULAR; INTRAVENOUS; SUBCUTANEOUS at 15:58

## 2018-11-05 RX ADMIN — HYDROMORPHONE HYDROCHLORIDE 0.5 MG: 1 INJECTION, SOLUTION INTRAMUSCULAR; INTRAVENOUS; SUBCUTANEOUS at 18:58

## 2018-11-05 ASSESSMENT — PAIN SCALES - GENERAL
PAINLEVEL_OUTOF10: 7
PAINLEVEL_OUTOF10: 8
PAINLEVEL_OUTOF10: 5
PAINLEVEL_OUTOF10: 8
PAINLEVEL_OUTOF10: 9
PAINLEVEL_OUTOF10: 0
PAINLEVEL_OUTOF10: 7
PAINLEVEL_OUTOF10: 7

## 2018-11-05 ASSESSMENT — PAIN DESCRIPTION - PAIN TYPE: TYPE: ACUTE PAIN

## 2018-11-05 ASSESSMENT — PAIN DESCRIPTION - LOCATION: LOCATION: RECTUM

## 2018-11-05 NOTE — PROGRESS NOTES
Patient has been refusing to swallow all day. She has spit out water multiple times when attempting to give any medications. Needs further evaluation by speech therapy.

## 2018-11-05 NOTE — PROGRESS NOTES
10/27/2017    Hyperlipidemia     Hypertension     Hyperthyroidism      Past Surgical History:   Procedure Laterality Date    COLON SURGERY  6-5-12    Robotic assisted left hemicolectomy-Dr. Alvarez    COLONOSCOPY      HEMORRHOID SURGERY  8/2013    banding     HYSTERECTOMY  5/2011    HYSTEROSCOPY  2004    OTHER SURGICAL HISTORY  7/30/2013    RIGHT ABD. WALL MASS EXCISION-Dr. Danial Reardon    [place] [address] INCL FLUOR GDNCE DX W/CELL WASHG [facility] N/A 10/29/2018    BRONCHOSCOPY performed by Devendra Almonte MD at [facility] [place] SCRN NOT  [address] IND Left 10/27/2017    COLONOSCOPY performed by Omer Swenson MD at [facility]    [place] OFFICE/OUTPT VISIT,PROCEDURE ONLY N/A 11/2/2018    BRONCHOSCOPY performed by Devendra Almonte MD at [address]  1990's    TUNNELED VENOUS PORT PLACEMENT  6/28/2012    purple power port-Dr. Danial Reardon        Restrictions/Precautions:  General Precautions, Fall Risk, Isolation (neutropenic precautions)             Prior Level of Function:  ADL Assistance: Independent  Homemaking Assistance: Needs assistance  Ambulation Assistance: Independent  Transfer Assistance: Independent  Additional Comments: PLOF obtained from previous recent admission    Subjective:  Chart Reviewed: Yes  Family / Caregiver Present: Yes  Subjective: pt had become agitated during with night nursing and less verbalizations, pt alert and nodding head for yes/no during session, pt cooperative to get OOB to chair, mother present and supportive, 2 sons arrived at end of session    Pain:  Denies.   Pain Assessment  Pain Level: 0 (at rest however with mobility pt moaning with pain all over per pt)       Social/Functional:  Lives With: Son (and mother)  Type of Home: House  Home Layout: Two level, Bed/Bath upstairs  Home Access: Level entry  Home Equipment: 4 wheeled walker     Objective:  Rolling to Right: Maximum assistance (x1, cues to reach for BR)  Supine to Sit: Maximum

## 2018-11-05 NOTE — PROGRESS NOTES
Isabel Joseph Luis Carlosolivebasilio [address] 61  INPATIENT OCCUPATIONAL THERAPY  [facility] ICU STEPDOWN TELEMETRY 4K  DAILY NOTE    Time:  Time In: 1055  Time Out: 1119  Timed Code Treatment Minutes: 24 Minutes  Minutes: 24          Date: 2018  Patient Name: Darrell Benz,   Gender: female      Room: [facility]24/024-A  MRN: [id number]  : 1965  (47 y.o.)  Referring Practitioner: Luis Scott MD  Diagnosis: severe sepsis  Additional Pertinent Hx:  Patient is a 73-year-old black female lifetime nonsmoker. She has a history of type 2 diabetes mellitus, hyperlipidemia, hypothyroidism, and hypertension. 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 She underwent surgical intervention with adjuvant chemoradiation therapy in 2012. She developed local recurrence around the anastomosis in  and underwent low anterior resection and intraoperative radiation 10/21/14. Surgical margins were positive and she received chemotherapy with 6 cycles Folfiri. Subsequent colonoscopy completed in 2017 showed ongoing tumor involvement in the rectum. CT scan completed 2017 showed sacral soft tissue mass contiguous with previous surgical site. PET scan showed increased metabolic activity. She was no longer a surgical candidate and underwent treatment with Xelox/Avastin in 2017. Treatment concluded 2017. She developed an abscess and was taken to the operating room on 10/30/17. She has required multiple debridements since that time including 2018 and 2018. She has multiple bouts of Pseudomonas infections including urinary tract infection. She appears to have received ciprofloxacin for chronic suppressive therapy. Given persistent abscess formation, patient has persistent ileostomy. Pt intubated 10/29. Pt extubated 18.      Past Medical History:   Diagnosis Date    Blood transfusion     Cancer [facility])     colorectal    Diabetes mellitus type 2 in nonobese ([facility] [address] 10/27/2017    Hyperlipidemia     Hypertension     Hyperthyroidism      Past Surgical History:   Procedure Laterality Date    COLON SURGERY  6-5-12    Robotic assisted left hemicolectomy-Dr. Alvarez    COLONOSCOPY      HEMORRHOID SURGERY  8/2013    banding     HYSTERECTOMY  5/2011    HYSTEROSCOPY  2004    OTHER SURGICAL HISTORY  7/30/2013    RIGHT ABD. WALL MASS EXCISION-Dr. Elvira Villeda    [place] [address] INCL FLUOR GDNCE DX W/CELL WASHG SPX N/A 10/29/2018    BRONCHOSCOPY performed by Catarina Schumacher MD at [address] [place] SCRN NOT  [address] IND Left 10/27/2017    COLONOSCOPY performed by [facility]mita Razo MD at [facility]    [place] OFFICE/OUTPT VISIT,PROCEDURE ONLY N/A 11/2/2018    BRONCHOSCOPY performed by Catarina Schumacher MD at [address]  1990's    TUNNELED VENOUS PORT PLACEMENT  6/28/2012    purple power port-Dr. Elvira Villeda        Restrictions/Precautions:  General Precautions, Fall Risk, Isolation (neutropenic precautions)                            Prior Level of Function:  ADL Assistance: Independent  Homemaking Assistance: Needs assistance  Ambulation Assistance: Independent  Transfer Assistance: Independent  Additional Comments: PLOF obtained from previous recent admission    Subjective       Subjective: pt sitting in recliner when arrived. Pt wanting to go back to bed and RN stated [phone] Lorelei Brown to work with pt and will help get pt back to be with therapy           Pain:  Pain Assessment  Patient Currently in Pain: Yes  Pain Level: 7  Pain Type: Acute pain  Pain Location: Rectum       Objective  Overall Cognitive Status: Exceptions  Arousal/Alertness: Delayed responses to stimuli  Following Commands: Follows one step commands with increased time; Follows one step commands with repetition  Memory: Decreased short term memory (pt would forget what therapist asking her to do hang onto curt whitfield and would let go reaching for bed )  Safety precautions in place, Gait belt, Bed alarm in place, Left in bed, Call light within reach, Nurse notified (RN with TOLLIVER and tech present )  Restraints  Initially in place: No  Restraints: B wrist; secured at end of session; occasional cues/holding of UEs to prevent pulling at lines/tubes during session    Plan:  Times per week: 5x  Current Treatment Recommendations: Strengthening, Balance Training, Functional Mobility Training, Endurance Training, Safety Education & Training, Patient/Caregiver Education & Training, Equipment Evaluation, Education, & procurement, Self-Care / ADL    Goals:       Short term goals  Time Frame for Short term goals: 2 weeks  Short term goal 1: Pt will tolerate sitting at EOB X10 minutes with consistent minimal assistance or less in prep for ADL tasks. Short term goal 2: Pt will complete simple grooming tasks with moderate assistance to increase ability to complete self care tasks. Short term goal 3: Pt will complete BUE AAROM/AROM X5-10 reps to increase overall strength/endurance needed for ADLs and eventual transfers. Short term goal 4: Pt will tolerate further assessment of transfers/mobility as appropriate.    Long term goals  Time Frame for Long term goals : not set due to ELOS

## 2018-11-05 NOTE — PROGRESS NOTES
rex. There is an enteric tube coursing below the diaphragm, the distal portions of which are excluded from the current examination. The heart size appears normal. There are perihilar interstitial infiltrates demonstrated which may represent mild pulmonary edema versus pneumonia. No pneumothorax is seen. No pleural effusion is demonstrated.      1. Bilateral perihilar interstitial infiltrates are demonstrated, slightly worse on the left. This may represent mild pulmonary edema versus pneumonia. **This report has been created using voice recognition software. It may contain minor errors which are inherent in voice recognition technology. ** Final report electronically signed by Dr. Reyes Castillo on 10/29/2018 9:34 AM     Xr Chest Portable     Result Date: 10/29/2018  PROCEDURE: XR CHEST PORTABLE CLINICAL INFORMATION: Altered mental status, . COMPARISON: September 22, 2018 TECHNIQUE: AP upright view of the chest. FINDINGS: Lung volumes are diminished with bibasilar atelectasis and or developing mild infiltrate. Stable position of right-sided Port-A-Cath. The skeleton is unchanged      Decreasing lung volumes with bibasilar atelectasis. Mild infiltrate cannot excluded. **This report has been created using voice recognition software. It may contain minor errors which are inherent in voice recognition technology. ** Final report electronically signed by Dr. Del Felix on 10/29/2018 4:57 AM     Vl Dup Lower Extremity Venous Left     Result Date: 10/30/2018  PROCEDURE: VL DUP LOWER EXTREMITY VENOUS LEFT CLINICAL INFORMATION: Known deep venous thrombosis. Follow-up. COMPARISON: 10/25/2018 TECHNIQUE: Multiple grayscale and color flow images of the major veins of the left lower extremity were obtained from the level of the groin to the level of the ankle. Multiple compression and augmentation maneuvers were performed. A few images of the contralateral common femoral vein were also obtained.  FINDINGS:   There is no flow

## 2018-11-05 NOTE — PROGRESS NOTES
Patient refuses to turn as recommended. This RN has tried to encourage pt to turn and to assist pt to turn, but the patient refuses. Education provided regarding the benefits of repositioning and the risk to skin integrity associated with not repositioning as recommended.

## 2018-11-05 NOTE — PROGRESS NOTES
Patient refusing to allow this RN to perform an assessment at this time. This RN was able to check vitals and foot pulses, but nothing further. The patient refused oral medications, and when this RN attempted to give a spoonful of meds with applesauce the patient spit it out at this RN. While flushing IV, the patient grabbed this RN by the hair and started hitting and scratching. IV meds still able to be administered at this time. Pt refusing to let this RN check blood sugar. Pt refusing to talk or answer any questions at this time. Patient's mother at bedside.

## 2018-11-05 NOTE — PROGRESS NOTES
Progress note:covering for Dr Anahi Smith  Patient - Johan Simons,  Age - 47 y.o.    - 1965      Room Number - 2T-62/890-B   MRN -  [id number]   Acct # - [de-identified]  Date of Admission -  10/29/2018  3:43 AM    SUBJECTIVE:   No new issues  Discussed with pharmacy. OBJECTIVE   VITALS    height is 5' (1.524 m) and weight is 127 lb 10.3 oz (57.9 kg). Her axillary temperature is 98 °F (36.7 °C). Her blood pressure is 140/95 (abnormal) and her pulse is 114. Her respiration is 15 and oxygen saturation is 94%.        Wt Readings from Last 3 Encounters:   18 127 lb 10.3 oz (57.9 kg)   10/26/18 120 lb (54.4 kg)   18 117 lb 9.6 oz (53.3 kg)       I/O (24 Hours)    Intake/Output Summary (Last 24 hours) at 18 0933  Last data filed at 18 0447   Gross per 24 hour   Intake          2424.82 ml   Output             3000 ml   Net          -575.18 ml       General Appearance : Awake,chronically sick looking  HEENT - normocephalic, atraumatic, pale  conjunctiva,  anicteric sclera  Neck - Supple, no mass  Lungs -  Bilateral   air entry,mediport and left subclavian vein cathteter noted  Cardiovascular - tachycardic  Abdomen - soft, colostomy in place  Neurologic - awake and oriented  Skin - No bruising or bleeding  Extremities - left lower leg is less swollen,      MEDICATIONS:      pantoprazole  40 mg Oral QAM AC    insulin lispro  0-12 Units Subcutaneous TID WC    insulin lispro  0-6 Units Subcutaneous Nightly    metoprolol tartrate  25 mg Oral BID    potassium replacement protocol   Other RX Placeholder    magnesium replacement protocol   Other RX Placeholder    levetiracetam  250 mg Intravenous Q12H    magnesium oxide  400 mg Oral Daily    losartan  25 mg Oral Daily    docusate  100 mg Oral Daily    amLODIPine  10 mg Oral Daily    potassium & sodium phosphates  1 packet Oral 4x Daily    micafungin  100 mg

## 2018-11-05 NOTE — PROGRESS NOTES
Nutrition Assessment    Type and Reason for Visit: Reassess (pt/family request for Ensure or equivalent)    Nutrition Recommendations: Diet as per SLP recommendations; family request.  Continue ONS (change to Glucerna TID per family request). Recommend MVI. Malnutrition Assessment:  · Malnutrition Status: At risk for malnutrition    Nutrition Diagnosis:   · Problem: Inadequate oral intake  · Etiology: related to Difficulty swallowing, Insufficient energy/nutrient consumption     Signs and symptoms:  as evidenced by Diet history of poor intake    Nutrition Assessment:  · Subjective Assessment: Pt. seen with mother. Pt. very quiet. Did nod yes or no to questions. Indicates did not like Magic cup ONS. Agrees to trial Glucerna TID. 60 ml ostomy output noted past 48 hours. Pt. denies any nausea. Rx includes Zofran, Phenergan, Insulin. s/p SLP evaluation - diet downgraded to Dysphagia II per family request for softer textures. · Wound Type:  (none noted)  · Current Nutrition Therapies:  · Oral Diet Orders: Dysphagia 2, No Straws   · Oral Diet intake: 1-25%, 51-75%  · Oral Nutrition Supplement (ONS) Orders: Frozen Oral Supplement (magic cup TID)  · ONS intake:  (change to Glucerna TID)  · Anthropometric Measures:  · Ht: 5' (152.4 cm)   · Current Body Wt: 127 lb 10.3 oz (57.9 kg) (11/5, +1 edema)  · Admission Body Wt: 126 lb 5.2 oz (57.3 kg) (10/29  + 2 edema)  · Usual Body Wt: per EMR: 3/21/18: 116# 10 oz, 1/18: 117# 11.2 oz, 11/17: 131# 3.2 oz  · Ideal Body Wt: 100 lb (45.4 kg),  · BMI Classification: BMI 25.0 - 29.9 Overweight  · Comparative Standards (Estimated Nutrition Needs):  · Estimated Daily Total Kcal: ~1617 kcals ( 30 kcals/kg on actual weight of 55.7 kg)  · Estimated Daily Protein (g): ~78 grams ( 1.4 grams protein/kg on IBW of 45.4 kg)    Estimated Intake vs Estimated Needs: Intake Less Than Needs    Nutrition Risk Level:  Moderate    Nutrition Interventions:   Continue current diet, Modify current ONS  Continued Inpatient Monitoring, Education Initiated, Coordination of Care (11/5 Encouraged po, ONS intake at best efforts.)    Nutrition Evaluation:   · Evaluation: Progressing toward goals   · Goals: consume greater than 75% meals during LOS    · Monitoring: Diet Progression, Meal Intake, Supplement Intake, Diet Tolerance, Skin Integrity, Ascites/Edema, Weight, Pertinent Labs, Chewing/Swallowing, Patient/Family Education    See Adult Nutrition Doc Flowsheet for more detail.      Electronically signed by Edd Villalobos RD, LD on 11/5/18 at 2:55 PM    Contact Number: [phone]

## 2018-11-06 LAB
ANION GAP SERPL CALCULATED.3IONS-SCNC: 14 MEQ/L (ref 8–16)
ATYPICAL LYMPHOCYTES: ABNORMAL %
BASOPHILS # BLD: 0.2 %
BASOPHILS ABSOLUTE: 0 THOU/MM3 (ref 0–0.1)
BLOOD CULTURE, ROUTINE: NORMAL
BLOOD CULTURE, ROUTINE: NORMAL
BUN BLDV-MCNC: 6 MG/DL (ref 7–22)
CALCIUM SERPL-MCNC: 8.5 MG/DL (ref 8.5–10.5)
CHLORIDE BLD-SCNC: 95 MEQ/L (ref 98–111)
CO2: 21 MEQ/L (ref 23–33)
CREAT SERPL-MCNC: 0.4 MG/DL (ref 0.4–1.2)
DOHLE BODIES: PRESENT
EOSINOPHIL # BLD: 0.5 %
EOSINOPHILS ABSOLUTE: 0.1 THOU/MM3 (ref 0–0.4)
ERYTHROCYTE [DISTWIDTH] IN BLOOD BY AUTOMATED COUNT: 15.9 % (ref 11.5–14.5)
ERYTHROCYTE [DISTWIDTH] IN BLOOD BY AUTOMATED COUNT: 50.2 FL (ref 35–45)
GFR SERPL CREATININE-BSD FRML MDRD: > 90 ML/MIN/1.73M2
GLUCOSE BLD-MCNC: 107 MG/DL (ref 70–108)
GLUCOSE BLD-MCNC: 119 MG/DL (ref 70–108)
GLUCOSE BLD-MCNC: 124 MG/DL (ref 70–108)
GLUCOSE BLD-MCNC: 130 MG/DL (ref 70–108)
GLUCOSE BLD-MCNC: 158 MG/DL (ref 70–108)
HCT VFR BLD CALC: 40.4 % (ref 37–47)
HEMOGLOBIN: 14.1 GM/DL (ref 12–16)
HEPARIN ASSOCIATED AB DETECTION: NORMAL
IMMATURE GRANS (ABS): 2.24 THOU/MM3 (ref 0–0.07)
IMMATURE GRANULOCYTES: 17.2 %
LYMPHOCYTES # BLD: 6.5 %
LYMPHOCYTES ABSOLUTE: 0.8 THOU/MM3 (ref 1–4.8)
MCH RBC QN AUTO: 30.9 PG (ref 26–33)
MCHC RBC AUTO-ENTMCNC: 34.9 GM/DL (ref 32.2–35.5)
MCV RBC AUTO: 88.6 FL (ref 81–99)
MONOCYTES # BLD: 4.6 %
MONOCYTES ABSOLUTE: 0.6 THOU/MM3 (ref 0.4–1.3)
NUCLEATED RED BLOOD CELLS: 1 /100 WBC
PLATELET # BLD: 51 THOU/MM3 (ref 130–400)
PLATELET ESTIMATE: ABNORMAL
PMV BLD AUTO: 12 FL (ref 9.4–12.4)
POTASSIUM REFLEX MAGNESIUM: 3.6 MEQ/L (ref 3.5–5.2)
RBC # BLD: 4.56 MILL/MM3 (ref 4.2–5.4)
SCAN OF BLOOD SMEAR: NORMAL
SEG NEUTROPHILS: 71 %
SEGMENTED NEUTROPHILS ABSOLUTE COUNT: 9.2 THOU/MM3 (ref 1.8–7.7)
SEROTONIN RELEASING ASSAY: NORMAL
SODIUM BLD-SCNC: 130 MEQ/L (ref 135–145)
TOXIC GRANULATION: PRESENT
WBC # BLD: 13 THOU/MM3 (ref 4.8–10.8)

## 2018-11-06 PROCEDURE — 6360000002 HC RX W HCPCS: Performed by: PSYCHIATRY & NEUROLOGY

## 2018-11-06 PROCEDURE — 80048 BASIC METABOLIC PNL TOTAL CA: CPT

## 2018-11-06 PROCEDURE — 2580000003 HC RX 258: Performed by: INTERNAL MEDICINE

## 2018-11-06 PROCEDURE — 99232 SBSQ HOSP IP/OBS MODERATE 35: CPT | Performed by: PHYSICIAN ASSISTANT

## 2018-11-06 PROCEDURE — 6360000002 HC RX W HCPCS: Performed by: INTERNAL MEDICINE

## 2018-11-06 PROCEDURE — 36592 COLLECT BLOOD FROM PICC: CPT

## 2018-11-06 PROCEDURE — 2060000000 HC ICU INTERMEDIATE R&B

## 2018-11-06 PROCEDURE — 2709999900 HC NON-CHARGEABLE SUPPLY

## 2018-11-06 PROCEDURE — 97110 THERAPEUTIC EXERCISES: CPT

## 2018-11-06 PROCEDURE — 82948 REAGENT STRIP/BLOOD GLUCOSE: CPT

## 2018-11-06 PROCEDURE — 36415 COLL VENOUS BLD VENIPUNCTURE: CPT

## 2018-11-06 PROCEDURE — 2580000003 HC RX 258: Performed by: PSYCHIATRY & NEUROLOGY

## 2018-11-06 PROCEDURE — 85025 COMPLETE CBC W/AUTO DIFF WBC: CPT

## 2018-11-06 PROCEDURE — 6370000000 HC RX 637 (ALT 250 FOR IP): Performed by: INTERNAL MEDICINE

## 2018-11-06 PROCEDURE — 2500000003 HC RX 250 WO HCPCS: Performed by: INTERNAL MEDICINE

## 2018-11-06 RX ORDER — FONDAPARINUX SODIUM 2.5 MG/.5ML
7.5 INJECTION SUBCUTANEOUS EVERY 24 HOURS
Status: DISCONTINUED | OUTPATIENT
Start: 2018-11-06 | End: 2018-11-06

## 2018-11-06 RX ORDER — FLUORIDE TOOTHPASTE
5 TOOTHPASTE DENTAL PRN
Status: DISCONTINUED | OUTPATIENT
Start: 2018-11-06 | End: 2018-11-08 | Stop reason: HOSPADM

## 2018-11-06 RX ADMIN — MICAFUNGIN SODIUM 100 MG: 20 INJECTION, POWDER, LYOPHILIZED, FOR SOLUTION INTRAVENOUS at 09:44

## 2018-11-06 RX ADMIN — METHADONE HYDROCHLORIDE 20 MG: 10 TABLET ORAL at 12:11

## 2018-11-06 RX ADMIN — METOPROLOL TARTRATE 5 MG: 5 INJECTION, SOLUTION INTRAVENOUS at 04:04

## 2018-11-06 RX ADMIN — HYDROMORPHONE HYDROCHLORIDE 0.5 MG: 1 INJECTION, SOLUTION INTRAMUSCULAR; INTRAVENOUS; SUBCUTANEOUS at 00:40

## 2018-11-06 RX ADMIN — POTASSIUM & SODIUM PHOSPHATES POWDER PACK 280-160-250 MG 250 MG: 280-160-250 PACK at 12:47

## 2018-11-06 RX ADMIN — INSULIN LISPRO 1 UNITS: 100 INJECTION, SOLUTION INTRAVENOUS; SUBCUTANEOUS at 21:59

## 2018-11-06 RX ADMIN — POTASSIUM & SODIUM PHOSPHATES POWDER PACK 280-160-250 MG 250 MG: 280-160-250 PACK at 19:53

## 2018-11-06 RX ADMIN — PRAVASTATIN SODIUM 20 MG: 20 TABLET ORAL at 12:17

## 2018-11-06 RX ADMIN — METOPROLOL TARTRATE 25 MG: 25 TABLET ORAL at 19:53

## 2018-11-06 RX ADMIN — POTASSIUM & SODIUM PHOSPHATES POWDER PACK 280-160-250 MG 250 MG: 280-160-250 PACK at 17:35

## 2018-11-06 RX ADMIN — METHADONE HYDROCHLORIDE 20 MG: 10 TABLET ORAL at 19:53

## 2018-11-06 RX ADMIN — METOPROLOL TARTRATE 25 MG: 25 TABLET ORAL at 09:36

## 2018-11-06 RX ADMIN — HYDROMORPHONE HYDROCHLORIDE 0.5 MG: 1 INJECTION, SOLUTION INTRAMUSCULAR; INTRAVENOUS; SUBCUTANEOUS at 09:40

## 2018-11-06 RX ADMIN — METOPROLOL TARTRATE 5 MG: 5 INJECTION, SOLUTION INTRAVENOUS at 21:59

## 2018-11-06 RX ADMIN — MEROPENEM 1 G: 1 INJECTION, POWDER, FOR SOLUTION INTRAVENOUS at 20:01

## 2018-11-06 RX ADMIN — MAGNESIUM GLUCONATE 500 MG ORAL TABLET 400 MG: 500 TABLET ORAL at 09:34

## 2018-11-06 RX ADMIN — Medication 10 ML: at 12:18

## 2018-11-06 RX ADMIN — DEXTROSE MONOHYDRATE: 50 INJECTION, SOLUTION INTRAVENOUS at 14:53

## 2018-11-06 RX ADMIN — LEVETIRACETAM 250 MG: 100 INJECTION, SOLUTION INTRAVENOUS at 21:59

## 2018-11-06 RX ADMIN — METOPROLOL TARTRATE 25 MG: 25 TABLET ORAL at 02:17

## 2018-11-06 RX ADMIN — LOSARTAN POTASSIUM 25 MG: 25 TABLET, FILM COATED ORAL at 09:34

## 2018-11-06 RX ADMIN — AMLODIPINE BESYLATE 10 MG: 10 TABLET ORAL at 09:34

## 2018-11-06 RX ADMIN — MEROPENEM 1 G: 1 INJECTION, POWDER, FOR SOLUTION INTRAVENOUS at 04:04

## 2018-11-06 RX ADMIN — METHADONE HYDROCHLORIDE 20 MG: 10 TABLET ORAL at 02:17

## 2018-11-06 RX ADMIN — MEROPENEM 1 G: 1 INJECTION, POWDER, FOR SOLUTION INTRAVENOUS at 12:46

## 2018-11-06 RX ADMIN — LEVETIRACETAM 250 MG: 100 INJECTION, SOLUTION INTRAVENOUS at 12:12

## 2018-11-06 RX ADMIN — HYDROMORPHONE HYDROCHLORIDE 0.5 MG: 1 INJECTION, SOLUTION INTRAMUSCULAR; INTRAVENOUS; SUBCUTANEOUS at 04:04

## 2018-11-06 RX ADMIN — ONDANSETRON HYDROCHLORIDE 4 MG: 2 SOLUTION INTRAMUSCULAR; INTRAVENOUS at 12:54

## 2018-11-06 RX ADMIN — POTASSIUM & SODIUM PHOSPHATES POWDER PACK 280-160-250 MG 250 MG: 280-160-250 PACK at 09:37

## 2018-11-06 RX ADMIN — NALOXEGOL OXALATE 25 MG: 25 TABLET, FILM COATED ORAL at 09:35

## 2018-11-06 ASSESSMENT — PAIN DESCRIPTION - PAIN TYPE
TYPE: ACUTE PAIN

## 2018-11-06 ASSESSMENT — PAIN DESCRIPTION - DESCRIPTORS
DESCRIPTORS: ACHING

## 2018-11-06 ASSESSMENT — PAIN SCALES - GENERAL
PAINLEVEL_OUTOF10: 7
PAINLEVEL_OUTOF10: 6
PAINLEVEL_OUTOF10: 0
PAINLEVEL_OUTOF10: 7

## 2018-11-06 ASSESSMENT — PAIN DESCRIPTION - LOCATION
LOCATION: GENERALIZED

## 2018-11-06 ASSESSMENT — PAIN DESCRIPTION - ORIENTATION: ORIENTATION: LEFT

## 2018-11-06 ASSESSMENT — PAIN DESCRIPTION - FREQUENCY
FREQUENCY: CONTINUOUS

## 2018-11-06 NOTE — PLAN OF CARE
Problem: Falls - Risk of:  Goal: Will remain free from falls  Will remain free from falls   Outcome: Ongoing  No falls this shift. Call light in reach and used appropriately. Bed alarm remains on. Problem: Pain:  Goal: Pain level will decrease  Pain level will decrease   Outcome: Ongoing  Pain controlled with PRN medications and repositioning this shift. Problem: Bowel/Gastric:  Goal: Complications related to the disease process, condition or treatment will be avoided or minimized  Outcome: Ongoing    Goal: Will be independent with ostomy care  Outcome: Ongoing  Patient not interactive this shift. Continuing to encourage. Problem: Gas Exchange - Impaired:  Goal: Levels of oxygenation will improve  Levels of oxygenation will improve   Outcome: Ongoing  Remains on room air, no complications this shift. Problem: Infection, Septic Shock:  Goal: Will show no infection signs and symptoms  Will show no infection signs and symptoms   Outcome: Ongoing  Patient afebrile this shift, remains tachycardiac, BP stable. Problem: Venous Thromboembolism:  Goal: Will show no signs or symptoms of venous thromboembolism  Will show no signs or symptoms of venous thromboembolism   Outcome: Ongoing  DVT to LLE, known. Anticoagulants on hold until platelet levels increase to 50,000. Problem: Risk for Impaired Skin Integrity  Goal: Tissue integrity - skin and mucous membranes  Structural intactness and normal physiological function of skin and  mucous membranes. Outcome: Ongoing  New blister to left thigh this shift, wound consulted. Patient is assisted with turning every two hours and as needed. Problem: DISCHARGE BARRIERS  Goal: Patient's continuum of care needs are met  Outcome: Ongoing  [place] evaluation pending. Comments: Care plan reviewed with patient and family. Patient not interactive, family verbalize understanding of the plan of care and contribute to goal setting.

## 2018-11-06 NOTE — PLAN OF CARE
Problem: Risk for Impaired Skin Integrity  Goal: Tissue integrity - skin and mucous membranes  Structural intactness and normal physiological function of skin and  mucous membranes. Outcome: Ongoing  In to see pt to assess a fluid-filled blister on her left inner upper thigh from a gillis catheter. She does have a stage 2 pressure ulcer fluid-filled blister measuring 2x3.5cm on that left inner thigh. Skin prep was applied the area and it was covered with dry gauze. Pt does hold her legs together very tightly and is difficult to evaluate which may have contributed to the pressure ulcer. She also has an established colostomy. She currently has an intact flat  [place] two-piece 2 3/4\" cut-to-fit pouch in place with stool noted in the pouch. She did have supplies in the room. We will continue to follow. Care plan reviewed with patient and patient's family as well as the nurse. Patient and family verbalize understanding of the plan of care and contribute to goal setting.         Comments:       Left inner upper thigh wound 2x3.5cm

## 2018-11-06 NOTE — CARE COORDINATION
10/29/18 12:19 PM    Spoke with Padilla Williamson at [facility]; verified patient current with [facility] for RN and PT. She states OT was to start; she is unsure why it hadn't yet. Reported patient in hospital with plans to return home and continue [facility] services at discharge.
11/05/18 7:12 AM    Pt transferred to 4K24. Handoff report given to Bala Ahumada, 4K CM.
11/2/18, 12:55 PM      Luis Mckeon day: 4  Location: -06/006-A Reason for admit: Severe sepsis ([facility] [address] [A41.9, R65.20]  Septic shock ([facility] [address] [A41.9, R65.21]   Procedure:   10/29 CXR: Decreasing lung volumes with bibasilar atelectasis. Mild infiltrate cannot excluded  10/29 Repeat CXR: Bilateral perihilar interstitial infiltrates are demonstrated, slightly worse on the left. This may represent mild pulmonary edema versus pneumonia  10/29 Intubated  10/29 Bronch - washings sent  10/29 EEG: No seizure activity noted  10/29 MRI Brain: No mass or abnormal enhancement. No gross seizure focus  10/30 LLE Venous dopplers: Extensive relatively acute deep venous thrombosis extending from the left calf to the left groin. There appears be slightly greater amount of thrombus in the left common femoral vein at this time compared to prior study  10/30 CT Abd/pelvis: There is a presacral collection again seen measuring approximately 6 x 3 cm in the oblique transverse and AP dimensions respectively on axial image 70. 8 evaluation of this collection measures slightly above fluid attenuation suggesting possible phlegmon. Previously, this measured approximately 7 x 2 cm. There are decreased foci of gas attenuation within this collection when compared to prior examination. However, the previously demonstrated percutaneous drain has been removed since the prior exam; Pathologic fractures of the bilateral anterior sacral ala. There is also a compression fracture involving the posterior S1 vertebral body which also appears pathologic; Small amount of free fluid within the leftward paracolic gutter and left upper quadrant. There is also moderate free fluid within the pelvis. This is nonspecific; There is thickening of the gallbladder wall which may be related to incomplete distention.  However, cannot exclude gallbladder wall edema or pericholecystic fluid; Nonspecific diffuse circumferential bladder wall thickening along with
11/5/18, 8:08 AM    DISCHARGE BARRIERS        Patient transferred to  24. Report given to unit Adolfo Yun, regarding discharge plan for this patient.
Plan of care: Hernandez barkley pending  51/22/91  2:23 PM
emergently intubated and bronched. Intensivist consulted. Wound Care and Palliative Care consulted. EEG and MRI brain ordered. Remains on vent w/ETT on PCMV, peep 6, 30% FIO2, sats 100%. Tmax 100.5. Cardiac monitoring, I&O, daily weight, oral care, wound care, ostomy care, gillis care, OG to LIWS. Vasopressin stopped this afternoon. Precedex @ 0.4 mcg/kg/hr, levo @ 20 mcg/min, IV ascorbic acid, IV decadron 2 mg Q12H, valium, lovenox bid, pepcid, cerebyx, IV diflucan, nph, synthroid, methadone, movantik, IV zosyn, pravastatin, IV thiamine. Loading dose of cerebyx given. IV ativan x1 given. IV vancomycin x1 given. Labs: Na+ 132, CO2 19, AG 18, LA 5.8 - then 6.7, procal 53.27, wbc 0.5, Hgb 10.1, plt 63. Urine: Large blood, pro 30, Large leukocytes - sent for culture. Blood and respiratory cultures sent. Diet: DIET CARB CONTROL;   Smoking status:  reports that she has never smoked. She has never used smokeless tobacco.   PCP: Jim Baron MD  Readmission: no  Readmission Risk Score: 38%    Discharge Planning  Current Residence:     Living Arrangements:      Support Systems:     Current Services PTA:     Potential Assistance Needed:     Potential Assistance Purchasing Medications:     Does patient want to participate in local refill/ meds to beds program?     Type of Home Care Services:     Patient expects to be discharged to:     Expected Discharge date: Follow Up Appointment: Best Day/ Time:      Discharge Plan: Spoke with Marei's mother; states Denys lives at home with her son and her mother has been visiting/staying. Denys uses a cane or walker and also has a wheelchair. She states she was current with [facility] for RN and therapy. She states plan is for Marie to return home at discharge and continue [facility] services as PTA. This CM called [facility] and confirmed RN/PT/OT services - see separate note. ICU Understanding Delirium pamphlet given to patient's family.

## 2018-11-07 LAB
ANION GAP SERPL CALCULATED.3IONS-SCNC: 11 MEQ/L (ref 8–16)
ATYPICAL LYMPHOCYTES: ABNORMAL %
BASOPHILIA: ABNORMAL
BASOPHILS # BLD: 0.9 %
BASOPHILS ABSOLUTE: 0.1 THOU/MM3 (ref 0–0.1)
BUN BLDV-MCNC: 6 MG/DL (ref 7–22)
CALCIUM SERPL-MCNC: 8 MG/DL (ref 8.5–10.5)
CHLORIDE BLD-SCNC: 95 MEQ/L (ref 98–111)
CO2: 22 MEQ/L (ref 23–33)
CREAT SERPL-MCNC: 0.4 MG/DL (ref 0.4–1.2)
DOHLE BODIES: PRESENT
EOSINOPHIL # BLD: 0.6 %
EOSINOPHILS ABSOLUTE: 0.1 THOU/MM3 (ref 0–0.4)
ERYTHROCYTE [DISTWIDTH] IN BLOOD BY AUTOMATED COUNT: 15.8 % (ref 11.5–14.5)
ERYTHROCYTE [DISTWIDTH] IN BLOOD BY AUTOMATED COUNT: 50.1 FL (ref 35–45)
GFR SERPL CREATININE-BSD FRML MDRD: > 90 ML/MIN/1.73M2
GLUCOSE BLD-MCNC: 112 MG/DL (ref 70–108)
GLUCOSE BLD-MCNC: 115 MG/DL (ref 70–108)
GLUCOSE BLD-MCNC: 253 MG/DL (ref 70–108)
GLUCOSE BLD-MCNC: 90 MG/DL (ref 70–108)
HCT VFR BLD CALC: 37.7 % (ref 37–47)
HEMOGLOBIN: 13.2 GM/DL (ref 12–16)
IMMATURE GRANS (ABS): 1.36 THOU/MM3 (ref 0–0.07)
IMMATURE GRANULOCYTES: 11 %
LYMPHOCYTES # BLD: 6.3 %
LYMPHOCYTES ABSOLUTE: 0.8 THOU/MM3 (ref 1–4.8)
MCH RBC QN AUTO: 31.2 PG (ref 26–33)
MCHC RBC AUTO-ENTMCNC: 35 GM/DL (ref 32.2–35.5)
MCV RBC AUTO: 89.1 FL (ref 81–99)
MONOCYTES # BLD: 5 %
MONOCYTES ABSOLUTE: 0.6 THOU/MM3 (ref 0.4–1.3)
NUCLEATED RED BLOOD CELLS: 1 /100 WBC
PLATELET # BLD: 68 THOU/MM3 (ref 130–400)
PLATELET ESTIMATE: ABNORMAL
PMV BLD AUTO: 11.6 FL (ref 9.4–12.4)
POTASSIUM REFLEX MAGNESIUM: 3.7 MEQ/L (ref 3.5–5.2)
RBC # BLD: 4.23 MILL/MM3 (ref 4.2–5.4)
SCAN OF BLOOD SMEAR: NORMAL
SEG NEUTROPHILS: 76.2 %
SEGMENTED NEUTROPHILS ABSOLUTE COUNT: 9.4 THOU/MM3 (ref 1.8–7.7)
SODIUM BLD-SCNC: 128 MEQ/L (ref 135–145)
SPHEROCYTES: ABNORMAL
TOXIC GRANULATION: PRESENT
WBC # BLD: 12.3 THOU/MM3 (ref 4.8–10.8)

## 2018-11-07 PROCEDURE — 36556 INSERT NON-TUNNEL CV CATH: CPT

## 2018-11-07 PROCEDURE — 97110 THERAPEUTIC EXERCISES: CPT

## 2018-11-07 PROCEDURE — 97530 THERAPEUTIC ACTIVITIES: CPT

## 2018-11-07 PROCEDURE — 2580000003 HC RX 258: Performed by: PSYCHIATRY & NEUROLOGY

## 2018-11-07 PROCEDURE — 82948 REAGENT STRIP/BLOOD GLUCOSE: CPT

## 2018-11-07 PROCEDURE — 99253 IP/OBS CNSLTJ NEW/EST LOW 45: CPT | Performed by: NURSE PRACTITIONER

## 2018-11-07 PROCEDURE — 6360000002 HC RX W HCPCS: Performed by: INTERNAL MEDICINE

## 2018-11-07 PROCEDURE — 92526 ORAL FUNCTION THERAPY: CPT

## 2018-11-07 PROCEDURE — 6370000000 HC RX 637 (ALT 250 FOR IP): Performed by: INTERNAL MEDICINE

## 2018-11-07 PROCEDURE — 2709999900 HC NON-CHARGEABLE SUPPLY

## 2018-11-07 PROCEDURE — 2500000003 HC RX 250 WO HCPCS: Performed by: INTERNAL MEDICINE

## 2018-11-07 PROCEDURE — 6360000002 HC RX W HCPCS: Performed by: PSYCHIATRY & NEUROLOGY

## 2018-11-07 PROCEDURE — 85025 COMPLETE CBC W/AUTO DIFF WBC: CPT

## 2018-11-07 PROCEDURE — 2580000003 HC RX 258: Performed by: INTERNAL MEDICINE

## 2018-11-07 PROCEDURE — 99232 SBSQ HOSP IP/OBS MODERATE 35: CPT | Performed by: PHYSICIAN ASSISTANT

## 2018-11-07 PROCEDURE — 36592 COLLECT BLOOD FROM PICC: CPT

## 2018-11-07 PROCEDURE — 80048 BASIC METABOLIC PNL TOTAL CA: CPT

## 2018-11-07 PROCEDURE — 36415 COLL VENOUS BLD VENIPUNCTURE: CPT

## 2018-11-07 PROCEDURE — 2060000000 HC ICU INTERMEDIATE R&B

## 2018-11-07 PROCEDURE — 6360000002 HC RX W HCPCS: Performed by: PHYSICIAN ASSISTANT

## 2018-11-07 RX ORDER — FONDAPARINUX SODIUM 2.5 MG/.5ML
7.5 INJECTION SUBCUTANEOUS EVERY 24 HOURS
Status: DISCONTINUED | OUTPATIENT
Start: 2018-11-07 | End: 2018-11-07 | Stop reason: CLARIF

## 2018-11-07 RX ORDER — HEPARIN SODIUM (PORCINE) LOCK FLUSH IV SOLN 100 UNIT/ML 100 UNIT/ML
500 SOLUTION INTRAVENOUS PRN
Status: DISCONTINUED | OUTPATIENT
Start: 2018-11-07 | End: 2018-11-08 | Stop reason: HOSPADM

## 2018-11-07 RX ORDER — FONDAPARINUX SODIUM 7.5 MG/.6ML
7.5 INJECTION SUBCUTANEOUS EVERY 24 HOURS
Status: DISCONTINUED | OUTPATIENT
Start: 2018-11-08 | End: 2018-11-08 | Stop reason: HOSPADM

## 2018-11-07 RX ORDER — LEVETIRACETAM 250 MG/1
250 TABLET ORAL 2 TIMES DAILY
Status: DISCONTINUED | OUTPATIENT
Start: 2018-11-07 | End: 2018-11-08

## 2018-11-07 RX ADMIN — MEROPENEM 1 G: 1 INJECTION, POWDER, FOR SOLUTION INTRAVENOUS at 02:54

## 2018-11-07 RX ADMIN — MICAFUNGIN SODIUM 100 MG: 20 INJECTION, POWDER, LYOPHILIZED, FOR SOLUTION INTRAVENOUS at 10:35

## 2018-11-07 RX ADMIN — MEROPENEM 1 G: 1 INJECTION, POWDER, FOR SOLUTION INTRAVENOUS at 12:19

## 2018-11-07 RX ADMIN — PRAVASTATIN SODIUM 20 MG: 20 TABLET ORAL at 09:26

## 2018-11-07 RX ADMIN — DOCUSATE SODIUM 100 MG: 50 LIQUID ORAL at 09:26

## 2018-11-07 RX ADMIN — Medication 10 ML: at 10:14

## 2018-11-07 RX ADMIN — Medication 6 UNITS: at 17:47

## 2018-11-07 RX ADMIN — Medication 10 ML: at 21:18

## 2018-11-07 RX ADMIN — MEROPENEM 1 G: 1 INJECTION, POWDER, FOR SOLUTION INTRAVENOUS at 21:06

## 2018-11-07 RX ADMIN — Medication 10 ML: at 09:35

## 2018-11-07 RX ADMIN — MAGNESIUM GLUCONATE 500 MG ORAL TABLET 400 MG: 500 TABLET ORAL at 09:26

## 2018-11-07 RX ADMIN — FONDAPARINUX SODIUM 7.5 MG: 2.5 INJECTION SUBCUTANEOUS at 12:18

## 2018-11-07 RX ADMIN — HYDROMORPHONE HYDROCHLORIDE 0.5 MG: 1 INJECTION, SOLUTION INTRAMUSCULAR; INTRAVENOUS; SUBCUTANEOUS at 21:14

## 2018-11-07 RX ADMIN — POTASSIUM & SODIUM PHOSPHATES POWDER PACK 280-160-250 MG 250 MG: 280-160-250 PACK at 12:57

## 2018-11-07 RX ADMIN — AMLODIPINE BESYLATE 10 MG: 10 TABLET ORAL at 09:25

## 2018-11-07 RX ADMIN — METHADONE HYDROCHLORIDE 20 MG: 10 TABLET ORAL at 09:26

## 2018-11-07 RX ADMIN — POTASSIUM & SODIUM PHOSPHATES POWDER PACK 280-160-250 MG 250 MG: 280-160-250 PACK at 09:28

## 2018-11-07 RX ADMIN — SODIUM CHLORIDE, PRESERVATIVE FREE 500 UNITS: 5 INJECTION INTRAVENOUS at 10:15

## 2018-11-07 RX ADMIN — DEXTROSE MONOHYDRATE: 50 INJECTION, SOLUTION INTRAVENOUS at 13:02

## 2018-11-07 RX ADMIN — POTASSIUM & SODIUM PHOSPHATES POWDER PACK 280-160-250 MG 250 MG: 280-160-250 PACK at 21:13

## 2018-11-07 RX ADMIN — NALOXEGOL OXALATE 25 MG: 25 TABLET, FILM COATED ORAL at 09:25

## 2018-11-07 RX ADMIN — METOPROLOL TARTRATE 25 MG: 25 TABLET ORAL at 09:26

## 2018-11-07 RX ADMIN — METOPROLOL TARTRATE 37.5 MG: 25 TABLET ORAL at 21:06

## 2018-11-07 RX ADMIN — METHADONE HYDROCHLORIDE 20 MG: 10 TABLET ORAL at 17:48

## 2018-11-07 RX ADMIN — LEVETIRACETAM 250 MG: 250 TABLET, FILM COATED ORAL at 21:07

## 2018-11-07 RX ADMIN — LEVETIRACETAM 250 MG: 100 INJECTION, SOLUTION INTRAVENOUS at 10:38

## 2018-11-07 RX ADMIN — METHADONE HYDROCHLORIDE 20 MG: 10 TABLET ORAL at 02:55

## 2018-11-07 RX ADMIN — POTASSIUM & SODIUM PHOSPHATES POWDER PACK 280-160-250 MG 250 MG: 280-160-250 PACK at 17:48

## 2018-11-07 RX ADMIN — LOSARTAN POTASSIUM 25 MG: 25 TABLET, FILM COATED ORAL at 09:26

## 2018-11-07 RX ADMIN — METOPROLOL TARTRATE 5 MG: 5 INJECTION, SOLUTION INTRAVENOUS at 14:32

## 2018-11-07 ASSESSMENT — PAIN DESCRIPTION - LOCATION
LOCATION: GENERALIZED

## 2018-11-07 ASSESSMENT — PAIN DESCRIPTION - DESCRIPTORS
DESCRIPTORS: ACHING
DESCRIPTORS: ACHING

## 2018-11-07 ASSESSMENT — PAIN DESCRIPTION - PROGRESSION: CLINICAL_PROGRESSION: NOT CHANGED

## 2018-11-07 ASSESSMENT — PAIN DESCRIPTION - PAIN TYPE
TYPE: ACUTE PAIN

## 2018-11-07 ASSESSMENT — PAIN SCALES - GENERAL
PAINLEVEL_OUTOF10: 6
PAINLEVEL_OUTOF10: 10
PAINLEVEL_OUTOF10: 7
PAINLEVEL_OUTOF10: 7
PAINLEVEL_OUTOF10: 5
PAINLEVEL_OUTOF10: 6
PAINLEVEL_OUTOF10: 4

## 2018-11-07 ASSESSMENT — PAIN DESCRIPTION - FREQUENCY
FREQUENCY: CONTINUOUS
FREQUENCY: CONTINUOUS

## 2018-11-07 ASSESSMENT — PAIN DESCRIPTION - ONSET: ONSET: ON-GOING

## 2018-11-07 NOTE — PROGRESS NOTES
chloride flush  IV Drips/Infusions   dextrose      dextrose 50 mL/hr at 11/06/18 1453     Past Medical History         Diagnosis Date    Blood transfusion     Cancer [facility])     colorectal    Diabetes mellitus type 2 in nonobese ([address] 10/27/2017    Hyperlipidemia     Hypertension     Hyperthyroidism       Past Surgical History           Procedure Laterality Date    COLON SURGERY  6-5-12    Robotic assisted left hemicolectomy-Dr. Alvarez    COLONOSCOPY      HEMORRHOID SURGERY  8/2013    banding     HYSTERECTOMY  5/2011    HYSTEROSCOPY  2004    OTHER SURGICAL HISTORY  7/30/2013    RIGHT ABD. WALL MASS EXCISION-Dr. Charo Rios    [address] INCL FLUOR GDNCE DX W/CELL WASHG SPX N/A 10/29/2018    BRONCHOSCOPY performed by Cooper Teran MD at [facility] [place] SCRN NOT  [address] IND Left 10/27/2017    COLONOSCOPY performed by Zakiya Stevenson MD at [facility]    [place] OFFICE/OUTPT VISIT,PROCEDURE ONLY N/A 11/2/2018    BRONCHOSCOPY performed by Cooper Teran MD at [address]  1990's    TUNNELED VENOUS PORT PLACEMENT  6/28/2012    purple power port-Dr. Charo Rios      Diet    Dietary Nutrition Supplements: Diabetic Oral Supplement  DIET DYSPHAGIA II MECHANICALLY ALTERED; Allergies    Patient has no known allergies.   Social History     Social History     Social History    Marital status:      Spouse name: N/A    Number of children: 3    Years of education: 12     Occupational History    [employer]      Social History Main Topics    Smoking status: Never Smoker    Smokeless tobacco: Never Used    Alcohol use No    Drug use: No    Sexual activity: Not on file     Other Topics Concern    Not on file     Social History Narrative    No narrative on file     Family History      Family History   Problem Relation Age of Onset    Diabetes Mother     High Blood Pressure Mother     High Blood Pressure Father     Cancer Father         small voice recognition software. It may contain minor errors which are inherent in voice recognition technology. ** Final report electronically signed by Dr Panchito Lorenzana on 10/25/2018 11:28 AM     Mri Brain W Wo Contrast     Result Date: 10/29/2018  PROCEDURE: MRI BRAIN W WO CONTRAST CLINICAL INFORMATIONnew on set seizure with possible brain mets. Colon cancer on chemotherapy COMPARISON: CT 9/20/2018 TECHNIQUE: Multiplanar and multiple spin echo T1 and T2-weighted images were obtained through the brain before and after the administration of intravenous contrast. FINDINGS: Ventricles and extra-axial spaces: Normal for age. Brain: There is no diffusion restriction to suggest ischemia. There are a few tiny areas of high T2 signal bilaterally. They involve each periventricular white matter region as well as the left temporal lobe. They do not have associated contrast enhancement favoring chronic microangiopathic foci. No mass or abnormal enhancement. No hemorrhage on gradient echo. Conventional brain images of the hippocampal regions show no asymmetry or sclerosis. Brainstem: Normal Cerebellum: Normal Other:  The vascular flow voids are intact. Mild ethmoid mucosal thickening. Minimal maxillary and sphenoid thickening. Subtle mastoid inflammation, left greater than right. The sella is unremarkable. The calvarium is unremarkable.      No mass or abnormal enhancement. No gross seizure focus. **This report has been created using voice recognition software. It may contain minor errors which are inherent in voice recognition technology. ** Final report electronically signed by Dr. Fortunato Alcaraz on 10/29/2018 2:41 PM    Assessment/Recommendations    1. Metastatic Rectal Cancer - followed by Dr. Ric Reyes, and at [facility]. Dr. Eryn Rawls is covering inpatient stay. Patient received FOLFIRI and Avastin on 10/22/18. Paper copy of Dr Mitch Pinto office/Dr. Dariana Feliz note in chart.     -Palliative care following.  Chemotherapy on hold during acute hospitalization and while being treated for infection.    -Due to recurrent bacteremia (Sept, October 2018), Dr. Sara Scherer recommends removal of mediport when platelets are greater than 50,000.   2. Thrombocytopenia - Improving, platelets 34,968. Likely related to recent chemotherapy and reactive from sepsis. HIT panel HIT PF4 Ab, IgG level of 0.044. Reference range <=0.399, discussed with lab - this is a negative result although presence of antibiotics less than reference range noted. Resume Fondaparinux. 3. Sepsis - with septic shock. Due to UTI and acalculous cholecystitis. Blood cultures 10/29: E. Coli. On IV Merrem, IV micafungin.    -Recommend removal of mediport when stable and platelets greater than 50,000.   4.  Acute DVT - noted on doppler 10/30. Had been on Xarelto prior to admission. fondaparinux on hold due to thrombocytopenia. Resume Fondaparinux. 5. Hematuria - Much improved. Discussed with JOSE Killian-CNP with Urology, plan outpatient follow up. 6. Leukopenia - Resolved, now leukocytosis with WBC count 12.3. Likely related to recent Neulasta on 10/22/18. 7. Normocytic Anemia - resolved. Likely chemotherapy induced. Received 3 units of PRBCs on 10/31/18.   8. Acute Respiratory Failure - Resolved. S/P extubated on 11/2/18   Other comorbidities: recurrent rectal abscess, DM-2, hypothyroidism, hyperlipidemia, hypertension         Case discussed with nurse and patient  Questions and concerns addressed.   Plan made in collaboration with Dr. Sara Scherer    Electronically signed by   Britney Vazquez PA-C on 11/7/2018 at 10:50 AM

## 2018-11-07 NOTE — CONSULTS
SCRN NOT HI RSK IND Left 10/27/2017    COLONOSCOPY performed by Gustavo Vincent MD at [address] [facility]    [place] OFFICE/OUTPT VISIT,PROCEDURE ONLY N/A 11/2/2018    BRONCHOSCOPY performed by Virgil Tubbs MD at [address]  1990's    TUNNELED VENOUS PORT PLACEMENT  6/28/2012    purple power port-Dr. Dima Jacinto        Social History     Social History    Marital status:      Spouse name: N/A    Number of children: 3    Years of education: 12     Occupational History    [employer]      Social History Main Topics    Smoking status: Never Smoker    Smokeless tobacco: Never Used    Alcohol use No    Drug use: No    Sexual activity: Not on file     Other Topics Concern    Not on file     Social History Narrative    No narrative on file       AL  Family History   Problem Relation Age of Onset    Diabetes Mother     High Blood Pressure Mother     High Blood Pressure Father     Cancer Father         small intestine       Allergies:  No Known Allergies    ROS:  Constitutional: Negative for chills, fatigue, fever, or weight loss. Eyes: Denies reported visual changes. ENT: Denies headache, difficulty swallowing, nose bleeds, ringing in ears, or earaches. Cardiovascular: Negative for chest pain, palpitations, tachycardia or edema. Respiratory: Denies cough or SOB. GI:The patient denies abdominal or flank pain, anorexia, nausea or vomiting. : See HPI  Musculoskeletal: Patient denies low back pain or painful or reduced ROM of the back or extremities. Lymphatic: Denies swollen glands in neck, axillary or inguinal areas. Psychiatric: Denies anxiety or depression. Skin: Denies rash or lesions. The remainder of the complete ROS is negative    PHYSICAL EXAM:  VITALS:  BP (!) 164/104   Pulse 108   Temp 99.3 °F (37.4 °C) (Oral)   Resp 17   Ht 5' (1.524 m)   Wt 127 lb 12.8 oz (58 kg)   LMP 05/01/2011   SpO2 93%   BMI 24.96 kg/m² .   Nursing note and vitals

## 2018-11-07 NOTE — PROGRESS NOTES
ADL  LE Dressing: Dependent/Total (donning socks )  Additional Comments: Pt declining to complete any further ADL tasks           Bed mobility  Supine to Sit: Maximum assistance (Pt given verbal instructions on tech but unable to follow through or initiate tasks on own. Pt requiring max A to initiate tasks and verbal cues and tactile for step by step)  Sit to Supine: Maximum assistance            Balance  Sitting Balance:  (varying assistance from CGA to mod A d/t falling posteriorly. Pt using bilateral UE support significantly. Attempted to have pt reach forwaqrd with right UE with pt having a difficult time following through with directions. )                                               Comment: completed supine bilatearl UE AROM ex in shoulder flexion, chest press, horiz abd/add and elbow flexion x 10 reps to increse UB activity tolerance for ADL tasks. pt with smooth controlled movements throughout. Pt with no complaints of reports of SOB when asked. Activity Tolerance:  Activity Tolerance: Patient limited by pain  Activity Tolerance: Pt does not tolerate sitting eOB d/t pain in buttocks     Assessment:     Performance deficits / Impairments: Decreased functional mobility , Decreased ADL status, Decreased ROM, Decreased strength, Decreased cognition, Decreased endurance, Decreased balance  Prognosis: Fair    Discharge Recommendations:  Discharge Recommendations: ECF with OT    Patient Education:  Patient Education: safety with EOB sitting     Equipment Recommendations: Other: TBD pending progress, discharge environment    Safety:  Safety Devices in place: Yes  Type of devices: All fall risk precautions in place, Call light within reach, Bed alarm in place, Left in bed, Nurse notified  Restraints  Initially in place: No  Restraints: B wrist; secured at end of session; occasional cues/holding of UEs to prevent pulling at lines/tubes during session    Plan:  Times per week: 5x  Current Treatment Recommendations: Strengthening, Balance Training, Functional Mobility Training, Endurance Training, Safety Education & Training, Patient/Caregiver Education & Training, Equipment Evaluation, Education, & procurement, Self-Care / ADL    Goals:       Short term goals  Time Frame for Short term goals: 2 weeks  Short term goal 1: Pt will tolerate sitting at EOB X10 minutes with consistent minimal assistance or less in prep for ADL tasks. Short term goal 2: Pt will complete simple grooming tasks with moderate assistance to increase ability to complete self care tasks. Short term goal 3: Pt will complete BUE AAROM/AROM X5-10 reps to increase overall strength/endurance needed for ADLs and eventual transfers. Short term goal 4: Pt will tolerate further assessment of transfers/mobility as appropriate.    Long term goals  Time Frame for Long term goals : not set due to ELOS

## 2018-11-07 NOTE — DISCHARGE INSTR - COC
Therapy  Weight Bearing Status/Restrictions: No weight bearing restirctions  Other Medical Equipment (for information only, NOT a DME order): Other Treatments:     Patient's personal belongings (please select all that are sent with patient):  Glasses    RN SIGNATURE:  Electronically signed by Myesha Hall RN on 11/7/18 at 6:28 PM    CASE MANAGEMENT/SOCIAL WORK SECTION    Inpatient Status Date: ***    Readmission Risk Assessment Score:  Readmission Risk              Risk of Unplanned Readmission:        48           Discharging to Facility/ Agency   · Name:   · Address:  · Phone:  · Fax:    Dialysis Facility (if applicable)   · Name:  · Address:  · Dialysis Schedule:  · Phone:  · Fax:    / signature: {Esignature:[id number]}    PHYSICIAN SECTION    Prognosis: Good    Condition at Discharge: Stable    Rehab Potential (if transferring to Rehab): Good    Recommended Labs or Other Treatments After Discharge:     Physician Certification: I certify the above information and transfer of Alphonso Powell  is necessary for the continuing treatment of the diagnosis listed and that she requires LTAC for less 30 days.      Update Admission H&P: No change in H&P    PHYSICIAN SIGNATURE:  Electronically signed by Aldo Ojeda MD on 11/8/18 at 11:06 AM

## 2018-11-07 NOTE — PROGRESS NOTES
Education:  Learner:  [x]Patient          []Significant Other          [x]Other - mother  Education provided regarding:  [x]Goals and POC   [x]Diet and swallowing precautions    []Home Exercise Program  []Progress and/or discharge information  Method of Education:  [x]Discussion          []Demonstration          []Handout          []Other  Evaluation of Education:   [x]Verbalized understanding   []Demonstrates without assistance  [x]Demonstrates with assistance  [x]Needs further instruction     []No evidence of learning                  []No family present      Plan: [x]Continue with current plan of care    []Modify current plan of care as follows:    []Discharge patient    Discharge Location:    Services/Supervision Recommended:     [x]Patient continues to require treatment by a licensed therapist to address functional deficits as outlined in the established plan of care.     Aurora JAMA-UMEDGARDO/[id number]

## 2018-11-08 ENCOUNTER — HOSPITAL ENCOUNTER (OUTPATIENT)
Age: 53
Discharge: HOME | End: 2018-11-09
Attending: INTERNAL MEDICINE | Admitting: INTERNAL MEDICINE
Payer: COMMERCIAL

## 2018-11-08 VITALS
DIASTOLIC BLOOD PRESSURE: 86 MMHG | TEMPERATURE: 99.1 F | BODY MASS INDEX: 23.37 KG/M2 | OXYGEN SATURATION: 92 % | HEART RATE: 120 BPM | RESPIRATION RATE: 16 BRPM | WEIGHT: 119.05 LBS | HEIGHT: 60 IN | SYSTOLIC BLOOD PRESSURE: 131 MMHG

## 2018-11-08 LAB
ANION GAP SERPL CALCULATED.3IONS-SCNC: 10 MEQ/L (ref 8–16)
ANISOCYTOSIS: PRESENT
BASOPHILIA: ABNORMAL
BASOPHILS # BLD: 0.9 %
BASOPHILS ABSOLUTE: 0.1 THOU/MM3 (ref 0–0.1)
BUN BLDV-MCNC: 4 MG/DL (ref 7–22)
CALCIUM SERPL-MCNC: 8 MG/DL (ref 8.5–10.5)
CHLORIDE BLD-SCNC: 95 MEQ/L (ref 98–111)
CO2: 24 MEQ/L (ref 23–33)
CREAT SERPL-MCNC: 0.3 MG/DL (ref 0.4–1.2)
DOHLE BODIES: PRESENT
EOSINOPHIL # BLD: 0.4 %
EOSINOPHILS ABSOLUTE: 0.1 THOU/MM3 (ref 0–0.4)
ERYTHROCYTE [DISTWIDTH] IN BLOOD BY AUTOMATED COUNT: 15.6 % (ref 11.5–14.5)
ERYTHROCYTE [DISTWIDTH] IN BLOOD BY AUTOMATED COUNT: 50.3 FL (ref 35–45)
GFR SERPL CREATININE-BSD FRML MDRD: > 90 ML/MIN/1.73M2
GLUCOSE BLD-MCNC: 78 MG/DL (ref 70–108)
HCT VFR BLD CALC: 35.1 % (ref 37–47)
HEMOGLOBIN: 12.2 GM/DL (ref 12–16)
IMMATURE GRANS (ABS): 1.43 THOU/MM3 (ref 0–0.07)
IMMATURE GRANULOCYTES: 10.8 %
LYMPHOCYTES # BLD: 5.5 %
LYMPHOCYTES ABSOLUTE: 0.7 THOU/MM3 (ref 1–4.8)
MAGNESIUM: 1.6 MG/DL (ref 1.6–2.4)
MCH RBC QN AUTO: 31.1 PG (ref 26–33)
MCHC RBC AUTO-ENTMCNC: 34.8 GM/DL (ref 32.2–35.5)
MCV RBC AUTO: 89.5 FL (ref 81–99)
MONOCYTES # BLD: 4.6 %
MONOCYTES ABSOLUTE: 0.6 THOU/MM3 (ref 0.4–1.3)
NUCLEATED RED BLOOD CELLS: 0 /100 WBC
PLATELET # BLD: 99 THOU/MM3 (ref 130–400)
PLATELET ESTIMATE: ABNORMAL
PMV BLD AUTO: 10.9 FL (ref 9.4–12.4)
POTASSIUM REFLEX MAGNESIUM: 3.2 MEQ/L (ref 3.5–5.2)
RBC # BLD: 3.92 MILL/MM3 (ref 4.2–5.4)
SCAN OF BLOOD SMEAR: NORMAL
SEG NEUTROPHILS: 77.8 %
SEGMENTED NEUTROPHILS ABSOLUTE COUNT: 10.3 THOU/MM3 (ref 1.8–7.7)
SODIUM BLD-SCNC: 129 MEQ/L (ref 135–145)
SPHEROCYTES: ABNORMAL
TOXIC GRANULATION: PRESENT
TSH SERPL DL<=0.05 MIU/L-ACNC: 2.74 UIU/ML (ref 0.4–4.2)
WBC # BLD: 13.2 THOU/MM3 (ref 4.8–10.8)

## 2018-11-08 PROCEDURE — 2580000003 HC RX 258: Performed by: INTERNAL MEDICINE

## 2018-11-08 PROCEDURE — 84443 ASSAY THYROID STIM HORMONE: CPT

## 2018-11-08 PROCEDURE — 2709999900 HC NON-CHARGEABLE SUPPLY

## 2018-11-08 PROCEDURE — 6370000000 HC RX 637 (ALT 250 FOR IP): Performed by: INTERNAL MEDICINE

## 2018-11-08 PROCEDURE — 99232 SBSQ HOSP IP/OBS MODERATE 35: CPT | Performed by: SURGERY

## 2018-11-08 PROCEDURE — 85025 COMPLETE CBC W/AUTO DIFF WBC: CPT

## 2018-11-08 PROCEDURE — APPSS30 APP SPLIT SHARED TIME 16-30 MINUTES: Performed by: NURSE PRACTITIONER

## 2018-11-08 PROCEDURE — 36415 COLL VENOUS BLD VENIPUNCTURE: CPT

## 2018-11-08 PROCEDURE — 80048 BASIC METABOLIC PNL TOTAL CA: CPT

## 2018-11-08 PROCEDURE — 36592 COLLECT BLOOD FROM PICC: CPT

## 2018-11-08 PROCEDURE — 6360000002 HC RX W HCPCS: Performed by: INTERNAL MEDICINE

## 2018-11-08 PROCEDURE — 83735 ASSAY OF MAGNESIUM: CPT

## 2018-11-08 PROCEDURE — 6370000000 HC RX 637 (ALT 250 FOR IP): Performed by: PSYCHIATRY & NEUROLOGY

## 2018-11-08 RX ORDER — PANTOPRAZOLE SODIUM 40 MG/1
40 TABLET, DELAYED RELEASE ORAL
Qty: 30 TABLET | Refills: 3 | Status: SHIPPED | OUTPATIENT
Start: 2018-11-09 | End: 2018-11-08 | Stop reason: HOSPADM

## 2018-11-08 RX ORDER — METHADONE HYDROCHLORIDE 10 MG/1
20 TABLET ORAL EVERY 8 HOURS SCHEDULED
Qty: 10 TABLET | Refills: 0 | Status: SHIPPED | OUTPATIENT
Start: 2018-11-08 | End: 2018-11-12

## 2018-11-08 RX ORDER — FONDAPARINUX SODIUM 7.5 MG/.6ML
7.5 INJECTION SUBCUTANEOUS EVERY 24 HOURS
Qty: 3 ML | Refills: 2 | Status: SHIPPED | OUTPATIENT
Start: 2018-11-09

## 2018-11-08 RX ORDER — LEVETIRACETAM 250 MG/1
250 TABLET ORAL DAILY
Status: DISCONTINUED | OUTPATIENT
Start: 2018-11-08 | End: 2018-11-08 | Stop reason: HOSPADM

## 2018-11-08 RX ORDER — HYDRALAZINE HYDROCHLORIDE 20 MG/ML
5 INJECTION INTRAMUSCULAR; INTRAVENOUS EVERY 6 HOURS PRN
Qty: 1 VIAL | Refills: 1 | Status: SHIPPED | OUTPATIENT
Start: 2018-11-08

## 2018-11-08 RX ORDER — LEVETIRACETAM 250 MG/1
250 TABLET ORAL DAILY
Qty: 60 TABLET | Refills: 3 | Status: SHIPPED | OUTPATIENT
Start: 2018-11-09

## 2018-11-08 RX ORDER — LOSARTAN POTASSIUM 25 MG/1
25 TABLET ORAL DAILY
Qty: 30 TABLET | Refills: 3 | Status: SHIPPED | OUTPATIENT
Start: 2018-11-09

## 2018-11-08 RX ORDER — METOPROLOL TARTRATE 37.5 MG/1
37.5 TABLET, FILM COATED ORAL 2 TIMES DAILY
Qty: 60 TABLET | Refills: 3 | Status: SHIPPED | OUTPATIENT
Start: 2018-11-08

## 2018-11-08 RX ORDER — POTASSIUM CHLORIDE 750 MG/1
40 TABLET, FILM COATED, EXTENDED RELEASE ORAL ONCE
Status: COMPLETED | OUTPATIENT
Start: 2018-11-08 | End: 2018-11-08

## 2018-11-08 RX ADMIN — METOPROLOL TARTRATE 37.5 MG: 25 TABLET ORAL at 07:44

## 2018-11-08 RX ADMIN — AMLODIPINE BESYLATE 10 MG: 10 TABLET ORAL at 07:44

## 2018-11-08 RX ADMIN — HYDROMORPHONE HYDROCHLORIDE 4 MG: 2 TABLET ORAL at 11:00

## 2018-11-08 RX ADMIN — METHADONE HYDROCHLORIDE 20 MG: 10 TABLET ORAL at 09:28

## 2018-11-08 RX ADMIN — Medication 10 ML: at 07:45

## 2018-11-08 RX ADMIN — MAGNESIUM GLUCONATE 500 MG ORAL TABLET 400 MG: 500 TABLET ORAL at 07:44

## 2018-11-08 RX ADMIN — MEROPENEM 1 G: 1 INJECTION, POWDER, FOR SOLUTION INTRAVENOUS at 04:48

## 2018-11-08 RX ADMIN — NALOXEGOL OXALATE 25 MG: 25 TABLET, FILM COATED ORAL at 07:44

## 2018-11-08 RX ADMIN — LEVETIRACETAM 250 MG: 250 TABLET, FILM COATED ORAL at 07:44

## 2018-11-08 RX ADMIN — POTASSIUM & SODIUM PHOSPHATES POWDER PACK 280-160-250 MG 250 MG: 280-160-250 PACK at 07:45

## 2018-11-08 RX ADMIN — LOSARTAN POTASSIUM 25 MG: 25 TABLET, FILM COATED ORAL at 07:44

## 2018-11-08 RX ADMIN — POTASSIUM CHLORIDE 40 MEQ: 750 TABLET, FILM COATED, EXTENDED RELEASE ORAL at 07:44

## 2018-11-08 RX ADMIN — PRAVASTATIN SODIUM 20 MG: 20 TABLET ORAL at 07:45

## 2018-11-08 RX ADMIN — METHADONE HYDROCHLORIDE 20 MG: 10 TABLET ORAL at 01:41

## 2018-11-08 ASSESSMENT — PAIN SCALES - GENERAL
PAINLEVEL_OUTOF10: 7
PAINLEVEL_OUTOF10: 5
PAINLEVEL_OUTOF10: 7
PAINLEVEL_OUTOF10: 8

## 2018-11-08 ASSESSMENT — PAIN DESCRIPTION - FREQUENCY: FREQUENCY: CONTINUOUS

## 2018-11-08 ASSESSMENT — PAIN DESCRIPTION - LOCATION: LOCATION: GENERALIZED

## 2018-11-08 ASSESSMENT — PAIN DESCRIPTION - DESCRIPTORS: DESCRIPTORS: ACHING

## 2018-11-08 ASSESSMENT — PAIN DESCRIPTION - ONSET: ONSET: ON-GOING

## 2018-11-08 ASSESSMENT — PAIN DESCRIPTION - PAIN TYPE: TYPE: ACUTE PAIN

## 2018-11-08 ASSESSMENT — PAIN DESCRIPTION - PROGRESSION: CLINICAL_PROGRESSION: NOT CHANGED

## 2018-11-08 NOTE — PROGRESS NOTES
[facility] -  Dr. Valentino Ramsay  Daily Progress Note    Pt Name: Vita Banda  Medical Record Number: [id number]  Date of Birth 1965   Today's Date: 11/8/2018    Hospital day # 10     ASSESSMENT   1. Acute respiratory failure- resolved  2. Recurrent seizures  3. Acalculous cholecystitis - benign abdomen   4. Edematous colostomy- resolved  5. Urinary tract infection  6. Chronic rectal wound  7. Left lower extremity DVT  8. Colorectal adenocarcinoma recurrence with metastases  9. Lactic acidosis  10. Diabetes mellitus  11. Pancytopenia   has a past medical history of Blood transfusion; Cancer ([facility] [address] Diabetes mellitus type 2 in nonobese [facility]); Hyperlipidemia; Hypertension; and Hyperthyroidism. PLAN   1. Re-consulted for port removal per oncology/ID. They think recurrent infection may be secondary to port and would like for it to be removed. 2. Infectious disease following  3. Continue IV antibiotics and antifungals  4. Abdomen still benign   5. Oncology following along. Chemotherapy on hold right now. 6. Pain & nausea control as needed   7. Labs improving. Platelets 93,297.  8. Hold anticoagulant today  9. Informed consent  10. NPO  11. Surgical intervention tomorrow morning for port removal. Discussed plan with patient and family member. Questions answered and patient agrees to proceed. SUBJECTIVE   Chief complaint: \"My butt hurts. \"    Patient stable overnight. Now on 4K. Patient more talkative today. Family present. Doing better. She followed commands. Very weak. Ostomy is pink, moist. No longer edematous. Functioning well. No signs of necrosis. Abdomen is non-distended, soft, non-tender. Holder in place. Edema to lower legs L>R. Right sided mediport in place without signs of infection.    CURRENT MEDICATIONS   Scheduled Meds:   levETIRAcetam  250 mg Oral Daily    metoprolol tartrate  37.5 mg Oral BID    fondaparinux  7.5 mg Subcutaneous Q24H    pantoprazole  40 mg symmetrical, trachea midline, no adenopathy  LUNGS: lungs clears  HEART: regular rate with regular rhythm  ABDOMEN: soft, non-tender, non-distended, bowel sounds present in all 4 quadrants and no guarding or peritoneal signs. Ostomy to LLQ. Stoma is pink, moist, and no longer edematous. NEUROLOGIC: There are no focalizing motor or sensory deficits. Generalized weakness. EXTREMITIES: no cyanosis, no clubbing. Swelling to lower legs L>R. In: 1289.2 [P.O.:500; I.V.:789.2]  Out: 1300 [Urine:900]    Date 11/08/18 0000 - 11/08/18 2359   Shift 1197-5002 9026-3933 0422-1173 24 Hour Total   I  N  T  A  K  E   P.O. 200   200    I.V.  (mL/kg/hr) 516  (1.2)   516    Shift Total  (mL/kg) 716  (13.3)   716  (13.3)   O  U  T  P  U  T   Urine  (mL/kg/hr) 475  (1.1)   475    Emesis/NG output 0   0    Other 0   0    Stool 0   0    Blood 0   0    Shift Total  (mL/kg) 475  (8.8)   475  (8.8)   Weight (kg) 54 54 54 54     LABS     Recent Labs      11/06/18   0413  11/07/18   0340  11/08/18   0438   WBC  13.0*  12.3*  13.2*   HGB  14.1  13.2  12.2   HCT  40.4  37.7  35.1*   PLT  51*  68*  99*   NA  130*  128*  129*   K  3.6  3.7  3.2*   CL  95*  95*  95*   CO2  21*  22*  24   BUN  6*  6*  4*   CREATININE  0.4  0.4  0.3*   MG   --    --   1.6   CALCIUM  8.5  8.0*  8.0*      RADIOLOGY   No recent imaging. Electronically signed by JOSE Francis CNP on 11/8/2018 at 10:40 AM     Patient seen and examined independently by me. Above discussed and I agree with Marquita Matthews CNP. See my additional comments below for updated orders and plan. Labs, cultures, and radiographs where available were reviewed. I discussed patient concerns with the patient's nurse and instructions were given. Please see our orders for the updated patient care plan. - Agree with removing port. Stopping blood thinner today. Transferring to [place]. Antibiotics per infectious disease. Thrombocytopenia much improved. No signs of active bleeding.

## 2018-11-08 NOTE — PROGRESS NOTES
[facility] THERAPY MISSED TREATMENT NOTE  [facility] ICU STEPDOWN TELEMETRY 4K  [id number]      Date: 2018  Patient Name: Alphonso Powell        CSN: [id number]   : 1965  (47 y.o.)  Gender: female   Referring Practitioner: Linder Angelucci, MD  Diagnosis: severe sepsis         REASON FOR MISSED TREATMENT:  Missed Treat.   Pt transferred to [place] prior to arrival of TERRY

## 2018-11-08 NOTE — PLAN OF CARE
Problem: Falls - Risk of:  Goal: Will remain free from falls  Will remain free from falls   Outcome: Ongoing  Patient remains free of falls this shift. Non skid socks, bed alarm on, bed in lowest position, siderails up x2, call light within reach. Goal: Absence of physical injury  Absence of physical injury   Outcome: Ongoing  No new signs of physical injury this shift. Problem: Pain:  Goal: Pain level will decrease  Pain level will decrease   Outcome: Ongoing  Pain Assessment: 0-10  Pain Level: 5   Pain goal:  5  Is pain goal met at this time? Yes  Pain Intervention(s): Medication (see eMar)  Additional interventions to be implemented: medications scheduled methadone and PRN Dilaudid and position change      Problem: Bowel/Gastric:  Goal: Complications related to the disease process, condition or treatment will be avoided or minimized  Outcome: Ongoing  No signs of infection. Goal: Will be independent with ostomy care  Outcome: Ongoing  Dependent on nursing staff with ostomy care at this time. Patient weak, working with PT and OT. Problem: Gas Exchange - Impaired:  Goal: Levels of oxygenation will improve  Levels of oxygenation will improve   Outcome: Completed Date Met: 11/08/18  Oxygen 95% on room air. Problem: Infection, Septic Shock:  Goal: Will show no infection signs and symptoms  Will show no infection signs and symptoms   Outcome: Ongoing  No signs of infection this shift. Left subclavian CVC dressing clean, dry, intact; lumens have alcohol caps. Gillis catheter urine myranda in color, gillis care performed this shift. Problem: Serum Glucose Level - Abnormal:  Goal: Ability to maintain appropriate glucose levels will improve  Ability to maintain appropriate glucose levels will improve   Outcome: Ongoing  Chem was 90 this evening.      Problem: Venous Thromboembolism:  Goal: Will show no signs or symptoms of venous thromboembolism  Will show no signs or symptoms of venous thromboembolism

## 2018-11-09 ENCOUNTER — HOSPITAL ENCOUNTER (OUTPATIENT)
Age: 53
Setting detail: OUTPATIENT SURGERY
Discharge: NURSING, REHAB OR LONG-TERM CARE | End: 2018-11-09
Attending: SURGERY | Admitting: SURGERY
Payer: COMMERCIAL

## 2018-11-09 ENCOUNTER — HOSPITAL ENCOUNTER (OUTPATIENT)
Age: 53
Discharge: NURSING, REHAB OR LONG-TERM CARE | End: 2018-11-23
Attending: INTERNAL MEDICINE | Admitting: INTERNAL MEDICINE
Payer: COMMERCIAL

## 2018-11-09 ENCOUNTER — APPOINTMENT (OUTPATIENT)
Dept: MRI IMAGING | Age: 53
End: 2018-11-09
Attending: INTERNAL MEDICINE
Payer: COMMERCIAL

## 2018-11-09 VITALS — HEART RATE: 120 BPM | OXYGEN SATURATION: 79 %

## 2018-11-09 DIAGNOSIS — M54.5 LOW BACK PAIN, UNSPECIFIED BACK PAIN LATERALITY, UNSPECIFIED CHRONICITY, WITH SCIATICA PRESENCE UNSPECIFIED: ICD-10-CM

## 2018-11-09 PROCEDURE — 72148 MRI LUMBAR SPINE W/O DYE: CPT

## 2018-11-09 PROCEDURE — 3600000002 HC SURGERY LEVEL 2 BASE: Performed by: SURGERY

## 2018-11-09 PROCEDURE — 87075 CULTR BACTERIA EXCEPT BLOOD: CPT

## 2018-11-09 PROCEDURE — 36590 REMOVAL TUNNELED CV CATH: CPT | Performed by: SURGERY

## 2018-11-09 PROCEDURE — 87070 CULTURE OTHR SPECIMN AEROBIC: CPT

## 2018-11-09 PROCEDURE — 3600000012 HC SURGERY LEVEL 2 ADDTL 15MIN: Performed by: SURGERY

## 2018-11-09 PROCEDURE — 87205 SMEAR GRAM STAIN: CPT

## 2018-11-09 PROCEDURE — 87147 CULTURE TYPE IMMUNOLOGIC: CPT

## 2018-11-09 PROCEDURE — 2500000003 HC RX 250 WO HCPCS: Performed by: SURGERY

## 2018-11-09 RX ORDER — LIDOCAINE HYDROCHLORIDE 10 MG/ML
INJECTION, SOLUTION EPIDURAL; INFILTRATION; INTRACAUDAL; PERINEURAL PRN
Status: DISCONTINUED | OUTPATIENT
Start: 2018-11-09 | End: 2018-11-09 | Stop reason: HOSPADM

## 2018-11-09 NOTE — H&P
Pt Name: El Dunn  Medical Record Number: [id number]  Date of Birth 1965   Today's Date: 11/8/2018     Hospital day # 10      ASSESSMENT   1. Acute respiratory failure- resolved  2. Recurrent seizures  3. Acalculous cholecystitis - benign abdomen   4. Edematous colostomy- resolved  5. Urinary tract infection  6. Chronic rectal wound  7. Left lower extremity DVT  8. Colorectal adenocarcinoma recurrence with metastases  9. Lactic acidosis  10. Diabetes mellitus  11. Pancytopenia   has a past medical history of Blood transfusion; Cancer ([facility] [address] Diabetes mellitus type 2 in nonobese [facility]); Hyperlipidemia; Hypertension; and Hyperthyroidism. PLAN   1. Re-consulted for port removal per oncology/ID. They think recurrent infection may be secondary to port and would like for it to be removed. 2. Infectious disease following  3. Continue IV antibiotics and antifungals  4. Abdomen still benign   5. Oncology following along. Chemotherapy on hold right now. 6. Pain & nausea control as needed   7. Labs improving. Platelets 84,450.  8. Hold anticoagulant today  9. Informed consent  10. NPO  11. Surgical intervention tomorrow morning for port removal. Discussed plan with patient and family member. Questions answered and patient agrees to proceed. SUBJECTIVE   Chief complaint: \"My butt hurts. \"     Patient stable overnight. Now on 4K. Patient more talkative today. Family present. Doing better. She followed commands. Very weak. Ostomy is pink, moist. No longer edematous. Functioning well. No signs of necrosis. Abdomen is non-distended, soft, non-tender. Holder in place. Edema to lower legs L>R. Right sided mediport in place without signs of infection.    CURRENT MEDICATIONS   Scheduled Meds:  Scheduled Medications    levETIRAcetam  250 mg Oral Daily    metoprolol tartrate  37.5 mg Oral BID    fondaparinux  7.5 mg Subcutaneous Q24H    pantoprazole  40 mg Oral QAM AC    insulin lispro  0-12 Units Subcutaneous TID WC    purple power port-Dr. Zehra Cheatham        No current facility-administered medications for this encounter. Current Outpatient Prescriptions   Medication Sig Dispense Refill    methadone (DOLOPHINE) 10 MG tablet Take 2 tablets by mouth every 8 hours for 10 doses. . 10 tablet 0    fondaparinux (ARIXTRA) 7.5 MG/0.6ML SOLN injection Inject 0.6 mLs into the skin every 24 hours 3 mL 2    levETIRAcetam (KEPPRA) 250 MG tablet Take 1 tablet by mouth daily 60 tablet 3    insulin lispro (HUMALOG) 100 UNIT/ML injection vial Inject 0-12 Units into the skin 3 times daily (with meals) 1 vial 3    insulin lispro (HUMALOG) 100 UNIT/ML injection vial Inject 0-6 Units into the skin nightly 1 vial 3    hydrALAZINE (APRESOLINE) 20 MG/ML injection Infuse 0.25 mLs intravenously every 6 hours as needed (for sbp > 160) 1 vial 1    losartan (COZAAR) 25 MG tablet Take 1 tablet by mouth daily 30 tablet 3    metoprolol tartrate 37.5 MG TABS Take 37.5 mg by mouth 2 times daily 60 tablet 3    magnesium oxide (MAG-OX) 400 (241.3 Mg) MG TABS tablet Take 1 tablet by mouth daily 30 tablet 2    potassium & sodium phosphates (PHOS-NAK) 280-160-250 MG PACK Take 1 packet by mouth 4 times daily 120 packet 2    micafungin (MYCAMINE) IVPB Infuse 100 mg intravenously daily for 5 days Compound per protocol 500 mg 0    meropenem (MERREM) infusion Infuse 1,000 mg intravenously every 8 hours for 5 days Compound per protocol. 1 each 0    HYDROmorphone (DILAUDID) 4 MG tablet Take 4 mg by mouth every 3 hours as needed for Pain. .      ondansetron (ZOFRAN) 4 MG tablet Take 4 mg by mouth every 8 hours as needed for Nausea or Vomiting      senna (SENOKOT) 8.6 MG tablet Take 3 tablets by mouth 2 times daily       acetaminophen (TYLENOL) 500 MG tablet Take 500 mg by mouth every 6 hours as needed for Pain      amLODIPine (NORVASC) 10 MG tablet Take 10 mg by mouth daily       naloxegol (MOVANTIK) 25 MG TABS tablet Take 1 tablet by mouth every morning 30 tablet 0    levothyroxine (SYNTHROID) 50 MCG tablet Take 50 mcg by mouth Daily      promethazine (PHENERGAN) 25 MG tablet Take 25 mg by mouth every 6 hours as needed for Nausea      pravastatin (PRAVACHOL) 20 MG tablet Take 20 mg by mouth daily      docusate sodium (COLACE) 100 MG capsule Take 1 capsule by mouth 2 times daily 60 capsule 0       No Known Allergies    Family History   Problem Relation Age of Onset    Diabetes Mother     High Blood Pressure Mother     High Blood Pressure Father     Cancer Father         small intestine       Social History     Social History    Marital status:      Spouse name: N/A    Number of children: 3    Years of education: 12     Occupational History    [employer]      Social History Main Topics    Smoking status: Never Smoker    Smokeless tobacco: Never Used    Alcohol use No    Drug use: No    Sexual activity: Not on file     Other Topics Concern    Not on file     Social History Narrative    No narrative on file     OBJECTIVE   CURRENT VITALS:  height is 5' (1.524 m) and weight is 119 lb 0.8 oz (54 kg). Her oral temperature is 99.1 °F (37.3 °C). Her blood pressure is 131/86 and her pulse is 120. Her respiration is 16 and oxygen saturation is 92%. Temperature Range (24h):Temp: 99.1 °F (37.3 °C) Temp  Av °F (37.2 °C)  Min: 97.2 °F (36.2 °C)  Max: 99.8 °F (37.7 °C)  BP Range (22H): Systolic (77ATX), DEU:594 , Min:119 , ZGQ:551     Diastolic (63QYB), YBX:88, Min:78, Max:104     Pulse Range (24h): Pulse  Av.5  Min: 102  Max: 132  Respiration Range (24h): Resp  Av.5  Min: 16  Max: 18  Current Pulse Ox (24h):  SpO2: 92 %  Pulse Ox Range (24h):  SpO2  Av.5 %  Min: 92 %  Max: 95 %  Oxygen Amount and Delivery: O2 Flow Rate (L/min): 1 L/min (removed)  Incentive Spirometry Tx:             GENERAL: alert, flat affect, cooperative, no distress  SKIN: Skin color, texture, turgor normal. No rashes or lesions.   HEENT: Head is

## 2018-11-09 NOTE — OP NOTE
room, placed  supine on the operating room table. The area of concern was prepped and  draped in the usual sterile standard fashion. A time-out occurred prior  to the operation, which not only identified the patient, but also the  planned procedure to be performed. At the end of the time-out, there  were no questions or concerns. I began the operation first by locally anesthetizing the patient, which  she tolerated as well. A transverse incision was then made through the  previous scar on the right chest.  This was sharply dissected down to  the port. Port was roughly dissected out with scissors and a #15-blade  scalpel. It was then removed and pressure held. No backbleeding. Aerobic and anaerobic cultures sent with the catheter tip. Deep dermal  tissues were then   reapproximated with interrupted 3-0 Vicryl suture  after irrigation. Hemostasis adequate. Skin reapproximated with  running 4-0 Vicryl in a subcuticular fashion. The closed incision was  then cleaned, dried, and Steri-Strips were applied. A 0.5% Marcaine  with epinephrine was used for local anesthetic. The patient tolerated  the injection of local anesthetic without difficulty. The patient was  easily brought out of the operating suite and transferred to [place].         Marilia Osei M.D.    D: 11/09/2018 11:06:39       T: 11/09/2018 11:58:40     [email or web address]  Job#: [id number]     Doc#: [id number]    CC:

## 2018-11-10 ENCOUNTER — APPOINTMENT (OUTPATIENT)
Dept: GENERAL RADIOLOGY | Age: 53
End: 2018-11-10
Attending: INTERNAL MEDICINE
Payer: COMMERCIAL

## 2018-11-10 PROCEDURE — 74022 RADEX COMPL AQT ABD SERIES: CPT

## 2018-11-14 LAB
AEROBIC CULTURE: ABNORMAL
ANAEROBIC CULTURE: ABNORMAL
GRAM STAIN RESULT: ABNORMAL
ORGANISM: ABNORMAL

## 2018-11-24 DIAGNOSIS — C19 COLORECTAL CANCER, STAGE IV (HCC): Primary | ICD-10-CM

## 2018-11-24 RX ORDER — METHADONE HYDROCHLORIDE 10 MG/1
20 TABLET ORAL EVERY 8 HOURS PRN
Qty: 90 TABLET | Refills: 0 | Status: SHIPPED | OUTPATIENT
Start: 2018-11-24 | End: 2018-12-24

## 2018-11-24 NOTE — PROGRESS NOTES
Admission H&P at [facility]      NAME: Rose 5: 18  ROOM #: [id number]  CODE STATUS: FULL CODE  REASON FOR ADMISSION: weakness after prolonged hospitalization  : 1965  ADMISSION DATE: 2018    SKILLED PATIENT: Yes    History obtained from son and mother, chart review, the patient and nursing staff. SUBJECTIVE:  HPI: Veronica Bhatia is a 47 y.o. female. Pt seen and examined at bedside. Patient admitted to [facility] from 10/29 to  for AMS from presumed sepsis. She was then transferred to [place] from  to   D/c summary:  None available at this time. Pt d/c from facility 2018. Last Surgical PN 2018:  ASSESSMENT   1. Acute respiratory failure- resolved  2. Recurrent seizures  3. Acalculous cholecystitis - benign abdomen   4. Edematous colostomy- resolved  5. Urinary tract infection  6. Chronic rectal wound  7. Left lower extremity DVT  8. Colorectal adenocarcinoma recurrence with metastases  9. Lactic acidosis  10. Diabetes mellitus  11. Pancytopenia   has a past medical history of Blood transfusion; Cancer ([facility] [address] Diabetes mellitus type 2 in [facility]); Hyperlipidemia; Hypertension; and Hyperthyroidism. PLAN   1. Re-consulted for port removal per oncology/ID. They think recurrent infection may be secondary to port and would like for it to be removed. 2. Infectious disease following  3. Continue IV antibiotics and antifungals  4. Abdomen still benign   5. Oncology following along. Chemotherapy on hold right now. 6. Pain & nausea control as needed   7. Labs improving. Platelets 91,473.  8. Hold anticoagulant today  9. Informed consent  10. NPO  11. Surgical intervention tomorrow morning for port removal. Discussed plan with patient and family member. Questions answered and patient agrees to proceed. Last IM PN 2018  Assessment and Plan:   12. Septic shock, improved  13. Urosepsis  14. cholecystitis per GB USS  15.  Acute resp failure, Kim    COLONOSCOPY      HEMORRHOID SURGERY  8/2013    banding     HYSTERECTOMY  5/2011    HYSTEROSCOPY  2004    OTHER SURGICAL HISTORY  7/30/2013    RIGHT ABD. WALL MASS EXCISION-Dr. Luli Fernandez    CO [address] INCL FLUOR GDNCE DX W/CELL WASHG [facility] N/A 10/29/2018    BRONCHOSCOPY performed by Adan Raymundo MD at [facility] CA SCRN NOT  [address] IND Left 10/27/2017    COLONOSCOPY performed by Monetta Canavan, MD at [facility]    CO OFFICE/OUTPT VISIT,PROCEDURE ONLY N/A 11/2/2018    BRONCHOSCOPY performed by Adan Raymundo MD at [facility] OFFICE/OUTPT [address] [facility] Right 11/9/2018    RIGHT SUBCLAVIAN PORT REMOVAL performed by Silvia Harrison MD at [facility]  1990's    TUNNELED VENOUS PORT PLACEMENT  6/28/2012    purple power port-Dr. Luli Fernandez        No Known Allergies    Social History     Social History    Marital status:      Spouse name: N/A    Number of children: 3    Years of education: 12     Occupational History    [employer]      Social History Main Topics    Smoking status: Never Smoker    Smokeless tobacco: Never Used    Alcohol use No    Drug use: No    Sexual activity: Not on file     Other Topics Concern    Not on file     Social History Narrative    No narrative on file       Family History   Problem Relation Age of Onset    Diabetes Mother     High Blood Pressure Mother     High Blood Pressure Father     Cancer Father         small intestine         I have reviewed the patient's past medical history, past surgical history, allergies, medications, social and family history and I have made updates where appropriate.       Review of Systems  Positive responses are highlighted in bold    Constitutional:  Fever, Chills, Night Sweats, Fatigue, Unexpected changes in weight  Eyes:  Eye discharge, Eye pain, Eye redness, Visual disturbances   HENT:  Ear pain, Tinnitus, Nosebleeds, Trouble swallowing, Hearing mg/dL        10/30/2018  9:56 AM - Kenneth Vazquez Incoming Lab Results From [facility]     Narrative     [facility]     [facility]                   [id number]  Assoc.                                              Page 1 of 3 [address], [place] [address]                                                      PROC: 10/26/2018  [facility]                                    RECV: 10/29/2018  [address]                                    RPTD: 10/30/2018  [address]HA [address], [place] [address]                      MRN:  [id number]    LOC: Jeimy Arrow                      ACCT:            SEX: F                      [id number]       AGE: 47 Y                      : 1965                         PATHOLOGY REPORT                      ATTN: BUFFY GUAMAN                      REQ: Myriam Section       Copies To:   KENDRICK CAIN      Clinical Information:  GROSS HEMATURIA    SOURCE:  A) URINE    Source Description:                 Materials Prepared & Examined:  Volume. ............ 30 ml           Monolayers. ............... Lennie Wise 1  Color. .......... Monster Lesley  Other. ............... Lennie Wise Method of  collection unknown            FINAL RESULTS:     Negative for high grade urothelial carcinoma.       RBCs, neutrophils, and fungal organisms morphologically       consistent with Candida are present. Narrative   PROCEDURE: MRI LUMBAR SPINE WO CONTRAST       CLINICAL INFORMATION: Low back pain, unspecified back pain laterality, unspecified chronicity, with sciatica presence unspecified. Colon cancer       COMPARISON: CT 10/30/2018       TECHNIQUE: Sagittal and axial T1 and T2-weighted images were obtained through the lumbar spine.       FINDINGS:   Severe neoplastic marrow replacement involves L5. There is mild associated pathologic fracture.        8mm probable metastatic focus within L4.        There is severe neoplastic replacement of S1 and S2. An oblique nondisplaced fracture involves S1.  Subtle pathologic fracture is also favored color flow and compressibility in the posterior tibial vein.       There is normal color flow, spectral analysis and compressibility in the right common femoral vein.           Impression   Extensive deep vein thrombosis throughout the left lower extremity extending from the calf veins up to the common femoral vein.           Findings discussed by Dr. Leann Galvan with Dr. Andrzej Kelly via telephone at 11:27 AM 10/25/2018.       **This report has been created using voice recognition software. It may contain minor errors which are inherent in voice recognition technology. **       Final report electronically signed by Dr Paulette Rodriguez on 10/25/2018 11:28 AM       Narrative   PROCEDURE: MRI BRAIN W WO CONTRAST       CLINICAL INFORMATIONnew on set seizure with possible brain mets. Colon cancer on chemotherapy       COMPARISON: CT 9/20/2018       TECHNIQUE: Multiplanar and multiple spin echo T1 and T2-weighted images were obtained through the brain before and after the administration of intravenous contrast.       FINDINGS:       Ventricles and extra-axial spaces: Normal for age.       Brain: There is no diffusion restriction to suggest ischemia.       There are a few tiny areas of high T2 signal bilaterally. They involve each periventricular white matter region as well as the left temporal lobe. They do not have associated contrast enhancement favoring chronic microangiopathic foci.         No mass or abnormal enhancement.       No hemorrhage on gradient echo.       Conventional brain images of the hippocampal regions show no asymmetry or sclerosis.       Brainstem: Normal       Cerebellum: Normal       Other:  The vascular flow voids are intact.        Mild ethmoid mucosal thickening. Minimal maxillary and sphenoid thickening. Subtle mastoid inflammation, left greater than right.        The sella is unremarkable. The calvarium is unremarkable.               Impression   No mass or abnormal enhancement.  No gross seizure

## 2018-11-29 ENCOUNTER — CLINICAL DOCUMENTATION (OUTPATIENT)
Dept: FAMILY MEDICINE CLINIC | Age: 53
End: 2018-11-29

## 2018-11-29 VITALS
BODY MASS INDEX: 20.03 KG/M2 | TEMPERATURE: 98.4 F | HEIGHT: 60 IN | WEIGHT: 102 LBS | HEART RATE: 120 BPM | RESPIRATION RATE: 16 BRPM | SYSTOLIC BLOOD PRESSURE: 120 MMHG | DIASTOLIC BLOOD PRESSURE: 72 MMHG

## 2018-11-29 DIAGNOSIS — C20 METASTASIS FROM RECTAL CANCER (HCC): ICD-10-CM

## 2018-11-29 DIAGNOSIS — R00.0 TACHYCARDIA: ICD-10-CM

## 2018-11-29 DIAGNOSIS — N32.89 BLADDER WALL THICKENING: ICD-10-CM

## 2018-11-29 DIAGNOSIS — E44.0 MODERATE MALNUTRITION (HCC): ICD-10-CM

## 2018-11-29 DIAGNOSIS — Z79.4 TYPE 2 DIABETES MELLITUS WITH COMPLICATION, WITH LONG-TERM CURRENT USE OF INSULIN (HCC): ICD-10-CM

## 2018-11-29 DIAGNOSIS — R31.0 GROSS HEMATURIA: ICD-10-CM

## 2018-11-29 DIAGNOSIS — E03.9 HYPOTHYROIDISM, UNSPECIFIED TYPE: ICD-10-CM

## 2018-11-29 DIAGNOSIS — G89.3 CANCER RELATED PAIN: ICD-10-CM

## 2018-11-29 DIAGNOSIS — I82.402 ACUTE DEEP VEIN THROMBOSIS (DVT) OF LEFT LOWER EXTREMITY, UNSPECIFIED VEIN (HCC): ICD-10-CM

## 2018-11-29 DIAGNOSIS — R41.0 CONFUSION: ICD-10-CM

## 2018-11-29 DIAGNOSIS — I10 ESSENTIAL HYPERTENSION: ICD-10-CM

## 2018-11-29 DIAGNOSIS — C20 MALIGNANT NEOPLASM OF RECTUM (HCC): ICD-10-CM

## 2018-11-29 DIAGNOSIS — R56.9 SINGLE SEIZURE (HCC): ICD-10-CM

## 2018-11-29 DIAGNOSIS — K59.03 DRUG INDUCED CONSTIPATION: ICD-10-CM

## 2018-11-29 DIAGNOSIS — E11.8 TYPE 2 DIABETES MELLITUS WITH COMPLICATION, WITH LONG-TERM CURRENT USE OF INSULIN (HCC): ICD-10-CM

## 2018-11-29 DIAGNOSIS — C79.9 METASTASIS FROM RECTAL CANCER (HCC): ICD-10-CM

## 2018-11-29 DIAGNOSIS — R53.81 PHYSICAL DECONDITIONING: Primary | ICD-10-CM

## 2018-11-29 DIAGNOSIS — E78.5 DYSLIPIDEMIA: ICD-10-CM

## 2018-11-29 DIAGNOSIS — N39.0 ACUTE URINARY TRACT INFECTION: ICD-10-CM

## 2018-12-04 DIAGNOSIS — G89.3 CANCER ASSOCIATED PAIN: Primary | ICD-10-CM

## 2018-12-05 RX ORDER — HYDROMORPHONE HYDROCHLORIDE 4 MG/1
8 TABLET ORAL EVERY 4 HOURS PRN
Qty: 360 TABLET | Refills: 0 | Status: SHIPPED | OUTPATIENT
Start: 2018-12-05 | End: 2019-01-04

## 2018-12-05 NOTE — TELEPHONE ENCOUNTER
JOHANA Coyle does not recall discussing a shot either. Pt. Is out of the hospital and at [facility]. Son states he legs are weak due to not wanting to ambulate due to pain. Discussed with Carlton Eldridge, her nurse at [facility]. They have the pt. On Methadone 20mg q8h and Dilaudid 4mg q4h PRN. We had her on Dilaudid 8mg q4h PRN. Can you sign a Rx to send to the nursing home to get it changed there.

## 2018-12-27 PROBLEM — Z51.5 HOSPICE CARE PATIENT: Status: ACTIVE | Noted: 2018-12-27

## 2018-12-27 NOTE — PROGRESS NOTES
[facility])    On hospice  con't comfort measures  De-escalate meds further as family is comfortable and as warranted  Discussed hospice and dying process with family present, all questions answered    2. Metastasis from rectal cancer ([facility] [address]    As above    3. Cancer related pain    con't morphine, methadone    4. Wound of sacral region, sequela    con't wound care treatment for comfort  Finish karolin  Appreciate Dr. Fidel Flores input    5. Drug induced constipation    con't colace    6. Tachycardia    con't lopressor for comfort    7. Hypothyroidism, unspecified type    con't synthroid for comfort    8. Acute deep vein thrombosis (DVT) of left lower extremity, unspecified vein (HCC)    con't eliquis for now    9. Single seizure ([facility] [address]    con't keppra for now    10.  Hospice care patient      Electronically signed by Melyssa Figueroa DO on 12/28/2018 at 9:14 AM

## 2018-12-28 ENCOUNTER — CLINICAL DOCUMENTATION (OUTPATIENT)
Dept: FAMILY MEDICINE CLINIC | Age: 53
End: 2018-12-28

## 2018-12-28 VITALS
DIASTOLIC BLOOD PRESSURE: 90 MMHG | TEMPERATURE: 98.2 F | SYSTOLIC BLOOD PRESSURE: 117 MMHG | WEIGHT: 103 LBS | RESPIRATION RATE: 18 BRPM | HEIGHT: 60 IN | BODY MASS INDEX: 20.22 KG/M2 | HEART RATE: 126 BPM

## 2018-12-28 DIAGNOSIS — G89.3 CANCER RELATED PAIN: ICD-10-CM

## 2018-12-28 DIAGNOSIS — C79.9 METASTASIS FROM RECTAL CANCER (HCC): ICD-10-CM

## 2018-12-28 DIAGNOSIS — Z51.5 HOSPICE CARE PATIENT: ICD-10-CM

## 2018-12-28 DIAGNOSIS — S31.000S WOUND OF SACRAL REGION, SEQUELA: ICD-10-CM

## 2018-12-28 DIAGNOSIS — C20 MALIGNANT NEOPLASM OF RECTUM (HCC): Primary | ICD-10-CM

## 2018-12-28 DIAGNOSIS — E03.9 HYPOTHYROIDISM, UNSPECIFIED TYPE: ICD-10-CM

## 2018-12-28 DIAGNOSIS — R00.0 TACHYCARDIA: ICD-10-CM

## 2018-12-28 DIAGNOSIS — K59.03 DRUG INDUCED CONSTIPATION: ICD-10-CM

## 2018-12-28 DIAGNOSIS — I82.402 ACUTE DEEP VEIN THROMBOSIS (DVT) OF LEFT LOWER EXTREMITY, UNSPECIFIED VEIN (HCC): ICD-10-CM

## 2018-12-28 DIAGNOSIS — R56.9 SINGLE SEIZURE (HCC): ICD-10-CM

## 2018-12-28 DIAGNOSIS — C20 METASTASIS FROM RECTAL CANCER (HCC): ICD-10-CM

## 2019-01-05 DIAGNOSIS — C20 MALIGNANT NEOPLASM OF RECTUM (HCC): Primary | ICD-10-CM

## 2019-01-05 DIAGNOSIS — Z51.5 HOSPICE CARE PATIENT: ICD-10-CM

## 2019-01-05 RX ORDER — MORPHINE SULFATE 100 MG/5ML
40 SOLUTION ORAL EVERY 4 HOURS
Qty: 84 ML | Refills: 0 | Status: SHIPPED | OUTPATIENT
Start: 2019-01-05 | End: 2019-01-12

## 2023-12-27 NOTE — CONSULTS
PRE-SEDATION ASSESSMENT    CONSENT  Risks, benefits, and alternatives have been discussed with the patient/patient representative, and patient/patient representative agrees to proceed: Yes    MEDICAL HISTORY  Significant medical/surgical history: No  Past Complications with Sedation/Anesthesia: No  Significant Family History: No  Smoking History: Yes  Alcohol/Drug abuse: No  Possible Pregnancy (LMP): No  Cardiac History: No  Respiratory History: No    PHYSICAL EXAM  History and Physical Reviewed: Patient has valid H&P within 30 days. I have reviewed and there are no changes.  Airway Risk History: No previous complications  Airway Anatomy : Class II  Heart : Normal  Lungs : Normal  LOC/Mental Status : Normal    OTHER FINDINGS  Reviewed current medications and allergies: Yes  Pertinent lab/diagnostic test reviewed: Yes    SEDATION RISK ASSESSMENT  Risk Status ASA: Class II - Normal patient with mild systemic disease  EKG Monitoring: Yes    NARRATIVE FINDINGS      Pain management consult noted. Patient follows and recently seen in outpatient pain management clinic. Will continue to follow. Please see daily progress notes for changes and updates. Please call with any questions or concerns.      Electronically signed by JOSE Ayon CNP on 9/25/2018 at 2:34 PM

## (undated) DEVICE — ENDO KIT: Brand: MEDLINE INDUSTRIES, INC.

## (undated) DEVICE — JELLY,LUBE,STERILE,FLIP TOP,TUBE,2-OZ: Brand: MEDLINE

## (undated) DEVICE — SINGLE USE SUCTION VALVE MAJ-209: Brand: SINGLE USE SUCTION VALVE (STERILE)

## (undated) DEVICE — SOLUTION IV IRRIG POUR BRL 0.9% SODIUM CHL 2F7124

## (undated) DEVICE — SET LNR RED GRN W/ BASE CLEANASCOPE

## (undated) DEVICE — TUBING, SUCTION, 1/4" X 6', STRAIGHT: Brand: MEDLINE

## (undated) DEVICE — CONTAINER,SPECIMEN,PNEU TUBE,4OZ,OR STRL: Brand: MEDLINE

## (undated) DEVICE — SINGLE USE BIOPSY VALVE MAJ-210: Brand: SINGLE USE BIOPSY VALVE (STERILE)